# Patient Record
Sex: MALE | Race: WHITE | NOT HISPANIC OR LATINO
[De-identification: names, ages, dates, MRNs, and addresses within clinical notes are randomized per-mention and may not be internally consistent; named-entity substitution may affect disease eponyms.]

---

## 2017-02-01 ENCOUNTER — APPOINTMENT (OUTPATIENT)
Dept: CT IMAGING | Facility: IMAGING CENTER | Age: 77
End: 2017-02-01

## 2017-02-01 ENCOUNTER — OUTPATIENT (OUTPATIENT)
Dept: OUTPATIENT SERVICES | Facility: HOSPITAL | Age: 77
LOS: 1 days | End: 2017-02-01
Payer: COMMERCIAL

## 2017-02-01 DIAGNOSIS — C34.90 MALIGNANT NEOPLASM OF UNSPECIFIED PART OF UNSPECIFIED BRONCHUS OR LUNG: ICD-10-CM

## 2017-02-01 DIAGNOSIS — Z98.89 OTHER SPECIFIED POSTPROCEDURAL STATES: Chronic | ICD-10-CM

## 2017-02-01 DIAGNOSIS — R91.8 OTHER NONSPECIFIC ABNORMAL FINDING OF LUNG FIELD: Chronic | ICD-10-CM

## 2017-02-01 PROCEDURE — 71250 CT THORAX DX C-: CPT

## 2017-02-07 ENCOUNTER — APPOINTMENT (OUTPATIENT)
Dept: THORACIC SURGERY | Facility: CLINIC | Age: 77
End: 2017-02-07

## 2017-02-07 ENCOUNTER — OUTPATIENT (OUTPATIENT)
Dept: OUTPATIENT SERVICES | Facility: HOSPITAL | Age: 77
LOS: 1 days | Discharge: HOME | End: 2017-02-07

## 2017-02-07 VITALS
WEIGHT: 315 LBS | SYSTOLIC BLOOD PRESSURE: 150 MMHG | BODY MASS INDEX: 38.36 KG/M2 | HEIGHT: 76 IN | DIASTOLIC BLOOD PRESSURE: 70 MMHG | OXYGEN SATURATION: 97 % | RESPIRATION RATE: 16 BRPM | HEART RATE: 80 BPM

## 2017-02-07 DIAGNOSIS — C34.90 MALIGNANT NEOPLASM OF UNSPECIFIED PART OF UNSPECIFIED BRONCHUS OR LUNG: ICD-10-CM

## 2017-02-07 DIAGNOSIS — R91.8 OTHER NONSPECIFIC ABNORMAL FINDING OF LUNG FIELD: Chronic | ICD-10-CM

## 2017-02-07 DIAGNOSIS — Z98.89 OTHER SPECIFIED POSTPROCEDURAL STATES: Chronic | ICD-10-CM

## 2017-02-08 ENCOUNTER — APPOINTMENT (OUTPATIENT)
Dept: HEMATOLOGY ONCOLOGY | Facility: CLINIC | Age: 77
End: 2017-02-08

## 2017-05-01 ENCOUNTER — EMERGENCY (EMERGENCY)
Facility: HOSPITAL | Age: 77
LOS: 1 days | Discharge: HOME | End: 2017-05-01
Attending: EMERGENCY MEDICINE | Admitting: EMERGENCY MEDICINE
Payer: COMMERCIAL

## 2017-05-01 VITALS
DIASTOLIC BLOOD PRESSURE: 70 MMHG | OXYGEN SATURATION: 100 % | SYSTOLIC BLOOD PRESSURE: 174 MMHG | TEMPERATURE: 98 F | HEART RATE: 86 BPM | RESPIRATION RATE: 18 BRPM

## 2017-05-01 DIAGNOSIS — Z98.89 OTHER SPECIFIED POSTPROCEDURAL STATES: Chronic | ICD-10-CM

## 2017-05-01 DIAGNOSIS — R91.8 OTHER NONSPECIFIC ABNORMAL FINDING OF LUNG FIELD: Chronic | ICD-10-CM

## 2017-05-01 PROCEDURE — 99283 EMERGENCY DEPT VISIT LOW MDM: CPT

## 2017-05-01 NOTE — ED PROVIDER NOTE - PMH
Anxiety    Atrial fibrillation  s/p ablation 2006  Balanitis    Cellulitis  Both legs 2/2 chronic venous stasis  Diabetes mellitus    HTN (hypertension)    Morbid obesity    Neuropathy    Squamous cell lung cancer    Venous stasis of both lower extremities

## 2017-05-01 NOTE — ED PROVIDER NOTE - NS ED MD SCRIBE ATTENDING SCRIBE SECTIONS
DISPOSITION/HISTORY OF PRESENT ILLNESS/REVIEW OF SYSTEMS/VITAL SIGNS( Pullset)/PAST MEDICAL/SURGICAL/SOCIAL HISTORY

## 2017-05-01 NOTE — ED PROVIDER NOTE - PROGRESS NOTE DETAILS
The scribe's documentation has been prepared under my direction and personally reviewed by me in its entirety. I confirm that the note above accurately reflects all work, treatment, procedures, and medical decision making performed by me. SAI Pearce SAI Pearce - pt seen and evaluted by Dr tirado, wishes to leave before the xrays, will dc with pmd f/u.
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
## 2017-05-01 NOTE — ED PROVIDER NOTE - PSH
H/O prior ablation treatment  cardiac 2006  Lung nodules  Flexible Bronchoscopy, Right VATS, Right Lung Resection - 1/21/15, LLL partial resection 2/2015
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
## 2017-05-01 NOTE — ED PROVIDER NOTE - CARE PLAN
Principal Discharge DX:	Back pain  Instructions for follow-up, activity and diet:	YOU WILL BE FEELING SORE FOR THE NEXT 3-4 DAYS, PLEASE TAKE PERCOCET AS NEEDED FOR THE PAIN EVERY 6-8HRS - BE CAREFUL IT WILL MAKE YOU DROWSY.  SEE YOUR DOCTOR WITHIN 48HRS. RETURN TO ER FOR WORSENING PAIN, SEVERE HEADACHE, DIZZINESS, DIFFICULTY WALKING, PERSISTENT VOMITING.

## 2017-06-28 RX ORDER — RIVAROXABAN 15 MG-20MG
1 KIT ORAL
Qty: 0 | Refills: 0 | COMMUNITY
Start: 2017-06-28

## 2017-06-30 ENCOUNTER — INPATIENT (INPATIENT)
Facility: HOSPITAL | Age: 77
LOS: 4 days | Discharge: HOME | End: 2017-07-05
Attending: INTERNAL MEDICINE | Admitting: INTERNAL MEDICINE
Payer: MEDICARE

## 2017-06-30 VITALS
OXYGEN SATURATION: 100 % | HEART RATE: 143 BPM | TEMPERATURE: 98 F | RESPIRATION RATE: 18 BRPM | SYSTOLIC BLOOD PRESSURE: 159 MMHG | DIASTOLIC BLOOD PRESSURE: 76 MMHG

## 2017-06-30 DIAGNOSIS — Z98.89 OTHER SPECIFIED POSTPROCEDURAL STATES: Chronic | ICD-10-CM

## 2017-06-30 DIAGNOSIS — R91.8 OTHER NONSPECIFIC ABNORMAL FINDING OF LUNG FIELD: Chronic | ICD-10-CM

## 2017-06-30 LAB
ALBUMIN SERPL ELPH-MCNC: 3.6 G/DL — SIGNIFICANT CHANGE UP (ref 3.3–5)
ALP SERPL-CCNC: 60 U/L — SIGNIFICANT CHANGE UP (ref 40–120)
ALT FLD-CCNC: 36 U/L — SIGNIFICANT CHANGE UP (ref 4–41)
APTT BLD: 36.9 SEC — SIGNIFICANT CHANGE UP (ref 27.5–37.4)
AST SERPL-CCNC: 32 U/L — SIGNIFICANT CHANGE UP (ref 4–40)
BASE EXCESS BLDV CALC-SCNC: 1.8 MMOL/L — SIGNIFICANT CHANGE UP
BASOPHILS # BLD AUTO: 0.02 K/UL — SIGNIFICANT CHANGE UP (ref 0–0.2)
BASOPHILS NFR BLD AUTO: 0.4 % — SIGNIFICANT CHANGE UP (ref 0–2)
BILIRUB SERPL-MCNC: 0.5 MG/DL — SIGNIFICANT CHANGE UP (ref 0.2–1.2)
BLOOD GAS VENOUS - CREATININE: 1.04 MG/DL — SIGNIFICANT CHANGE UP (ref 0.5–1.3)
BUN SERPL-MCNC: 18 MG/DL — SIGNIFICANT CHANGE UP (ref 7–23)
CALCIUM SERPL-MCNC: 8.9 MG/DL — SIGNIFICANT CHANGE UP (ref 8.4–10.5)
CHLORIDE BLDV-SCNC: 105 MMOL/L — SIGNIFICANT CHANGE UP (ref 96–108)
CHLORIDE SERPL-SCNC: 101 MMOL/L — SIGNIFICANT CHANGE UP (ref 98–107)
CK MB BLD-MCNC: 5.42 NG/ML — SIGNIFICANT CHANGE UP (ref 1–6.6)
CK SERPL-CCNC: 578 U/L — HIGH (ref 30–200)
CO2 SERPL-SCNC: 22 MMOL/L — SIGNIFICANT CHANGE UP (ref 22–31)
CREAT SERPL-MCNC: 1.03 MG/DL — SIGNIFICANT CHANGE UP (ref 0.5–1.3)
EOSINOPHIL # BLD AUTO: 0.1 K/UL — SIGNIFICANT CHANGE UP (ref 0–0.5)
EOSINOPHIL NFR BLD AUTO: 1.8 % — SIGNIFICANT CHANGE UP (ref 0–6)
GAS PNL BLDV: 137 MMOL/L — SIGNIFICANT CHANGE UP (ref 136–146)
GLUCOSE BLDV-MCNC: 161 — HIGH (ref 70–99)
GLUCOSE SERPL-MCNC: 171 MG/DL — HIGH (ref 70–99)
HCO3 BLDV-SCNC: 24 MMOL/L — SIGNIFICANT CHANGE UP (ref 20–27)
HCT VFR BLD CALC: 39.3 % — SIGNIFICANT CHANGE UP (ref 39–50)
HCT VFR BLDV CALC: 42.1 % — SIGNIFICANT CHANGE UP (ref 39–51)
HGB BLD-MCNC: 13.4 G/DL — SIGNIFICANT CHANGE UP (ref 13–17)
HGB BLDV-MCNC: 13.7 G/DL — SIGNIFICANT CHANGE UP (ref 13–17)
IMM GRANULOCYTES # BLD AUTO: 0.04 # — SIGNIFICANT CHANGE UP
IMM GRANULOCYTES NFR BLD AUTO: 0.7 % — SIGNIFICANT CHANGE UP (ref 0–1.5)
INR BLD: 1.53 — HIGH (ref 0.88–1.17)
LACTATE BLDV-MCNC: 2.6 MMOL/L — HIGH (ref 0.5–2)
LYMPHOCYTES # BLD AUTO: 0.85 K/UL — LOW (ref 1–3.3)
LYMPHOCYTES # BLD AUTO: 14.9 % — SIGNIFICANT CHANGE UP (ref 13–44)
MCHC RBC-ENTMCNC: 30.7 PG — SIGNIFICANT CHANGE UP (ref 27–34)
MCHC RBC-ENTMCNC: 34.1 % — SIGNIFICANT CHANGE UP (ref 32–36)
MCV RBC AUTO: 89.9 FL — SIGNIFICANT CHANGE UP (ref 80–100)
MONOCYTES # BLD AUTO: 0.6 K/UL — SIGNIFICANT CHANGE UP (ref 0–0.9)
MONOCYTES NFR BLD AUTO: 10.5 % — SIGNIFICANT CHANGE UP (ref 2–14)
NEUTROPHILS # BLD AUTO: 4.1 K/UL — SIGNIFICANT CHANGE UP (ref 1.8–7.4)
NEUTROPHILS NFR BLD AUTO: 71.7 % — SIGNIFICANT CHANGE UP (ref 43–77)
NRBC # FLD: 0 — SIGNIFICANT CHANGE UP
NT-PROBNP SERPL-SCNC: 287 PG/ML — SIGNIFICANT CHANGE UP
PCO2 BLDV: 46 MMHG — SIGNIFICANT CHANGE UP (ref 41–51)
PH BLDV: 7.38 PH — SIGNIFICANT CHANGE UP (ref 7.32–7.43)
PLATELET # BLD AUTO: 121 K/UL — LOW (ref 150–400)
PMV BLD: 10 FL — SIGNIFICANT CHANGE UP (ref 7–13)
PO2 BLDV: < 24 MMHG — LOW (ref 35–40)
POTASSIUM BLDV-SCNC: 3.7 MMOL/L — SIGNIFICANT CHANGE UP (ref 3.4–4.5)
POTASSIUM SERPL-MCNC: 4 MMOL/L — SIGNIFICANT CHANGE UP (ref 3.5–5.3)
POTASSIUM SERPL-SCNC: 4 MMOL/L — SIGNIFICANT CHANGE UP (ref 3.5–5.3)
PROT SERPL-MCNC: 6.5 G/DL — SIGNIFICANT CHANGE UP (ref 6–8.3)
PROTHROM AB SERPL-ACNC: 17.3 SEC — HIGH (ref 9.8–13.1)
RBC # BLD: 4.37 M/UL — SIGNIFICANT CHANGE UP (ref 4.2–5.8)
RBC # FLD: 14.6 % — HIGH (ref 10.3–14.5)
SAO2 % BLDV: 36.4 % — LOW (ref 60–85)
SODIUM SERPL-SCNC: 138 MMOL/L — SIGNIFICANT CHANGE UP (ref 135–145)
TROPONIN T SERPL-MCNC: < 0.06 NG/ML — SIGNIFICANT CHANGE UP (ref 0–0.06)
WBC # BLD: 5.71 K/UL — SIGNIFICANT CHANGE UP (ref 3.8–10.5)
WBC # FLD AUTO: 5.71 K/UL — SIGNIFICANT CHANGE UP (ref 3.8–10.5)

## 2017-06-30 PROCEDURE — 71275 CT ANGIOGRAPHY CHEST: CPT | Mod: 26

## 2017-06-30 PROCEDURE — 71010: CPT | Mod: 26

## 2017-06-30 RX ORDER — METOPROLOL TARTRATE 50 MG
25 TABLET ORAL ONCE
Qty: 0 | Refills: 0 | Status: COMPLETED | OUTPATIENT
Start: 2017-06-30 | End: 2017-06-30

## 2017-06-30 RX ORDER — METOPROLOL TARTRATE 50 MG
5 TABLET ORAL ONCE
Qty: 0 | Refills: 0 | Status: COMPLETED | OUTPATIENT
Start: 2017-06-30 | End: 2017-06-30

## 2017-06-30 RX ADMIN — Medication 100 MILLIGRAM(S): at 22:04

## 2017-06-30 RX ADMIN — Medication 25 MILLIGRAM(S): at 23:11

## 2017-06-30 RX ADMIN — Medication 5 MILLIGRAM(S): at 21:50

## 2017-06-30 NOTE — ED PROVIDER NOTE - SKIN, MLM
bilateral lower extremities with skin changes associated with chronic venous stasis, RLE with greater swelling and erythema/warmth

## 2017-06-30 NOTE — ED PROVIDER NOTE - ATTENDING CONTRIBUTION TO CARE
Pt was seen and evaluated by me. Pt states he was seen yesterday at [place] for swelling to LE and found to have a right leg DVT and started on Xarelto. Pt was noted today to have increased right LE swelling. with weakness. Family notes pt has been more tired. Pt denies any fever, chills, SOB, chest pain, palpitations, nausea, vomiting, or abd pain. Lungs CTA b/l. Tachy. Abd soft, non-tender. Noted to have swelling to b/l LE R>L with erythema and warmth. + distal pulses.

## 2017-06-30 NOTE — ED PROVIDER NOTE - CRITICAL CARE PROVIDED
direct patient care (not related to procedure)/interpretation of diagnostic studies/documentation/additional history taking

## 2017-06-30 NOTE — ED PROVIDER NOTE - MEDICAL DECISION MAKING DETAILS
78 y/o M with h/o DM, HTN, DVT on xarelto, afib s/p ablation presents with worsening RLE swelling/erythema/warmth and is in afib w/ RVR. Hypertensive. Concern for PE vs cellultiis as cause of tachycardia. Rate control, IV abx, CTA PA to r/o PE

## 2017-06-30 NOTE — ED PROVIDER NOTE - CHPI ED SYMPTOMS NEG
no chest pain/no chills/no numbness/no fever/no vomiting/no nausea/no pain/no weakness no vomiting/no chest pain/no fever/no chills/no nausea

## 2017-06-30 NOTE — ED ADULT NURSE NOTE - OBJECTIVE STATEMENT
Received pt A&Ox3, respirations even and unlabored b/l. Abdomen soft, nondistended, nontender. HR in 140s, medicated with 5mg metoprolol IV as per MD Romero. Redness, swelling and warmth noted on right LE with b/l redness and swelling. IVL 20g Angiocath placed on right AC. Labs sent. Pt placed on zoll. MD sanchez at bedside. Cardiology NP at bedside. Will continue to monitor. Received pt A&Ox3, respirations even and unlabored b/l. Abdomen soft, nondistended, nontender. HR in 140s, medicated with 5mg metoprolol IV as per MD Romero. Aflutter on cardiac monitor. Redness, swelling and warmth noted on right LE with b/l redness and swelling. IVL 20g Angiocath placed on right AC. Labs sent. Pt placed on zoll. MD sanchez at bedside. Cardiology NP at bedside. Will continue to monitor. Received pt A&Ox3, respirations even and unlabored b/l. Abdomen soft, nondistended, nontender. HR in 140s, medicated with 5mg metoprolol IV as per MD Romero. Wide QRS on cardiac monitor. Redness, swelling and warmth noted on right LE with b/l redness and swelling. IVL 20g Angiocath placed on right AC. Labs sent. Pt placed on zoll. MD sanchez at bedside. Cardiology NP at bedside. Will continue to monitor.

## 2017-06-30 NOTE — ED ADULT NURSE NOTE - CHIEF COMPLAINT QUOTE
Arrives by EMS following dc from [place] yesterday for fall and diagnosed w/ DVT to right leg and found new onset AFlutter which resolved during ER visit. Today RLExt is warm/swollen.  PMHx Afib s/p ablation 2007, HTN, DM on Xarelto, Lung CA last chemo 2yrs ago. HR Noted to be in 140s in triage.  Pt denies any pain except to lower back intermitt and none at present. b/l LExt redness/ruddiness w/ severe swelling to RLExt, ambulating w/own cane. Resps even/unlabored on RA. EKG obtained.  , Charge RN made aware.    MD Collins came to [facility] to eval pt following EKG - wide complex tachy, brought to room 27

## 2017-06-30 NOTE — ED PROVIDER NOTE - OBJECTIVE STATEMENT
78 y/o M with h/o DM, HTN, DVT diagnosed earlier this week on xarelto, a-fib s/p ablation and Lung CA s/p resection of RUL and half of LLL presents with complaint of RLE swelling and redness. Patient found to be in a-fib with RVR with concerning ischemic changes in inferior leads, stemi consult called. Patient was discharged from [place] yesterday where he was found to have RLE DVT and started on xarelto. As per family swelling and redness in RLE worsened today and patient was complaining of shortness of breath.

## 2017-06-30 NOTE — ED ADULT NURSE NOTE - CHIEF COMPLAINT
The patient is a 77y Male complaining of b/l leg swelling, recent discharge from [place] and dx with DVT, aflutter that resolved in [place]. Pt c/o int lower back pain.

## 2017-06-30 NOTE — ED PROVIDER NOTE - PROGRESS NOTE DETAILS
Pt was brought back with abnormal EKG and upfront physician called Cardio and was faxed. Cardio NP contacted. Discussed case with Cardio NP with HR in 140s and SBP 180s and it recommended to give Metoprolol 5mg IV. Pt denies any palpitations, SOB, or chest pain. After Metoprolol pts HR down to 130s and SBP down to 135. Pt still denies any chest pain, SOB, or palpitations.

## 2017-07-01 DIAGNOSIS — L03.90 CELLULITIS, UNSPECIFIED: ICD-10-CM

## 2017-07-01 DIAGNOSIS — I49.9 CARDIAC ARRHYTHMIA, UNSPECIFIED: ICD-10-CM

## 2017-07-01 DIAGNOSIS — E11.9 TYPE 2 DIABETES MELLITUS WITHOUT COMPLICATIONS: ICD-10-CM

## 2017-07-01 DIAGNOSIS — C34.90 MALIGNANT NEOPLASM OF UNSPECIFIED PART OF UNSPECIFIED BRONCHUS OR LUNG: ICD-10-CM

## 2017-07-01 DIAGNOSIS — I82.409 ACUTE EMBOLISM AND THROMBOSIS OF UNSPECIFIED DEEP VEINS OF UNSPECIFIED LOWER EXTREMITY: ICD-10-CM

## 2017-07-01 DIAGNOSIS — I87.8 OTHER SPECIFIED DISORDERS OF VEINS: ICD-10-CM

## 2017-07-01 DIAGNOSIS — I10 ESSENTIAL (PRIMARY) HYPERTENSION: ICD-10-CM

## 2017-07-01 DIAGNOSIS — I48.91 UNSPECIFIED ATRIAL FIBRILLATION: ICD-10-CM

## 2017-07-01 LAB
APTT BLD: 35.3 SEC — SIGNIFICANT CHANGE UP (ref 27.5–37.4)
BASOPHILS # BLD AUTO: 0.03 K/UL — SIGNIFICANT CHANGE UP (ref 0–0.2)
BASOPHILS NFR BLD AUTO: 0.4 % — SIGNIFICANT CHANGE UP (ref 0–2)
BUN SERPL-MCNC: 17 MG/DL — SIGNIFICANT CHANGE UP (ref 7–23)
CALCIUM SERPL-MCNC: 8.6 MG/DL — SIGNIFICANT CHANGE UP (ref 8.4–10.5)
CHLORIDE SERPL-SCNC: 102 MMOL/L — SIGNIFICANT CHANGE UP (ref 98–107)
CHOLEST SERPL-MCNC: 116 MG/DL — LOW (ref 120–199)
CK MB BLD-MCNC: 3.45 NG/ML — SIGNIFICANT CHANGE UP (ref 1–6.6)
CK SERPL-CCNC: 437 U/L — HIGH (ref 30–200)
CO2 SERPL-SCNC: 21 MMOL/L — LOW (ref 22–31)
CREAT SERPL-MCNC: 0.94 MG/DL — SIGNIFICANT CHANGE UP (ref 0.5–1.3)
EOSINOPHIL # BLD AUTO: 0.07 K/UL — SIGNIFICANT CHANGE UP (ref 0–0.5)
EOSINOPHIL NFR BLD AUTO: 0.9 % — SIGNIFICANT CHANGE UP (ref 0–6)
GLUCOSE SERPL-MCNC: 145 MG/DL — HIGH (ref 70–99)
HCT VFR BLD CALC: 41.2 % — SIGNIFICANT CHANGE UP (ref 39–50)
HDLC SERPL-MCNC: 22 MG/DL — LOW (ref 35–55)
HGB BLD-MCNC: 13.7 G/DL — SIGNIFICANT CHANGE UP (ref 13–17)
IMM GRANULOCYTES # BLD AUTO: 0.07 # — SIGNIFICANT CHANGE UP
IMM GRANULOCYTES NFR BLD AUTO: 0.9 % — SIGNIFICANT CHANGE UP (ref 0–1.5)
INR BLD: 1.31 — HIGH (ref 0.88–1.17)
LACTATE SERPL-SCNC: 1.7 MMOL/L — SIGNIFICANT CHANGE UP (ref 0.5–2)
LIPID PNL WITH DIRECT LDL SERPL: 72 MG/DL — SIGNIFICANT CHANGE UP
LYMPHOCYTES # BLD AUTO: 1.09 K/UL — SIGNIFICANT CHANGE UP (ref 1–3.3)
LYMPHOCYTES # BLD AUTO: 14.7 % — SIGNIFICANT CHANGE UP (ref 13–44)
MAGNESIUM SERPL-MCNC: 2 MG/DL — SIGNIFICANT CHANGE UP (ref 1.6–2.6)
MCHC RBC-ENTMCNC: 29.5 PG — SIGNIFICANT CHANGE UP (ref 27–34)
MCHC RBC-ENTMCNC: 33.3 % — SIGNIFICANT CHANGE UP (ref 32–36)
MCV RBC AUTO: 88.8 FL — SIGNIFICANT CHANGE UP (ref 80–100)
MONOCYTES # BLD AUTO: 0.71 K/UL — SIGNIFICANT CHANGE UP (ref 0–0.9)
MONOCYTES NFR BLD AUTO: 9.5 % — SIGNIFICANT CHANGE UP (ref 2–14)
NEUTROPHILS # BLD AUTO: 5.47 K/UL — SIGNIFICANT CHANGE UP (ref 1.8–7.4)
NEUTROPHILS NFR BLD AUTO: 73.6 % — SIGNIFICANT CHANGE UP (ref 43–77)
NRBC # FLD: 0 — SIGNIFICANT CHANGE UP
PHOSPHATE SERPL-MCNC: 2.8 MG/DL — SIGNIFICANT CHANGE UP (ref 2.5–4.5)
PLATELET # BLD AUTO: 128 K/UL — LOW (ref 150–400)
PMV BLD: 9.8 FL — SIGNIFICANT CHANGE UP (ref 7–13)
POTASSIUM SERPL-MCNC: 4.6 MMOL/L — SIGNIFICANT CHANGE UP (ref 3.5–5.3)
POTASSIUM SERPL-SCNC: 4.6 MMOL/L — SIGNIFICANT CHANGE UP (ref 3.5–5.3)
PROTHROM AB SERPL-ACNC: 14.7 SEC — HIGH (ref 9.8–13.1)
RBC # BLD: 4.64 M/UL — SIGNIFICANT CHANGE UP (ref 4.2–5.8)
RBC # FLD: 14.9 % — HIGH (ref 10.3–14.5)
SODIUM SERPL-SCNC: 140 MMOL/L — SIGNIFICANT CHANGE UP (ref 135–145)
TRIGL SERPL-MCNC: 137 MG/DL — SIGNIFICANT CHANGE UP (ref 10–149)
TROPONIN T SERPL-MCNC: < 0.06 NG/ML — SIGNIFICANT CHANGE UP (ref 0–0.06)
WBC # BLD: 7.44 K/UL — SIGNIFICANT CHANGE UP (ref 3.8–10.5)
WBC # FLD AUTO: 7.44 K/UL — SIGNIFICANT CHANGE UP (ref 3.8–10.5)

## 2017-07-01 RX ORDER — INSULIN LISPRO 100/ML
VIAL (ML) SUBCUTANEOUS AT BEDTIME
Qty: 0 | Refills: 0 | Status: DISCONTINUED | OUTPATIENT
Start: 2017-07-01 | End: 2017-07-05

## 2017-07-01 RX ORDER — ACETAMINOPHEN 500 MG
650 TABLET ORAL EVERY 6 HOURS
Qty: 0 | Refills: 0 | Status: DISCONTINUED | OUTPATIENT
Start: 2017-07-01 | End: 2017-07-05

## 2017-07-01 RX ORDER — FERROUS SULFATE 325(65) MG
325 TABLET ORAL DAILY
Qty: 0 | Refills: 0 | Status: DISCONTINUED | OUTPATIENT
Start: 2017-07-01 | End: 2017-07-05

## 2017-07-01 RX ORDER — SODIUM CHLORIDE 9 MG/ML
1000 INJECTION, SOLUTION INTRAVENOUS
Qty: 0 | Refills: 0 | Status: DISCONTINUED | OUTPATIENT
Start: 2017-07-01 | End: 2017-07-05

## 2017-07-01 RX ORDER — ALBUTEROL 90 UG/1
2 AEROSOL, METERED ORAL EVERY 4 HOURS
Qty: 0 | Refills: 0 | Status: DISCONTINUED | OUTPATIENT
Start: 2017-07-01 | End: 2017-07-05

## 2017-07-01 RX ORDER — RIVAROXABAN 15 MG-20MG
15 KIT ORAL
Qty: 0 | Refills: 0 | Status: DISCONTINUED | OUTPATIENT
Start: 2017-07-01 | End: 2017-07-05

## 2017-07-01 RX ORDER — DEXTROSE 50 % IN WATER 50 %
1 SYRINGE (ML) INTRAVENOUS ONCE
Qty: 0 | Refills: 0 | Status: DISCONTINUED | OUTPATIENT
Start: 2017-07-01 | End: 2017-07-05

## 2017-07-01 RX ORDER — DEXTROSE 50 % IN WATER 50 %
12.5 SYRINGE (ML) INTRAVENOUS ONCE
Qty: 0 | Refills: 0 | Status: DISCONTINUED | OUTPATIENT
Start: 2017-07-01 | End: 2017-07-05

## 2017-07-01 RX ORDER — GABAPENTIN 400 MG/1
800 CAPSULE ORAL THREE TIMES A DAY
Qty: 0 | Refills: 0 | Status: DISCONTINUED | OUTPATIENT
Start: 2017-07-01 | End: 2017-07-05

## 2017-07-01 RX ORDER — METOPROLOL TARTRATE 50 MG
50 TABLET ORAL
Qty: 0 | Refills: 0 | Status: DISCONTINUED | OUTPATIENT
Start: 2017-07-01 | End: 2017-07-05

## 2017-07-01 RX ORDER — METOPROLOL TARTRATE 50 MG
12.5 TABLET ORAL
Qty: 0 | Refills: 0 | Status: DISCONTINUED | OUTPATIENT
Start: 2017-07-01 | End: 2017-07-01

## 2017-07-01 RX ORDER — INSULIN LISPRO 100/ML
VIAL (ML) SUBCUTANEOUS
Qty: 0 | Refills: 0 | Status: DISCONTINUED | OUTPATIENT
Start: 2017-07-01 | End: 2017-07-05

## 2017-07-01 RX ORDER — GLUCAGON INJECTION, SOLUTION 0.5 MG/.1ML
1 INJECTION, SOLUTION SUBCUTANEOUS ONCE
Qty: 0 | Refills: 0 | Status: DISCONTINUED | OUTPATIENT
Start: 2017-07-01 | End: 2017-07-05

## 2017-07-01 RX ORDER — DEXTROSE 50 % IN WATER 50 %
25 SYRINGE (ML) INTRAVENOUS ONCE
Qty: 0 | Refills: 0 | Status: DISCONTINUED | OUTPATIENT
Start: 2017-07-01 | End: 2017-07-05

## 2017-07-01 RX ORDER — ACETAMINOPHEN 500 MG
650 TABLET ORAL ONCE
Qty: 0 | Refills: 0 | Status: COMPLETED | OUTPATIENT
Start: 2017-07-01 | End: 2017-07-01

## 2017-07-01 RX ORDER — FUROSEMIDE 40 MG
40 TABLET ORAL
Qty: 0 | Refills: 0 | Status: DISCONTINUED | OUTPATIENT
Start: 2017-07-01 | End: 2017-07-04

## 2017-07-01 RX ORDER — AMLODIPINE BESYLATE 2.5 MG/1
5 TABLET ORAL DAILY
Qty: 0 | Refills: 0 | Status: DISCONTINUED | OUTPATIENT
Start: 2017-07-01 | End: 2017-07-05

## 2017-07-01 RX ORDER — GABAPENTIN 400 MG/1
800 CAPSULE ORAL
Qty: 0 | Refills: 0 | Status: DISCONTINUED | OUTPATIENT
Start: 2017-07-01 | End: 2017-07-01

## 2017-07-01 RX ADMIN — Medication 10 MILLIGRAM(S): at 13:43

## 2017-07-01 RX ADMIN — GABAPENTIN 800 MILLIGRAM(S): 400 CAPSULE ORAL at 18:14

## 2017-07-01 RX ADMIN — Medication 100 MILLIGRAM(S): at 13:42

## 2017-07-01 RX ADMIN — Medication 100 MILLIGRAM(S): at 21:46

## 2017-07-01 RX ADMIN — Medication 325 MILLIGRAM(S): at 12:30

## 2017-07-01 RX ADMIN — GABAPENTIN 800 MILLIGRAM(S): 400 CAPSULE ORAL at 10:02

## 2017-07-01 RX ADMIN — Medication 2: at 09:04

## 2017-07-01 RX ADMIN — Medication 2: at 12:30

## 2017-07-01 RX ADMIN — AMLODIPINE BESYLATE 5 MILLIGRAM(S): 2.5 TABLET ORAL at 13:42

## 2017-07-01 RX ADMIN — Medication 40 MILLIGRAM(S): at 18:14

## 2017-07-01 RX ADMIN — GABAPENTIN 800 MILLIGRAM(S): 400 CAPSULE ORAL at 21:46

## 2017-07-01 RX ADMIN — Medication 100 MILLIGRAM(S): at 07:27

## 2017-07-01 RX ADMIN — Medication 650 MILLIGRAM(S): at 00:35

## 2017-07-01 RX ADMIN — Medication 50 MILLIGRAM(S): at 18:14

## 2017-07-01 RX ADMIN — RIVAROXABAN 15 MILLIGRAM(S): KIT at 18:14

## 2017-07-01 NOTE — H&P ADULT - PMH
Anxiety    Atrial fibrillation  s/p ablation 2006  Balanitis    Cellulitis  Both legs 2/2 chronic venous stasis  Diabetes mellitus    DVT (deep venous thrombosis)  RLE dx 6/28/17  HTN (hypertension)    Morbid obesity    Neuropathy    Squamous cell lung cancer    Venous stasis of both lower extremities

## 2017-07-01 NOTE — CONSULT NOTE ADULT - PROBLEM SELECTOR RECOMMENDATION 9
Admit to medicine  -cardiology consulted  for  possible STEMI ,but EKG more concerning right heart strain   -Given patient chest pain free , incidental  finding of ST changes in EKG   likely 2/2 R Heart strain and on   Xarelto (last dose 6/30) patient  deemed not  a candidate for  urgent C  -Echo in am  -CTPA chest to r/o PE Admit to medicine  -cardiology consulted  for  possible STEMI ,but EKG more concerning right heart strain   -Given patient chest pain free , incidental  finding of ST changes in EKG   likely 2/2 R Heart strain and on   Xarelto (last dose 6/30) patient  deemed not  a candidate for  urgent LHC  Trend CE x3  -Echo in am  -CTPA chest to r/o PE

## 2017-07-01 NOTE — CONSULT NOTE ADULT - SUBJECTIVE AND OBJECTIVE BOX
CHIEF COMPLAINT :   78 y/o male, with a PmHx of DM, HTN, Morbid Obesity, Afib s/p ablation in 2006, Lung ca w/RUL resection 1/21/15 and LLL partial resection 2/2015, was discharged from [facility] on 6/28/17 after being diagnosed with DVT and started on Xarelto, presented to [facility] by EMS with RLE swelling and sob. Pt states while at home yesterday, his wife noticed his right lower leg was red and swollen even more than it normally is so she called 911. Pt denies any fever, chills, chest pain, HA, dizziness, blurred vision, n/v. Pt states he is always sob secondary to the lung resection. In the ED at [facility] today, he was found to be in Afib w/RVR so he was admitted to telemetry for further medical management.    HISTORY OF PRESENT ILLNESS:    PAST MEDICAL & SURGICAL HISTORY:  DVT (deep venous thrombosis): RLE dx 6/28/17  Venous stasis of both lower extremities  Balanitis  Squamous cell lung cancer  Atrial fibrillation: s/p ablation 2006  Cellulitis: Both legs 2/2 chronic venous stasis  Morbid obesity  Anxiety  Neuropathy  Diabetes mellitus  HTN (hypertension)  Lung nodules: Flexible Bronchoscopy, Right VATS, Right Lung Resection - 1/21/15, LLL partial resection 2/2015  H/O prior ablation treatment: cardiac 2006   	    MEDICATIONS:  rivaroxaban 15 milliGRAM(s) Oral two times a day  amLODIPine   Tablet 5 milliGRAM(s) Oral daily  furosemide   Injectable 40 milliGRAM(s) IV Push two times a day  metoprolol 50 milliGRAM(s) Oral two times a day  clindamycin IVPB 600 milliGRAM(s) IV Intermittent every 8 hours  ALBUTerol    90 MICROgram(s) HFA Inhaler 2 Puff(s) Inhalation every 4 hours PRN  acetaminophen   Tablet. 650 milliGRAM(s) Oral every 6 hours PRN  oxyCODONE  5 mG/acetaminophen 325 mG 1 Tablet(s) Oral every 6 hours PRN  busPIRone 10 milliGRAM(s) Oral two times a day  gabapentin 800 milliGRAM(s) Oral three times a day  insulin lispro (HumaLOG) corrective regimen sliding scale   SubCutaneous three times a day before meals  insulin lispro (HumaLOG) corrective regimen sliding scale   SubCutaneous at bedtime  dextrose Gel 1 Dose(s) Oral once PRN  dextrose 50% Injectable 12.5 Gram(s) IV Push once  dextrose 50% Injectable 25 Gram(s) IV Push once  dextrose 50% Injectable 25 Gram(s) IV Push once  glucagon  Injectable 1 milliGRAM(s) IntraMuscular once PRN  dextrose 5%. 1000 milliLiter(s) IV Continuous <Continuous>  ferrous    sulfate 325 milliGRAM(s) Oral daily      FAMILY HISTORY:  Family history of liver cancer (Sibling): sister  Family history of diabetes mellitus (Sibling): Brother  Family history of heart failure (Father): father    Allergies    No Known Allergies    Intolerances      PHYSICAL EXAM:  T(C): 36.4 (07-01-17 @ 05:15), Max: 37.8 (07-01-17 @ 00:23)  HR: 72 (07-01-17 @ 05:15) (72 - 144)  BP: 152/66 (07-01-17 @ 05:15) (111/69 - 171/66)  RR: 18 (07-01-17 @ 05:15) (18 - 22)  SpO2: 98% (07-01-17 @ 05:15) (95% - 100%)  Wt(kg): --  I&O's Summary    30 Jun 2017 07:01  -  01 Jul 2017 07:00  --------------------------------------------------------  IN: 0 mL / OUT: 600 mL / NET: -600 mL    01 Jul 2017 07:01  -  01 Jul 2017 11:46  --------------------------------------------------------  IN: 350 mL / OUT: 0 mL / NET: 350 mL        Appearance: Normal	  HEENT:   no JVD  Cardiovascular: Normal S1 S2, No JVD, No murmurs, No edema  Respiratory: Lungs clear to auscultation	  Gastrointestinal:  Soft, Non-tender, + BS	  Extremities: right lower extremity swelling and tenderness and warmth      CKMB: 3.45 ng/mL (07-01 @ 05:01)  CKMB: 5.42 ng/mL (06-30 @ 21:40)                              13.7   7.44  )-----------( 128      ( 01 Jul 2017 05:01 )             41.2     07-01    140  |  102  |  17  ----------------------------<  145<H>  4.6   |  21<L>  |  0.94    Ca    8.6      01 Jul 2017 05:01  Phos  2.8     07-01  Mg     2.0     07-01    TPro  6.5  /  Alb  3.6  /  TBili  0.5  /  DBili  x   /  AST  32  /  ALT  36  /  AlkPhos  60  06-30    proBNP: Serum Pro-Brain Natriuretic Peptide: 287.0 pg/mL (06-30 @ 21:40)    Lipid Profile:   HgA1c:   TSH:     ASSESSMENT/PLAN:

## 2017-07-01 NOTE — H&P ADULT - NSHPPHYSICALEXAM_GEN_ALL_CORE
Vital Signs Last 24 Hrs  T(C): 36.4 (01 Jul 2017 05:15), Max: 37.8 (01 Jul 2017 00:23)  T(F): 97.5 (01 Jul 2017 05:15), Max: 100 (01 Jul 2017 00:23)  HR: 72 (01 Jul 2017 05:15) (72 - 144)  BP: 152/66 (01 Jul 2017 05:15) (111/69 - 171/66)  BP(mean): --  RR: 18 (01 Jul 2017 05:15) (18 - 22)  SpO2: 98% (01 Jul 2017 05:15) (95% - 100%)    EKG: Afib @ 121, RBBB, T inv III, V2-4, ST dep I, AVL, ST inc III, AVF

## 2017-07-01 NOTE — H&P ADULT - ASSESSMENT
78 y/o male, with a PmHx of DM, HTN, Morbid Obesity, Afib s/p ablation in 2006, Lung ca w/RUL resection 1/21/15 and LLL partial resection 2/2015, was discharged from [facility] on 6/28/17 after being diagnosed with DVT and started on Xarelto, is being admitted to telemetry for RLE cellulitis and afib w/RVR.

## 2017-07-01 NOTE — CONSULT NOTE ADULT - ASSESSMENT
EKG - SVT @ 140 bpm  Tele - several episodes of SVT, no afib     1) SVT - ? aflutter vs atrial tach with 2:1 conduction, increase lopressor to 50mg q12, cont xarelto , get 2d echo , will consider EP consult, has h/o afib ablation    2) Right leg cellulitis - on ciprofloxacin     3) SOB - CT chest negative for PE

## 2017-07-01 NOTE — H&P ADULT - NEGATIVE OPHTHALMOLOGIC SYMPTOMS
no diplopia/no loss of vision R/no blurred vision R/no photophobia/no loss of vision L/no blurred vision L

## 2017-07-01 NOTE — CONSULT NOTE ADULT - SUBJECTIVE AND OBJECTIVE BOX
Patient is a 77y old  Male who presents with a chief complaint of     HPI:·76 y/o M with h/o DM, HTN, DVT diagnosed earlier this week on xarelto, a-fib s/p ablation 2006 ,Small cell  Lung CA s/p resection of RUL and half of LLLin 2015 .who was brought by family concerning worsening shortness of breath , change in mental status and worsening  RLE swelling and redness He was recently admitted to  [facility] s/p fall and discharge on  6/28 on xeralto for+ RLE DVT.  As per family swelling and redness in RLE worsened today and patient was complaining of shortness of breath and weakness .Patient found to be in wide complex tachycardia with RVR with concerning ischemic changes in inferior leads, stemi consult called.In ED EKG reviewed with on call cath interventionalist .EKG showed STD I,aVL,V2-V6,KEILA in III,aVF with RBBB, likely PE . CKMB and trop negative	        PAST MEDICAL & SURGICAL HISTORY:  Venous stasis of both lower extremities  Balanitis  Squamous cell lung cancer  Atrial fibrillation: s/p ablation 2006   Cellulitis: Both legs 2/2 chronic venous stasis  Morbid obesity  Anxiety  Neuropathy  Diabetes mellitus  HTN (hypertension)  Lung nodules: Flexible Bronchoscopy, Right VATS, Right Lung Resection - 1/21/15, LLL partial resection 2/2015  H/O prior ablation treatment: cardiac 2006      FAMILY HISTORY:  Family history of liver cancer (Sibling)  Family history of diabetes mellitus (Sibling)  Family history of heart failure      Social History:  former smoker[x  ]   Non smoker[  ]   Alcohol[ ] amount[   ]       nonalcohol[ ]   ilicit drug use[  ]         Previous Diagnostic Testing:    Echo:    Cath:    Stress Test:    Allergies:No Known Allergies      Medication:  Albuterol 90mcg HF 2 puff Q4 as needed  Norvasc 5mg PO daily  Buspirone 10mg bid  Feso4 325mg daily  gabapentin 800mg TID  metformin 500mg daily  glipizide 1 mg daily  Oxycodone 5/325mg as needed  Xarelto 15mg bid  Lopressor 12.5mg bid          Vital Signs:  Vital Signs Last 24 Hrs  T(C): 37.1 (30 Jun 2017 22:37), Max: 37.1 (30 Jun 2017 22:37)  T(F): 98.8 (30 Jun 2017 22:37), Max: 98.8 (30 Jun 2017 22:37)  HR: 135 (30 Jun 2017 22:37) (134 - 144)  BP: 132/79 (30 Jun 2017 22:37) (111/69 - 171/66)  BP(mean): --  RR: 20 (30 Jun 2017 22:37) (18 - 22)  SpO2: 99% (30 Jun 2017 22:37) (97% - 100%)    EKG: Wide complex tachycardia with RBBB      Labs:                        13.4   5.71  )-----------( 121      ( 30 Jun 2017 21:40 )             39.3     06-30    138  |  101  |  18  ----------------------------<  171<H>  4.0   |  22  |  1.03    Ca    8.9      30 Jun 2017 21:40    TPro  6.5  /  Alb  3.6  /  TBili  0.5  /  DBili  x   /  AST  32  /  ALT  36  /  AlkPhos  60  06-30    PT/INR - ( 30 Jun 2017 21:40 )   PT: 17.3 SEC;   INR: 1.53          PTT - ( 30 Jun 2017 21:40 )  PTT:36.9 SEC  CE Creatine Kinase, Serum: 578 u/L (06-30 @ 21:40)  CKMB: 5.42 ng/mL (06-30 @ 21:40)  Troponin T, Serum: < 0.06 ng/mL (06-30 @ 21:40)    pro BNP Serum Pro-Brain Natriuretic Peptide: 287.0 pg/mL (06-30 @ 21:40)    lipid   LIVER FUNCTIONS - ( 30 Jun 2017 21:40 )  Alb: 3.6 g/dL / Pro: 6.5 g/dL / ALK PHOS: 60 u/L / ALT: 36 u/L / AST: 32 u/L / GGT: x

## 2017-07-01 NOTE — H&P ADULT - HISTORY OF PRESENT ILLNESS
76 y/o male, with a PmHx of DM, HTN, Morbid Obesity, Afib s/p ablation in 2006, Lung ca w/RUL resection 1/21/15 and LLL partial resection 2/2015, was discharged from [facility] on 6/28/17 after being diagnosed with DVT and started on Xarelto, presented to [facility] by EMS with RLE swelling and sob. Pt states while at home yesterday, his wife noticed his right lower leg was red and swollen even more than it normally is so she called 911. Pt denies any fever, chills, chest pain, HA, dizziness, blurred vision, n/v. Pt states he is always sob secondary to the lung resection. In the ED at [facility] today, he was found to be in Afib w/RVR so he was admitted to telemetry for further medical management.

## 2017-07-01 NOTE — H&P ADULT - FAMILY HISTORY
Sibling  Still living? No  Family history of diabetes mellitus, Age at diagnosis: Age Unknown  Family history of liver cancer, Age at diagnosis: Age Unknown     Father  Still living? No  Family history of heart failure, Age at diagnosis: Age Unknown

## 2017-07-01 NOTE — CONSULT NOTE ADULT - ASSESSMENT
78 y/o M with h/o DM, HTN, DVT diagnosed earlier this week on xarelto, a-fib s/p ablation 2006 ,Small cell  Lung CA s/p resection of RUL and half of LLLin 2015 presents with worsening shortness of breath,weakness and RLE swelling and reddness found in wide complex tachycardia with RBBB  concerning PE

## 2017-07-02 LAB
BUN SERPL-MCNC: 15 MG/DL — SIGNIFICANT CHANGE UP (ref 7–23)
CALCIUM SERPL-MCNC: 9 MG/DL — SIGNIFICANT CHANGE UP (ref 8.4–10.5)
CHLORIDE SERPL-SCNC: 99 MMOL/L — SIGNIFICANT CHANGE UP (ref 98–107)
CO2 SERPL-SCNC: 24 MMOL/L — SIGNIFICANT CHANGE UP (ref 22–31)
CREAT SERPL-MCNC: 0.98 MG/DL — SIGNIFICANT CHANGE UP (ref 0.5–1.3)
GLUCOSE SERPL-MCNC: 130 MG/DL — HIGH (ref 70–99)
HBA1C BLD-MCNC: 6.2 % — HIGH (ref 4–5.6)
HCT VFR BLD CALC: 42.5 % — SIGNIFICANT CHANGE UP (ref 39–50)
HGB BLD-MCNC: 14.1 G/DL — SIGNIFICANT CHANGE UP (ref 13–17)
MCHC RBC-ENTMCNC: 29.6 PG — SIGNIFICANT CHANGE UP (ref 27–34)
MCHC RBC-ENTMCNC: 33.2 % — SIGNIFICANT CHANGE UP (ref 32–36)
MCV RBC AUTO: 89.1 FL — SIGNIFICANT CHANGE UP (ref 80–100)
NRBC # FLD: 0 — SIGNIFICANT CHANGE UP
PLATELET # BLD AUTO: 129 K/UL — LOW (ref 150–400)
PMV BLD: 10 FL — SIGNIFICANT CHANGE UP (ref 7–13)
POTASSIUM SERPL-MCNC: 3.9 MMOL/L — SIGNIFICANT CHANGE UP (ref 3.5–5.3)
POTASSIUM SERPL-SCNC: 3.9 MMOL/L — SIGNIFICANT CHANGE UP (ref 3.5–5.3)
RBC # BLD: 4.77 M/UL — SIGNIFICANT CHANGE UP (ref 4.2–5.8)
RBC # FLD: 14.9 % — HIGH (ref 10.3–14.5)
SODIUM SERPL-SCNC: 139 MMOL/L — SIGNIFICANT CHANGE UP (ref 135–145)
WBC # BLD: 8.09 K/UL — SIGNIFICANT CHANGE UP (ref 3.8–10.5)
WBC # FLD AUTO: 8.09 K/UL — SIGNIFICANT CHANGE UP (ref 3.8–10.5)

## 2017-07-02 PROCEDURE — 99222 1ST HOSP IP/OBS MODERATE 55: CPT | Mod: GC

## 2017-07-02 RX ORDER — CEFAZOLIN SODIUM 1 G
1000 VIAL (EA) INJECTION ONCE
Qty: 0 | Refills: 0 | Status: COMPLETED | OUTPATIENT
Start: 2017-07-02 | End: 2017-07-02

## 2017-07-02 RX ORDER — CEFAZOLIN SODIUM 1 G
VIAL (EA) INJECTION
Qty: 0 | Refills: 0 | Status: DISCONTINUED | OUTPATIENT
Start: 2017-07-02 | End: 2017-07-05

## 2017-07-02 RX ORDER — CEFAZOLIN SODIUM 1 G
1000 VIAL (EA) INJECTION EVERY 8 HOURS
Qty: 0 | Refills: 0 | Status: DISCONTINUED | OUTPATIENT
Start: 2017-07-02 | End: 2017-07-05

## 2017-07-02 RX ADMIN — Medication 10 MILLIGRAM(S): at 18:15

## 2017-07-02 RX ADMIN — AMLODIPINE BESYLATE 5 MILLIGRAM(S): 2.5 TABLET ORAL at 05:48

## 2017-07-02 RX ADMIN — Medication 50 MILLIGRAM(S): at 18:15

## 2017-07-02 RX ADMIN — Medication 10 MILLIGRAM(S): at 05:48

## 2017-07-02 RX ADMIN — Medication: at 17:42

## 2017-07-02 RX ADMIN — RIVAROXABAN 15 MILLIGRAM(S): KIT at 05:48

## 2017-07-02 RX ADMIN — GABAPENTIN 800 MILLIGRAM(S): 400 CAPSULE ORAL at 05:48

## 2017-07-02 RX ADMIN — Medication 100 MILLIGRAM(S): at 15:43

## 2017-07-02 RX ADMIN — Medication 40 MILLIGRAM(S): at 05:48

## 2017-07-02 RX ADMIN — Medication 325 MILLIGRAM(S): at 15:43

## 2017-07-02 RX ADMIN — GABAPENTIN 800 MILLIGRAM(S): 400 CAPSULE ORAL at 21:38

## 2017-07-02 RX ADMIN — Medication 100 MILLIGRAM(S): at 13:54

## 2017-07-02 RX ADMIN — RIVAROXABAN 15 MILLIGRAM(S): KIT at 18:15

## 2017-07-02 RX ADMIN — Medication 100 MILLIGRAM(S): at 05:50

## 2017-07-02 RX ADMIN — GABAPENTIN 800 MILLIGRAM(S): 400 CAPSULE ORAL at 15:43

## 2017-07-02 RX ADMIN — Medication 40 MILLIGRAM(S): at 18:15

## 2017-07-02 RX ADMIN — Medication 50 MILLIGRAM(S): at 05:48

## 2017-07-02 RX ADMIN — Medication 100 MILLIGRAM(S): at 21:38

## 2017-07-02 RX ADMIN — Medication 2: at 12:14

## 2017-07-02 RX ADMIN — Medication: at 08:50

## 2017-07-02 NOTE — CONSULT NOTE ADULT - ATTENDING COMMENTS
-suspect Rt LE strep cellulitis  -better  -DC clinda  -ancef 1 gm iv q8  -if stable in AM, change to po mike Rosario  Division of Infectious Disease  Pager [phone]  After 5pm/weekend #[phone]

## 2017-07-02 NOTE — PROGRESS NOTE ADULT - SUBJECTIVE AND OBJECTIVE BOX
INTERVAL HISTORY: Pt is lying in bed comfortable, leg pain improving, no chest pain noSOB  	  MEDICATIONS:  rivaroxaban 15 milliGRAM(s) Oral two times a day  amLODIPine   Tablet 5 milliGRAM(s) Oral daily  furosemide   Injectable 40 milliGRAM(s) IV Push two times a day  metoprolol 50 milliGRAM(s) Oral two times a day  ceFAZolin   IVPB 1000 milliGRAM(s) IV Intermittent every 8 hours  ceFAZolin   IVPB   IV Intermittent   ALBUTerol    90 MICROgram(s) HFA Inhaler 2 Puff(s) Inhalation every 4 hours PRN  acetaminophen   Tablet. 650 milliGRAM(s) Oral every 6 hours PRN  oxyCODONE  5 mG/acetaminophen 325 mG 1 Tablet(s) Oral every 6 hours PRN  busPIRone 10 milliGRAM(s) Oral two times a day  gabapentin 800 milliGRAM(s) Oral three times a day  insulin lispro (HumaLOG) corrective regimen sliding scale   SubCutaneous three times a day before meals  insulin lispro (HumaLOG) corrective regimen sliding scale   SubCutaneous at bedtime  dextrose Gel 1 Dose(s) Oral once PRN  dextrose 50% Injectable 12.5 Gram(s) IV Push once  dextrose 50% Injectable 25 Gram(s) IV Push once  dextrose 50% Injectable 25 Gram(s) IV Push once  glucagon  Injectable 1 milliGRAM(s) IntraMuscular once PRN  dextrose 5%. 1000 milliLiter(s) IV Continuous <Continuous>  ferrous    sulfate 325 milliGRAM(s) Oral daily      PHYSICAL EXAM:  T(C): 36.3 (07-02-17 @ 13:53), Max: 36.5 (07-01-17 @ 20:09)  HR: 78 (07-02-17 @ 13:53) (74 - 82)  BP: 123/97 (07-02-17 @ 13:53) (123/97 - 154/56)  RR: 18 (07-02-17 @ 13:53) (17 - 18)  SpO2: 98% (07-02-17 @ 13:53) (96% - 100%)  Wt(kg): --  I&O's Summary    01 Jul 2017 07:01  -  02 Jul 2017 07:00  --------------------------------------------------------  IN: 850 mL / OUT: 2 mL / NET: 848 mL    02 Jul 2017 07:01  -  02 Jul 2017 15:48  --------------------------------------------------------  IN: 360 mL / OUT: 0 mL / NET: 360 mL          Appearance: Normal	  HEENT:   Normal oral mucosa, PERRL, EOMI	  Cardiovascular: Normal S1 S2, No JVD, No murmurs, No edema  Respiratory: Lungs clear to auscultation	  Gastrointestinal:  Soft, Non-tender, + BS	  Extremities: b/l lower ext swelling right > left, +erythema                                    14.1   8.09  )-----------( 129      ( 02 Jul 2017 05:10 )             42.5     07-02    139  |  99  |  15  ----------------------------<  130<H>  3.9   |  24  |  0.98    Ca    9.0      02 Jul 2017 06:30  Phos  2.8     07-01  Mg     2.0     07-01    TPro  6.5  /  Alb  3.6  /  TBili  0.5  /  DBili  x   /  AST  32  /  ALT  36  /  AlkPhos  60  06-30    proBNP:   Lipid Profile:   HgA1c: Hemoglobin A1C, Whole Blood: 6.2 % (07-02 @ 05:10)    TSH:

## 2017-07-02 NOTE — CONSULT NOTE ADULT - ASSESSMENT
76 y/o male with DM, HTN, Morbid Obesity, Afib s/p ablation, Lung ca w/ RUL resection and LLL partial resection, was discharged from [facility] on 6/28/17 with DVT on Xarelto p/w RLE swelling and sob. CT angio chest showed no central PE. 76 y/o male with DM, HTN, Morbid Obesity, Afib s/p ablation, Lung ca w/ RUL resection and LLL partial resection, was discharged from OSH on 6/28/17 with DVT on Xarelto p/w RLE swelling and sob. CT angio chest showed no central PE.    1- Cellulitis of Right leg  - check blood cultures  - discontinue clindamycin  - start cefazolin  - r/o DVT 78 y/o male with DM, HTN, Morbid Obesity, Afib s/p ablation, Lung ca w/ RUL resection and LLL partial resection, was discharged from OSH on 6/28/17 with DVT on Xarelto p/w RLE swelling and sob. CT angio chest showed no central PE.    1- Cellulitis of Right leg- looks more like chronic venous stasis with the DVT making the swelling worse  - s/p 2 days of clindamycin- patient says its better  - check blood cultures  - discontinue clindamycin  - start cefazolin  - r/o DVT

## 2017-07-02 NOTE — PROGRESS NOTE ADULT - SUBJECTIVE AND OBJECTIVE BOX
chief complaint : lower ext edema, erythema    SUBJECTIVE / OVERNIGHT EVENTS: pt ambulating without difficulty, says swelling in lower ext is much better       MEDICATIONS  (STANDING):  insulin lispro (HumaLOG) corrective regimen sliding scale   SubCutaneous three times a day before meals  insulin lispro (HumaLOG) corrective regimen sliding scale   SubCutaneous at bedtime  dextrose 5%. 1000 milliLiter(s) (50 mL/Hr) IV Continuous <Continuous>  dextrose 50% Injectable 12.5 Gram(s) IV Push once  dextrose 50% Injectable 25 Gram(s) IV Push once  dextrose 50% Injectable 25 Gram(s) IV Push once  rivaroxaban 15 milliGRAM(s) Oral two times a day  busPIRone 10 milliGRAM(s) Oral two times a day  amLODIPine   Tablet 5 milliGRAM(s) Oral daily  furosemide   Injectable 40 milliGRAM(s) IV Push two times a day  ferrous    sulfate 325 milliGRAM(s) Oral daily  gabapentin 800 milliGRAM(s) Oral three times a day  metoprolol 50 milliGRAM(s) Oral two times a day  ceFAZolin   IVPB 1000 milliGRAM(s) IV Intermittent every 8 hours  ceFAZolin   IVPB   IV Intermittent     MEDICATIONS  (PRN):  acetaminophen   Tablet. 650 milliGRAM(s) Oral every 6 hours PRN mild, moderate pain  dextrose Gel 1 Dose(s) Oral once PRN Blood Glucose LESS THAN 70 milliGRAM(s)/deciliter  glucagon  Injectable 1 milliGRAM(s) IntraMuscular once PRN Glucose LESS THAN 70 milligrams/deciliter  oxyCODONE  5 mG/acetaminophen 325 mG 1 Tablet(s) Oral every 6 hours PRN Severe Pain (7 - 10)  ALBUTerol    90 MICROgram(s) HFA Inhaler 2 Puff(s) Inhalation every 4 hours PRN Shortness of Breath      Vital Signs Last 24 Hrs  T(C): 36.3 (07-02-17 @ 13:53), Max: 36.5 (07-01-17 @ 20:09)  HR: 83 (07-02-17 @ 16:52) (74 - 83)  BP: 129/58 (07-02-17 @ 16:52) (123/97 - 154/56)  RR: 18 (07-02-17 @ 13:53) (17 - 18)  SpO2: 98% (07-02-17 @ 13:53) (96% - 100%)  CAPILLARY BLOOD GLUCOSE  169 (02 Jul 2017 17:26)  172 (02 Jul 2017 11:56)  151 (02 Jul 2017 08:43)  122 (01 Jul 2017 23:13)        I&O's Summary    01 Jul 2017 07:01  -  02 Jul 2017 07:00  --------------------------------------------------------  IN: 850 mL / OUT: 2 mL / NET: 848 mL    02 Jul 2017 07:01  -  02 Jul 2017 17:36  --------------------------------------------------------  IN: 360 mL / OUT: 0 mL / NET: 360 mL        Constitutional: No fever, fatigue  Skin: No rash.  Eyes: No recent vision problems or eye pain.  ENT: No congestion, ear pain, or sore throat.  Cardiovascular: No chest pain or palpation.  Respiratory: No cough, shortness of breath, congestion, or wheezing.  Gastrointestinal: No abdominal pain, nausea, vomiting, or diarrhea.  Genitourinary: No dysuria.  Musculoskeletal: No joint swelling.  Neurologic: No headache.    PHYSICAL EXAM:  GENERAL: NAD  EYES: EOMI, PERRLA  NECK: Supple, No JVD  CHEST/LUNG: dec breath sounds at bases  HEART:  S1 , S2 +  ABDOMEN: soft, bs+  EXTREMITIES:  edema erythema, chronic venous statis changes +, rt lower edema >left  NEUROLOGY: alert awake orineted      LABS:                        14.1   8.09  )-----------( 129      ( 02 Jul 2017 05:10 )             42.5     07-02    139  |  99  |  15  ----------------------------<  130<H>  3.9   |  24  |  0.98    Ca    9.0      02 Jul 2017 06:30  Phos  2.8     07-01  Mg     2.0     07-01    TPro  6.5  /  Alb  3.6  /  TBili  0.5  /  DBili  x   /  AST  32  /  ALT  36  /  AlkPhos  60  06-30    PT/INR - ( 01 Jul 2017 05:01 )   PT: 14.7 SEC;   INR: 1.31          PTT - ( 01 Jul 2017 05:01 )  PTT:35.3 SEC  CARDIAC MARKERS ( 01 Jul 2017 05:01 )  x     / < 0.06 ng/mL / 437 u/L / 3.45 ng/mL / x      CARDIAC MARKERS ( 30 Jun 2017 21:40 )  x     / < 0.06 ng/mL / 578 u/L / 5.42 ng/mL / x              RADIOLOGY & ADDITIONAL TESTS:    Imaging Personally Reviewed:    Consultant(s) Notes Reviewed:      Care Discussed with Consultants/Other Providers:

## 2017-07-02 NOTE — CONSULT NOTE ADULT - SUBJECTIVE AND OBJECTIVE BOX
Patient is a 77y old  Male who presents with a chief complaint of RLE Swelling/SOB (2017 08:14)    HPI:  76 y/o male with DM, HTN, Morbid Obesity, Afib s/p ablation in , Lung ca w/RUL resection 1/21/15 and LLL partial resection 2015, was discharged from [facility] on 17 after being diagnosed with DVT and started on Xarelto, presented to [facility] by EMS with RLE swelling and sob. Pt states while at home yesterday, his wife noticed his right lower leg was red and swollen even more than it normally is so she called 911.   Pt denies any fever, chills, chest pain, HA, dizziness, blurred vision, n/v. Pt states he is always sob secondary to the lung resection.   In the ED at [facility] today, he was found to be in Afib w/RVR so he was admitted to telemetry for further medical management.         REVIEW OF SYSTEMS  All as below unless noted otherwise    General:  Denies fever and chills. 	  Ophthalmologic: Denies any visual complaints. 	  ENMT: No throat pain.  Respiratory: No cough, sputum. No shortness of breath.   Cardiovascular: No chest pain.   Gastrointestinal: No nausea or vomiting. No abdominal pain or diarrhea.   Genitourinary: No burning urine, no frequency. No flank pain  Musculoskeletal: No joint swelling or pain.   Neurological: No confusion. 	  Skin: No rash    PAST MEDICAL & SURGICAL HISTORY:  DVT (deep venous thrombosis): RLE dx 17  Venous stasis of both lower extremities  Balanitis  Squamous cell lung cancer  Atrial fibrillation: s/p ablation   Cellulitis: Both legs 2/2 chronic venous stasis  Morbid obesity  Anxiety  Neuropathy  Diabetes mellitus  HTN (hypertension)  Lung nodules: Flexible Bronchoscopy, Right VATS, Right Lung Resection - 1/21/15, LLL partial resection 2015  H/O prior ablation treatment: cardiac 2006    FAMILY HISTORY:  Family history of liver cancer (Sibling): sister  Family history of diabetes mellitus (Sibling): Brother  Family history of heart failure (Father): father    SOCIAL HISTORY:  non smoker      ANTIMICROBIALS:    clindamycin IVPB 600 every 8 hours      OTHER MEDS:    acetaminophen   Tablet. 650 milliGRAM(s) Oral every 6 hours PRN  insulin lispro (HumaLOG) corrective regimen sliding scale   SubCutaneous three times a day before meals  insulin lispro (HumaLOG) corrective regimen sliding scale   SubCutaneous at bedtime  oxyCODONE  5 mG/acetaminophen 325 mG 1 Tablet(s) Oral every 6 hours PRN  rivaroxaban 15 milliGRAM(s) Oral two times a day  busPIRone 10 milliGRAM(s) Oral two times a day  ALBUTerol    90 MICROgram(s) HFA Inhaler 2 Puff(s) Inhalation every 4 hours PRN  amLODIPine   Tablet 5 milliGRAM(s) Oral daily  furosemide   Injectable 40 milliGRAM(s) IV Push two times a day  ferrous    sulfate 325 milliGRAM(s) Oral daily  gabapentin 800 milliGRAM(s) Oral three times a day  metoprolol 50 milliGRAM(s) Oral two times a day      Allergies  No Known Allergies      Vital Signs Last 24 Hrs  T(C): 36.5 (2017 05:40), Max: 36.7 (2017 13:43)  T(F): 97.7 (2017 05:40), Max: 98.1 (2017 13:43)  HR: 78 (2017 05:40) (74 - 82)  BP: 136/62 (2017 05:40) (132/61 - 154/56)  RR: 18 (2017 05:40) (16 - 18)  SpO2: 96% (2017 05:40) (96% - 100%)  CAPILLARY BLOOD GLUCOSE  122 (2017 23:13)     Daily Weight in k.1 (2017 05:40)        PHYSICAL EXAM:  patient in no acute respiratory distress.  Constitutional: Comfortable. Awake and alert  Eyes: PERRL EOMI. No discharge.  ENMT: No sinus tenderness.  No pharyngeal erythema.   Neck: Supple  Respiratory: Good air entry bilaterally, no wheezes, rhonchi, or crackles  Cardiovascular:S1 S2 wnl, No murmurs  Gastrointestinal: Soft, no tenderness , bowel sounds present,   Genitourinary: No suprapubic tenderness. no CVA tenderness   Musculoskeletal: No joint swelling. No edema.  Vascular: peripheral pulses felt  Neurological: No grossly focal deficits  Skin: No rash   Lymph Nodes: No palpable Lymphadenopathy   Psychiatric: Affect normal                            14.1   8.09  )-----------( 129      ( 2017 05:10 )             42.5     WBC Count: 8.09 ( @ 05:10)  WBC Count: 7.44 ( @ 05:01)  WBC Count: 5.71 ( @ 21:40)        139  |  99  |  15  ----------------------------<  130<H>  3.9   |  24  |  0.98    Ca    9.0      2017 06:30  Phos  2.8       Mg     2.0         TPro  6.5  /  Alb  3.6  /  TBili  0.5  /  DBili  x   /  AST  32  /  ALT  36  /  AlkPhos  60      LIVER FUNCTIONS - ( 2017 21:40 )  Alb: 3.6 g/dL / Pro: 6.5 g/dL / ALK PHOS: 60 u/L / ALT: 36 u/L / AST: 32 u/L / GGT: x           PT/INR - ( 2017 05:01 )   PT: 14.7 SEC;   INR: 1.31          PTT - ( 2017 05:01 )  PTT:35.3 SEC    CARDIAC MARKERS ( 2017 05:01 )  x     / < 0.06 ng/mL / 437 u/L / 3.45 ng/mL / x      CARDIAC MARKERS ( 2017 21:40 )  x     / < 0.06 ng/mL / 578 u/L / 5.42 ng/mL / x                  MICROBIOLOGY:  none new        RADIOLOGY:    < from: CT Angio Chest w/ IV Cont (17 @ 22:34) >  LUNGS AND LARGE AIRWAYS: Patent central airways. Status post right upper   lobectomy and partial left lower lobectomy. Subsegmental atelectasis.   Again noted, scarring in the left lower lobe and right apex. Stable   scattered bilateral pulmonary nodules, for example, 0.7 cm in the left   lower lobe (3:129), 1.0 cm in the right lower lobe (3:319), 0.3 cm in the   right lower lobe (3:132), 0.4 cm in the right middle lobe at the apex   (3:340 and 3:194).  PLEURA: No pneumothorax. Interval resolution of left pleural effusion.   Biapical pleural thickening.  VESSELS: Suboptimal contrast opacification of the peripheral pulmonary   arteries. No central pulmonary embolus. Contrast reflux into the   hepatic/azygos veins/IVC without significant cardiac interventricular   septal bowing to suggest right heart strain. Atherosclerotic change of   the thoracic aorta, great vessels and coronary arteries.   HEART: Normal heart size. No pericardial effusion.    MEDIASTINUM AND MU: Subcentimeter mediastinal and hilar lymph nodes   without lymphadenopathy. Calcified right hilar lymph nodes.  CHEST WALL AND LOWER NECK: Within normal limits.  VISUALIZED UPPER ABDOMEN: Grossly unremarkable.  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    IMPRESSION:     Suboptimal evaluation of the peripheral pulmonary arteries. No central   pulmonary embolus.    Post surgical changes. Interval resolution of the left pleural effusion   since 2015. Stable bilateral pulmonary nodules.    < from: Xray Chest 1 View AP-PORTABLE IMMEDIATE (17 @ 21:53) >  IMPRESSION:    No localized pulmonary disease. Patient is a 77y old  Male who presents with a chief complaint of RLE Swelling/SOB (2017 08:14)    HPI:  78 y/o male with DM, HTN, Morbid Obesity, Afib s/p ablation in , Lung ca w/RUL resection 1/21/15 and LLL partial resection 2015, was discharged from [facility] on 17 after being diagnosed with DVT and started on Xarelto, presented to [facility] by EMS with RLE swelling and sob. Pt states while at home yesterday, his wife noticed his right lower leg was red and swollen even more than it normally is so she called 911.   Pt denies any fever, chills, chest pain, HA, dizziness, blurred vision, n/v. Pt states he is always sob secondary to the lung resection.   In the ED at [facility] today, he was found to be in Afib w/RVR so he was admitted to telemetry for further medical management.         REVIEW OF SYSTEMS  All as below unless noted otherwise    General:  Denies fever and chills. 	  Ophthalmologic: Denies any visual complaints. 	  ENMT: No throat pain.  Respiratory: No cough, sputum. No shortness of breath.   Cardiovascular: No chest pain.   Gastrointestinal: No nausea or vomiting. No abdominal pain or diarrhea.   Genitourinary: No burning urine, no frequency. No flank pain  Musculoskeletal: No joint swelling or pain.   Neurological: No confusion. 	  Skin: No rash    PAST MEDICAL & SURGICAL HISTORY:  DVT (deep venous thrombosis): RLE dx 17  Venous stasis of both lower extremities  Balanitis  Squamous cell lung cancer  Atrial fibrillation: s/p ablation   Cellulitis: Both legs 2/2 chronic venous stasis  Morbid obesity  Anxiety  Neuropathy  Diabetes mellitus  HTN (hypertension)  Lung nodules: Flexible Bronchoscopy, Right VATS, Right Lung Resection - 1/21/15, LLL partial resection 2015  H/O prior ablation treatment: cardiac 2006    FAMILY HISTORY:  Family history of liver cancer (Sibling): sister  Family history of diabetes mellitus (Sibling): Brother  Family history of heart failure (Father): father    SOCIAL HISTORY: Retired - quit smoking 13 years ago; used to smoke 2pks/day x 45 years    ANTIMICROBIALS:    clindamycin IVPB 600 every 8 hours      OTHER MEDS:    acetaminophen   Tablet. 650 milliGRAM(s) Oral every 6 hours PRN  insulin lispro (HumaLOG) corrective regimen sliding scale   SubCutaneous three times a day before meals  insulin lispro (HumaLOG) corrective regimen sliding scale   SubCutaneous at bedtime  oxyCODONE  5 mG/acetaminophen 325 mG 1 Tablet(s) Oral every 6 hours PRN  rivaroxaban 15 milliGRAM(s) Oral two times a day  busPIRone 10 milliGRAM(s) Oral two times a day  ALBUTerol    90 MICROgram(s) HFA Inhaler 2 Puff(s) Inhalation every 4 hours PRN  amLODIPine   Tablet 5 milliGRAM(s) Oral daily  furosemide   Injectable 40 milliGRAM(s) IV Push two times a day  ferrous    sulfate 325 milliGRAM(s) Oral daily  gabapentin 800 milliGRAM(s) Oral three times a day  metoprolol 50 milliGRAM(s) Oral two times a day      Allergies  No Known Allergies      Vital Signs Last 24 Hrs  T(C): 36.5 (2017 05:40), Max: 36.7 (2017 13:43)  T(F): 97.7 (2017 05:40), Max: 98.1 (2017 13:43)  HR: 78 (2017 05:40) (74 - 82)  BP: 136/62 (2017 05:40) (132/61 - 154/56)  RR: 18 (2017 05:40) (16 - 18)  SpO2: 96% (2017 05:40) (96% - 100%)  CAPILLARY BLOOD GLUCOSE  122 (2017 23:13)     Daily Weight in k.1 (2017 05:40)        PHYSICAL EXAM:  patient in no acute respiratory distress.  Constitutional: Comfortable. Awake and alert  Eyes: PERRL EOMI. No discharge.  ENMT: No sinus tenderness.  No pharyngeal erythema.   Neck: Supple  Respiratory: Good air entry bilaterally, no wheezes, rhonchi, or crackles  Cardiovascular:S1 S2 wnl, No murmurs  Gastrointestinal: Soft, no tenderness , bowel sounds present,   Genitourinary: No suprapubic tenderness. no CVA tenderness   Musculoskeletal: No joint swelling. No edema.  Vascular: peripheral pulses felt  Neurological: No grossly focal deficits  Skin: No rash   Lymph Nodes: No palpable Lymphadenopathy   Psychiatric: Affect normal                            14.1   8.09  )-----------( 129      ( 2017 05:10 )             42.5     WBC Count: 8.09 ( @ 05:10)  WBC Count: 7.44 ( @ 05:01)  WBC Count: 5.71 ( @ 21:40)        139  |  99  |  15  ----------------------------<  130<H>  3.9   |  24  |  0.98    Ca    9.0      2017 06:30  Phos  2.8       Mg     2.0         TPro  6.5  /  Alb  3.6  /  TBili  0.5  /  DBili  x   /  AST  32  /  ALT  36  /  AlkPhos  60      LIVER FUNCTIONS - ( 2017 21:40 )  Alb: 3.6 g/dL / Pro: 6.5 g/dL / ALK PHOS: 60 u/L / ALT: 36 u/L / AST: 32 u/L / GGT: x           PT/INR - ( 2017 05:01 )   PT: 14.7 SEC;   INR: 1.31          PTT - ( 2017 05:01 )  PTT:35.3 SEC    CARDIAC MARKERS ( 2017 05:01 )  x     / < 0.06 ng/mL / 437 u/L / 3.45 ng/mL / x      CARDIAC MARKERS ( 2017 21:40 )  x     / < 0.06 ng/mL / 578 u/L / 5.42 ng/mL / x                  MICROBIOLOGY:  none new        RADIOLOGY:    < from: CT Angio Chest w/ IV Cont (17 @ 22:34) >  LUNGS AND LARGE AIRWAYS: Patent central airways. Status post right upper   lobectomy and partial left lower lobectomy. Subsegmental atelectasis.   Again noted, scarring in the left lower lobe and right apex. Stable   scattered bilateral pulmonary nodules, for example, 0.7 cm in the left   lower lobe (3:129), 1.0 cm in the right lower lobe (3:319), 0.3 cm in the   right lower lobe (3:132), 0.4 cm in the right middle lobe at the apex   (3:340 and 3:194).  PLEURA: No pneumothorax. Interval resolution of left pleural effusion.   Biapical pleural thickening.  VESSELS: Suboptimal contrast opacification of the peripheral pulmonary   arteries. No central pulmonary embolus. Contrast reflux into the   hepatic/azygos veins/IVC without significant cardiac interventricular   septal bowing to suggest right heart strain. Atherosclerotic change of   the thoracic aorta, great vessels and coronary arteries.   HEART: Normal heart size. No pericardial effusion.    MEDIASTINUM AND MU: Subcentimeter mediastinal and hilar lymph nodes   without lymphadenopathy. Calcified right hilar lymph nodes.  CHEST WALL AND LOWER NECK: Within normal limits.  VISUALIZED UPPER ABDOMEN: Grossly unremarkable.  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    IMPRESSION:     Suboptimal evaluation of the peripheral pulmonary arteries. No central   pulmonary embolus.    Post surgical changes. Interval resolution of the left pleural effusion   since 2015. Stable bilateral pulmonary nodules.    < from: Xray Chest 1 View AP-PORTABLE IMMEDIATE (17 @ 21:53) >  IMPRESSION:    No localized pulmonary disease. Patient is a 77y old  Male who presents with a chief complaint of RLE Swelling/SOB (2017 08:14)    HPI:  76 y/o male with DM, HTN, Morbid Obesity, Afib s/p ablation in , Lung ca w/RUL resection 1/21/15 and LLL partial resection 2015, was discharged from [facility] on 17 after being diagnosed with DVT and started on Xarelto, presented to [facility] by EMS with RLE swelling and sob. Pt states while at home yesterday, his wife noticed his right lower leg was red and swollen even more than it normally is so she called 911.   Pt denies any fever, chills, chest pain, HA, dizziness, blurred vision, n/v. Pt states he is always sob secondary to the lung resection.   In the ED at [facility] today, he was found to be in Afib w/RVR so he was admitted to telemetry for further medical management.   He has right leg DVT. Has a dog. Son at bedside.       REVIEW OF SYSTEMS  All as below unless noted otherwise    General:  Denies fever and chills. 	  Ophthalmologic: Denies any visual complaints. 	  ENMT: No throat pain.  Respiratory: No cough, sputum. No shortness of breath.   Cardiovascular: No chest pain.   Gastrointestinal: No nausea or vomiting. No abdominal pain or diarrhea.   Genitourinary: No burning urine, no frequency. No flank pain  Musculoskeletal: right leg redness and swelling  Neurological: No confusion. 	  Skin: No rash    PAST MEDICAL & SURGICAL HISTORY:  DVT (deep venous thrombosis): RLE dx 17  Venous stasis of both lower extremities  Balanitis  Squamous cell lung cancer  Atrial fibrillation: s/p ablation 2006  Cellulitis: Both legs 2/2 chronic venous stasis  Morbid obesity  Anxiety  Neuropathy  Diabetes mellitus  HTN (hypertension)  Lung nodules: Flexible Bronchoscopy, Right VATS, Right Lung Resection - 1/21/15, LLL partial resection 2015  H/O prior ablation treatment: cardiac 2006    FAMILY HISTORY:  Family history of liver cancer (Sibling): sister  Family history of diabetes mellitus (Sibling): Brother  Family history of heart failure (Father): father    SOCIAL HISTORY: Retired - quit smoking 13 years ago; used to smoke 2pks/day x 45 years    ANTIMICROBIALS:    clindamycin IVPB 600 every 8 hours      OTHER MEDS:    acetaminophen   Tablet. 650 milliGRAM(s) Oral every 6 hours PRN  insulin lispro (HumaLOG) corrective regimen sliding scale   SubCutaneous three times a day before meals  insulin lispro (HumaLOG) corrective regimen sliding scale   SubCutaneous at bedtime  oxyCODONE  5 mG/acetaminophen 325 mG 1 Tablet(s) Oral every 6 hours PRN  rivaroxaban 15 milliGRAM(s) Oral two times a day  busPIRone 10 milliGRAM(s) Oral two times a day  ALBUTerol    90 MICROgram(s) HFA Inhaler 2 Puff(s) Inhalation every 4 hours PRN  amLODIPine   Tablet 5 milliGRAM(s) Oral daily  furosemide   Injectable 40 milliGRAM(s) IV Push two times a day  ferrous    sulfate 325 milliGRAM(s) Oral daily  gabapentin 800 milliGRAM(s) Oral three times a day  metoprolol 50 milliGRAM(s) Oral two times a day      Allergies  No Known Allergies      Vital Signs Last 24 Hrs  T(C): 36.5 (2017 05:40), Max: 36.7 (2017 13:43)  T(F): 97.7 (2017 05:40), Max: 98.1 (2017 13:43)  HR: 78 (2017 05:40) (74 - 82)  BP: 136/62 (2017 05:40) (132/61 - 154/56)  RR: 18 (2017 05:40) (16 - 18)  SpO2: 96% (2017 05:40) (96% - 100%)  CAPILLARY BLOOD GLUCOSE  122 (2017 23:13)     Daily Weight in k.1 (2017 05:40)        PHYSICAL EXAM:  patient in no acute respiratory distress. Obese elderly male  Constitutional: Comfortable. Awake and alert  Eyes: PERRL EOMI. No discharge.  ENMT: No sinus tenderness.  No pharyngeal erythema.   Neck: Supple  Respiratory: air entry bilaterally, no wheezes, rhonchi, or crackles  Cardiovascular:S1 S2 wnl, No murmurs  Gastrointestinal: Soft, no tenderness , bowel sounds present,   Genitourinary: No suprapubic tenderness. no CVA tenderness   Musculoskeletal: no joint swelling  Vascular: peripheral pulses felt  Neurological: No grossly focal deficits  Skin: bilateral chronic venous stasis- right leg looks more swollen not warm, some erythema on the medial leg, not tender  Lymph Nodes: No palpable Lymphadenopathy   Psychiatric: Affect normal                            14.1   8.09  )-----------( 129      ( 2017 05:10 )             42.5     WBC Count: 8.09 ( @ 05:10)  WBC Count: 7.44 ( @ 05:01)  WBC Count: 5.71 ( @ 21:40)        139  |  99  |  15  ----------------------------<  130<H>  3.9   |  24  |  0.98    Ca    9.0      2017 06:30  Phos  2.8       Mg     2.0         TPro  6.5  /  Alb  3.6  /  TBili  0.5  /  DBili  x   /  AST  32  /  ALT  36  /  AlkPhos  60  30    LIVER FUNCTIONS - ( 2017 21:40 )  Alb: 3.6 g/dL / Pro: 6.5 g/dL / ALK PHOS: 60 u/L / ALT: 36 u/L / AST: 32 u/L / GGT: x           PT/INR - ( 2017 05:01 )   PT: 14.7 SEC;   INR: 1.31          PTT - ( 2017 05:01 )  PTT:35.3 SEC    CARDIAC MARKERS ( 2017 05:01 )  x     / < 0.06 ng/mL / 437 u/L / 3.45 ng/mL / x      CARDIAC MARKERS ( 2017 21:40 )  x     / < 0.06 ng/mL / 578 u/L / 5.42 ng/mL / x                  MICROBIOLOGY:  none new        RADIOLOGY:    < from: CT Angio Chest w/ IV Cont (17 @ 22:34) >  LUNGS AND LARGE AIRWAYS: Patent central airways. Status post right upper   lobectomy and partial left lower lobectomy. Subsegmental atelectasis.   Again noted, scarring in the left lower lobe and right apex. Stable   scattered bilateral pulmonary nodules, for example, 0.7 cm in the left   lower lobe (3:129), 1.0 cm in the right lower lobe (3:319), 0.3 cm in the   right lower lobe (3:132), 0.4 cm in the right middle lobe at the apex   (3:340 and 3:194).  PLEURA: No pneumothorax. Interval resolution of left pleural effusion.   Biapical pleural thickening.  VESSELS: Suboptimal contrast opacification of the peripheral pulmonary   arteries. No central pulmonary embolus. Contrast reflux into the   hepatic/azygos veins/IVC without significant cardiac interventricular   septal bowing to suggest right heart strain. Atherosclerotic change of   the thoracic aorta, great vessels and coronary arteries.   HEART: Normal heart size. No pericardial effusion.    MEDIASTINUM AND MU: Subcentimeter mediastinal and hilar lymph nodes   without lymphadenopathy. Calcified right hilar lymph nodes.  CHEST WALL AND LOWER NECK: Within normal limits.  VISUALIZED UPPER ABDOMEN: Grossly unremarkable.  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    IMPRESSION:     Suboptimal evaluation of the peripheral pulmonary arteries. No central   pulmonary embolus.    Post surgical changes. Interval resolution of the left pleural effusion   since 2015. Stable bilateral pulmonary nodules.    < from: Xray Chest 1 View AP-PORTABLE IMMEDIATE (17 @ 21:53) >  IMPRESSION:    No localized pulmonary disease.

## 2017-07-03 DIAGNOSIS — L03.115 CELLULITIS OF RIGHT LOWER LIMB: ICD-10-CM

## 2017-07-03 LAB
BASOPHILS # BLD AUTO: 0.03 K/UL — SIGNIFICANT CHANGE UP (ref 0–0.2)
BASOPHILS NFR BLD AUTO: 0.5 % — SIGNIFICANT CHANGE UP (ref 0–2)
BUN SERPL-MCNC: 20 MG/DL — SIGNIFICANT CHANGE UP (ref 7–23)
CALCIUM SERPL-MCNC: 8.8 MG/DL — SIGNIFICANT CHANGE UP (ref 8.4–10.5)
CHLORIDE SERPL-SCNC: 98 MMOL/L — SIGNIFICANT CHANGE UP (ref 98–107)
CO2 SERPL-SCNC: 20 MMOL/L — LOW (ref 22–31)
CREAT SERPL-MCNC: 1.15 MG/DL — SIGNIFICANT CHANGE UP (ref 0.5–1.3)
EOSINOPHIL # BLD AUTO: 0.15 K/UL — SIGNIFICANT CHANGE UP (ref 0–0.5)
EOSINOPHIL NFR BLD AUTO: 2.4 % — SIGNIFICANT CHANGE UP (ref 0–6)
GLUCOSE SERPL-MCNC: 151 MG/DL — HIGH (ref 70–99)
HCT VFR BLD CALC: 42.3 % — SIGNIFICANT CHANGE UP (ref 39–50)
HGB BLD-MCNC: 14.4 G/DL — SIGNIFICANT CHANGE UP (ref 13–17)
IMM GRANULOCYTES # BLD AUTO: 0.05 # — SIGNIFICANT CHANGE UP
IMM GRANULOCYTES NFR BLD AUTO: 0.8 % — SIGNIFICANT CHANGE UP (ref 0–1.5)
LYMPHOCYTES # BLD AUTO: 1.17 K/UL — SIGNIFICANT CHANGE UP (ref 1–3.3)
LYMPHOCYTES # BLD AUTO: 18.9 % — SIGNIFICANT CHANGE UP (ref 13–44)
MAGNESIUM SERPL-MCNC: 1.9 MG/DL — SIGNIFICANT CHANGE UP (ref 1.6–2.6)
MCHC RBC-ENTMCNC: 30.1 PG — SIGNIFICANT CHANGE UP (ref 27–34)
MCHC RBC-ENTMCNC: 34 % — SIGNIFICANT CHANGE UP (ref 32–36)
MCV RBC AUTO: 88.5 FL — SIGNIFICANT CHANGE UP (ref 80–100)
MONOCYTES # BLD AUTO: 0.6 K/UL — SIGNIFICANT CHANGE UP (ref 0–0.9)
MONOCYTES NFR BLD AUTO: 9.7 % — SIGNIFICANT CHANGE UP (ref 2–14)
NEUTROPHILS # BLD AUTO: 4.2 K/UL — SIGNIFICANT CHANGE UP (ref 1.8–7.4)
NEUTROPHILS NFR BLD AUTO: 67.7 % — SIGNIFICANT CHANGE UP (ref 43–77)
NRBC # FLD: 0 — SIGNIFICANT CHANGE UP
PLATELET # BLD AUTO: 153 K/UL — SIGNIFICANT CHANGE UP (ref 150–400)
PMV BLD: 10.1 FL — SIGNIFICANT CHANGE UP (ref 7–13)
POTASSIUM SERPL-MCNC: 3.7 MMOL/L — SIGNIFICANT CHANGE UP (ref 3.5–5.3)
POTASSIUM SERPL-SCNC: 3.7 MMOL/L — SIGNIFICANT CHANGE UP (ref 3.5–5.3)
RBC # BLD: 4.78 M/UL — SIGNIFICANT CHANGE UP (ref 4.2–5.8)
RBC # FLD: 14.6 % — HIGH (ref 10.3–14.5)
SODIUM SERPL-SCNC: 139 MMOL/L — SIGNIFICANT CHANGE UP (ref 135–145)
WBC # BLD: 6.2 K/UL — SIGNIFICANT CHANGE UP (ref 3.8–10.5)
WBC # FLD AUTO: 6.2 K/UL — SIGNIFICANT CHANGE UP (ref 3.8–10.5)

## 2017-07-03 PROCEDURE — 93306 TTE W/DOPPLER COMPLETE: CPT | Mod: 26

## 2017-07-03 PROCEDURE — 99232 SBSQ HOSP IP/OBS MODERATE 35: CPT

## 2017-07-03 RX ADMIN — GABAPENTIN 800 MILLIGRAM(S): 400 CAPSULE ORAL at 14:02

## 2017-07-03 RX ADMIN — GABAPENTIN 800 MILLIGRAM(S): 400 CAPSULE ORAL at 22:02

## 2017-07-03 RX ADMIN — RIVAROXABAN 15 MILLIGRAM(S): KIT at 05:54

## 2017-07-03 RX ADMIN — Medication 100 MILLIGRAM(S): at 05:55

## 2017-07-03 RX ADMIN — RIVAROXABAN 15 MILLIGRAM(S): KIT at 18:07

## 2017-07-03 RX ADMIN — Medication 4: at 14:02

## 2017-07-03 RX ADMIN — Medication 100 MILLIGRAM(S): at 14:03

## 2017-07-03 RX ADMIN — Medication 10 MILLIGRAM(S): at 18:07

## 2017-07-03 RX ADMIN — Medication 325 MILLIGRAM(S): at 14:02

## 2017-07-03 RX ADMIN — Medication 100 MILLIGRAM(S): at 22:02

## 2017-07-03 RX ADMIN — Medication 2: at 18:06

## 2017-07-03 RX ADMIN — Medication 40 MILLIGRAM(S): at 18:07

## 2017-07-03 RX ADMIN — GABAPENTIN 800 MILLIGRAM(S): 400 CAPSULE ORAL at 05:55

## 2017-07-03 RX ADMIN — AMLODIPINE BESYLATE 5 MILLIGRAM(S): 2.5 TABLET ORAL at 05:55

## 2017-07-03 RX ADMIN — Medication 10 MILLIGRAM(S): at 05:55

## 2017-07-03 RX ADMIN — Medication 50 MILLIGRAM(S): at 18:07

## 2017-07-03 RX ADMIN — Medication 50 MILLIGRAM(S): at 05:55

## 2017-07-03 RX ADMIN — Medication 40 MILLIGRAM(S): at 05:54

## 2017-07-03 NOTE — DIETITIAN INITIAL EVALUATION ADULT. - OTHER INFO
Nutrition Consult X BMI greater than 40. Pt 76 yo male awake, alert, oriented. Per Pt his appetite good; No chew/swallow problem voiced; no nausea/vomiting/diarrhea reported @ present. Per Pt he has been following "Atkins diet" for past 2 months and lost ~24#. Weight management nutrition therapy discussed with Pt including better food choices, foods to avoid, Low Calorie meal plans. Written materials on diet also provided. RDN remains available, Pt made aware.

## 2017-07-03 NOTE — DIETITIAN INITIAL EVALUATION ADULT. - PERTINENT LABORATORY DATA
(7/2) Glu 130 H, HbA1c 6.2% H;             (7/1) Cholesterol 11 L, HDL 22 L;           (6/30) Albumin 3.6

## 2017-07-03 NOTE — PROGRESS NOTE ADULT - SUBJECTIVE AND OBJECTIVE BOX
chief complaint : lower ext edema, erythema    SUBJECTIVE / OVERNIGHT EVENTS: pt ambulating without difficulty, says swelling in lower ext is much better       MEDICATIONS  (STANDING):  insulin lispro (HumaLOG) corrective regimen sliding scale   SubCutaneous three times a day before meals  insulin lispro (HumaLOG) corrective regimen sliding scale   SubCutaneous at bedtime  dextrose 5%. 1000 milliLiter(s) (50 mL/Hr) IV Continuous <Continuous>  dextrose 50% Injectable 12.5 Gram(s) IV Push once  dextrose 50% Injectable 25 Gram(s) IV Push once  dextrose 50% Injectable 25 Gram(s) IV Push once  rivaroxaban 15 milliGRAM(s) Oral two times a day  busPIRone 10 milliGRAM(s) Oral two times a day  amLODIPine   Tablet 5 milliGRAM(s) Oral daily  furosemide   Injectable 40 milliGRAM(s) IV Push two times a day  ferrous    sulfate 325 milliGRAM(s) Oral daily  gabapentin 800 milliGRAM(s) Oral three times a day  metoprolol 50 milliGRAM(s) Oral two times a day  ceFAZolin   IVPB 1000 milliGRAM(s) IV Intermittent every 8 hours  ceFAZolin   IVPB   IV Intermittent     MEDICATIONS  (PRN):  acetaminophen   Tablet. 650 milliGRAM(s) Oral every 6 hours PRN mild, moderate pain  dextrose Gel 1 Dose(s) Oral once PRN Blood Glucose LESS THAN 70 milliGRAM(s)/deciliter  glucagon  Injectable 1 milliGRAM(s) IntraMuscular once PRN Glucose LESS THAN 70 milligrams/deciliter  oxyCODONE  5 mG/acetaminophen 325 mG 1 Tablet(s) Oral every 6 hours PRN Severe Pain (7 - 10)  ALBUTerol    90 MICROgram(s) HFA Inhaler 2 Puff(s) Inhalation every 4 hours PRN Shortness of Breath      Vital Signs Last 24 Hrs  T(C): 36.3 (03 Jul 2017 05:48), Max: 36.4 (02 Jul 2017 21:31)  T(F): 97.3 (03 Jul 2017 05:48), Max: 97.6 (02 Jul 2017 21:31)  HR: 79 (03 Jul 2017 05:48) (71 - 83)  BP: 149/69 (03 Jul 2017 05:48) (101/82 - 149/69)  BP(mean): --  RR: 18 (03 Jul 2017 05:48) (18 - 18)  SpO2: 100% (03 Jul 2017 05:48) (98% - 100%)      Constitutional: No fever, fatigue  Skin: No rash.  Eyes: No recent vision problems or eye pain.  ENT: No congestion, ear pain, or sore throat.  Cardiovascular: No chest pain or palpation.  Respiratory: No cough, shortness of breath, congestion, or wheezing.  Gastrointestinal: No abdominal pain, nausea, vomiting, or diarrhea.  Genitourinary: No dysuria.  Musculoskeletal: No joint swelling.  Neurologic: No headache.    PHYSICAL EXAM:  GENERAL: NAD  EYES: EOMI, PERRLA  NECK: Supple, No JVD  CHEST/LUNG: dec breath sounds at bases  HEART:  S1 , S2 +  ABDOMEN: soft, bs+  EXTREMITIES:  edema erythema, chronic venous statis changes +, rt lower edema >left  NEUROLOGY: alert awake orineted    LABS:  07-03    139  |  98  |  20  ----------------------------<  151<H>  3.7   |  20<L>  |  1.15    Ca    8.8      03 Jul 2017 06:00  Mg     1.9     07-03      Creatinine Trend: 1.15 <--, 0.98 <--, 0.94 <--, 1.03 <--                        14.4   6.20  )-----------( 153      ( 03 Jul 2017 06:00 )             42.3     Urine Studies:

## 2017-07-03 NOTE — PROGRESS NOTE ADULT - SUBJECTIVE AND OBJECTIVE BOX
INTERVAL HISTORY: Pt is lying in bed comfortable, seen in am, not in distress  	  MEDICATIONS:  rivaroxaban 15 milliGRAM(s) Oral two times a day  amLODIPine   Tablet 5 milliGRAM(s) Oral daily  furosemide   Injectable 40 milliGRAM(s) IV Push two times a day  metoprolol 50 milliGRAM(s) Oral two times a day    ceFAZolin   IVPB 1000 milliGRAM(s) IV Intermittent every 8 hours  ceFAZolin   IVPB   IV Intermittent     ALBUTerol    90 MICROgram(s) HFA Inhaler 2 Puff(s) Inhalation every 4 hours PRN    acetaminophen   Tablet. 650 milliGRAM(s) Oral every 6 hours PRN  oxyCODONE  5 mG/acetaminophen 325 mG 1 Tablet(s) Oral every 6 hours PRN  busPIRone 10 milliGRAM(s) Oral two times a day  gabapentin 800 milliGRAM(s) Oral three times a day      insulin lispro (HumaLOG) corrective regimen sliding scale   SubCutaneous three times a day before meals  insulin lispro (HumaLOG) corrective regimen sliding scale   SubCutaneous at bedtime  dextrose Gel 1 Dose(s) Oral once PRN  dextrose 50% Injectable 12.5 Gram(s) IV Push once  dextrose 50% Injectable 25 Gram(s) IV Push once  dextrose 50% Injectable 25 Gram(s) IV Push once  glucagon  Injectable 1 milliGRAM(s) IntraMuscular once PRN    dextrose 5%. 1000 milliLiter(s) IV Continuous <Continuous>  ferrous    sulfate 325 milliGRAM(s) Oral daily      PHYSICAL EXAM:  T(C): 36.3 (07-03-17 @ 05:48), Max: 36.4 (07-02-17 @ 21:31)  HR: 79 (07-03-17 @ 05:48) (71 - 83)  BP: 149/69 (07-03-17 @ 05:48) (101/82 - 149/69)  RR: 18 (07-03-17 @ 05:48) (18 - 18)  SpO2: 100% (07-03-17 @ 05:48) (98% - 100%)  Wt(kg): --  I&O's Summary    02 Jul 2017 07:01  -  03 Jul 2017 07:00  --------------------------------------------------------  IN: 660 mL / OUT: 0 mL / NET: 660 mL    03 Jul 2017 07:01  -  03 Jul 2017 14:01  --------------------------------------------------------  IN: 280 mL / OUT: 0 mL / NET: 280 mL          Appearance: Normal	  HEENT:   Normal oral mucosa, PERRL, EOMI	  Cardiovascular: Normal S1 S2, No JVD, No murmurs, No edema  Respiratory: Lungs clear to auscultation	  Gastrointestinal:  Soft, Non-tender, + BS	  Extremities: b/l lower ext erythema and 2+ edema                                    14.4   6.20  )-----------( 153      ( 03 Jul 2017 06:00 )             42.3     07-03    139  |  98  |  20  ----------------------------<  151<H>  3.7   |  20<L>  |  1.15    Ca    8.8      03 Jul 2017 06:00  Mg     1.9     07-03      proBNP:   Lipid Profile:   HgA1c:   TSH:

## 2017-07-03 NOTE — DIETITIAN INITIAL EVALUATION ADULT. - ENERGY NEEDS
Per Pt his usual body weight: 348#. Pt also stated she lost weight: ~24# since 5/1/17; Pt following Atkins diet (for past 2 months) reported.

## 2017-07-03 NOTE — DIETITIAN INITIAL EVALUATION ADULT. - NS AS NUTRI INTERV MEALS SNACK
Diets modified for specific foods and ingredients/Other (specify)/1. Suggest: PO diet rx: Consistent Carbohydrate (no snacks), DASH/TLC (cholesterol and sodium restricted), Low Fat;             2. Monitor PO diet tolerance;                 3. Monitor labs, weights, hydration status;                4. Suggest outpatient follow up with an endocrinologist to ensure long-term DM diet comprehension and compliance. Suggest outpatient follow up with appropriate RD for the purposes of long-term nutrition evaluation and diet education; 1. Suggest: PO diet rx: Consistent Carbohydrate (no snacks), DASH/TLC (cholesterol and sodium restricted), Low Fat;             2. Monitor PO diet tolerance;                 3. Monitor labs, weights, hydration status;                4. Recommend: to follow up with weight management dietitians;/Diets modified for specific foods and ingredients/Other (specify)

## 2017-07-03 NOTE — PROGRESS NOTE ADULT - SUBJECTIVE AND OBJECTIVE BOX
CALLIE MACIAS 77y [id number]    Patient is a 77y old  Male who presents with a chief complaint of RLE Swelling/SOB (01 Jul 2017 08:14)      Follow Up/CC:  Cellulitis    Interval History/ROS: leg better    Allergies    No Known Allergies    Intolerances        ANTIMICROBIALS:  ceFAZolin   IVPB 1000 every 8 hours  ceFAZolin   IVPB        MEDICATIONS  (STANDING):  insulin lispro (HumaLOG) corrective regimen sliding scale   SubCutaneous three times a day before meals  insulin lispro (HumaLOG) corrective regimen sliding scale   SubCutaneous at bedtime  dextrose 5%. 1000 milliLiter(s) (50 mL/Hr) IV Continuous <Continuous>  dextrose 50% Injectable 12.5 Gram(s) IV Push once  dextrose 50% Injectable 25 Gram(s) IV Push once  dextrose 50% Injectable 25 Gram(s) IV Push once  rivaroxaban 15 milliGRAM(s) Oral two times a day  busPIRone 10 milliGRAM(s) Oral two times a day  amLODIPine   Tablet 5 milliGRAM(s) Oral daily  furosemide   Injectable 40 milliGRAM(s) IV Push two times a day  ferrous    sulfate 325 milliGRAM(s) Oral daily  gabapentin 800 milliGRAM(s) Oral three times a day  metoprolol 50 milliGRAM(s) Oral two times a day  ceFAZolin   IVPB 1000 milliGRAM(s) IV Intermittent every 8 hours  ceFAZolin   IVPB   IV Intermittent     MEDICATIONS  (PRN):  acetaminophen   Tablet. 650 milliGRAM(s) Oral every 6 hours PRN mild, moderate pain  dextrose Gel 1 Dose(s) Oral once PRN Blood Glucose LESS THAN 70 milliGRAM(s)/deciliter  glucagon  Injectable 1 milliGRAM(s) IntraMuscular once PRN Glucose LESS THAN 70 milligrams/deciliter  oxyCODONE  5 mG/acetaminophen 325 mG 1 Tablet(s) Oral every 6 hours PRN Severe Pain (7 - 10)  ALBUTerol    90 MICROgram(s) HFA Inhaler 2 Puff(s) Inhalation every 4 hours PRN Shortness of Breath        Vital Signs Last 24 Hrs  T(C): 36.3 (03 Jul 2017 05:48), Max: 36.4 (02 Jul 2017 21:31)  T(F): 97.3 (03 Jul 2017 05:48), Max: 97.6 (02 Jul 2017 21:31)  HR: 79 (03 Jul 2017 05:48) (71 - 83)  BP: 149/69 (03 Jul 2017 05:48) (101/82 - 149/69)  BP(mean): --  RR: 18 (03 Jul 2017 05:48) (18 - 18)  SpO2: 100% (03 Jul 2017 05:48) (98% - 100%)    CBC Full  -  ( 03 Jul 2017 06:00 )  WBC Count : 6.20 K/uL  Hemoglobin : 14.4 g/dL  Hematocrit : 42.3 %  Platelet Count - Automated : 153 K/uL  Mean Cell Volume : 88.5 fL  Mean Cell Hemoglobin : 30.1 pg  Mean Cell Hemoglobin Concentration : 34.0 %  Auto Neutrophil # : 4.20 K/uL  Auto Lymphocyte # : 1.17 K/uL  Auto Monocyte # : 0.60 K/uL  Auto Eosinophil # : 0.15 K/uL  Auto Basophil # : 0.03 K/uL  Auto Neutrophil % : 67.7 %  Auto Lymphocyte % : 18.9 %  Auto Monocyte % : 9.7 %  Auto Eosinophil % : 2.4 %  Auto Basophil % : 0.5 %    07-03    139  |  98  |  20  ----------------------------<  151<H>  3.7   |  20<L>  |  1.15    Ca    8.8      03 Jul 2017 06:00  Mg     1.9     07-03            MICROBIOLOGY:      CALLIE MACIAS 77y [id number]    Patient is a 77y old  Male who presents with a chief complaint of RLE Swelling/SOB (01 Jul 2017 08:14)      Follow Up/CC:      Interval History/ROS:    Allergies    No Known Allergies    Intolerances        ANTIMICROBIALS:  ceFAZolin   IVPB 1000 every 8 hours  ceFAZolin   IVPB        MEDICATIONS  (STANDING):  insulin lispro (HumaLOG) corrective regimen sliding scale   SubCutaneous three times a day before meals  insulin lispro (HumaLOG) corrective regimen sliding scale   SubCutaneous at bedtime  dextrose 5%. 1000 milliLiter(s) (50 mL/Hr) IV Continuous <Continuous>  dextrose 50% Injectable 12.5 Gram(s) IV Push once  dextrose 50% Injectable 25 Gram(s) IV Push once  dextrose 50% Injectable 25 Gram(s) IV Push once  rivaroxaban 15 milliGRAM(s) Oral two times a day  busPIRone 10 milliGRAM(s) Oral two times a day  amLODIPine   Tablet 5 milliGRAM(s) Oral daily  furosemide   Injectable 40 milliGRAM(s) IV Push two times a day  ferrous    sulfate 325 milliGRAM(s) Oral daily  gabapentin 800 milliGRAM(s) Oral three times a day  metoprolol 50 milliGRAM(s) Oral two times a day  ceFAZolin   IVPB 1000 milliGRAM(s) IV Intermittent every 8 hours  ceFAZolin   IVPB   IV Intermittent     MEDICATIONS  (PRN):  acetaminophen   Tablet. 650 milliGRAM(s) Oral every 6 hours PRN mild, moderate pain  dextrose Gel 1 Dose(s) Oral once PRN Blood Glucose LESS THAN 70 milliGRAM(s)/deciliter  glucagon  Injectable 1 milliGRAM(s) IntraMuscular once PRN Glucose LESS THAN 70 milligrams/deciliter  oxyCODONE  5 mG/acetaminophen 325 mG 1 Tablet(s) Oral every 6 hours PRN Severe Pain (7 - 10)  ALBUTerol    90 MICROgram(s) HFA Inhaler 2 Puff(s) Inhalation every 4 hours PRN Shortness of Breath        Vital Signs Last 24 Hrs  T(C): 36.3 (03 Jul 2017 05:48), Max: 36.4 (02 Jul 2017 21:31)  T(F): 97.3 (03 Jul 2017 05:48), Max: 97.6 (02 Jul 2017 21:31)  HR: 79 (03 Jul 2017 05:48) (71 - 83)  BP: 149/69 (03 Jul 2017 05:48) (101/82 - 149/69)  BP(mean): --  RR: 18 (03 Jul 2017 05:48) (18 - 18)  SpO2: 100% (03 Jul 2017 05:48) (98% - 100%)    CBC Full  -  ( 03 Jul 2017 06:00 )  WBC Count : 6.20 K/uL  Hemoglobin : 14.4 g/dL  Hematocrit : 42.3 %  Platelet Count - Automated : 153 K/uL  Mean Cell Volume : 88.5 fL  Mean Cell Hemoglobin : 30.1 pg  Mean Cell Hemoglobin Concentration : 34.0 %  Auto Neutrophil # : 4.20 K/uL  Auto Lymphocyte # : 1.17 K/uL  Auto Monocyte # : 0.60 K/uL  Auto Eosinophil # : 0.15 K/uL  Auto Basophil # : 0.03 K/uL  Auto Neutrophil % : 67.7 %  Auto Lymphocyte % : 18.9 %  Auto Monocyte % : 9.7 %  Auto Eosinophil % : 2.4 %  Auto Basophil % : 0.5 %    07-03    139  |  98  |  20  ----------------------------<  151<H>  3.7   |  20<L>  |  1.15    Ca    8.8      03 Jul 2017 06:00  Mg     1.9     07-03            MICROBIOLOGY:          v            RADIOLOGY    CXR:    CT HEAD:    CT CHEST:    CT ABDOMEN:    MRI:    OTHER:          RADIOLOGY    CXR:    CT HEAD:    CT CHEST:    CT ABDOMEN:    MRI:    OTHER:0

## 2017-07-04 LAB
BUN SERPL-MCNC: 20 MG/DL — SIGNIFICANT CHANGE UP (ref 7–23)
CALCIUM SERPL-MCNC: 8.9 MG/DL — SIGNIFICANT CHANGE UP (ref 8.4–10.5)
CHLORIDE SERPL-SCNC: 96 MMOL/L — LOW (ref 98–107)
CO2 SERPL-SCNC: 28 MMOL/L — SIGNIFICANT CHANGE UP (ref 22–31)
CREAT SERPL-MCNC: 1.1 MG/DL — SIGNIFICANT CHANGE UP (ref 0.5–1.3)
GLUCOSE SERPL-MCNC: 131 MG/DL — HIGH (ref 70–99)
HCT VFR BLD CALC: 44.7 % — SIGNIFICANT CHANGE UP (ref 39–50)
HGB BLD-MCNC: 15 G/DL — SIGNIFICANT CHANGE UP (ref 13–17)
MAGNESIUM SERPL-MCNC: 2.1 MG/DL — SIGNIFICANT CHANGE UP (ref 1.6–2.6)
MCHC RBC-ENTMCNC: 29.6 PG — SIGNIFICANT CHANGE UP (ref 27–34)
MCHC RBC-ENTMCNC: 33.6 % — SIGNIFICANT CHANGE UP (ref 32–36)
MCV RBC AUTO: 88.3 FL — SIGNIFICANT CHANGE UP (ref 80–100)
NRBC # FLD: 0 — SIGNIFICANT CHANGE UP
PLATELET # BLD AUTO: 159 K/UL — SIGNIFICANT CHANGE UP (ref 150–400)
PMV BLD: 10 FL — SIGNIFICANT CHANGE UP (ref 7–13)
POTASSIUM SERPL-MCNC: 3.5 MMOL/L — SIGNIFICANT CHANGE UP (ref 3.5–5.3)
POTASSIUM SERPL-SCNC: 3.5 MMOL/L — SIGNIFICANT CHANGE UP (ref 3.5–5.3)
RBC # BLD: 5.06 M/UL — SIGNIFICANT CHANGE UP (ref 4.2–5.8)
RBC # FLD: 14.6 % — HIGH (ref 10.3–14.5)
SODIUM SERPL-SCNC: 139 MMOL/L — SIGNIFICANT CHANGE UP (ref 135–145)
WBC # BLD: 5.96 K/UL — SIGNIFICANT CHANGE UP (ref 3.8–10.5)
WBC # FLD AUTO: 5.96 K/UL — SIGNIFICANT CHANGE UP (ref 3.8–10.5)

## 2017-07-04 PROCEDURE — 93970 EXTREMITY STUDY: CPT | Mod: 26

## 2017-07-04 RX ORDER — FUROSEMIDE 40 MG
40 TABLET ORAL DAILY
Qty: 0 | Refills: 0 | Status: DISCONTINUED | OUTPATIENT
Start: 2017-07-04 | End: 2017-07-05

## 2017-07-04 RX ADMIN — Medication 2: at 17:48

## 2017-07-04 RX ADMIN — GABAPENTIN 800 MILLIGRAM(S): 400 CAPSULE ORAL at 21:53

## 2017-07-04 RX ADMIN — Medication 10 MILLIGRAM(S): at 17:48

## 2017-07-04 RX ADMIN — RIVAROXABAN 15 MILLIGRAM(S): KIT at 17:48

## 2017-07-04 RX ADMIN — Medication 2: at 12:48

## 2017-07-04 RX ADMIN — Medication 50 MILLIGRAM(S): at 17:48

## 2017-07-04 RX ADMIN — Medication 100 MILLIGRAM(S): at 21:53

## 2017-07-04 RX ADMIN — GABAPENTIN 800 MILLIGRAM(S): 400 CAPSULE ORAL at 06:24

## 2017-07-04 RX ADMIN — Medication 40 MILLIGRAM(S): at 06:23

## 2017-07-04 RX ADMIN — RIVAROXABAN 15 MILLIGRAM(S): KIT at 06:23

## 2017-07-04 RX ADMIN — GABAPENTIN 800 MILLIGRAM(S): 400 CAPSULE ORAL at 13:44

## 2017-07-04 RX ADMIN — Medication 325 MILLIGRAM(S): at 12:49

## 2017-07-04 RX ADMIN — Medication 100 MILLIGRAM(S): at 06:24

## 2017-07-04 RX ADMIN — Medication 10 MILLIGRAM(S): at 06:23

## 2017-07-04 RX ADMIN — Medication 100 MILLIGRAM(S): at 13:44

## 2017-07-04 RX ADMIN — Medication 50 MILLIGRAM(S): at 06:23

## 2017-07-04 RX ADMIN — AMLODIPINE BESYLATE 5 MILLIGRAM(S): 2.5 TABLET ORAL at 06:23

## 2017-07-04 NOTE — PROGRESS NOTE ADULT - PROBLEM SELECTOR PROBLEM 3
Cardiac arrhythmia, unspecified cardiac arrhythmia type

## 2017-07-04 NOTE — PROGRESS NOTE ADULT - SUBJECTIVE AND OBJECTIVE BOX
Patient is a 77y old  Male who presents with a chief complaint of RLE Swelling/SOB (01 Jul 2017 08:14)      SUBJECTIVE / OVERNIGHT EVENTS:  No new symptoms reported  Review of Systems:   CONSTITUTIONAL: No fever, weight loss, or fatigue  EYES: No eye pain, visual disturbances, or discharge  ENMT:  No difficulty hearing, tinnitus, vertigo; No sinus or throat pain  NECK: No pain or stiffness  BREASTS: No pain, masses, or nipple discharge  RESPIRATORY: No cough, wheezing, chills or hemoptysis; No shortness of breath  CARDIOVASCULAR: No chest pain, palpitations, dizziness, or leg swelling  GASTROINTESTINAL: No abdominal or epigastric pain. No nausea, vomiting, or hematemesis; No diarrhea or constipation. No melena or hematochezia.  GENITOURINARY: No dysuria, frequency, hematuria, or incontinence  NEUROLOGICAL: No headaches, memory loss, loss of strength, numbness, or tremors  SKIN: No itching, burning, rashes, or lesions   LYMPH NODES: No enlarged glands  ENDOCRINE: No heat or cold intolerance; No hair loss  MUSCULOSKELETAL: No joint pain or swelling; No muscle, back, or extremity pain  PSYCHIATRIC: No depression, anxiety, mood swings, or difficulty sleeping  HEME/LYMPH: No easy bruising, or bleeding gums  ALLERY AND IMMUNOLOGIC: No hives or eczema    MEDICATIONS  (STANDING):  insulin lispro (HumaLOG) corrective regimen sliding scale   SubCutaneous three times a day before meals  insulin lispro (HumaLOG) corrective regimen sliding scale   SubCutaneous at bedtime  dextrose 5%. 1000 milliLiter(s) (50 mL/Hr) IV Continuous <Continuous>  dextrose 50% Injectable 12.5 Gram(s) IV Push once  dextrose 50% Injectable 25 Gram(s) IV Push once  dextrose 50% Injectable 25 Gram(s) IV Push once  rivaroxaban 15 milliGRAM(s) Oral two times a day  busPIRone 10 milliGRAM(s) Oral two times a day  amLODIPine   Tablet 5 milliGRAM(s) Oral daily  ferrous    sulfate 325 milliGRAM(s) Oral daily  gabapentin 800 milliGRAM(s) Oral three times a day  metoprolol 50 milliGRAM(s) Oral two times a day  ceFAZolin   IVPB 1000 milliGRAM(s) IV Intermittent every 8 hours  ceFAZolin   IVPB   IV Intermittent   furosemide    Tablet 40 milliGRAM(s) Oral daily    MEDICATIONS  (PRN):  acetaminophen   Tablet. 650 milliGRAM(s) Oral every 6 hours PRN mild, moderate pain  dextrose Gel 1 Dose(s) Oral once PRN Blood Glucose LESS THAN 70 milliGRAM(s)/deciliter  glucagon  Injectable 1 milliGRAM(s) IntraMuscular once PRN Glucose LESS THAN 70 milligrams/deciliter  oxyCODONE  5 mG/acetaminophen 325 mG 1 Tablet(s) Oral every 6 hours PRN Severe Pain (7 - 10)  ALBUTerol    90 MICROgram(s) HFA Inhaler 2 Puff(s) Inhalation every 4 hours PRN Shortness of Breath      PHYSICAL EXAM:  Vital Signs Last 24 Hrs  T(C): 36.2 (07-04-17 @ 13:49), Max: 36.4 (07-04-17 @ 06:18)  HR: 65 (07-04-17 @ 17:47) (65 - 76)  BP: 126/61 (07-04-17 @ 17:47) (124/65 - 148/70)  RR: 18 (07-04-17 @ 13:49) (18 - 18)  SpO2: 97% (07-04-17 @ 13:49) (95% - 100%)  I&O's Summary    03 Jul 2017 07:01  -  04 Jul 2017 07:00  --------------------------------------------------------  IN: 1360 mL / OUT: 0 mL / NET: 1360 mL    04 Jul 2017 07:01  -  04 Jul 2017 19:12  --------------------------------------------------------  IN: 660 mL / OUT: 0 mL / NET: 660 mL      GENERAL: NAD, well-developed  HEAD:  Atraumatic, Normocephalic  EYES: EOMI, PERRLA, conjunctiva and sclera clear  NECK: Supple, No JVD  CHEST/LUNG: Clear to auscultation bilaterally; No wheeze  HEART: Regular rate and rhythm; No murmurs, rubs, or gallops  ABDOMEN: Soft, Nontender, Nondistended; Bowel sounds present  EXTREMITIES:  2+ Peripheral Pulses, No clubbing, cyanosis, or edema  PSYCH: AAOx3  NEUROLOGY: non-focal  SKIN: No rashes or lesions    LABS:  CAPILLARY BLOOD GLUCOSE  157 (04 Jul 2017 17:41)  167 (04 Jul 2017 11:50)  148 (04 Jul 2017 08:17)  170 (03 Jul 2017 21:24)                              15.0   5.96  )-----------( 159      ( 04 Jul 2017 07:20 )             44.7     07-04    139  |  96<L>  |  20  ----------------------------<  131<H>  3.5   |  28  |  1.10    Ca    8.9      04 Jul 2017 07:20  Mg     2.1     07-04                RADIOLOGY & ADDITIONAL TESTS:    Imaging Personally Reviewed:    Consultant(s) Notes Reviewed:      Care Discussed with Consultants/Other Providers:

## 2017-07-04 NOTE — PROGRESS NOTE ADULT - SUBJECTIVE AND OBJECTIVE BOX
INTERVAL HISTORY: Pt is lying in bed comfortable, not in distress, no chest pain noSOB  	  MEDICATIONS:  rivaroxaban 15 milliGRAM(s) Oral two times a day  amLODIPine   Tablet 5 milliGRAM(s) Oral daily  metoprolol 50 milliGRAM(s) Oral two times a day  furosemide    Tablet 40 milliGRAM(s) Oral daily    ceFAZolin   IVPB 1000 milliGRAM(s) IV Intermittent every 8 hours  ceFAZolin   IVPB   IV Intermittent     ALBUTerol    90 MICROgram(s) HFA Inhaler 2 Puff(s) Inhalation every 4 hours PRN    acetaminophen   Tablet. 650 milliGRAM(s) Oral every 6 hours PRN  oxyCODONE  5 mG/acetaminophen 325 mG 1 Tablet(s) Oral every 6 hours PRN  busPIRone 10 milliGRAM(s) Oral two times a day  gabapentin 800 milliGRAM(s) Oral three times a day      insulin lispro (HumaLOG) corrective regimen sliding scale   SubCutaneous three times a day before meals  insulin lispro (HumaLOG) corrective regimen sliding scale   SubCutaneous at bedtime  dextrose Gel 1 Dose(s) Oral once PRN  dextrose 50% Injectable 12.5 Gram(s) IV Push once  dextrose 50% Injectable 25 Gram(s) IV Push once  dextrose 50% Injectable 25 Gram(s) IV Push once  glucagon  Injectable 1 milliGRAM(s) IntraMuscular once PRN    dextrose 5%. 1000 milliLiter(s) IV Continuous <Continuous>  ferrous    sulfate 325 milliGRAM(s) Oral daily      PHYSICAL EXAM:  T(C): 36.4 (07-04-17 @ 06:18), Max: 36.4 (07-04-17 @ 06:18)  HR: 76 (07-04-17 @ 06:18) (68 - 76)  BP: 148/70 (07-04-17 @ 06:18) (124/65 - 148/70)  RR: 18 (07-04-17 @ 06:18) (18 - 18)  SpO2: 100% (07-04-17 @ 06:18) (95% - 100%)  Wt(kg): --  I&O's Summary    03 Jul 2017 07:01  -  04 Jul 2017 07:00  --------------------------------------------------------  IN: 1360 mL / OUT: 0 mL / NET: 1360 mL    04 Jul 2017 07:01  -  04 Jul 2017 11:43  --------------------------------------------------------  IN: 340 mL / OUT: 0 mL / NET: 340 mL          Appearance: Normal	  HEENT:   Normal oral mucosa, PERRL, EOMI	  Cardiovascular: Normal S1 S2, No JVD, No murmurs, No edema  Respiratory: Lungs clear to auscultation	  Gastrointestinal:  Soft, Non-tender, + BS	  Extremities: right leg cellulitis improving, b/l edema improving                                    15.0   5.96  )-----------( 159      ( 04 Jul 2017 07:20 )             44.7     07-04    139  |  96<L>  |  20  ----------------------------<  131<H>  3.5   |  28  |  1.10    Ca    8.9      04 Jul 2017 07:20  Mg     2.1     07-04      proBNP:   Lipid Profile:   HgA1c:   TSH:

## 2017-07-05 ENCOUNTER — TRANSCRIPTION ENCOUNTER (OUTPATIENT)
Age: 77
End: 2017-07-05

## 2017-07-05 LAB
HCT VFR BLD CALC: 45.5 % — SIGNIFICANT CHANGE UP (ref 39–50)
HGB BLD-MCNC: 15.1 G/DL — SIGNIFICANT CHANGE UP (ref 13–17)
MCHC RBC-ENTMCNC: 29.7 PG — SIGNIFICANT CHANGE UP (ref 27–34)
MCHC RBC-ENTMCNC: 33.2 % — SIGNIFICANT CHANGE UP (ref 32–36)
MCV RBC AUTO: 89.4 FL — SIGNIFICANT CHANGE UP (ref 80–100)
NRBC # FLD: 0 — SIGNIFICANT CHANGE UP
PLATELET # BLD AUTO: 159 K/UL — SIGNIFICANT CHANGE UP (ref 150–400)
PMV BLD: 9.8 FL — SIGNIFICANT CHANGE UP (ref 7–13)
RBC # BLD: 5.09 M/UL — SIGNIFICANT CHANGE UP (ref 4.2–5.8)
RBC # FLD: 14.3 % — SIGNIFICANT CHANGE UP (ref 10.3–14.5)
WBC # BLD: 5.71 K/UL — SIGNIFICANT CHANGE UP (ref 3.8–10.5)
WBC # FLD AUTO: 5.71 K/UL — SIGNIFICANT CHANGE UP (ref 3.8–10.5)

## 2017-07-05 PROCEDURE — 99232 SBSQ HOSP IP/OBS MODERATE 35: CPT

## 2017-07-05 RX ORDER — ACETAMINOPHEN 500 MG
2 TABLET ORAL
Qty: 0 | Refills: 0 | COMMUNITY
Start: 2017-07-05

## 2017-07-05 RX ORDER — FONDAPARINUX SODIUM 2.5 MG/.5ML
1 INJECTION, SOLUTION SUBCUTANEOUS
Qty: 0 | Refills: 0 | COMMUNITY

## 2017-07-05 RX ORDER — FUROSEMIDE 40 MG
1 TABLET ORAL
Qty: 0 | Refills: 0 | COMMUNITY
Start: 2017-07-05

## 2017-07-05 RX ORDER — AMLODIPINE BESYLATE 2.5 MG/1
1 TABLET ORAL
Qty: 0 | Refills: 0 | COMMUNITY
Start: 2017-07-05

## 2017-07-05 RX ORDER — GABAPENTIN 400 MG/1
2 CAPSULE ORAL
Qty: 0 | Refills: 0 | COMMUNITY
Start: 2017-07-05

## 2017-07-05 RX ORDER — GABAPENTIN 400 MG/1
1 CAPSULE ORAL
Qty: 0 | Refills: 0 | COMMUNITY
Start: 2017-07-05

## 2017-07-05 RX ORDER — ALBUTEROL 90 UG/1
2 AEROSOL, METERED ORAL
Qty: 0 | Refills: 0 | COMMUNITY

## 2017-07-05 RX ORDER — GABAPENTIN 400 MG/1
1 CAPSULE ORAL
Qty: 0 | Refills: 0 | DISCHARGE
Start: 2017-07-05

## 2017-07-05 RX ORDER — METOPROLOL TARTRATE 50 MG
0.5 TABLET ORAL
Qty: 0 | Refills: 0 | COMMUNITY

## 2017-07-05 RX ORDER — CEPHALEXIN 500 MG
1 CAPSULE ORAL
Qty: 12 | Refills: 0 | OUTPATIENT
Start: 2017-07-05 | End: 2017-07-08

## 2017-07-05 RX ORDER — ALBUTEROL 90 UG/1
2 AEROSOL, METERED ORAL
Qty: 0 | Refills: 0 | COMMUNITY
Start: 2017-07-05

## 2017-07-05 RX ORDER — METOPROLOL TARTRATE 50 MG
1 TABLET ORAL
Qty: 0 | Refills: 0 | COMMUNITY
Start: 2017-07-05

## 2017-07-05 RX ORDER — METOPROLOL TARTRATE 50 MG
1 TABLET ORAL
Qty: 60 | Refills: 0 | OUTPATIENT
Start: 2017-07-05 | End: 2017-08-04

## 2017-07-05 RX ADMIN — Medication 100 MILLIGRAM(S): at 10:47

## 2017-07-05 RX ADMIN — Medication 10 MILLIGRAM(S): at 05:39

## 2017-07-05 RX ADMIN — AMLODIPINE BESYLATE 5 MILLIGRAM(S): 2.5 TABLET ORAL at 05:39

## 2017-07-05 RX ADMIN — Medication 100 MILLIGRAM(S): at 13:02

## 2017-07-05 RX ADMIN — Medication 2: at 13:02

## 2017-07-05 RX ADMIN — RIVAROXABAN 15 MILLIGRAM(S): KIT at 05:40

## 2017-07-05 RX ADMIN — Medication 325 MILLIGRAM(S): at 13:02

## 2017-07-05 RX ADMIN — Medication 50 MILLIGRAM(S): at 05:40

## 2017-07-05 RX ADMIN — Medication 40 MILLIGRAM(S): at 05:40

## 2017-07-05 RX ADMIN — GABAPENTIN 800 MILLIGRAM(S): 400 CAPSULE ORAL at 13:02

## 2017-07-05 RX ADMIN — GABAPENTIN 800 MILLIGRAM(S): 400 CAPSULE ORAL at 05:40

## 2017-07-05 NOTE — PROGRESS NOTE ADULT - PROBLEM SELECTOR PROBLEM 2
DVT (deep venous thrombosis)
Venous stasis of both lower extremities
Venous stasis of both lower extremities

## 2017-07-05 NOTE — PROGRESS NOTE ADULT - SUBJECTIVE AND OBJECTIVE BOX
CALLIE MACIAS 77y [id number]    Patient is a 77y old  Male who presents with a chief complaint of RLE Swelling/SOB (05 Jul 2017 12:22)      Follow Up/CC:  Rt LE cellulitis    Interval History/ROS: feels well    Allergies    No Known Allergies    Intolerances        ANTIMICROBIALS:  ceFAZolin   IVPB 1000 every 8 hours  ceFAZolin   IVPB        MEDICATIONS  (STANDING):  insulin lispro (HumaLOG) corrective regimen sliding scale   SubCutaneous three times a day before meals  insulin lispro (HumaLOG) corrective regimen sliding scale   SubCutaneous at bedtime  dextrose 5%. 1000 milliLiter(s) (50 mL/Hr) IV Continuous <Continuous>  dextrose 50% Injectable 12.5 Gram(s) IV Push once  dextrose 50% Injectable 25 Gram(s) IV Push once  dextrose 50% Injectable 25 Gram(s) IV Push once  rivaroxaban 15 milliGRAM(s) Oral two times a day  busPIRone 10 milliGRAM(s) Oral two times a day  amLODIPine   Tablet 5 milliGRAM(s) Oral daily  ferrous    sulfate 325 milliGRAM(s) Oral daily  gabapentin 800 milliGRAM(s) Oral three times a day  metoprolol 50 milliGRAM(s) Oral two times a day  ceFAZolin   IVPB 1000 milliGRAM(s) IV Intermittent every 8 hours  ceFAZolin   IVPB   IV Intermittent   furosemide    Tablet 40 milliGRAM(s) Oral daily    MEDICATIONS  (PRN):  acetaminophen   Tablet. 650 milliGRAM(s) Oral every 6 hours PRN mild, moderate pain  dextrose Gel 1 Dose(s) Oral once PRN Blood Glucose LESS THAN 70 milliGRAM(s)/deciliter  glucagon  Injectable 1 milliGRAM(s) IntraMuscular once PRN Glucose LESS THAN 70 milligrams/deciliter  oxyCODONE  5 mG/acetaminophen 325 mG 1 Tablet(s) Oral every 6 hours PRN Severe Pain (7 - 10)  ALBUTerol    90 MICROgram(s) HFA Inhaler 2 Puff(s) Inhalation every 4 hours PRN Shortness of Breath        Vital Signs Last 24 Hrs  T(C): 36.8 (04 Jul 2017 21:51), Max: 36.8 (04 Jul 2017 21:51)  T(F): 98.2 (04 Jul 2017 21:51), Max: 98.2 (04 Jul 2017 21:51)  HR: 71 (04 Jul 2017 21:51) (65 - 71)  BP: 144/71 (04 Jul 2017 21:51) (126/61 - 144/71)  BP(mean): --  RR: 18 (04 Jul 2017 21:51) (18 - 18)  SpO2: 97% (04 Jul 2017 21:51) (97% - 97%)    CBC Full  -  ( 05 Jul 2017 07:44 )  WBC Count : 5.71 K/uL  Hemoglobin : 15.1 g/dL  Hematocrit : 45.5 %  Platelet Count - Automated : 159 K/uL  Mean Cell Volume : 89.4 fL  Mean Cell Hemoglobin : 29.7 pg  Mean Cell Hemoglobin Concentration : 33.2 %  Auto Neutrophil # : x  Auto Lymphocyte # : x  Auto Monocyte # : x  Auto Eosinophil # : x  Auto Basophil # : x  Auto Neutrophil % : x  Auto Lymphocyte % : x  Auto Monocyte % : x  Auto Eosinophil % : x  Auto Basophil % : x    07-04    139  |  96<L>  |  20  ----------------------------<  131<H>  3.5   |  28  |  1.10    Ca    8.9      04 Jul 2017 07:20  Mg     2.1     07-04            MICROBIOLOGY:          v            RADIOLOGY    CXR:    CT HEAD:    CT CHEST:    CT ABDOMEN:    MRI:    OTHER:

## 2017-07-05 NOTE — PROGRESS NOTE ADULT - ATTENDING COMMENTS
after echo ./ doppler of lower ext possible dc
Filiberto Rosario  Division of Infectious Disease  Pager [phone]  After 5pm/weekend #[phone]    Will sign off, recall ID if needed #[phone].
Filiberto Rosario  Division of Infectious Disease  Pager [phone]  After 5pm/weekend #[phone]

## 2017-07-05 NOTE — PROGRESS NOTE ADULT - PROBLEM SELECTOR PROBLEM 1
Cellulitis
Cellulitis of right lower extremity
Cellulitis of right lower extremity

## 2017-07-05 NOTE — DISCHARGE NOTE ADULT - PLAN OF CARE
complete antibiotics complete course of antibiotics   follow up with primary medical doctor within 1 week - please call for an appointment continue xarelto -low salt diet  - monitor blood pressure closely- please call primary care doctor for follow up appointment   - continue medications

## 2017-07-05 NOTE — DISCHARGE NOTE ADULT - HOSPITAL COURSE
78 y/o male, with a PmHx of DM, HTN, Afib s/p ablation, Lung ca w/RUL resection 1/21/15 and LLL partial resection 2/2015, was discharged from [facility] on 6/28/17 after being diagnosed with DVT and started on Xarelto, presented with RLE swelling and sob. Pt was found to be in Afib w/RVR and Cellulitis in RLE.    + RLE Cellulitis - on Cefazolin IV - ID following  -suspect Rt LE strep cellulitis-DC clinda  -ancef 1 gm iv q8 change to po keflex    + DVT &  Afib w/RVR - on xarelto- Ct angio of the chest   Suboptimal evaluation of the peripheral pulmonary arteries. No central   pulmonary embolus.  Post surgical changes. Interval resolution of the left pleural effusion   since 5/13/2015. Stable bilateral pulmonary nodules.      cardiology consult obtained and metoprolol increased   + LE Edema - on Lasix 40 iv bid - r/o CHF    Admit to medicine  -cardiology consulted  for  possible STEMI ,but EKG more concerning right heart strain   -Given patient chest pain free , incidental  finding of ST changes in EKG   likely 2/2 R Heart strain and on   Xarelto (last dose 6/30) patient  deemed not  a candidate for  urgent LHC  -Echo in am  -CTPA chest to r/o PE from: TTE with Doppler (w/Cont) (07.03.17 @ 13:05) >  CONCLUSIONS:  Technically very difficult study.  1. Mitral annular calcification, otherwise normal mitral  valve. Minimal mitral regurgitation.  2. Endocardium not well visualized; despite the use of IV  contrast, unable to evaluate left ventricular systolic  function.  3. Unable to accurately evaluate right ventricular size or  systolic function.  4. Estimated right ventricular systolic pressure equals 41  mm Hg, assuming right atrial pressure equals 10 mm Hg,  consistent with mild pulmonary hypertension. 78 y/o male, with a PmHx of DM, HTN, Afib s/p ablation, Lung ca w/RUL resection 1/21/15 and LLL partial resection 2/2015, was discharged from [facility] on 6/28/17 after being diagnosed with DVT and started on Xarelto, presented with RLE swelling and sob. Pt was found to be in Afib w/RVR and Cellulitis in RLE.    + RLE Cellulitis - on Cefazolin IV - ID following  -suspect Rt LE strep cellulitis-DC clinda  -ancef 1 gm iv q8 change to po keflex    + DVT &  Afib w/RVR - on xarelto- Ct angio of the chest   Suboptimal evaluation of the peripheral pulmonary arteries. No central   pulmonary embolus.  Post surgical changes. Interval resolution of the left pleural effusion   since 5/13/2015. Stable bilateral pulmonary nodules.      cardiology consult obtained and metoprolol increased   + LE Edema - on Lasix 40 iv bid - r/o CHF    Admit to medicine  -cardiology consulted  for  possible STEMI ,but EKG more concerning right heart strain   -Given patient chest pain free , incidental  finding of ST changes in EKG   likely 2/2 R Heart strain and on   Xarelto (last dose 6/30) patient  deemed not  a candidate for  urgent LHC  -Echo in am  -CTPA chest to r/o PE from: TTE with Doppler (w/Cont) (07.03.17 @ 13:05) >  CONCLUSIONS:Technically very difficult study. Mitral annular calcification, otherwise normal mitral valve. Minimal mitral regurgitation.  Endocardium not well visualized; despite the use of IV contrast, unable to evaluate left ventricular systolic function. Unable to accurately evaluate right ventricular size or  systolic function. Estimated right ventricular systolic pressure equals 41 mm Hg, assuming right atrial pressure equals 10 mm Hg,consistent with mild pulmonary hypertension.

## 2017-07-05 NOTE — PROGRESS NOTE ADULT - SUBJECTIVE AND OBJECTIVE BOX
Coverage for Dr. Saleem      CC: no CP/SOB    TELEMETRY: no events    PHYSICAL EXAM:    T(C): 36.8 (07-04-17 @ 21:51), Max: 36.8 (07-04-17 @ 21:51)  HR: 71 (07-04-17 @ 21:51) (65 - 71)  BP: 144/71 (07-04-17 @ 21:51) (126/61 - 144/71)  RR: 18 (07-04-17 @ 21:51) (18 - 18)  SpO2: 97% (07-04-17 @ 21:51) (97% - 97%)  Wt(kg): --  I&O's Summary    04 Jul 2017 07:01  -  05 Jul 2017 07:00  --------------------------------------------------------  IN: 910 mL / OUT: 0 mL / NET: 910 mL    05 Jul 2017 07:01  -  05 Jul 2017 11:47  --------------------------------------------------------  IN: 240 mL / OUT: 0 mL / NET: 240 mL        Appearance: Normal	  Cardiovascular: Normal S1 S2,RRR, No JVD, No murmurs  Respiratory: Lungs clear to auscultation	  Gastrointestinal:  Soft, Non-tender, + BS	  Extremities: + LE R>L  Vascular: Peripheral pulses palpable 2+ bilaterally     LABS:	 	                          15.1   5.71  )-----------( 159      ( 05 Jul 2017 07:44 )             45.5     07-04    139  |  96<L>  |  20  ----------------------------<  131<H>  3.5   |  28  |  1.10    Ca    8.9      04 Jul 2017 07:20  Mg     2.1     07-04            CARDIAC MARKERS:

## 2017-07-05 NOTE — DISCHARGE NOTE ADULT - CARE PROVIDER_API CALL
Fozia Ledesma), Internal Medicine  [address] [place] [address]  Phone: [phone]  Fax: [phone]

## 2017-07-05 NOTE — PROGRESS NOTE ADULT - ASSESSMENT
EKG - SVT @ 140 bpm  Tele - several episodes of SVT, no afib     1) SVT - ? aflutter vs atrial tach with 2:1 conduction, increase lopressor to 50mg q12, no episodes overnight, cont xarelto , 2d echo technically difficult cannot access LV function ,  has h/o afib ablation, no afib episodes on this admission    2) Right leg cellulitis - on ancef    3) SOB - CT chest negative for PE, ?diastolic CHF, 2d echo technically difficult cannot access LV function, edema improved change IV lasix to PO 40mg daily
EKG - SVT @ 140 bpm  Tele - several episodes of SVT, no afib     1) SVT - ? aflutter vs atrial tach with 2:1 conduction, increase lopressor to 50mg q12, no episodes overnight, cont xarelto , get 2d echo ,  has h/o afib ablation, no afib episodes on this admission    2) Right leg cellulitis - on ancef    3) SOB - CT chest negative for PE, ?diastolic CHF was started on lasix 40mg IV q12, f/u 2d echo
EKG - SVT @ 140 bpm  Tele - several episodes of SVT, no afib     1) SVT - ? aflutter vs atrial tach with 2:1 conduction, increase lopressor to 50mg q12, no episodes overnight, cont xarelto , get 2d echo , will consider EP consult, has h/o afib ablation    2) Right leg cellulitis - on ancef    3) SOB - CT chest negative for PE, ?diastolic CHF was started on lasix 40mg IV q12, f/u 2d echo
EKG - SVT @ 140 bpm  Tele - several episodes of SVT, no afib     1) SVT - ?no further events on tele, cont BB    2) Right leg cellulitis - on ancef    3) SOB - CT chest negative for PE, ?diastolic CHF, 2d echo technically difficult cannot access LV function, edema improved , cont PO lasix
76 y/o male with DM, HTN, Morbid Obesity, Afib s/p ablation, Lung ca w/ RUL resection and LLL partial resection, was discharged from OSH on 6/28/17 with DVT on Xarelto p/w RLE swelling and sob. CT angio chest showed no central PE.
76 y/o male with DM, HTN, Morbid Obesity, Afib s/p ablation, Lung ca w/ RUL resection and LLL partial resection, was discharged from OSH on 6/28/17 with DVT on Xarelto p/w RLE swelling and sob. CT angio chest showed no central PE.

## 2017-07-05 NOTE — DISCHARGE NOTE ADULT - PATIENT PORTAL LINK FT
“You can access the Follow[facility] Patient Portal, offered by [facility], by registering with the following website: [email or web address]”

## 2017-07-05 NOTE — DISCHARGE NOTE ADULT - CARE PLAN
Principal Discharge DX:	Cellulitis  Goal:	complete antibiotics  Instructions for follow-up, activity and diet:	complete course of antibiotics   follow up with primary medical doctor within 1 week - please call for an appointment  Secondary Diagnosis:	Atrial fibrillation  Instructions for follow-up, activity and diet:	continue xarelto  Secondary Diagnosis:	HTN (hypertension)  Instructions for follow-up, activity and diet:	-low salt diet  - monitor blood pressure closely- please call primary care doctor for follow up appointment   - continue medications  Secondary Diagnosis:	DVT (deep venous thrombosis)  Instructions for follow-up, activity and diet:	continue xarelto

## 2017-07-05 NOTE — PROGRESS NOTE ADULT - PROBLEM SELECTOR PLAN 1
cont present abx  doppler rt lower ext pending
cont present abx  doppler rt lower ext pending
cont present abx  doppler rt lower ext
-better  -limb elevation  -change abx to keflex 500 mg po 4 x daily x 3 days
-better  -limb elevation  -cont ancef  -DC with Keflex 500 mg po 4 x daily x 5 days in AM

## 2017-07-05 NOTE — DISCHARGE NOTE ADULT - MEDICATION SUMMARY - MEDICATIONS TO TAKE
I will START or STAY ON the medications listed below when I get home from the hospital:    Percocet 5/325  -- 1 tab(s) by mouth every 4 hours, As Needed MDD:8 - for severe pain  -- Indication: For pain    rivaroxaban 15 mg oral tablet  -- 1 tab(s) by mouth 2 times a day  -- Indication: For DVT (deep venous thrombosis)    gabapentin 400 mg oral capsule  -- 2 cap(s) by mouth 3 times a day  -- Indication: For Diabetes mellitus    Amaryl 1 mg oral tablet  -- 1 tab(s) by mouth once a day  -- Indication: For Diabetes mellitus    metFORMIN 500 mg oral tablet, extended release  -- 1 tab(s) by mouth once a day  -- Indication: For Diabetes mellitus    busPIRone 10 mg oral tablet  -- 1 tab(s) by mouth 2 times a day  -- Indication: For mood    metoprolol tartrate 50 mg oral tablet  -- 1 tab(s) by mouth 2 times a day  -- Indication: For Atrial fibrillation    albuterol 90 mcg/inh inhalation aerosol  -- 2 puff(s) inhaled every 4 hours, As needed, Shortness of Breath  -- Indication: For Squamous cell lung cancer    amLODIPine 5 mg oral tablet  -- 1 tab(s) by mouth once a day  -- Indication: For HTN (hypertension)    Keflex 500 mg oral capsule  -- 1 cap(s) by mouth 4 times a day  -- Finish all this medication unless otherwise directed by prescriber.    -- Indication: For Cellulitis    furosemide 40 mg oral tablet  -- 1 tab(s) by mouth once a day  -- Indication: For Heart failure    ferrous sulfate  -- 325 milligram(s) by mouth once a day  -- Indication: For Supplement

## 2017-07-06 VITALS
OXYGEN SATURATION: 94 % | SYSTOLIC BLOOD PRESSURE: 136 MMHG | DIASTOLIC BLOOD PRESSURE: 67 MMHG | HEART RATE: 67 BPM | TEMPERATURE: 98 F | RESPIRATION RATE: 18 BRPM

## 2017-07-11 ENCOUNTER — INPATIENT (INPATIENT)
Facility: HOSPITAL | Age: 77
LOS: 3 days | Discharge: HOME HEALTH | End: 2017-07-15
Attending: INTERNAL MEDICINE | Admitting: INTERNAL MEDICINE
Payer: MEDICARE

## 2017-07-11 VITALS
SYSTOLIC BLOOD PRESSURE: 136 MMHG | HEART RATE: 95 BPM | TEMPERATURE: 100 F | DIASTOLIC BLOOD PRESSURE: 82 MMHG | RESPIRATION RATE: 20 BRPM

## 2017-07-11 DIAGNOSIS — Z98.89 OTHER SPECIFIED POSTPROCEDURAL STATES: Chronic | ICD-10-CM

## 2017-07-11 DIAGNOSIS — F41.9 ANXIETY DISORDER, UNSPECIFIED: ICD-10-CM

## 2017-07-11 DIAGNOSIS — Z29.9 ENCOUNTER FOR PROPHYLACTIC MEASURES, UNSPECIFIED: ICD-10-CM

## 2017-07-11 DIAGNOSIS — I10 ESSENTIAL (PRIMARY) HYPERTENSION: ICD-10-CM

## 2017-07-11 DIAGNOSIS — I48.91 UNSPECIFIED ATRIAL FIBRILLATION: ICD-10-CM

## 2017-07-11 DIAGNOSIS — L03.115 CELLULITIS OF RIGHT LOWER LIMB: ICD-10-CM

## 2017-07-11 DIAGNOSIS — I82.411 ACUTE EMBOLISM AND THROMBOSIS OF RIGHT FEMORAL VEIN: ICD-10-CM

## 2017-07-11 DIAGNOSIS — R91.8 OTHER NONSPECIFIC ABNORMAL FINDING OF LUNG FIELD: Chronic | ICD-10-CM

## 2017-07-11 DIAGNOSIS — E11.9 TYPE 2 DIABETES MELLITUS WITHOUT COMPLICATIONS: ICD-10-CM

## 2017-07-11 LAB
ALBUMIN SERPL ELPH-MCNC: 4.2 G/DL — SIGNIFICANT CHANGE UP (ref 3.3–5)
ALP SERPL-CCNC: 56 U/L — SIGNIFICANT CHANGE UP (ref 40–120)
ALT FLD-CCNC: 17 U/L — SIGNIFICANT CHANGE UP (ref 4–41)
APPEARANCE UR: CLEAR — SIGNIFICANT CHANGE UP
APTT BLD: 35.3 SEC — SIGNIFICANT CHANGE UP (ref 27.5–37.4)
AST SERPL-CCNC: 20 U/L — SIGNIFICANT CHANGE UP (ref 4–40)
BASE EXCESS BLDV CALC-SCNC: -0.5 MMOL/L — SIGNIFICANT CHANGE UP
BASOPHILS # BLD AUTO: 0.02 K/UL — SIGNIFICANT CHANGE UP (ref 0–0.2)
BASOPHILS NFR BLD AUTO: 0.2 % — SIGNIFICANT CHANGE UP (ref 0–2)
BILIRUB SERPL-MCNC: 0.7 MG/DL — SIGNIFICANT CHANGE UP (ref 0.2–1.2)
BILIRUB UR-MCNC: NEGATIVE — SIGNIFICANT CHANGE UP
BLOOD GAS VENOUS - CREATININE: 1.09 MG/DL — SIGNIFICANT CHANGE UP (ref 0.5–1.3)
BLOOD UR QL VISUAL: NEGATIVE — SIGNIFICANT CHANGE UP
BUN SERPL-MCNC: 21 MG/DL — SIGNIFICANT CHANGE UP (ref 7–23)
CALCIUM SERPL-MCNC: 9.4 MG/DL — SIGNIFICANT CHANGE UP (ref 8.4–10.5)
CHLORIDE BLDV-SCNC: 104 MMOL/L — SIGNIFICANT CHANGE UP (ref 96–108)
CHLORIDE SERPL-SCNC: 97 MMOL/L — LOW (ref 98–107)
CO2 SERPL-SCNC: 21 MMOL/L — LOW (ref 22–31)
COLOR SPEC: SIGNIFICANT CHANGE UP
CREAT SERPL-MCNC: 1.17 MG/DL — SIGNIFICANT CHANGE UP (ref 0.5–1.3)
EOSINOPHIL # BLD AUTO: 0 K/UL — SIGNIFICANT CHANGE UP (ref 0–0.5)
EOSINOPHIL NFR BLD AUTO: 0 % — SIGNIFICANT CHANGE UP (ref 0–6)
GAS PNL BLDV: 137 MMOL/L — SIGNIFICANT CHANGE UP (ref 136–146)
GLUCOSE BLDV-MCNC: 182 — HIGH (ref 70–99)
GLUCOSE SERPL-MCNC: 184 MG/DL — HIGH (ref 70–99)
GLUCOSE UR-MCNC: NEGATIVE — SIGNIFICANT CHANGE UP
HCO3 BLDV-SCNC: 24 MMOL/L — SIGNIFICANT CHANGE UP (ref 20–27)
HCT VFR BLD CALC: 42 % — SIGNIFICANT CHANGE UP (ref 39–50)
HCT VFR BLDV CALC: 43.9 % — SIGNIFICANT CHANGE UP (ref 39–51)
HGB BLD-MCNC: 14 G/DL — SIGNIFICANT CHANGE UP (ref 13–17)
HGB BLDV-MCNC: 14.3 G/DL — SIGNIFICANT CHANGE UP (ref 13–17)
IMM GRANULOCYTES # BLD AUTO: 0.07 # — SIGNIFICANT CHANGE UP
IMM GRANULOCYTES NFR BLD AUTO: 0.6 % — SIGNIFICANT CHANGE UP (ref 0–1.5)
INR BLD: 1.33 — HIGH (ref 0.88–1.17)
KETONES UR-MCNC: NEGATIVE — SIGNIFICANT CHANGE UP
LACTATE BLDV-MCNC: 3.8 MMOL/L — HIGH (ref 0.5–2)
LEUKOCYTE ESTERASE UR-ACNC: NEGATIVE — SIGNIFICANT CHANGE UP
LYMPHOCYTES # BLD AUTO: 0.51 K/UL — LOW (ref 1–3.3)
LYMPHOCYTES # BLD AUTO: 4.1 % — LOW (ref 13–44)
MCHC RBC-ENTMCNC: 29.2 PG — SIGNIFICANT CHANGE UP (ref 27–34)
MCHC RBC-ENTMCNC: 33.3 % — SIGNIFICANT CHANGE UP (ref 32–36)
MCV RBC AUTO: 87.7 FL — SIGNIFICANT CHANGE UP (ref 80–100)
MONOCYTES # BLD AUTO: 0.54 K/UL — SIGNIFICANT CHANGE UP (ref 0–0.9)
MONOCYTES NFR BLD AUTO: 4.4 % — SIGNIFICANT CHANGE UP (ref 2–14)
NEUTROPHILS # BLD AUTO: 11.17 K/UL — HIGH (ref 1.8–7.4)
NEUTROPHILS NFR BLD AUTO: 90.7 % — HIGH (ref 43–77)
NITRITE UR-MCNC: NEGATIVE — SIGNIFICANT CHANGE UP
NRBC # FLD: 0 — SIGNIFICANT CHANGE UP
PCO2 BLDV: 38 MMHG — LOW (ref 41–51)
PH BLDV: 7.41 PH — SIGNIFICANT CHANGE UP (ref 7.32–7.43)
PH UR: 6 — SIGNIFICANT CHANGE UP (ref 4.6–8)
PLATELET # BLD AUTO: 131 K/UL — LOW (ref 150–400)
PMV BLD: 10.2 FL — SIGNIFICANT CHANGE UP (ref 7–13)
PO2 BLDV: 50 MMHG — HIGH (ref 35–40)
POTASSIUM BLDV-SCNC: 3.8 MMOL/L — SIGNIFICANT CHANGE UP (ref 3.4–4.5)
POTASSIUM SERPL-MCNC: 4 MMOL/L — SIGNIFICANT CHANGE UP (ref 3.5–5.3)
POTASSIUM SERPL-SCNC: 4 MMOL/L — SIGNIFICANT CHANGE UP (ref 3.5–5.3)
PROT SERPL-MCNC: 7.6 G/DL — SIGNIFICANT CHANGE UP (ref 6–8.3)
PROT UR-MCNC: 20 — SIGNIFICANT CHANGE UP
PROTHROM AB SERPL-ACNC: 15 SEC — HIGH (ref 9.8–13.1)
RBC # BLD: 4.79 M/UL — SIGNIFICANT CHANGE UP (ref 4.2–5.8)
RBC # FLD: 14.6 % — HIGH (ref 10.3–14.5)
RBC CASTS # UR COMP ASSIST: SIGNIFICANT CHANGE UP (ref 0–?)
SAO2 % BLDV: 79.2 % — SIGNIFICANT CHANGE UP (ref 60–85)
SODIUM SERPL-SCNC: 138 MMOL/L — SIGNIFICANT CHANGE UP (ref 135–145)
SP GR SPEC: 1.02 — SIGNIFICANT CHANGE UP (ref 1–1.03)
TROPONIN T SERPL-MCNC: < 0.06 NG/ML — SIGNIFICANT CHANGE UP (ref 0–0.06)
UROBILINOGEN FLD QL: NORMAL E.U. — SIGNIFICANT CHANGE UP (ref 0.1–0.2)
WBC # BLD: 12.31 K/UL — HIGH (ref 3.8–10.5)
WBC # FLD AUTO: 12.31 K/UL — HIGH (ref 3.8–10.5)
WBC UR QL: SIGNIFICANT CHANGE UP (ref 0–?)

## 2017-07-11 PROCEDURE — 99223 1ST HOSP IP/OBS HIGH 75: CPT

## 2017-07-11 PROCEDURE — 71020: CPT | Mod: 26

## 2017-07-11 PROCEDURE — 93970 EXTREMITY STUDY: CPT | Mod: 26

## 2017-07-11 RX ORDER — SODIUM CHLORIDE 9 MG/ML
1000 INJECTION INTRAMUSCULAR; INTRAVENOUS; SUBCUTANEOUS ONCE
Qty: 0 | Refills: 0 | Status: COMPLETED | OUTPATIENT
Start: 2017-07-11 | End: 2017-07-11

## 2017-07-11 RX ORDER — VANCOMYCIN HCL 1 G
1000 VIAL (EA) INTRAVENOUS EVERY 12 HOURS
Qty: 0 | Refills: 0 | Status: DISCONTINUED | OUTPATIENT
Start: 2017-07-12 | End: 2017-07-13

## 2017-07-11 RX ORDER — GABAPENTIN 400 MG/1
800 CAPSULE ORAL THREE TIMES A DAY
Qty: 0 | Refills: 0 | Status: DISCONTINUED | OUTPATIENT
Start: 2017-07-11 | End: 2017-07-15

## 2017-07-11 RX ORDER — VANCOMYCIN HCL 1 G
1500 VIAL (EA) INTRAVENOUS ONCE
Qty: 0 | Refills: 0 | Status: COMPLETED | OUTPATIENT
Start: 2017-07-11 | End: 2017-07-11

## 2017-07-11 RX ORDER — GABAPENTIN 400 MG/1
800 CAPSULE ORAL ONCE
Qty: 0 | Refills: 0 | Status: COMPLETED | OUTPATIENT
Start: 2017-07-11 | End: 2017-07-11

## 2017-07-11 RX ORDER — INSULIN LISPRO 100/ML
VIAL (ML) SUBCUTANEOUS
Qty: 0 | Refills: 0 | Status: DISCONTINUED | OUTPATIENT
Start: 2017-07-11 | End: 2017-07-15

## 2017-07-11 RX ORDER — DEXTROSE 50 % IN WATER 50 %
1 SYRINGE (ML) INTRAVENOUS ONCE
Qty: 0 | Refills: 0 | Status: DISCONTINUED | OUTPATIENT
Start: 2017-07-11 | End: 2017-07-15

## 2017-07-11 RX ORDER — ENOXAPARIN SODIUM 100 MG/ML
150 INJECTION SUBCUTANEOUS EVERY 12 HOURS
Qty: 0 | Refills: 0 | Status: DISCONTINUED | OUTPATIENT
Start: 2017-07-11 | End: 2017-07-11

## 2017-07-11 RX ORDER — DEXTROSE 50 % IN WATER 50 %
12.5 SYRINGE (ML) INTRAVENOUS ONCE
Qty: 0 | Refills: 0 | Status: DISCONTINUED | OUTPATIENT
Start: 2017-07-11 | End: 2017-07-15

## 2017-07-11 RX ORDER — OXYCODONE AND ACETAMINOPHEN 5; 325 MG/1; MG/1
1 TABLET ORAL ONCE
Qty: 0 | Refills: 0 | Status: DISCONTINUED | OUTPATIENT
Start: 2017-07-11 | End: 2017-07-11

## 2017-07-11 RX ORDER — DOCUSATE SODIUM 100 MG
100 CAPSULE ORAL
Qty: 0 | Refills: 0 | Status: DISCONTINUED | OUTPATIENT
Start: 2017-07-11 | End: 2017-07-15

## 2017-07-11 RX ORDER — METOPROLOL TARTRATE 50 MG
50 TABLET ORAL
Qty: 0 | Refills: 0 | Status: DISCONTINUED | OUTPATIENT
Start: 2017-07-11 | End: 2017-07-11

## 2017-07-11 RX ORDER — ENOXAPARIN SODIUM 100 MG/ML
150 INJECTION SUBCUTANEOUS EVERY 12 HOURS
Qty: 0 | Refills: 0 | Status: DISCONTINUED | OUTPATIENT
Start: 2017-07-11 | End: 2017-07-13

## 2017-07-11 RX ORDER — ACETAMINOPHEN 500 MG
1000 TABLET ORAL ONCE
Qty: 0 | Refills: 0 | Status: COMPLETED | OUTPATIENT
Start: 2017-07-11 | End: 2017-07-11

## 2017-07-11 RX ORDER — FUROSEMIDE 40 MG
40 TABLET ORAL
Qty: 0 | Refills: 0 | Status: DISCONTINUED | OUTPATIENT
Start: 2017-07-11 | End: 2017-07-11

## 2017-07-11 RX ORDER — AMLODIPINE BESYLATE 2.5 MG/1
5 TABLET ORAL DAILY
Qty: 0 | Refills: 0 | Status: DISCONTINUED | OUTPATIENT
Start: 2017-07-11 | End: 2017-07-11

## 2017-07-11 RX ORDER — GLUCAGON INJECTION, SOLUTION 0.5 MG/.1ML
1 INJECTION, SOLUTION SUBCUTANEOUS ONCE
Qty: 0 | Refills: 0 | Status: DISCONTINUED | OUTPATIENT
Start: 2017-07-11 | End: 2017-07-15

## 2017-07-11 RX ORDER — SODIUM CHLORIDE 9 MG/ML
1000 INJECTION, SOLUTION INTRAVENOUS
Qty: 0 | Refills: 0 | Status: DISCONTINUED | OUTPATIENT
Start: 2017-07-11 | End: 2017-07-15

## 2017-07-11 RX ORDER — FERROUS SULFATE 325(65) MG
325 TABLET ORAL DAILY
Qty: 0 | Refills: 0 | Status: DISCONTINUED | OUTPATIENT
Start: 2017-07-11 | End: 2017-07-15

## 2017-07-11 RX ORDER — OXYCODONE AND ACETAMINOPHEN 5; 325 MG/1; MG/1
1 TABLET ORAL EVERY 6 HOURS
Qty: 0 | Refills: 0 | Status: DISCONTINUED | OUTPATIENT
Start: 2017-07-11 | End: 2017-07-15

## 2017-07-11 RX ORDER — DEXTROSE 50 % IN WATER 50 %
25 SYRINGE (ML) INTRAVENOUS ONCE
Qty: 0 | Refills: 0 | Status: DISCONTINUED | OUTPATIENT
Start: 2017-07-11 | End: 2017-07-15

## 2017-07-11 RX ORDER — TIZANIDINE 4 MG/1
2 TABLET ORAL
Qty: 0 | Refills: 0 | COMMUNITY

## 2017-07-11 RX ORDER — METOPROLOL TARTRATE 50 MG
50 TABLET ORAL
Qty: 0 | Refills: 0 | Status: DISCONTINUED | OUTPATIENT
Start: 2017-07-11 | End: 2017-07-15

## 2017-07-11 RX ORDER — RIVAROXABAN 15 MG-20MG
15 KIT ORAL ONCE
Qty: 0 | Refills: 0 | Status: COMPLETED | OUTPATIENT
Start: 2017-07-11 | End: 2017-07-11

## 2017-07-11 RX ORDER — INSULIN LISPRO 100/ML
VIAL (ML) SUBCUTANEOUS AT BEDTIME
Qty: 0 | Refills: 0 | Status: DISCONTINUED | OUTPATIENT
Start: 2017-07-11 | End: 2017-07-15

## 2017-07-11 RX ORDER — AMLODIPINE BESYLATE 2.5 MG/1
5 TABLET ORAL DAILY
Qty: 0 | Refills: 0 | Status: DISCONTINUED | OUTPATIENT
Start: 2017-07-11 | End: 2017-07-15

## 2017-07-11 RX ORDER — FUROSEMIDE 40 MG
40 TABLET ORAL
Qty: 0 | Refills: 0 | Status: DISCONTINUED | OUTPATIENT
Start: 2017-07-11 | End: 2017-07-15

## 2017-07-11 RX ORDER — POTASSIUM CHLORIDE 20 MEQ
20 PACKET (EA) ORAL DAILY
Qty: 0 | Refills: 0 | Status: DISCONTINUED | OUTPATIENT
Start: 2017-07-11 | End: 2017-07-15

## 2017-07-11 RX ORDER — ALBUTEROL 90 UG/1
2 AEROSOL, METERED ORAL EVERY 6 HOURS
Qty: 0 | Refills: 0 | Status: DISCONTINUED | OUTPATIENT
Start: 2017-07-11 | End: 2017-07-15

## 2017-07-11 RX ADMIN — Medication 250 MILLIGRAM(S): at 12:59

## 2017-07-11 RX ADMIN — Medication 10 MILLIGRAM(S): at 20:15

## 2017-07-11 RX ADMIN — OXYCODONE AND ACETAMINOPHEN 1 TABLET(S): 5; 325 TABLET ORAL at 14:01

## 2017-07-11 RX ADMIN — SODIUM CHLORIDE 3000 MILLILITER(S): 9 INJECTION INTRAMUSCULAR; INTRAVENOUS; SUBCUTANEOUS at 09:47

## 2017-07-11 RX ADMIN — GABAPENTIN 800 MILLIGRAM(S): 400 CAPSULE ORAL at 21:39

## 2017-07-11 RX ADMIN — Medication 40 MILLIGRAM(S): at 18:00

## 2017-07-11 RX ADMIN — Medication 50 MILLIGRAM(S): at 18:04

## 2017-07-11 RX ADMIN — Medication 400 MILLIGRAM(S): at 10:37

## 2017-07-11 RX ADMIN — OXYCODONE AND ACETAMINOPHEN 1 TABLET(S): 5; 325 TABLET ORAL at 13:01

## 2017-07-11 RX ADMIN — Medication 1000 MILLIGRAM(S): at 12:09

## 2017-07-11 RX ADMIN — RIVAROXABAN 15 MILLIGRAM(S): KIT at 09:57

## 2017-07-11 RX ADMIN — GABAPENTIN 800 MILLIGRAM(S): 400 CAPSULE ORAL at 09:57

## 2017-07-11 NOTE — H&P ADULT - EXTREMITIES COMMENTS
significant bilateral LE pitting edema with chronic venous stasis changes, RLE much greater than LLE; RLE with erythema tracking up medial thigh

## 2017-07-11 NOTE — ED ADULT TRIAGE NOTE - CHIEF COMPLAINT QUOTE
Pt. brought to [facility] ED for difficulty breathing. Pt. appears comfortable. NAD noted. O2 2liters via N/C given at triage for comfort.

## 2017-07-11 NOTE — ED PROVIDER NOTE - MEDICAL DECISION MAKING DETAILS
76 y/o male with PMH of DM, HTN, a-flutter, lung cancer s/p lobectomy, presenting after an episode of AMS, SOB, chills, and diaphoresis this morning. Found to have a fever and RLE erythema, edema, and TTP. All in the context of a recent hospitalization for RLE cellulitis. Suspicious for infection (cellulitis recurrence vs. UTI) vs. encephalopathy vs. electrolyte imbalance. Will start patient on vanco for coverage of MRSA cellulitis and give Tylenol and IVF. Ordered CBC, CMP, UA, VBG.    Less suspicion for ACS given unchanged EKG but ordered CE and will trend.

## 2017-07-11 NOTE — ED PROVIDER NOTE - CRITICAL CARE PROVIDED
consult w/ pt's family directly relating to pts condition/additional history taking/direct patient care (not related to procedure)/interpretation of diagnostic studies/consultation with other physicians/documentation

## 2017-07-11 NOTE — H&P ADULT - PROBLEM SELECTOR PLAN 1
Extension of thrombus despite rivaroxaban therapy. Alternative medication could be apixaban (especially given history of cardiovascular disease), enoxaparin (though could be challenging given obesity), or warfarin. Pt expressed concern over coverage of DOACs. Will need to coordinate with Pharmacy. For now, initiate enoxaparin 1mg/kg BID.

## 2017-07-11 NOTE — ED PROVIDER NOTE - PROGRESS NOTE DETAILS
76 y/o male, with a PmHx of DM, HTN, Afib s/p ablation, Lung ca w/RUL resection 1/21/15 and LLL partial resection 2/2015, was discharged from [facility] on 6/28/17 after being diagnosed with DVT and started on Xarelto, p/w SOB.  Pt recently admitted CTA no PE, Echo technically difficult unable to assess systolic Fx, US no DVT. Pt Tx for cellulitis.  Pt discharge don antibiotics. CLYDE As the supervising resident-in-charge (JEN), I have performed a face to face bedside history and physical examination of this patient. I have discussed the history, examination, review of systems, assessment and plan of management with the PA. I have reviewed the electronic medical record and amended it to reflect my history, review of systems, physical exam, assessment and plan. 77 YOM PmHx of lung CA, DM, HTN, Afib s/p ablation, Lung ca w/RUL resection 1/21/15 and LLL partial resection 2/2015, was discharged from [facility] on 6/28/17 after being diagnosed with DVT and started on Xarelto, p/w SOB.  Pt awoke this morning c/o sudden onset diaphoresis SOB and urinary incontinence.  Pt states mild chills last night Pt and son state otherwise normal.  Pt recently admitted CTA no PE, Echo technically difficult unable to assess systolic Fx, US no DVT. Pt Tx for cellulitis.  Pt discharged don antibiotics. 77 YOM PMH DM, HTN, Afib s/p ablation, Lung ca w/RUL resection 1/21/15 and LLL partial resection 2/2015, was discharged from [facility] on 6/28/17 after being diagnosed with DVT and started on Xarelto, p/w SOB.  Pt awoke this morning c/o sudden onset diaphoresis SOB and urinary incontinence.  Pt states mild chills last night Pt and son state otherwise normal.  Pt recently admitted CTA no PE, Echo technically difficult unable to assess systolic Fx, US no DVT. Pt Tx for cellulitis for selling in the right leg.  Swelling in the right leg worse today.  Pt discharged on Keflex finished 2 days ago.  No chest pain, back pain neck pain.  In the ED Tachypnea, mild tachycardia and fever. No hypoxia.  RRR NMRG, LCAB, ABD soft NTD, ND, NBS.  Extremities 4+ pitting edema, chronic venous stasis right worse than left.  A/P: Given Hx R/O DVT however less likely given pt has fever.  EKG no active ischemia.  Likely bacteremia with CHANTEL cellulitis. Evaluate and treat for infection.  Admit.

## 2017-07-11 NOTE — H&P ADULT - HISTORY OF PRESENT ILLNESS
77M DM2, HTN, Afib s/p ablation 2006, prior lung CA s/p lobectomy x 2 who presents with worsening of RLE swelling. The patient has been hospitalized twice recently: first at [facility] where he was diagnosed with RLE below-the-knee DVT and discharged on xarelto; and second at [facility] where presented with afib RVR, was ruled out for PE, and was diagnosed with cellulitis and sent home on keflex (which he took for 3 days total). Since then, RLE swelling has been worsening, and now redness is tracking up the leg to the thigh. He has some discomfort, not really what he would call pain. His wife also reports that the patient has appears short of breath and mildly confused; the patient does not affirm this history.

## 2017-07-11 NOTE — ED ADULT NURSE NOTE - CHIEF COMPLAINT QUOTE
Pt. brought to [facility] ED for difficulty breathing. Pt. appears comfortable. NAD noted. O2 2liters via N/C given at triage for comfort.

## 2017-07-11 NOTE — ED PROVIDER NOTE - OBJECTIVE STATEMENT
Patient is a 78 y/o male complaining of shortness of breath. Per patient's son, when they went to his room this morning (1 hour ago), they found the patient to be disoriented, sweating, short of breath, and unable to stand. The patient urinated himself once. Patient said he had some chills last night and moderate fatigue while walking during the day yesterday, but otherwise felt normal. He feels better now but is not completely back to baseline. Patient denies fevers, chest pain, dyspnea on exertion, orthopnea, cough, sore throat, runny nose, congestion, abdominal pain, N/V/D, dysuria, or weakness. His family denied witnessing any epileptic activity. Of note, patient was hospitalized twice recently. During the first admission, he was found to have a DVT, and during the second admission, he was diagnosed with cellulitis and discharged with Keflex, which he finished 2 days ago. Patient is a 78 y/o male complaining of shortness of breath. Per patient's son, when they went to his room this morning (1 hour ago), they found the patient to be disoriented, sweating, short of breath, and unable to stand. The patient urinated himself once. Patient said he had some chills last night and moderate fatigue while walking during the day yesterday, but otherwise felt normal. He feels better now but is not completely back to baseline. Patient denies fevers, chest pain, dyspnea on exertion, orthopnea, cough, sore throat, runny nose, congestion, abdominal pain, N/V/D, dysuria, or weakness. His family denied witnessing any epileptic activity. Of note, patient was hospitalized twice recently. During the first admission, he was found to have a DVT, and during the second admission, he was diagnosed with cellulitis and discharged with Keflex, which he finished 2 days ago.    Attendinyo male presents with episode of shortness of breath and confusion this morning.  This happened 2x before in the last 2 weeks, both times when he was diagnosed with an infection and found to be febrile.  Pt was on keflex for LE cellulitis which stopped 2 days ago.  No fall/trauma.  No chest pain.

## 2017-07-11 NOTE — ED ADULT NURSE NOTE - OBJECTIVE STATEMENT
Pt. received in stable condition and NAD AOX3. Patient is a 76 y/o male complaining of shortness of breath. Per patient's son, when they went to his room this morning (1 hour ago), they found the patient to be disoriented, sweating, short of breath, and unable to stand. RLE appears more swollen than left. Denies CP, Headache, n/v. 20G IV inserted to right AC. VSS. Labs drawn and sent. MD evaluation in progress. Will cont. to monitor.

## 2017-07-11 NOTE — H&P ADULT - FAMILY HISTORY
<<-----Click on this checkbox to enter Family History Family history of heart failure, father     Sibling  Still living? No  Family history of diabetes mellitus, Age at diagnosis: Age Unknown  Family history of liver cancer, Age at diagnosis: Age Unknown

## 2017-07-11 NOTE — H&P ADULT - ASSESSMENT
77M DM2, HTN, Afib s/p ablation 2006, prior lung CA s/p lobectomy x 2 who presents with worsening of RLE swelling. Confirmed extension of RLE DVT on ultrasound.

## 2017-07-11 NOTE — CONSULT NOTE ADULT - SUBJECTIVE AND OBJECTIVE BOX
CHIEF COMPLAINT: Pt is a 76 yo male h/o Afib s/p abl;ation 2006, DVT on xarelto, cellulitis, obesity, comes in with chief complaint of altered mental status as per patients wife and shortness of breath, pt was recently admitted with right lower ext cellulitis, s/p antibiotics, now complains of right lower ext swelling and warmth and tenderness, no h/o dizziness, chest pain or syncope    HISTORY OF PRESENT ILLNESS:    PAST MEDICAL & SURGICAL HISTORY:  DVT (deep venous thrombosis): RLE dx 6/28/17  Venous stasis of both lower extremities  Balanitis  Squamous cell lung cancer  Atrial fibrillation: s/p ablation 2006  Cellulitis: Both legs 2/2 chronic venous stasis  Morbid obesity  Anxiety  Neuropathy  Diabetes mellitus  HTN (hypertension)  Lung nodules: Flexible Bronchoscopy, Right VATS, Right Lung Resection - 1/21/15, LLL partial resection 2/2015  H/O prior ablation treatment: cardiac 2006      MEDICATIONS:  enoxaparin Injectable 150 milliGRAM(s) SubCutaneous every 12 hours                  FAMILY HISTORY:  Family history of liver cancer (Sibling): sister  Family history of diabetes mellitus (Sibling): Brother  Family history of heart failure: father      SOCIAL HISTORY:    [ ] Non-smoker  [ ] Smoker  [ ] Alcohol    Allergies    No Known Allergies    Intolerances        PHYSICAL EXAM:  T(C): 37.1 (07-11-17 @ 13:36), Max: 38.2 (07-11-17 @ 09:40)  HR: 88 (07-11-17 @ 13:36) (88 - 95)  BP: 140/84 (07-11-17 @ 13:36) (136/82 - 140/84)  RR: 18 (07-11-17 @ 13:36) (18 - 20)  SpO2: 97% (07-11-17 @ 13:36) (97% - 97%)  Wt(kg): --  I&O's Summary      Appearance: Normal	  HEENT:   no JVD  Lymphatic: No lymphadenopathy  Cardiovascular: Normal S1 S2, No JVD, No murmurs, No edema  Respiratory: Lungs clear to auscultation	  Gastrointestinal:  Soft, Non-tender, + BS	  Extremities: right calf edema 2+ erythema, and warmth       TELEMETRY: 	    ECG:  	  RADIOLOGY:  OTHER: 	  	  LABS:	 	    CARDIAC MARKERS:                                  14.0   12.31 )-----------( 131      ( 11 Jul 2017 08:43 )             42.0     07-11    138  |  97<L>  |  21  ----------------------------<  184<H>  4.0   |  21<L>  |  1.17    Ca    9.4      11 Jul 2017 08:45    TPro  7.6  /  Alb  4.2  /  TBili  0.7  /  DBili  x   /  AST  20  /  ALT  17  /  AlkPhos  56  07-11    proBNP:   Lipid Profile:   HgA1c:   TSH:     ASSESSMENT/PLAN:

## 2017-07-11 NOTE — ED PROVIDER NOTE - ST/T WAVE
Diffuse TWI in the precordial leads unchanged.  V4-V6 QRS complex normalized compared to prior.  No signs of active ischemia.

## 2017-07-11 NOTE — H&P ADULT - PROBLEM SELECTOR PLAN 2
Suspect majority of LE symptoms related to extending DVT. Nonetheless, pt remains at risk for infection. Will give IV vancomycin 1g q12. Monitor trough.

## 2017-07-11 NOTE — ED PROVIDER NOTE - CHIEF COMPLAINT
The patient is a 77y Male complaining of The patient is a 77y Male complaining of shortness of breath.

## 2017-07-12 ENCOUNTER — TRANSCRIPTION ENCOUNTER (OUTPATIENT)
Age: 77
End: 2017-07-12

## 2017-07-12 DIAGNOSIS — Z51.81 ENCOUNTER FOR THERAPEUTIC DRUG LEVEL MONITORING: ICD-10-CM

## 2017-07-12 DIAGNOSIS — R50.9 FEVER, UNSPECIFIED: ICD-10-CM

## 2017-07-12 DIAGNOSIS — I82.499 ACUTE EMBOLISM AND THROMBOSIS OF OTHER SPECIFIED DEEP VEIN OF UNSPECIFIED LOWER EXTREMITY: ICD-10-CM

## 2017-07-12 LAB
SPECIMEN SOURCE: SIGNIFICANT CHANGE UP

## 2017-07-12 PROCEDURE — 99232 SBSQ HOSP IP/OBS MODERATE 35: CPT

## 2017-07-12 PROCEDURE — 99223 1ST HOSP IP/OBS HIGH 75: CPT

## 2017-07-12 PROCEDURE — 99222 1ST HOSP IP/OBS MODERATE 55: CPT

## 2017-07-12 RX ORDER — APIXABAN 2.5 MG/1
2 TABLET, FILM COATED ORAL
Qty: 120 | Refills: 0 | OUTPATIENT
Start: 2017-07-12 | End: 2017-08-11

## 2017-07-12 RX ADMIN — OXYCODONE AND ACETAMINOPHEN 1 TABLET(S): 5; 325 TABLET ORAL at 12:22

## 2017-07-12 RX ADMIN — GABAPENTIN 800 MILLIGRAM(S): 400 CAPSULE ORAL at 22:46

## 2017-07-12 RX ADMIN — Medication 250 MILLIGRAM(S): at 00:04

## 2017-07-12 RX ADMIN — Medication 20 MILLIEQUIVALENT(S): at 11:22

## 2017-07-12 RX ADMIN — Medication 2: at 17:38

## 2017-07-12 RX ADMIN — OXYCODONE AND ACETAMINOPHEN 1 TABLET(S): 5; 325 TABLET ORAL at 22:46

## 2017-07-12 RX ADMIN — OXYCODONE AND ACETAMINOPHEN 1 TABLET(S): 5; 325 TABLET ORAL at 11:22

## 2017-07-12 RX ADMIN — ENOXAPARIN SODIUM 150 MILLIGRAM(S): 100 INJECTION SUBCUTANEOUS at 22:46

## 2017-07-12 RX ADMIN — Medication 50 MILLIGRAM(S): at 18:36

## 2017-07-12 RX ADMIN — Medication 40 MILLIGRAM(S): at 18:39

## 2017-07-12 RX ADMIN — ENOXAPARIN SODIUM 150 MILLIGRAM(S): 100 INJECTION SUBCUTANEOUS at 09:43

## 2017-07-12 RX ADMIN — Medication 325 MILLIGRAM(S): at 11:22

## 2017-07-12 RX ADMIN — GABAPENTIN 800 MILLIGRAM(S): 400 CAPSULE ORAL at 06:03

## 2017-07-12 RX ADMIN — ENOXAPARIN SODIUM 150 MILLIGRAM(S): 100 INJECTION SUBCUTANEOUS at 00:03

## 2017-07-12 RX ADMIN — Medication 40 MILLIGRAM(S): at 06:03

## 2017-07-12 RX ADMIN — Medication 4: at 12:34

## 2017-07-12 RX ADMIN — Medication 50 MILLIGRAM(S): at 06:02

## 2017-07-12 RX ADMIN — Medication 10 MILLIGRAM(S): at 06:02

## 2017-07-12 RX ADMIN — OXYCODONE AND ACETAMINOPHEN 1 TABLET(S): 5; 325 TABLET ORAL at 23:30

## 2017-07-12 RX ADMIN — AMLODIPINE BESYLATE 5 MILLIGRAM(S): 2.5 TABLET ORAL at 06:02

## 2017-07-12 RX ADMIN — Medication 10 MILLIGRAM(S): at 18:37

## 2017-07-12 RX ADMIN — Medication 250 MILLIGRAM(S): at 11:22

## 2017-07-12 RX ADMIN — GABAPENTIN 800 MILLIGRAM(S): 400 CAPSULE ORAL at 14:37

## 2017-07-12 NOTE — DISCHARGE NOTE ADULT - CARE PLAN
Principal Discharge DX:	Acute deep vein thrombosis (DVT) of femoral vein of right lower extremity  Goal:	management  Instructions for follow-up, activity and diet:	Please continue with eliquis as directed.  Please take medications daily and do not skip doses.  Follow up with your PCP within 2 weeks of discharge.  Monitor for any further pain or swelling of lower legs  Secondary Diagnosis:	Cellulitis of right lower extremity  Instructions for follow-up, activity and diet:	Your legs are not infected.  Secondary Diagnosis:	Venous stasis of both lower extremities  Instructions for follow-up, activity and diet:	Please continue to elevate legs when sitting or at home.  Compression stockings if tolerated.  Secondary Diagnosis:	Diabetes mellitus  Instructions for follow-up, activity and diet:	Your a1c is 6.2  Continue with oral medications as directed.  Secondary Diagnosis:	Essential hypertension  Instructions for follow-up, activity and diet:	Continue medications as directed. Principal Discharge DX:	Acute deep vein thrombosis (DVT) of femoral vein of right lower extremity  Goal:	management  Instructions for follow-up, activity and diet:	Please continue with eliquis as directed. ($10 copay)  Please take medications daily and do not skip doses.  Follow up with your PCP within 2 weeks of discharge.  Monitor for any further pain or swelling of lower legs  Secondary Diagnosis:	Cellulitis of right lower extremity  Instructions for follow-up, activity and diet:	Your legs are not infected.  Secondary Diagnosis:	Venous stasis of both lower extremities  Instructions for follow-up, activity and diet:	Please continue to elevate legs when sitting or at home.  Compression stockings if tolerated.  Secondary Diagnosis:	Diabetes mellitus  Instructions for follow-up, activity and diet:	Your a1c is 6.2  Continue with oral medications as directed.  Secondary Diagnosis:	Essential hypertension  Instructions for follow-up, activity and diet:	Continue medications as directed. Principal Discharge DX:	Acute deep vein thrombosis (DVT) of femoral vein of right lower extremity  Goal:	management  Instructions for follow-up, activity and diet:	Please continue with eliquis as directed. ($10 copay)  Please take medications daily and do not skip doses.  Follow up with your PCP within 2 weeks of discharge.  Monitor for any further pain or swelling of lower legs  Follow up with Dr Quesada from cardiology within 1 to 2 weeks of discharge. Call to schedule your appointment  Secondary Diagnosis:	Cellulitis of right lower extremity  Instructions for follow-up, activity and diet:	Your legs are not infected.  Secondary Diagnosis:	Venous stasis of both lower extremities  Instructions for follow-up, activity and diet:	Please continue to elevate legs when sitting or at home.  Compression stockings if tolerated.  Secondary Diagnosis:	Diabetes mellitus  Instructions for follow-up, activity and diet:	Your a1c is 6.2  Continue with oral medications as directed.  Secondary Diagnosis:	Essential hypertension  Instructions for follow-up, activity and diet:	Continue medications as directed.

## 2017-07-12 NOTE — DISCHARGE NOTE ADULT - CARE PROVIDER_API CALL
Nik Quesada (MD; PhD), Cardiology; Internal Medicine; Vascular Medicine  [id number] [place] [address]  Phone: [phone]  Fax: [phone]    Fozia Ledesma (MD), Internal Medicine  [address] [place] [address]  Phone: [phone]  Fax: [phone]

## 2017-07-12 NOTE — CONSULT NOTE ADULT - SUBJECTIVE AND OBJECTIVE BOX
CALLIE MACIAS 77y Male  MRN-[id number]    Patient is a 77y old  Male who presents with a chief complaint of right leg swelling and pain (2017 17:14)      HPI:  77M DM2, HTN, Afib s/p ablation 2006, prior lung CA s/p lobectomy x 2 who presents with worsening of RLE swelling. The patient has been hospitalized twice recently: first at [place] where he was diagnosed with RLE below-the-knee DVT and discharged on xarelto; and second at [facility] where presented with afib RVR, was ruled out for PE, and was diagnosed with cellulitis and sent home on keflex (which he took for 3 days total). Since then, RLE swelling has been worsening, and now redness is tracking up the leg to the thigh. He has some discomfort, not really what he would call pain. His wife also reports that the patient has appears short of breath and mildly confused; the patient does not affirm this history. (2017 17:14)      PAST MEDICAL & SURGICAL HISTORY:  DVT (deep venous thrombosis): RLE dx 17  Venous stasis of both lower extremities  Balanitis  Squamous cell lung cancer  Atrial fibrillation: s/p ablation 2006  Cellulitis: Both legs 2/2 chronic venous stasis  Morbid obesity  Anxiety  Neuropathy  Diabetes mellitus  HTN (hypertension)  Lung nodules: Flexible Bronchoscopy, Right VATS, Right Lung Resection - 1/21/15, LLL partial resection 2015  H/O prior ablation treatment: cardiac       Allergies    No Known Allergies    Intolerances        ANTIMICROBIALS:  vancomycin  IVPB 1000 every 12 hours      MEDICATIONS  (STANDING):  enoxaparin Injectable 150 milliGRAM(s) SubCutaneous every 12 hours  gabapentin 800 milliGRAM(s) Oral three times a day  busPIRone 10 milliGRAM(s) Oral two times a day  ferrous    sulfate 325 milliGRAM(s) Oral daily  potassium chloride    Tablet ER 20 milliEquivalent(s) Oral daily  insulin lispro (HumaLOG) corrective regimen sliding scale   SubCutaneous three times a day before meals  insulin lispro (HumaLOG) corrective regimen sliding scale   SubCutaneous at bedtime  dextrose 5%. 1000 milliLiter(s) (50 mL/Hr) IV Continuous <Continuous>  dextrose 50% Injectable 12.5 Gram(s) IV Push once  dextrose 50% Injectable 25 Gram(s) IV Push once  dextrose 50% Injectable 25 Gram(s) IV Push once  vancomycin  IVPB 1000 milliGRAM(s) IV Intermittent every 12 hours  amLODIPine   Tablet 5 milliGRAM(s) Oral daily  furosemide    Tablet 40 milliGRAM(s) Oral two times a day  metoprolol 50 milliGRAM(s) Oral two times a day      Social History  Smoking:  Etoh:  Drug use:      FAMILY HISTORY:  Family history of liver cancer (Sibling): sister  Family history of diabetes mellitus (Sibling): Brother  Family history of heart failure: father      Vital Signs Last 24 Hrs  T(C): 36.9 (2017 06:00), Max: 37.2 (2017 21:29)  T(F): 98.5 (2017 06:00), Max: 99 (2017 21:29)  HR: 69 (2017 06:00) (69 - 88)  BP: 126/55 (2017 06:00) (126/55 - 161/76)  BP(mean): --  RR: 18 (2017 06:00) (17 - 18)  SpO2: 98% (2017 06:00) (96% - 100%)    CBC Full  -  ( 2017 08:43 )  WBC Count : 12.31 K/uL  Hemoglobin : 14.0 g/dL  Hematocrit : 42.0 %  Platelet Count - Automated : 131 K/uL  Mean Cell Volume : 87.7 fL  Mean Cell Hemoglobin : 29.2 pg  Mean Cell Hemoglobin Concentration : 33.3 %  Auto Neutrophil # : 11.17 K/uL  Auto Lymphocyte # : 0.51 K/uL  Auto Monocyte # : 0.54 K/uL  Auto Eosinophil # : 0.00 K/uL  Auto Basophil # : 0.02 K/uL  Auto Neutrophil % : 90.7 %  Auto Lymphocyte % : 4.1 %  Auto Monocyte % : 4.4 %  Auto Eosinophil % : 0.0 %  Auto Basophil % : 0.2 %        138  |  97<L>  |  21  ----------------------------<  184<H>  4.0   |  21<L>  |  1.17    Ca    9.4      2017 08:45    TPro  7.6  /  Alb  4.2  /  TBili  0.7  /  DBili  x   /  AST  20  /  ALT  17  /  AlkPhos  56  07-11    LIVER FUNCTIONS - ( 2017 08:45 )  Alb: 4.2 g/dL / Pro: 7.6 g/dL / ALK PHOS: 56 u/L / ALT: 17 u/L / AST: 20 u/L / GGT: x           Urinalysis Basic - ( 2017 09:00 )    Color: PLYEL / Appearance: CLEAR / S.018 / pH: 6.0  Gluc: NEGATIVE / Ketone: NEGATIVE  / Bili: NEGATIVE / Urobili: NORMAL E.U.   Blood: NEGATIVE / Protein: 20 / Nitrite: NEGATIVE   Leuk Esterase: NEGATIVE / RBC: 0-2 / WBC 0-2   Sq Epi: x / Non Sq Epi: x / Bacteria: x        MICROBIOLOGY:          v            RADIOLOGY    CXR:    CT HEAD:    CT CHEST:    CT ABDOMEN:    MRI:    OTHER: CALLIE MACISA 77y Male  MRN-[id number]    Patient is a 77y old  Male who presents with a chief complaint of right leg swelling and pain (2017 17:14)      HPI:  77M DM2, HTN, Afib s/p ablation 2006, prior lung CA s/p lobectomy x 2 who presents with worsening of RLE swelling. The patient has been hospitalized twice recently: first at [place] where he was diagnosed with RLE below-the-knee DVT and discharged on xarelto; and second at [facility] where presented with afib RVR, was ruled out for PE, and was diagnosed with cellulitis and sent home on keflex (which he took for 3 days total). Since then, RLE swelling has been worsening, and now redness is tracking up the leg to the thigh. He has some discomfort, not really what he would call pain. His wife also reports that the patient has appears short of breath and mildly confused; the patient does not affirm this history. (2017 17:14)    Leg not as red as last admission  worsening swelling       PAST MEDICAL & SURGICAL HISTORY:  DVT (deep venous thrombosis): RLE dx 17  Venous stasis of both lower extremities  Balanitis  Squamous cell lung cancer  Atrial fibrillation: s/p ablation 2006  Cellulitis: Both legs 2/2 chronic venous stasis  Morbid obesity  Anxiety  Neuropathy  Diabetes mellitus  HTN (hypertension)  Lung nodules: Flexible Bronchoscopy, Right VATS, Right Lung Resection - 1/21/15, LLL partial resection 2015  H/O prior ablation treatment: cardiac 2006      Allergies    No Known Allergies    Intolerances        ANTIMICROBIALS:  vancomycin  IVPB 1000 every 12 hours      MEDICATIONS  (STANDING):  enoxaparin Injectable 150 milliGRAM(s) SubCutaneous every 12 hours  gabapentin 800 milliGRAM(s) Oral three times a day  busPIRone 10 milliGRAM(s) Oral two times a day  ferrous    sulfate 325 milliGRAM(s) Oral daily  potassium chloride    Tablet ER 20 milliEquivalent(s) Oral daily  insulin lispro (HumaLOG) corrective regimen sliding scale   SubCutaneous three times a day before meals  insulin lispro (HumaLOG) corrective regimen sliding scale   SubCutaneous at bedtime  dextrose 5%. 1000 milliLiter(s) (50 mL/Hr) IV Continuous <Continuous>  dextrose 50% Injectable 12.5 Gram(s) IV Push once  dextrose 50% Injectable 25 Gram(s) IV Push once  dextrose 50% Injectable 25 Gram(s) IV Push once  vancomycin  IVPB 1000 milliGRAM(s) IV Intermittent every 12 hours  amLODIPine   Tablet 5 milliGRAM(s) Oral daily  furosemide    Tablet 40 milliGRAM(s) Oral two times a day  metoprolol 50 milliGRAM(s) Oral two times a day      Social History  Smoking:  Etoh:  Drug use:      FAMILY HISTORY:  Family history of liver cancer (Sibling): sister  Family history of diabetes mellitus (Sibling): Brother  Family history of heart failure: father      Vital Signs Last 24 Hrs  T(C): 36.9 (2017 06:00), Max: 37.2 (2017 21:29)  T(F): 98.5 (2017 06:00), Max: 99 (2017 21:29)  HR: 69 (2017 06:00) (69 - 88)  BP: 126/55 (2017 06:00) (126/55 - 161/76)  BP(mean): --  RR: 18 (2017 06:00) (17 - 18)  SpO2: 98% (2017 06:00) (96% - 100%)    CBC Full  -  ( 2017 08:43 )  WBC Count : 12.31 K/uL  Hemoglobin : 14.0 g/dL  Hematocrit : 42.0 %  Platelet Count - Automated : 131 K/uL  Mean Cell Volume : 87.7 fL  Mean Cell Hemoglobin : 29.2 pg  Mean Cell Hemoglobin Concentration : 33.3 %  Auto Neutrophil # : 11.17 K/uL  Auto Lymphocyte # : 0.51 K/uL  Auto Monocyte # : 0.54 K/uL  Auto Eosinophil # : 0.00 K/uL  Auto Basophil # : 0.02 K/uL  Auto Neutrophil % : 90.7 %  Auto Lymphocyte % : 4.1 %  Auto Monocyte % : 4.4 %  Auto Eosinophil % : 0.0 %  Auto Basophil % : 0.2 %        138  |  97<L>  |  21  ----------------------------<  184<H>  4.0   |  21<L>  |  1.17    Ca    9.4      2017 08:45    TPro  7.6  /  Alb  4.2  /  TBili  0.7  /  DBili  x   /  AST  20  /  ALT  17  /  AlkPhos  56  07-11    LIVER FUNCTIONS - ( 2017 08:45 )  Alb: 4.2 g/dL / Pro: 7.6 g/dL / ALK PHOS: 56 u/L / ALT: 17 u/L / AST: 20 u/L / GGT: x           Urinalysis Basic - ( 2017 09:00 )    Color: PLYEL / Appearance: CLEAR / S.018 / pH: 6.0  Gluc: NEGATIVE / Ketone: NEGATIVE  / Bili: NEGATIVE / Urobili: NORMAL E.U.   Blood: NEGATIVE / Protein: 20 / Nitrite: NEGATIVE   Leuk Esterase: NEGATIVE / RBC: 0-2 / WBC 0-2   Sq Epi: x / Non Sq Epi: x / Bacteria: x        MICROBIOLOGY:    Culture - Urine (17 @ 13:09)    Culture - Urine:   NO GROWTH TO DATE    Specimen Source: URINE MIDSTREAM    Culture - Blood (17 @ 11:19)    Culture - Blood:   NO ORGANISMS ISOLATED  NO ORGANISMS ISOLATED AT 24 HOURS    Specimen Source: BLOOD VENOUS    Culture - Blood (17 @ 11:19)    Culture - Blood:   NO ORGANISMS ISOLATED  NO ORGANISMS ISOLATED AT 24 HOURS    Specimen Source: BLOOD PERIPHERAL                      RADIOLOGY    CXR:    CT HEAD:    CT CHEST:    CT ABDOMEN:    MRI:    OTHER:

## 2017-07-12 NOTE — PROGRESS NOTE ADULT - SUBJECTIVE AND OBJECTIVE BOX
CC: no CP/SOB    TELEMETRY: no events    PHYSICAL EXAM:    T(C): 36.8 (07-04-17 @ 21:51), Max: 36.8 (07-04-17 @ 21:51)  HR: 71 (07-04-17 @ 21:51) (65 - 71)  BP: 144/71 (07-04-17 @ 21:51) (126/61 - 144/71)  RR: 18 (07-04-17 @ 21:51) (18 - 18)  SpO2: 97% (07-04-17 @ 21:51) (97% - 97%)  Wt(kg): --  I&O's Summary    04 Jul 2017 07:01  -  05 Jul 2017 07:00  --------------------------------------------------------  IN: 910 mL / OUT: 0 mL / NET: 910 mL    05 Jul 2017 07:01  -  05 Jul 2017 11:47  --------------------------------------------------------  IN: 240 mL / OUT: 0 mL / NET: 240 mL        Appearance: Normal	  Cardiovascular: Normal S1 S2,RRR, No JVD, No murmurs  Respiratory: Lungs clear to auscultation	  Gastrointestinal:  Soft, Non-tender, + BS	  Extremities: + LE R>L  Vascular: Peripheral pulses palpable 2+ bilaterally     LABS:	 	                          15.1   5.71  )-----------( 159      ( 05 Jul 2017 07:44 )             45.5     07-04    139  |  96<L>  |  20  ----------------------------<  131<H>  3.5   |  28  |  1.10    Ca    8.9      04 Jul 2017 07:20  Mg     2.1     07-04            CARDIAC MARKERS:

## 2017-07-12 NOTE — DIETITIAN INITIAL EVALUATION ADULT. - OTHER INFO
Nutrition consult ordered for BMI > 40. 76 y/o male admitted with the DX of cellulitis of lower leg, DM and HTN.    Pt with good  po and appetite  with no N/V/D at this time. Pt reports he was following  DR An diet and was able  to loose 22 pounds but stopped doing it  now. Pt reports  that he cannot control his portion size and  loves pasta   and bread. Pt encouraged to  f/u with out pt dietitian for regular  visits. Labs reviewed , Recommend to add DASH/TLC to current diet.  Nutrition education provided  with written material . RD remains available,  pt made aware..

## 2017-07-12 NOTE — PROGRESS NOTE ADULT - ASSESSMENT
EKG - SVT @ 140 bpm  Tele - several episodes of SVT, no afib     1) SVT - ?no further events on tele, cont BB    2) Right leg cellulitis - on ancef    3) SOB - CT chest negative for PE, ?diastolic CHF, 2d echo technically difficult cannot access LV function, edema improved , cont PO lasix

## 2017-07-12 NOTE — DISCHARGE NOTE ADULT - MEDICATION SUMMARY - MEDICATIONS TO TAKE
I will START or STAY ON the medications listed below when I get home from the hospital:    naproxen 500 mg oral tablet  -- 1 tab(s) by mouth 2 times a day  -- Indication: For Pain     Percocet 5/325 oral tablet  -- 1 tab(s) by mouth every 4 hours, As Needed  -- Indication: For Pain     apixaban 5 mg oral tablet  -- 2 tab(s) by mouth every 12 hours x7 days then 1 tab by mouth every 12 hours    **please use coupon**  -- Indication: For Atrial fibrillation    gabapentin 800 mg oral tablet  -- 1 tab(s) by mouth 3 times a day  -- Indication: For pain     Amaryl 1 mg oral tablet  -- 1 tab(s) by mouth once a day before breakfast  -- Indication: For Dm    metFORMIN 500 mg oral tablet, extended release  -- 1 tab(s) by mouth once a day  -- Indication: For Dm    busPIRone 10 mg oral tablet  -- 1 tab(s) by mouth 2 times a day  -- Indication: For Anxiety    metoprolol tartrate 50 mg oral tablet  -- 1 tab(s) by mouth 2 times a day  -- Indication: For AFIB    Ventolin HFA 90 mcg/inh inhalation aerosol  -- 2 puff(s) inhaled 4 times a day, As Needed  -- Indication: For RESP    amLODIPine 5 mg oral tablet  -- 1 tab(s) by mouth once a day  -- Indication: For HTN    furosemide 40 mg oral tablet  -- 1 tab(s) by mouth 2 times a day  -- Indication: For Diuretic    ferrous sulfate 325 mg (65 mg elemental iron) oral tablet  -- 1 tab(s) by mouth once a day  -- Indication: For supplement     Colace 100 mg oral capsule  -- 1 cap(s) by mouth 2 times a day, As Needed  -- Indication: For Constipation     potassium chloride 20 mEq oral tablet, extended release  -- 1 tab(s) by mouth once a day  -- Indication: For supplement     tiZANidine 4 mg oral tablet  -- 2 tab(s) by mouth once a day (at bedtime) for muscle spasm  -- Indication: For pain     Refresh ophthalmic solution  -- 1 drop(s) to each affected eye 2 times a day, As Needed  -- Indication: For Eye drop

## 2017-07-12 NOTE — CONSULT NOTE ADULT - ATTENDING COMMENTS
Filiberto Rosario  Division of Infectious Disease  Pager [phone]  After 5pm/weekend #[phone]    I am away from 7/13-7/16. ID available #[phone]

## 2017-07-12 NOTE — DISCHARGE NOTE ADULT - PATIENT PORTAL LINK FT
“You can access the [employer] Patient Portal, offered by [facility], by registering with the following website: [email or web address]”

## 2017-07-12 NOTE — DISCHARGE NOTE ADULT - PLAN OF CARE
management Please continue with eliquis as directed.  Please take medications daily and do not skip doses.  Follow up with your PCP within 2 weeks of discharge.  Monitor for any further pain or swelling of lower legs Your legs are not infected. Please continue to elevate legs when sitting or at home.  Compression stockings if tolerated. Your a1c is 6.2  Continue with oral medications as directed. Continue medications as directed. Please continue with eliquis as directed. ($10 copay)  Please take medications daily and do not skip doses.  Follow up with your PCP within 2 weeks of discharge.  Monitor for any further pain or swelling of lower legs Please continue with eliquis as directed. ($10 copay)  Please take medications daily and do not skip doses.  Follow up with your PCP within 2 weeks of discharge.  Monitor for any further pain or swelling of lower legs  Follow up with Dr Quesada from cardiology within 1 to 2 weeks of discharge. Call to schedule your appointment

## 2017-07-12 NOTE — DISCHARGE NOTE ADULT - HOME CARE AGENCY
[facility]  [phone] .   Nurse to call and visit day after discharge  and Physical Therapy to Follow

## 2017-07-12 NOTE — DISCHARGE NOTE ADULT - HOSPITAL COURSE
77y Male complaining of shortness of breath. RLE swelling.  Doppler with acute DVT above and below knee.  Cards consulted, Vascular cards consulted.  Pt will be d/c on another NOAC besides xarelto.  ID consulted for poss cellulitis  - bcx negative, abx d/c'd, monitored off abx.  PT evaluated, rec home PT.  Medically optimized for discharge    dispo: home with home PT

## 2017-07-12 NOTE — CONSULT NOTE ADULT - ASSESSMENT
? Recurrent DVT/failure on Xarelto vs subacute DVT that was not noted on previous [facility] ultrasound.  Will review previous venous duplex  Despite whether this is indeed old or new DVT, recommend switching to another agent (another DOAC is fine).  On abx for cellulitis
EKG - NSR RBBB    1) Cellulitis - recurrent cellulitis of right lower ext, recommend ID consult and IV vancomycin     2) Acute right calf above and below knee DVT - pt developed clot ob xarelto, switch to lovenox for now, consider switching to eliquis or coumadin, recommend vascular consult     3) Shortness of breath - consider CT chest to r/o PE
76 y/o male with h/o DVT with Rt LE new DVT with swelling and fever

## 2017-07-12 NOTE — CONSULT NOTE ADULT - PROBLEM SELECTOR RECOMMENDATION 9
-leg doesn't seem as red as last time  -suspect fever likely due to DVT more than infection  -if bcx remain -ve, DC abx and observe  -?Heme eval given DVT occurred while on AC

## 2017-07-12 NOTE — DISCHARGE NOTE ADULT - MEDICATION SUMMARY - MEDICATIONS TO STOP TAKING
I will STOP taking the medications listed below when I get home from the hospital:    rivaroxaban 15 mg oral tablet  -- 1 tab(s) by mouth 2 times a day

## 2017-07-12 NOTE — DISCHARGE NOTE ADULT - SECONDARY DIAGNOSIS.
Cellulitis of right lower extremity Venous stasis of both lower extremities Diabetes mellitus Essential hypertension

## 2017-07-12 NOTE — CONSULT NOTE ADULT - SUBJECTIVE AND OBJECTIVE BOX
Vascular Medicine Inpatient Consultation Note      Asked by Dr. Wood to evaluate patient for DVT      HISTORY OF PRESENT ILLNESS:  Patient is a 76 yo man with DM2, HTN, Afib s/p ablation 2006, prior lung CA s/p lobectomy x 2 who presents with worsening of RLE swelling. The patient has been hospitalized twice recently at [facility] where he was diagnosed with RLE below-the-knee DVT and discharged on Xarelto; and then more recently at [facility] where presented with afib RVR, was ruled out for PE, and was diagnosed with cellulitis and sent home on Keflex (which he took for 3 days total). At that time, LE venous duplex did not identify DVT in RLE (reviewed study myself--technically difficulty study).    Since then, RLE swelling has been worsening, and now redness is tracking up the leg to the thigh. He has some discomfort, not really what he would call pain. His wife also reports that the patient has appears short of breath and mildly confused; the patient does not affirm this history. (11 Jul 2017 17:14)    < from: US Duplex Venous Lower Ext Complete, Bilateral (07.11.17 @ 10:56) >  Above and below knee acute DVT in the right lower extremity as described.    < end of copied text >      Allergies  No Known Allergies      MEDICATIONS:  enoxaparin Injectable 150 milliGRAM(s) SubCutaneous every 12 hours  amLODIPine   Tablet 5 milliGRAM(s) Oral daily  furosemide    Tablet 40 milliGRAM(s) Oral two times a day  metoprolol 50 milliGRAM(s) Oral two times a day    vancomycin  IVPB 1000 milliGRAM(s) IV Intermittent every 12 hours    ALBUTerol    90 MICROgram(s) HFA Inhaler 2 Puff(s) Inhalation every 6 hours PRN    gabapentin 800 milliGRAM(s) Oral three times a day  busPIRone 10 milliGRAM(s) Oral two times a day  oxyCODONE    5 mG/acetaminophen 325 mG 1 Tablet(s) Oral every 6 hours PRN    docusate sodium 100 milliGRAM(s) Oral two times a day PRN    insulin lispro (HumaLOG) corrective regimen sliding scale   SubCutaneous three times a day before meals  insulin lispro (HumaLOG) corrective regimen sliding scale   SubCutaneous at bedtime  dextrose Gel 1 Dose(s) Oral once PRN  dextrose 50% Injectable 12.5 Gram(s) IV Push once  dextrose 50% Injectable 25 Gram(s) IV Push once  dextrose 50% Injectable 25 Gram(s) IV Push once  glucagon  Injectable 1 milliGRAM(s) IntraMuscular once PRN    ferrous    sulfate 325 milliGRAM(s) Oral daily  potassium chloride    Tablet ER 20 milliEquivalent(s) Oral daily  dextrose 5%. 1000 milliLiter(s) IV Continuous <Continuous>      PAST MEDICAL & SURGICAL HISTORY:  DVT (deep venous thrombosis): RLE dx 6/28/17  Venous stasis of both lower extremities  Balanitis  Squamous cell lung cancer  Atrial fibrillation: s/p ablation 2006  Cellulitis: Both legs 2/2 chronic venous stasis  Morbid obesity  Anxiety  Neuropathy  Diabetes mellitus  HTN (hypertension)  Lung nodules: Flexible Bronchoscopy, Right VATS, Right Lung Resection - 1/21/15, LLL partial resection 2/2015  H/O prior ablation treatment: cardiac 2006      FAMILY HISTORY:  Family history of liver cancer (Sibling): sister  Family history of diabetes mellitus (Sibling): Brother  Family history of heart failure: father    SOCIAL HISTORY:    [ ] Non-smoker      REVIEW OF SYSTEMS:  CONSTITUTIONAL: No fever, weight loss, or fatigue  EYES: No eye pain, visual disturbances, or discharge  ENMT:  No difficulty hearing, tinnitus, vertigo; No sinus or throat pain  NECK: No pain or stiffness  RESPIRATORY: No cough, wheezing, chills or hemoptysis; No Shortness of Breath  CARDIOVASCULAR: No chest pain, palpitations, passing out, dizziness, or leg swelling  GASTROINTESTINAL: No abdominal or epigastric pain. No nausea, vomiting, or hematemesis; No diarrhea or constipation. No melena or hematochezia.  GENITOURINARY: No dysuria, frequency, hematuria, or incontinence  NEUROLOGICAL: No headaches, memory loss, loss of strength, numbness, or tremors  SKIN:As above  LYMPH Nodes: No enlarged glands  ENDOCRINE: No heat or cold intolerance; No hair loss  MUSCULOSKELETAL: No joint pain or swelling; No muscle, back, or extremity pain  PSYCHIATRIC: No depression, anxiety, mood swings, or difficulty sleeping  HEME/LYMPH: No easy bruising, or bleeding gums  ALLERGY AND IMMUNOLOGIC: No hives or eczema	        PHYSICAL EXAM:  T(C): 36.9 (07-12-17 @ 06:00), Max: 38.2 (07-11-17 @ 09:40)  HR: 69 (07-12-17 @ 06:00) (69 - 88)  BP: 126/55 (07-12-17 @ 06:00) (126/55 - 161/76)  RR: 18 (07-12-17 @ 06:00) (17 - 18)  SpO2: 98% (07-12-17 @ 06:00) (96% - 100%)  Wt(kg): --  I&O's Summary      Appearance: Normal	  HEENT:   Normal oral mucosa, PERRL, EOMI	  Lymphatic: No lymphadenopathy  Cardiovascular: Normal S1 S2, No JVD, No murmurs, No edema  Respiratory: Lungs clear to auscultation	  Psychiatry: A & O x 3, Mood & affect appropriate  Gastrointestinal:  Soft, Non-tender, + BS	  Skin: No rashes, No ecchymoses, No cyanosis	  Neurologic: Non-focal  Extremities: As above  Vascular: Peripheral pulses palpable 2+ bilaterally      LABS:	 	                          14.0   12.31 )-----------( 131      ( 11 Jul 2017 08:43 )             42.0     07-11    138  |  97<L>  |  21  ----------------------------<  184<H>  4.0   |  21<L>  |  1.17    Ca    9.4      11 Jul 2017 08:45    TPro  7.6  /  Alb  4.2  /  TBili  0.7  /  DBili  x   /  AST  20  /  ALT  17  /  AlkPhos  56  07-11      < from: US Duplex Venous Lower Ext Complete, Bilateral (07.11.17 @ 10:56) >  EXAM:  US DPLX LWR EXT VEINS COMPL BI        PROCEDURE DATE:  Jul 11 2017         INTERPRETATION:  CLINICAL INFORMATION: Lower extremity swelling. History   of DVT and cellulitis.    COMPARISON: Duplex sonography of the bilateral lower extremities dated   5/13/2015    TECHNIQUE: Duplex sonography of the BILATERAL LOWER extremities with   color and spectral Doppler, with and without compression.      FINDINGS:    There is normal compressibility of the left common femoral, femoral and   poplitealveins.     Normal compressibility of the right common and femoral veins are   demonstrated. There is noncompressibility with filling defect in the   right popliteal and gastrocnemius veins.     Calf veins are not visualized bilaterally.      IMPRESSION:     Above and below knee acute DVT in the right lower extremity as described.    Dr. Silver discussed these findings with SAI Richard on 7/11/2017 11:15   AM with read back.        REBEKAH SILVER M.D., RADIOLOGY RESIDENT  This document has been electronically signed.  JESUS MANUEL ALEXANDER M.D., ATTENDING RADIOLOGIST  This document has been electronically signed. Jul 11 2017 12:40PM        < end of copied text >  	  	    < from: US Duplex Venous Upper Ext Complete, Bilateral (07.04.17 @ 12:29) >    EXAM:  US DPLX UPR EXT VEINS COMPL BI        PROCEDURE DATE:  Jul 4 2017         INTERPRETATION:  CLINICAL INFORMATION: Bilateral lower external cellulitis    TECHNIQUE: Duplex sonography of the bilateral lower extremities with   color and spectral Doppler, with and without compression.      COMPARISON: Bilateral lower external venous duplex 5/13/2015    FINDINGS:    There is normal compressibility of the bilateral common femoral, femoral   and popliteal veins. The calf veins could not be seenbilaterally.    Doppler examination shows normal spontaneous and phasic flow.    IMPRESSION:     No evidence of bilateral lower extremity deep venous thrombosis.      TRISTA VILLARREAL M.D., RADIOLOGY RESIDENT  This document has been electronically signed.  ANNA MARIE MEADE M.D., ATTENDING RADIOLOGIST  This document has been electronically signed. Jul 4 2017  1:58PM              < end of copied text >    < from: CT Angio Chest w/ IV Cont (06.30.17 @ 22:34) >    EXAM:  CT ANGIO CHEST (W)AW IC        PROCEDURE DATE:  Jun 30 2017         INTERPRETATION:  CLINICAL INFORMATION: Chest pain, evaluate for pulmonary   embolism    COMPARISON: CT chest 5/13/2015.    PROCEDURE:   CT Angiography of the Chest.  90 ml of Omnipaque 350 was injected intravenously. 10 ml were discarded.  Sagittal and coronal reformats were performed as well as MIPS.    FINDINGS: Artifact from the patient's arms and this patient motion   degrades images.    LUNGS AND LARGE AIRWAYS: Patent central airways. Status post right upper   lobectomy and partial left lower lobectomy. Subsegmental atelectasis.   Again noted, scarring in the left lower lobe and right apex. Stable   scattered bilateral pulmonary nodules, for example, 0.7 cm in the left   lower lobe (3:129), 1.0 cm in the right lower lobe (3:319), 0.3 cm in the   right lower lobe (3:132), 0.4 cm in the right middle lobe at the apex   (3:340 and 3:194).  PLEURA: No pneumothorax. Interval resolution of left pleural effusion.   Biapical pleural thickening.  VESSELS: Suboptimal contrast opacification of the peripheral pulmonary   arteries. No central pulmonary embolus. Contrast reflux into the   hepatic/azygos veins/IVC without significant cardiac interventricular   septal bowing to suggest right heart strain. Atherosclerotic change of   the thoracic aorta, great vessels and coronary arteries.   HEART: Normal heart size. No pericardial effusion.    MEDIASTINUM AND MU: Subcentimeter mediastinal and hilar lymph nodes   without lymphadenopathy. Calcified right hilar lymph nodes.  CHEST WALL AND LOWER NECK: Within normal limits.  VISUALIZED UPPER ABDOMEN: Grossly unremarkable.  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    IMPRESSION:     Suboptimal evaluation of the peripheral pulmonary arteries. No central   pulmonary embolus.    Post surgical changes. Interval resolution of the left pleural effusion   since 5/13/2015. Stable bilateral pulmonary nodules.    Additional findings as described.      TRISTA VILLARREAL M.D., RADIOLOGY RESIDENT  This document has been electronically signed.  NESTOR ALEXANDER M.D., ATTENDING RADIOLOGIST  This document has been electronically signed. Jun 30 2017 11:58PM            < end of copied text >

## 2017-07-13 DIAGNOSIS — I48.91 UNSPECIFIED ATRIAL FIBRILLATION: ICD-10-CM

## 2017-07-13 LAB
BACTERIA UR CULT: SIGNIFICANT CHANGE UP
BUN SERPL-MCNC: 17 MG/DL — SIGNIFICANT CHANGE UP (ref 7–23)
CALCIUM SERPL-MCNC: 8.6 MG/DL — SIGNIFICANT CHANGE UP (ref 8.4–10.5)
CHLORIDE SERPL-SCNC: 101 MMOL/L — SIGNIFICANT CHANGE UP (ref 98–107)
CO2 SERPL-SCNC: 26 MMOL/L — SIGNIFICANT CHANGE UP (ref 22–31)
CREAT SERPL-MCNC: 1.07 MG/DL — SIGNIFICANT CHANGE UP (ref 0.5–1.3)
GLUCOSE SERPL-MCNC: 116 MG/DL — HIGH (ref 70–99)
HCT VFR BLD CALC: 37.9 % — LOW (ref 39–50)
HGB BLD-MCNC: 12.6 G/DL — LOW (ref 13–17)
MCHC RBC-ENTMCNC: 30.1 PG — SIGNIFICANT CHANGE UP (ref 27–34)
MCHC RBC-ENTMCNC: 33.2 % — SIGNIFICANT CHANGE UP (ref 32–36)
MCV RBC AUTO: 90.7 FL — SIGNIFICANT CHANGE UP (ref 80–100)
NRBC # FLD: 0 — SIGNIFICANT CHANGE UP
PLATELET # BLD AUTO: 100 K/UL — LOW (ref 150–400)
PMV BLD: 10.1 FL — SIGNIFICANT CHANGE UP (ref 7–13)
POTASSIUM SERPL-MCNC: 3.6 MMOL/L — SIGNIFICANT CHANGE UP (ref 3.5–5.3)
POTASSIUM SERPL-SCNC: 3.6 MMOL/L — SIGNIFICANT CHANGE UP (ref 3.5–5.3)
RBC # BLD: 4.18 M/UL — LOW (ref 4.2–5.8)
RBC # FLD: 14.6 % — HIGH (ref 10.3–14.5)
SODIUM SERPL-SCNC: 139 MMOL/L — SIGNIFICANT CHANGE UP (ref 135–145)
VANCOMYCIN TROUGH SERPL-MCNC: 13.3 UG/ML — SIGNIFICANT CHANGE UP (ref 10–20)
WBC # BLD: 4.11 K/UL — SIGNIFICANT CHANGE UP (ref 3.8–10.5)
WBC # FLD AUTO: 4.11 K/UL — SIGNIFICANT CHANGE UP (ref 3.8–10.5)

## 2017-07-13 PROCEDURE — 99232 SBSQ HOSP IP/OBS MODERATE 35: CPT

## 2017-07-13 RX ORDER — APIXABAN 2.5 MG/1
10 TABLET, FILM COATED ORAL EVERY 12 HOURS
Qty: 0 | Refills: 0 | Status: DISCONTINUED | OUTPATIENT
Start: 2017-07-13 | End: 2017-07-15

## 2017-07-13 RX ORDER — APIXABAN 2.5 MG/1
10 TABLET, FILM COATED ORAL EVERY 12 HOURS
Qty: 0 | Refills: 0 | Status: DISCONTINUED | OUTPATIENT
Start: 2017-07-13 | End: 2017-07-13

## 2017-07-13 RX ORDER — APIXABAN 2.5 MG/1
2 TABLET, FILM COATED ORAL
Qty: 120 | Refills: 0 | OUTPATIENT
Start: 2017-07-13 | End: 2017-08-12

## 2017-07-13 RX ADMIN — APIXABAN 10 MILLIGRAM(S): 2.5 TABLET, FILM COATED ORAL at 18:43

## 2017-07-13 RX ADMIN — OXYCODONE AND ACETAMINOPHEN 1 TABLET(S): 5; 325 TABLET ORAL at 17:12

## 2017-07-13 RX ADMIN — Medication 10 MILLIGRAM(S): at 18:43

## 2017-07-13 RX ADMIN — Medication 40 MILLIGRAM(S): at 06:01

## 2017-07-13 RX ADMIN — Medication 50 MILLIGRAM(S): at 18:43

## 2017-07-13 RX ADMIN — Medication 325 MILLIGRAM(S): at 12:16

## 2017-07-13 RX ADMIN — Medication 40 MILLIGRAM(S): at 18:48

## 2017-07-13 RX ADMIN — AMLODIPINE BESYLATE 5 MILLIGRAM(S): 2.5 TABLET ORAL at 05:54

## 2017-07-13 RX ADMIN — Medication 50 MILLIGRAM(S): at 05:54

## 2017-07-13 RX ADMIN — GABAPENTIN 800 MILLIGRAM(S): 400 CAPSULE ORAL at 22:22

## 2017-07-13 RX ADMIN — ENOXAPARIN SODIUM 150 MILLIGRAM(S): 100 INJECTION SUBCUTANEOUS at 10:12

## 2017-07-13 RX ADMIN — Medication 2: at 08:45

## 2017-07-13 RX ADMIN — Medication 20 MILLIEQUIVALENT(S): at 12:16

## 2017-07-13 RX ADMIN — GABAPENTIN 800 MILLIGRAM(S): 400 CAPSULE ORAL at 14:18

## 2017-07-13 RX ADMIN — GABAPENTIN 800 MILLIGRAM(S): 400 CAPSULE ORAL at 06:01

## 2017-07-13 RX ADMIN — Medication 250 MILLIGRAM(S): at 00:15

## 2017-07-13 RX ADMIN — OXYCODONE AND ACETAMINOPHEN 1 TABLET(S): 5; 325 TABLET ORAL at 16:12

## 2017-07-13 RX ADMIN — Medication 10 MILLIGRAM(S): at 05:54

## 2017-07-13 NOTE — PROGRESS NOTE ADULT - SUBJECTIVE AND OBJECTIVE BOX
CC: F/U for ? cellulitis    Saw/spoke to patient. No further fevers, no chills. No other complaints. Improved RLE swelling and redness. No new complaints.    Allergies  No Known Allergies    ANTIMICROBIALS:  Off    PE:    Vital Signs Last 24 Hrs  T(C): 36.2 (13 Jul 2017 05:52), Max: 36.6 (12 Jul 2017 13:37)  T(F): 97.2 (13 Jul 2017 05:52), Max: 97.8 (12 Jul 2017 13:37)  HR: 62 (13 Jul 2017 05:52) (62 - 73)  BP: 148/76 (13 Jul 2017 05:52) (114/49 - 157/63)  BP(mean): --  RR: 18 (13 Jul 2017 05:52) (18 - 18)  SpO2: 99% (13 Jul 2017 05:52) (95% - 100%)    Gen: AOx3, NAD, non-toxic, pleasant  CV: S1+S2 normal, no murmurs, nontachycardic  Resp: Clear bilat, no resp distress, no crackles/wheezes  Abd: Soft, nontender, +BS, obese  Ext: Bilateral lower extremity lower ext chronic venous stasis; RLE swelling, and mild warmth    LABS:                        12.6   4.11  )-----------( 100      ( 13 Jul 2017 05:30 )             37.9     07-13    139  |  101  |  17  ----------------------------<  116<H>  3.6   |  26  |  1.07    Ca    8.6      13 Jul 2017 05:30    MICROBIOLOGY:  Vancomycin Level, Trough: 13.3 ug/mL (07-13-17 @ 05:30)    7/11 BCX x2 NGTD  7/11 UCX NGTD    RADIOLOGY:    LE USG:    IMPRESSION:     Above and below knee acute DVT in the right lower extremity as described.

## 2017-07-13 NOTE — PROGRESS NOTE ADULT - SUBJECTIVE AND OBJECTIVE BOX
chief complaint : worsening lower ext erythema    subjective/ overnight events : pt says he feels better than before    MEDICATIONS  (STANDING):  gabapentin 800 milliGRAM(s) Oral three times a day  busPIRone 10 milliGRAM(s) Oral two times a day  ferrous    sulfate 325 milliGRAM(s) Oral daily  potassium chloride    Tablet ER 20 milliEquivalent(s) Oral daily  insulin lispro (HumaLOG) corrective regimen sliding scale   SubCutaneous three times a day before meals  insulin lispro (HumaLOG) corrective regimen sliding scale   SubCutaneous at bedtime  dextrose 5%. 1000 milliLiter(s) (50 mL/Hr) IV Continuous <Continuous>  dextrose 50% Injectable 12.5 Gram(s) IV Push once  dextrose 50% Injectable 25 Gram(s) IV Push once  dextrose 50% Injectable 25 Gram(s) IV Push once  amLODIPine   Tablet 5 milliGRAM(s) Oral daily  furosemide    Tablet 40 milliGRAM(s) Oral two times a day  metoprolol 50 milliGRAM(s) Oral two times a day  apixaban 10 milliGRAM(s) Oral every 12 hours    MEDICATIONS  (PRN):  ALBUTerol    90 MICROgram(s) HFA Inhaler 2 Puff(s) Inhalation every 6 hours PRN Shortness of Breath and/or Wheezing  docusate sodium 100 milliGRAM(s) Oral two times a day PRN Constipation  dextrose Gel 1 Dose(s) Oral once PRN Blood Glucose LESS THAN 70 milliGRAM(s)/deciliter  glucagon  Injectable 1 milliGRAM(s) IntraMuscular once PRN Glucose LESS THAN 70 milligrams/deciliter  oxyCODONE    5 mG/acetaminophen 325 mG 1 Tablet(s) Oral every 6 hours PRN moderate and severe pain    Vital Signs Last 24 Hrs  T(C): 36.4 (13 Jul 2017 20:42), Max: 36.4 (13 Jul 2017 12:55)  T(F): 97.6 (13 Jul 2017 20:42), Max: 97.6 (13 Jul 2017 12:55)  HR: 59 (13 Jul 2017 20:42) (59 - 71)  BP: 124/67 (13 Jul 2017 20:42) (122/65 - 151/71)  BP(mean): --  RR: 18 (13 Jul 2017 20:42) (18 - 18)  SpO2: 100% (13 Jul 2017 20:42) (96% - 100%)    Constitutional: No fever, fatigue  Skin: No rash.  Eyes: No recent vision problems or eye pain.  ENT: No congestion, ear pain, or sore throat.  Cardiovascular: No chest pain or palpation.  Respiratory: No cough, shortness of breath, congestion, or wheezing.  Gastrointestinal: No abdominal pain, nausea, vomiting, or diarrhea.  Genitourinary: No dysuria.  Musculoskeletal: No joint swelling.  Neurologic: No headache.    Physical Exam :    HEENT ; peerla, eomi  CVS : S1, S2  RS - dec breath sounds at bases  Abd : soft, bs+  Ext : rt lower ext edema/ erythema > left lower ext   Neuro : alert awake calm cooperative

## 2017-07-13 NOTE — PROGRESS NOTE ADULT - ASSESSMENT
? Recurrent DVT/failure on Xarelto vs subacute DVT that was not noted on previous [facility] ultrasound.  Will review previous venous duplex  Despite whether this is indeed old or new DVT, recommend switching to another agent (another DOAC is fine).  On abx for cellulitis ? Recurrent DVT/failure on Xarelto vs subacute DVT that was not noted on previous [facility] ultrasound.  Will review previous venous duplex  Despite whether this is indeed old or new DVT, recommend switching to another agent (another DOAC is fine such as apixaban).  On abx for cellulitis

## 2017-07-13 NOTE — PROGRESS NOTE ADULT - SUBJECTIVE AND OBJECTIVE BOX
INTERVAL HISTORY: Pt is lying in bed comfortable, not in distress, feels better no SOB  	  MEDICATIONS:  amLODIPine   Tablet 5 milliGRAM(s) Oral daily  furosemide    Tablet 40 milliGRAM(s) Oral two times a day  metoprolol 50 milliGRAM(s) Oral two times a day  apixaban 10 milliGRAM(s) Oral every 12 hours      ALBUTerol    90 MICROgram(s) HFA Inhaler 2 Puff(s) Inhalation every 6 hours PRN    gabapentin 800 milliGRAM(s) Oral three times a day  busPIRone 10 milliGRAM(s) Oral two times a day  oxyCODONE    5 mG/acetaminophen 325 mG 1 Tablet(s) Oral every 6 hours PRN    docusate sodium 100 milliGRAM(s) Oral two times a day PRN    insulin lispro (HumaLOG) corrective regimen sliding scale   SubCutaneous three times a day before meals  insulin lispro (HumaLOG) corrective regimen sliding scale   SubCutaneous at bedtime  dextrose Gel 1 Dose(s) Oral once PRN  dextrose 50% Injectable 12.5 Gram(s) IV Push once  dextrose 50% Injectable 25 Gram(s) IV Push once  dextrose 50% Injectable 25 Gram(s) IV Push once  glucagon  Injectable 1 milliGRAM(s) IntraMuscular once PRN    ferrous    sulfate 325 milliGRAM(s) Oral daily  potassium chloride    Tablet ER 20 milliEquivalent(s) Oral daily  dextrose 5%. 1000 milliLiter(s) IV Continuous <Continuous>      PHYSICAL EXAM:  T(C): 36.4 (07-13-17 @ 12:55), Max: 36.4 (07-13-17 @ 12:55)  HR: 62 (07-13-17 @ 12:55) (62 - 73)  BP: 122/65 (07-13-17 @ 12:55) (122/65 - 157/63)  RR: 18 (07-13-17 @ 12:55) (18 - 18)  SpO2: 96% (07-13-17 @ 12:55) (96% - 100%)  Wt(kg): --  I&O's Summary        Appearance: Normal	  HEENT:   Normal oral mucosa, PERRL, EOMI	  Cardiovascular: Normal S1 S2, No JVD, No murmurs, No edema  Respiratory: Lungs clear to auscultation	  Gastrointestinal:  Soft, Non-tender, + BS	  Extremities: b/' lower ext swelling and erythema right > left                                    12.6   4.11  )-----------( 100      ( 13 Jul 2017 05:30 )             37.9     07-13    139  |  101  |  17  ----------------------------<  116<H>  3.6   |  26  |  1.07    Ca    8.6      13 Jul 2017 05:30      proBNP:   Lipid Profile:   HgA1c:   TSH:

## 2017-07-13 NOTE — PROGRESS NOTE ADULT - SUBJECTIVE AND OBJECTIVE BOX
Vascular Medicine Inpatient Consultation Note      Asked by Dr. Wood to evaluate patient for DVT      HISTORY OF PRESENT ILLNESS:  Patient is a 78 yo man with DM2, HTN, Afib s/p ablation 2006, prior lung CA s/p lobectomy x 2 who presents with worsening of RLE swelling. The patient has been hospitalized twice recently at [place] where he was diagnosed with RLE below-the-knee DVT and discharged on Xarelto; and then more recently at [facility] where presented with afib RVR, was ruled out for PE, and was diagnosed with cellulitis and sent home on Keflex (which he took for 3 days total). At that time, LE venous duplex did not identify DVT in RLE (reviewed study myself--technically difficulty study).    Since then, RLE swelling has been worsening, and now redness is tracking up the leg to the thigh. He has some discomfort, not really what he would call pain. His wife also reports that the patient has appears short of breath and mildly confused; the patient does not affirm this history. (11 Jul 2017 17:14)    < from: US Duplex Venous Lower Ext Complete, Bilateral (07.11.17 @ 10:56) >  Above and below knee acute DVT in the right lower extremity as described.    < end of copied text >      Allergies  No Known Allergies      MEDICATIONS:  enoxaparin Injectable 150 milliGRAM(s) SubCutaneous every 12 hours  amLODIPine   Tablet 5 milliGRAM(s) Oral daily  furosemide    Tablet 40 milliGRAM(s) Oral two times a day  metoprolol 50 milliGRAM(s) Oral two times a day    vancomycin  IVPB 1000 milliGRAM(s) IV Intermittent every 12 hours    ALBUTerol    90 MICROgram(s) HFA Inhaler 2 Puff(s) Inhalation every 6 hours PRN    gabapentin 800 milliGRAM(s) Oral three times a day  busPIRone 10 milliGRAM(s) Oral two times a day  oxyCODONE    5 mG/acetaminophen 325 mG 1 Tablet(s) Oral every 6 hours PRN    docusate sodium 100 milliGRAM(s) Oral two times a day PRN    insulin lispro (HumaLOG) corrective regimen sliding scale   SubCutaneous three times a day before meals  insulin lispro (HumaLOG) corrective regimen sliding scale   SubCutaneous at bedtime  dextrose Gel 1 Dose(s) Oral once PRN  dextrose 50% Injectable 12.5 Gram(s) IV Push once  dextrose 50% Injectable 25 Gram(s) IV Push once  dextrose 50% Injectable 25 Gram(s) IV Push once  glucagon  Injectable 1 milliGRAM(s) IntraMuscular once PRN    ferrous    sulfate 325 milliGRAM(s) Oral daily  potassium chloride    Tablet ER 20 milliEquivalent(s) Oral daily  dextrose 5%. 1000 milliLiter(s) IV Continuous <Continuous>      PAST MEDICAL & SURGICAL HISTORY:  DVT (deep venous thrombosis): RLE dx 6/28/17  Venous stasis of both lower extremities  Balanitis  Squamous cell lung cancer  Atrial fibrillation: s/p ablation 2006  Cellulitis: Both legs 2/2 chronic venous stasis  Morbid obesity  Anxiety  Neuropathy  Diabetes mellitus  HTN (hypertension)  Lung nodules: Flexible Bronchoscopy, Right VATS, Right Lung Resection - 1/21/15, LLL partial resection 2/2015  H/O prior ablation treatment: cardiac 2006      FAMILY HISTORY:  Family history of liver cancer (Sibling): sister  Family history of diabetes mellitus (Sibling): Brother  Family history of heart failure: father    SOCIAL HISTORY:    [ ] Non-smoker      REVIEW OF SYSTEMS:  CONSTITUTIONAL: No fever, weight loss, or fatigue  EYES: No eye pain, visual disturbances, or discharge  ENMT:  No difficulty hearing, tinnitus, vertigo; No sinus or throat pain  NECK: No pain or stiffness  RESPIRATORY: No cough, wheezing, chills or hemoptysis; No Shortness of Breath  CARDIOVASCULAR: No chest pain, palpitations, passing out, dizziness, or leg swelling  GASTROINTESTINAL: No abdominal or epigastric pain. No nausea, vomiting, or hematemesis; No diarrhea or constipation. No melena or hematochezia.  GENITOURINARY: No dysuria, frequency, hematuria, or incontinence  NEUROLOGICAL: No headaches, memory loss, loss of strength, numbness, or tremors  SKIN:As above  LYMPH Nodes: No enlarged glands  ENDOCRINE: No heat or cold intolerance; No hair loss  MUSCULOSKELETAL: No joint pain or swelling; No muscle, back, or extremity pain  PSYCHIATRIC: No depression, anxiety, mood swings, or difficulty sleeping  HEME/LYMPH: No easy bruising, or bleeding gums  ALLERGY AND IMMUNOLOGIC: No hives or eczema	        PHYSICAL EXAM:  T(C): 36.9 (07-12-17 @ 06:00), Max: 38.2 (07-11-17 @ 09:40)  HR: 69 (07-12-17 @ 06:00) (69 - 88)  BP: 126/55 (07-12-17 @ 06:00) (126/55 - 161/76)  RR: 18 (07-12-17 @ 06:00) (17 - 18)  SpO2: 98% (07-12-17 @ 06:00) (96% - 100%)  Wt(kg): --  I&O's Summary      Appearance: Normal	  HEENT:   Normal oral mucosa, PERRL, EOMI	  Lymphatic: No lymphadenopathy  Cardiovascular: Normal S1 S2, No JVD, No murmurs, No edema  Respiratory: Lungs clear to auscultation	  Psychiatry: A & O x 3, Mood & affect appropriate  Gastrointestinal:  Soft, Non-tender, + BS	  Skin: No rashes, No ecchymoses, No cyanosis	  Neurologic: Non-focal  Extremities: As above  Vascular: Peripheral pulses palpable 2+ bilaterally      LABS:	 	                          14.0   12.31 )-----------( 131      ( 11 Jul 2017 08:43 )             42.0     07-11    138  |  97<L>  |  21  ----------------------------<  184<H>  4.0   |  21<L>  |  1.17    Ca    9.4      11 Jul 2017 08:45    TPro  7.6  /  Alb  4.2  /  TBili  0.7  /  DBili  x   /  AST  20  /  ALT  17  /  AlkPhos  56  07-11      < from: US Duplex Venous Lower Ext Complete, Bilateral (07.11.17 @ 10:56) >  EXAM:  US DPLX LWR EXT VEINS COMPL BI        PROCEDURE DATE:  Jul 11 2017         INTERPRETATION:  CLINICAL INFORMATION: Lower extremity swelling. History   of DVT and cellulitis.    COMPARISON: Duplex sonography of the bilateral lower extremities dated   5/13/2015    TECHNIQUE: Duplex sonography of the BILATERAL LOWER extremities with   color and spectral Doppler, with and without compression.      FINDINGS:    There is normal compressibility of the left common femoral, femoral and   poplitealveins.     Normal compressibility of the right common and femoral veins are   demonstrated. There is noncompressibility with filling defect in the   right popliteal and gastrocnemius veins.     Calf veins are not visualized bilaterally.      IMPRESSION:     Above and below knee acute DVT in the right lower extremity as described.    Dr. Silver discussed these findings with SAI Richard on 7/11/2017 11:15   AM with read back.        REBEKAH SILVER M.D., RADIOLOGY RESIDENT  This document has been electronically signed.  JESUS MANUEL ALEXANDER M.D., ATTENDING RADIOLOGIST  This document has been electronically signed. Jul 11 2017 12:40PM        < end of copied text >  	  	    < from: US Duplex Venous Upper Ext Complete, Bilateral (07.04.17 @ 12:29) >    EXAM:  US DPLX UPR EXT VEINS COMPL BI        PROCEDURE DATE:  Jul 4 2017         INTERPRETATION:  CLINICAL INFORMATION: Bilateral lower external cellulitis    TECHNIQUE: Duplex sonography of the bilateral lower extremities with   color and spectral Doppler, with and without compression.      COMPARISON: Bilateral lower external venous duplex 5/13/2015    FINDINGS:    There is normal compressibility of the bilateral common femoral, femoral   and popliteal veins. The calf veins could not be seenbilaterally.    Doppler examination shows normal spontaneous and phasic flow.    IMPRESSION:     No evidence of bilateral lower extremity deep venous thrombosis.      TRISTA VILLARREAL M.D., RADIOLOGY RESIDENT  This document has been electronically signed.  ANNA MARIE MEADE M.D., ATTENDING RADIOLOGIST  This document has been electronically signed. Jul 4 2017  1:58PM              < end of copied text >    < from: CT Angio Chest w/ IV Cont (06.30.17 @ 22:34) >    EXAM:  CT ANGIO CHEST (W)AW IC        PROCEDURE DATE:  Jun 30 2017         INTERPRETATION:  CLINICAL INFORMATION: Chest pain, evaluate for pulmonary   embolism    COMPARISON: CT chest 5/13/2015.    PROCEDURE:   CT Angiography of the Chest.  90 ml of Omnipaque 350 was injected intravenously. 10 ml were discarded.  Sagittal and coronal reformats were performed as well as MIPS.    FINDINGS: Artifact from the patient's arms and this patient motion   degrades images.    LUNGS AND LARGE AIRWAYS: Patent central airways. Status post right upper   lobectomy and partial left lower lobectomy. Subsegmental atelectasis.   Again noted, scarring in the left lower lobe and right apex. Stable   scattered bilateral pulmonary nodules, for example, 0.7 cm in the left   lower lobe (3:129), 1.0 cm in the right lower lobe (3:319), 0.3 cm in the   right lower lobe (3:132), 0.4 cm in the right middle lobe at the apex   (3:340 and 3:194).  PLEURA: No pneumothorax. Interval resolution of left pleural effusion.   Biapical pleural thickening.  VESSELS: Suboptimal contrast opacification of the peripheral pulmonary   arteries. No central pulmonary embolus. Contrast reflux into the   hepatic/azygos veins/IVC without significant cardiac interventricular   septal bowing to suggest right heart strain. Atherosclerotic change of   the thoracic aorta, great vessels and coronary arteries.   HEART: Normal heart size. No pericardial effusion.    MEDIASTINUM AND MU: Subcentimeter mediastinal and hilar lymph nodes   without lymphadenopathy. Calcified right hilar lymph nodes.  CHEST WALL AND LOWER NECK: Within normal limits.  VISUALIZED UPPER ABDOMEN: Grossly unremarkable.  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    IMPRESSION:     Suboptimal evaluation of the peripheral pulmonary arteries. No central   pulmonary embolus.    Post surgical changes. Interval resolution of the left pleural effusion   since 5/13/2015. Stable bilateral pulmonary nodules.    Additional findings as described.      TRISTA VILLARREAL M.D., RADIOLOGY RESIDENT  This document has been electronically signed.  NESTOR ALEXANDER M.D., ATTENDING RADIOLOGIST  This document has been electronically signed. Jun 30 2017 11:58PM            < end of copied text > Vascular Medicine Inpatient Consultation Note      Asked by Dr. Wood to evaluate patient for DVT  No overnight complaints/events.  No CP, SOB.  Stable RLE swelling/discomfort.  Placed on apixaban.      HISTORY OF PRESENT ILLNESS:  Patient is a 78 yo man with DM2, HTN, Afib s/p ablation 2006, prior lung CA s/p lobectomy x 2 who presents with worsening of RLE swelling. The patient has been hospitalized twice recently at [place] where he was diagnosed with RLE below-the-knee DVT and discharged on Xarelto; and then more recently at [facility] where presented with afib RVR, was ruled out for PE, and was diagnosed with cellulitis and sent home on Keflex (which he took for 3 days total). At that time, LE venous duplex did not identify DVT in RLE (reviewed study myself--technically difficulty study).    Since then, RLE swelling has been worsening, and now redness is tracking up the leg to the thigh. He has some discomfort, not really what he would call pain. His wife also reports that the patient has appears short of breath and mildly confused; the patient does not affirm this history. (11 Jul 2017 17:14)    < from: US Duplex Venous Lower Ext Complete, Bilateral (07.11.17 @ 10:56) >  Above and below knee acute DVT in the right lower extremity as described.    < end of copied text >      Allergies  No Known Allergies      MEDICATIONS  (STANDING):  gabapentin 800 milliGRAM(s) Oral three times a day  busPIRone 10 milliGRAM(s) Oral two times a day  ferrous    sulfate 325 milliGRAM(s) Oral daily  potassium chloride    Tablet ER 20 milliEquivalent(s) Oral daily  insulin lispro (HumaLOG) corrective regimen sliding scale   SubCutaneous three times a day before meals  insulin lispro (HumaLOG) corrective regimen sliding scale   SubCutaneous at bedtime  dextrose 5%. 1000 milliLiter(s) (50 mL/Hr) IV Continuous <Continuous>  dextrose 50% Injectable 12.5 Gram(s) IV Push once  dextrose 50% Injectable 25 Gram(s) IV Push once  dextrose 50% Injectable 25 Gram(s) IV Push once  amLODIPine   Tablet 5 milliGRAM(s) Oral daily  furosemide    Tablet 40 milliGRAM(s) Oral two times a day  metoprolol 50 milliGRAM(s) Oral two times a day  apixaban 10 milliGRAM(s) Oral every 12 hours    MEDICATIONS  (PRN):  ALBUTerol    90 MICROgram(s) HFA Inhaler 2 Puff(s) Inhalation every 6 hours PRN Shortness of Breath and/or Wheezing  docusate sodium 100 milliGRAM(s) Oral two times a day PRN Constipation  dextrose Gel 1 Dose(s) Oral once PRN Blood Glucose LESS THAN 70 milliGRAM(s)/deciliter  glucagon  Injectable 1 milliGRAM(s) IntraMuscular once PRN Glucose LESS THAN 70 milligrams/deciliter  oxyCODONE    5 mG/acetaminophen 325 mG 1 Tablet(s) Oral every 6 hours PRN moderate and severe pain      PAST MEDICAL & SURGICAL HISTORY:  DVT (deep venous thrombosis): RLE dx 6/28/17  Venous stasis of both lower extremities  Balanitis  Squamous cell lung cancer  Atrial fibrillation: s/p ablation 2006  Cellulitis: Both legs 2/2 chronic venous stasis  Morbid obesity  Anxiety  Neuropathy  Diabetes mellitus  HTN (hypertension)  Lung nodules: Flexible Bronchoscopy, Right VATS, Right Lung Resection - 1/21/15, LLL partial resection 2/2015  H/O prior ablation treatment: cardiac 2006      FAMILY HISTORY:  Family history of liver cancer (Sibling): sister  Family history of diabetes mellitus (Sibling): Brother  Family history of heart failure: father    SOCIAL HISTORY:    [ ] Non-smoker      REVIEW OF SYSTEMS:  CONSTITUTIONAL: No fever, weight loss, or fatigue  EYES: No eye pain, visual disturbances, or discharge  ENMT:  No difficulty hearing, tinnitus, vertigo; No sinus or throat pain  NECK: No pain or stiffness  RESPIRATORY: No cough, wheezing, chills or hemoptysis; No Shortness of Breath  CARDIOVASCULAR: No chest pain, palpitations, passing out, dizziness, or leg swelling  GASTROINTESTINAL: No abdominal or epigastric pain. No nausea, vomiting, or hematemesis; No diarrhea or constipation. No melena or hematochezia.  GENITOURINARY: No dysuria, frequency, hematuria, or incontinence  NEUROLOGICAL: No headaches, memory loss, loss of strength, numbness, or tremors  SKIN:As above  LYMPH Nodes: No enlarged glands  ENDOCRINE: No heat or cold intolerance; No hair loss  MUSCULOSKELETAL: No joint pain or swelling; No muscle, back, or extremity pain  PSYCHIATRIC: No depression, anxiety, mood swings, or difficulty sleeping  HEME/LYMPH: No easy bruising, or bleeding gums  ALLERGY AND IMMUNOLOGIC: No hives or eczema	      Vital Signs Last 24 Hrs  T(C): 36.4 (13 Jul 2017 20:42), Max: 36.4 (13 Jul 2017 12:55)  T(F): 97.6 (13 Jul 2017 20:42), Max: 97.6 (13 Jul 2017 12:55)  HR: 59 (13 Jul 2017 20:42) (59 - 71)  BP: 124/67 (13 Jul 2017 20:42) (122/65 - 151/71)  BP(mean): --  RR: 18 (13 Jul 2017 20:42) (18 - 18)  SpO2: 100% (13 Jul 2017 20:42) (96% - 100%)    Appearance: Normal	  HEENT:   Normal oral mucosa, PERRL, EOMI	  Lymphatic: No lymphadenopathy  Cardiovascular: Normal S1 S2, No JVD, No murmurs, No edema  Respiratory: Lungs clear to auscultation	  Psychiatry: A & O x 3, Mood & affect appropriate  Gastrointestinal:  Soft, Non-tender, + BS	  Skin: No rashes, No ecchymoses, No cyanosis	  Neurologic: Non-focal  Extremities: As above  Vascular: Peripheral pulses palpable 2+ bilaterally      LABS:	 	                          14.0   12.31 )-----------( 131      ( 11 Jul 2017 08:43 )             42.0     07-11    138  |  97<L>  |  21  ----------------------------<  184<H>  4.0   |  21<L>  |  1.17    Ca    9.4      11 Jul 2017 08:45    TPro  7.6  /  Alb  4.2  /  TBili  0.7  /  DBili  x   /  AST  20  /  ALT  17  /  AlkPhos  56  07-11      < from: US Duplex Venous Lower Ext Complete, Bilateral (07.11.17 @ 10:56) >  EXAM:  US DPLX LWR EXT VEINS COMPL BI        PROCEDURE DATE:  Jul 11 2017         INTERPRETATION:  CLINICAL INFORMATION: Lower extremity swelling. History   of DVT and cellulitis.    COMPARISON: Duplex sonography of the bilateral lower extremities dated   5/13/2015    TECHNIQUE: Duplex sonography of the BILATERAL LOWER extremities with   color and spectral Doppler, with and without compression.      FINDINGS:    There is normal compressibility of the left common femoral, femoral and   poplitealveins.     Normal compressibility of the right common and femoral veins are   demonstrated. There is noncompressibility with filling defect in the   right popliteal and gastrocnemius veins.     Calf veins are not visualized bilaterally.      IMPRESSION:     Above and below knee acute DVT in the right lower extremity as described.    Dr. Silver discussed these findings with SAI Richard on 7/11/2017 11:15   AM with read back.        REBEKAH SILVER M.D., RADIOLOGY RESIDENT  This document has been electronically signed.  JESUS MANUEL ALEXANDER M.D., ATTENDING RADIOLOGIST  This document has been electronically signed. Jul 11 2017 12:40PM        < end of copied text >  	  	    < from: US Duplex Venous Upper Ext Complete, Bilateral (07.04.17 @ 12:29) >    EXAM:  US DPLX UPR EXT VEINS COMPL BI        PROCEDURE DATE:  Jul 4 2017         INTERPRETATION:  CLINICAL INFORMATION: Bilateral lower external cellulitis    TECHNIQUE: Duplex sonography of the bilateral lower extremities with   color and spectral Doppler, with and without compression.      COMPARISON: Bilateral lower external venous duplex 5/13/2015    FINDINGS:    There is normal compressibility of the bilateral common femoral, femoral   and popliteal veins. The calf veins could not be seenbilaterally.    Doppler examination shows normal spontaneous and phasic flow.    IMPRESSION:     No evidence of bilateral lower extremity deep venous thrombosis.      TRISTA VILLARREAL M.D., RADIOLOGY RESIDENT  This document has been electronically signed.  ANNA MARIE MEADE M.D., ATTENDING RADIOLOGIST  This document has been electronically signed. Jul 4 2017  1:58PM              < end of copied text >    < from: CT Angio Chest w/ IV Cont (06.30.17 @ 22:34) >    EXAM:  CT ANGIO CHEST (W)AW IC        PROCEDURE DATE:  Jun 30 2017         INTERPRETATION:  CLINICAL INFORMATION: Chest pain, evaluate for pulmonary   embolism    COMPARISON: CT chest 5/13/2015.    PROCEDURE:   CT Angiography of the Chest.  90 ml of Omnipaque 350 was injected intravenously. 10 ml were discarded.  Sagittal and coronal reformats were performed as well as MIPS.    FINDINGS: Artifact from the patient's arms and this patient motion   degrades images.    LUNGS AND LARGE AIRWAYS: Patent central airways. Status post right upper   lobectomy and partial left lower lobectomy. Subsegmental atelectasis.   Again noted, scarring in the left lower lobe and right apex. Stable   scattered bilateral pulmonary nodules, for example, 0.7 cm in the left   lower lobe (3:129), 1.0 cm in the right lower lobe (3:319), 0.3 cm in the   right lower lobe (3:132), 0.4 cm in the right middle lobe at the apex   (3:340 and 3:194).  PLEURA: No pneumothorax. Interval resolution of left pleural effusion.   Biapical pleural thickening.  VESSELS: Suboptimal contrast opacification of the peripheral pulmonary   arteries. No central pulmonary embolus. Contrast reflux into the   hepatic/azygos veins/IVC without significant cardiac interventricular   septal bowing to suggest right heart strain. Atherosclerotic change of   the thoracic aorta, great vessels and coronary arteries.   HEART: Normal heart size. No pericardial effusion.    MEDIASTINUM AND MU: Subcentimeter mediastinal and hilar lymph nodes   without lymphadenopathy. Calcified right hilar lymph nodes.  CHEST WALL AND LOWER NECK: Within normal limits.  VISUALIZED UPPER ABDOMEN: Grossly unremarkable.  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    IMPRESSION:     Suboptimal evaluation of the peripheral pulmonary arteries. No central   pulmonary embolus.    Post surgical changes. Interval resolution of the left pleural effusion   since 5/13/2015. Stable bilateral pulmonary nodules.    Additional findings as described.      TRISTA VILLARREAL M.D., RADIOLOGY RESIDENT  This document has been electronically signed.  NESTOR ALEXANDER M.D., ATTENDING RADIOLOGIST  This document has been electronically signed. Jun 30 2017 11:58PM            < end of copied text >

## 2017-07-13 NOTE — PROGRESS NOTE ADULT - ASSESSMENT
EKG - NSR RBBB    1) Cellulitis - as per ID no cellulitis off abx     2) Acute right calf above and below knee DVT - pt developed clot on xarelto, now switched to eliquis    3) Shortness of breath - improved

## 2017-07-13 NOTE — PROGRESS NOTE ADULT - ASSESSMENT
76 y/o male with h/o DVT with Rt LE new DVT with swelling and fever. Appears to have baseline LE redness and swelling due to chronic venous stasis, and has increasing swelling in RLE likely 2/2 to DVT. Team stopped antibiotic today. Can observe for signs of breakthrough cellulitis.  - Continue off antibiotics  - Monitor RLE for worsening redness, erythema to indicate cellulitis; monitor fever curve; trend WBCs  - Follow up blood cultures      David Hogue MD  Pager [phone]  After 5pm and on weekends call [phone]

## 2017-07-13 NOTE — PHYSICAL THERAPY INITIAL EVALUATION ADULT - PERTINENT HX OF CURRENT PROBLEM, REHAB EVAL
Pt with history of DM2, HTN, Afib s/p ablation 2006, prior lung CA s/p lobectomy x 2 who presents with worsening of RLE swelling.

## 2017-07-14 LAB
HCT VFR BLD CALC: 42.6 % — SIGNIFICANT CHANGE UP (ref 39–50)
HGB BLD-MCNC: 14.2 G/DL — SIGNIFICANT CHANGE UP (ref 13–17)
MCHC RBC-ENTMCNC: 30.1 PG — SIGNIFICANT CHANGE UP (ref 27–34)
MCHC RBC-ENTMCNC: 33.3 % — SIGNIFICANT CHANGE UP (ref 32–36)
MCV RBC AUTO: 90.3 FL — SIGNIFICANT CHANGE UP (ref 80–100)
NRBC # FLD: 0 — SIGNIFICANT CHANGE UP
PLATELET # BLD AUTO: 126 K/UL — LOW (ref 150–400)
PMV BLD: 10.2 FL — SIGNIFICANT CHANGE UP (ref 7–13)
RBC # BLD: 4.72 M/UL — SIGNIFICANT CHANGE UP (ref 4.2–5.8)
RBC # FLD: 14.4 % — SIGNIFICANT CHANGE UP (ref 10.3–14.5)
WBC # BLD: 4.39 K/UL — SIGNIFICANT CHANGE UP (ref 3.8–10.5)
WBC # FLD AUTO: 4.39 K/UL — SIGNIFICANT CHANGE UP (ref 3.8–10.5)

## 2017-07-14 PROCEDURE — 99232 SBSQ HOSP IP/OBS MODERATE 35: CPT

## 2017-07-14 RX ADMIN — AMLODIPINE BESYLATE 5 MILLIGRAM(S): 2.5 TABLET ORAL at 05:24

## 2017-07-14 RX ADMIN — GABAPENTIN 800 MILLIGRAM(S): 400 CAPSULE ORAL at 21:54

## 2017-07-14 RX ADMIN — Medication 40 MILLIGRAM(S): at 17:30

## 2017-07-14 RX ADMIN — GABAPENTIN 800 MILLIGRAM(S): 400 CAPSULE ORAL at 05:24

## 2017-07-14 RX ADMIN — Medication 40 MILLIGRAM(S): at 05:24

## 2017-07-14 RX ADMIN — Medication 10 MILLIGRAM(S): at 17:30

## 2017-07-14 RX ADMIN — GABAPENTIN 800 MILLIGRAM(S): 400 CAPSULE ORAL at 12:28

## 2017-07-14 RX ADMIN — APIXABAN 10 MILLIGRAM(S): 2.5 TABLET, FILM COATED ORAL at 05:24

## 2017-07-14 RX ADMIN — OXYCODONE AND ACETAMINOPHEN 1 TABLET(S): 5; 325 TABLET ORAL at 16:05

## 2017-07-14 RX ADMIN — Medication 50 MILLIGRAM(S): at 05:24

## 2017-07-14 RX ADMIN — Medication 325 MILLIGRAM(S): at 12:25

## 2017-07-14 RX ADMIN — Medication 20 MILLIEQUIVALENT(S): at 12:24

## 2017-07-14 RX ADMIN — Medication 50 MILLIGRAM(S): at 17:31

## 2017-07-14 RX ADMIN — Medication 2: at 12:23

## 2017-07-14 RX ADMIN — OXYCODONE AND ACETAMINOPHEN 1 TABLET(S): 5; 325 TABLET ORAL at 15:05

## 2017-07-14 RX ADMIN — Medication 10 MILLIGRAM(S): at 05:24

## 2017-07-14 RX ADMIN — APIXABAN 10 MILLIGRAM(S): 2.5 TABLET, FILM COATED ORAL at 17:30

## 2017-07-14 NOTE — PROGRESS NOTE ADULT - SUBJECTIVE AND OBJECTIVE BOX
chief complaint : worsening lower ext erythema    subjective/ overnight events : pt says he feels better than before    MEDICATIONS  (STANDING):  gabapentin 800 milliGRAM(s) Oral three times a day  busPIRone 10 milliGRAM(s) Oral two times a day  ferrous    sulfate 325 milliGRAM(s) Oral daily  potassium chloride    Tablet ER 20 milliEquivalent(s) Oral daily  insulin lispro (HumaLOG) corrective regimen sliding scale   SubCutaneous three times a day before meals  insulin lispro (HumaLOG) corrective regimen sliding scale   SubCutaneous at bedtime  dextrose 5%. 1000 milliLiter(s) (50 mL/Hr) IV Continuous <Continuous>  dextrose 50% Injectable 12.5 Gram(s) IV Push once  dextrose 50% Injectable 25 Gram(s) IV Push once  dextrose 50% Injectable 25 Gram(s) IV Push once  amLODIPine   Tablet 5 milliGRAM(s) Oral daily  furosemide    Tablet 40 milliGRAM(s) Oral two times a day  metoprolol 50 milliGRAM(s) Oral two times a day  apixaban 10 milliGRAM(s) Oral every 12 hours    MEDICATIONS  (PRN):  ALBUTerol    90 MICROgram(s) HFA Inhaler 2 Puff(s) Inhalation every 6 hours PRN Shortness of Breath and/or Wheezing  docusate sodium 100 milliGRAM(s) Oral two times a day PRN Constipation  dextrose Gel 1 Dose(s) Oral once PRN Blood Glucose LESS THAN 70 milliGRAM(s)/deciliter  glucagon  Injectable 1 milliGRAM(s) IntraMuscular once PRN Glucose LESS THAN 70 milligrams/deciliter  oxyCODONE    5 mG/acetaminophen 325 mG 1 Tablet(s) Oral every 6 hours PRN moderate and severe pain    Vital Signs Last 24 Hrs  T(C): 36.9 (14 Jul 2017 13:47), Max: 36.9 (14 Jul 2017 13:47)  T(F): 98.4 (14 Jul 2017 13:47), Max: 98.4 (14 Jul 2017 13:47)  HR: 66 (14 Jul 2017 13:47) (59 - 71)  BP: 137/73 (14 Jul 2017 13:47) (124/67 - 155/73)  BP(mean): --  RR: 18 (14 Jul 2017 13:47) (18 - 18)  SpO2: 96% (14 Jul 2017 13:47) (96% - 100%)    Constitutional: No fever, fatigue  Skin: No rash.  Eyes: No recent vision problems or eye pain.  ENT: No congestion, ear pain, or sore throat.  Cardiovascular: No chest pain or palpation.  Respiratory: No cough, shortness of breath, congestion, or wheezing.  Gastrointestinal: No abdominal pain, nausea, vomiting, or diarrhea.  Genitourinary: No dysuria.  Musculoskeletal: No joint swelling.  Neurologic: No headache.    Physical Exam :    HEENT ; peerla, eomi  CVS : S1, S2  RS - dec breath sounds at bases  Abd : soft, bowel sounds+  Ext : rt lower ext edema/ erythema > left lower ext   Neuro : alert awake calm cooperative

## 2017-07-14 NOTE — PROGRESS NOTE ADULT - SUBJECTIVE AND OBJECTIVE BOX
INTERVAL HISTORY: Pt is lying in bed comfortable, not in distress, lower ext swelling improved, no SOB  	  MEDICATIONS:  amLODIPine   Tablet 5 milliGRAM(s) Oral daily  furosemide    Tablet 40 milliGRAM(s) Oral two times a day  metoprolol 50 milliGRAM(s) Oral two times a day  apixaban 10 milliGRAM(s) Oral every 12 hours      ALBUTerol    90 MICROgram(s) HFA Inhaler 2 Puff(s) Inhalation every 6 hours PRN    gabapentin 800 milliGRAM(s) Oral three times a day  busPIRone 10 milliGRAM(s) Oral two times a day  oxyCODONE    5 mG/acetaminophen 325 mG 1 Tablet(s) Oral every 6 hours PRN    docusate sodium 100 milliGRAM(s) Oral two times a day PRN    insulin lispro (HumaLOG) corrective regimen sliding scale   SubCutaneous three times a day before meals  insulin lispro (HumaLOG) corrective regimen sliding scale   SubCutaneous at bedtime  dextrose Gel 1 Dose(s) Oral once PRN  dextrose 50% Injectable 12.5 Gram(s) IV Push once  dextrose 50% Injectable 25 Gram(s) IV Push once  dextrose 50% Injectable 25 Gram(s) IV Push once  glucagon  Injectable 1 milliGRAM(s) IntraMuscular once PRN    ferrous    sulfate 325 milliGRAM(s) Oral daily  potassium chloride    Tablet ER 20 milliEquivalent(s) Oral daily  dextrose 5%. 1000 milliLiter(s) IV Continuous <Continuous>      PHYSICAL EXAM:  T(C): 36.9 (07-14-17 @ 13:47), Max: 36.9 (07-14-17 @ 13:47)  HR: 66 (07-14-17 @ 13:47) (59 - 71)  BP: 137/73 (07-14-17 @ 13:47) (124/67 - 155/73)  RR: 18 (07-14-17 @ 13:47) (18 - 18)  SpO2: 96% (07-14-17 @ 13:47) (96% - 100%)  Wt(kg): --  I&O's Summary        Appearance: Normal	  HEENT:   Normal oral mucosa, PERRL, EOMI	  Cardiovascular: Normal S1 S2, No JVD, No murmurs, No edema  Respiratory: Lungs clear to auscultation	  Gastrointestinal:  Soft, Non-tender, + BS	  Extremities: RIGHT leg swelling improving                                    14.2   4.39  )-----------( 126      ( 14 Jul 2017 05:45 )             42.6     07-13    139  |  101  |  17  ----------------------------<  116<H>  3.6   |  26  |  1.07    Ca    8.6      13 Jul 2017 05:30      proBNP:   Lipid Profile:   HgA1c:   TSH:

## 2017-07-14 NOTE — PROGRESS NOTE ADULT - PROBLEM SELECTOR PLAN 1
Continue off antibiotics  - Monitor RLE for worsening redness, erythema to indicate cellulitis; monitor fever curve; trend WBCs  - Follow up blood cultures
Continue off antibiotics  - Monitor RLE for worsening redness, erythema to indicate cellulitis; monitor fever curve; trend WBCs  - Follow up blood cultures

## 2017-07-14 NOTE — PROGRESS NOTE ADULT - ASSESSMENT
76 y/o male with h/o DVT with Rt LE new DVT with swelling and fever. Appears to have baseline LE redness and swelling due to chronic venous stasis, and has increasing swelling in RLE likely 2/2 to DVT. Team stopped antibiotic 7/13, patient doing well off abx.  - Continue off antibiotics  - Monitor RLE for worsening redness, erythema to indicate cellulitis; monitor fever curve; trend WBCs  - Follow up blood cultures (presently NGTD)      David Hogue MD  Pager [phone]  After 5pm and on weekends call [phone]

## 2017-07-14 NOTE — PROGRESS NOTE ADULT - SUBJECTIVE AND OBJECTIVE BOX
CC: F/U for LE swelling    Saw/spoke to patient. No fevers, no chills, no new events. Overall well off antibiotics.    Allergies  No Known Allergies    ANTIMICROBIALS:  Off    PE:    Vital Signs Last 24 Hrs  T(C): 36.9 (14 Jul 2017 13:47), Max: 36.9 (14 Jul 2017 13:47)  T(F): 98.4 (14 Jul 2017 13:47), Max: 98.4 (14 Jul 2017 13:47)  HR: 66 (14 Jul 2017 13:47) (59 - 71)  BP: 137/73 (14 Jul 2017 13:47) (124/67 - 155/73)  BP(mean): --  RR: 18 (14 Jul 2017 13:47) (18 - 18)  SpO2: 96% (14 Jul 2017 13:47) (96% - 100%)    Gen: AOx3, NAD, non-toxic, pleasant  CV: S1+S2 normal, no murmurs, nontachycardic  Resp: Clear bilat, no resp distress, no crackles/wheezes  Abd: Soft, nontender, +BS  Ext: R> L LE swelling; mild warmth at RLE; not worsening    LABS:                        14.2   4.39  )-----------( 126      ( 14 Jul 2017 05:45 )             42.6     07-13    139  |  101  |  17  ----------------------------<  116<H>  3.6   |  26  |  1.07    Ca    8.6      13 Jul 2017 05:30    MICROBIOLOGY:    7/11 BCX NGTD  7/11 UCX NGTD    RADIOLOGY:    LE USG:    IMPRESSION:     Above and below knee acute DVT in the right lower extremity as described.

## 2017-07-15 VITALS
SYSTOLIC BLOOD PRESSURE: 108 MMHG | DIASTOLIC BLOOD PRESSURE: 59 MMHG | OXYGEN SATURATION: 98 % | TEMPERATURE: 98 F | RESPIRATION RATE: 18 BRPM | HEART RATE: 72 BPM

## 2017-07-15 PROCEDURE — 99232 SBSQ HOSP IP/OBS MODERATE 35: CPT

## 2017-07-15 RX ADMIN — Medication 2: at 12:04

## 2017-07-15 RX ADMIN — Medication 40 MILLIGRAM(S): at 05:28

## 2017-07-15 RX ADMIN — GABAPENTIN 800 MILLIGRAM(S): 400 CAPSULE ORAL at 13:34

## 2017-07-15 RX ADMIN — GABAPENTIN 800 MILLIGRAM(S): 400 CAPSULE ORAL at 05:28

## 2017-07-15 RX ADMIN — AMLODIPINE BESYLATE 5 MILLIGRAM(S): 2.5 TABLET ORAL at 05:28

## 2017-07-15 RX ADMIN — APIXABAN 10 MILLIGRAM(S): 2.5 TABLET, FILM COATED ORAL at 05:28

## 2017-07-15 RX ADMIN — Medication 325 MILLIGRAM(S): at 12:04

## 2017-07-15 RX ADMIN — Medication 50 MILLIGRAM(S): at 05:28

## 2017-07-15 RX ADMIN — Medication 20 MILLIEQUIVALENT(S): at 12:04

## 2017-07-15 RX ADMIN — Medication 10 MILLIGRAM(S): at 05:28

## 2017-07-15 NOTE — PROGRESS NOTE ADULT - PROBLEM SELECTOR PROBLEM 5
Need for prophylactic measure
Atrial fibrillation
Atrial fibrillation

## 2017-07-15 NOTE — PROGRESS NOTE ADULT - PROBLEM SELECTOR PROBLEM 4
Anticoagulation management encounter
Essential hypertension
Essential hypertension

## 2017-07-15 NOTE — PROGRESS NOTE ADULT - PROBLEM SELECTOR PROBLEM 1
Cellulitis of right lower extremity
Acute deep vein thrombosis (DVT) of femoral vein of right lower extremity
Cellulitis of right lower extremity

## 2017-07-15 NOTE — PROGRESS NOTE ADULT - ASSESSMENT
? Recurrent DVT/failure on Xarelto vs subacute DVT that was not noted on previous [facility] ultrasound.  Will review previous venous duplex  Despite whether this is indeed old or new DVT, recommended switching to another agent (now on apixaban).  s/p abx for cellulitis

## 2017-07-15 NOTE — PROGRESS NOTE ADULT - PROBLEM SELECTOR PROBLEM 2
Deep vein thrombosis (DVT) of other vein of lower extremity
Acute deep vein thrombosis (DVT) of femoral vein of right lower extremity
Acute deep vein thrombosis (DVT) of femoral vein of right lower extremity

## 2017-07-15 NOTE — PROGRESS NOTE ADULT - PROBLEM SELECTOR PROBLEM 3
Cellulitis of right lower extremity
Diabetes mellitus
Diabetes mellitus

## 2017-07-15 NOTE — PROGRESS NOTE ADULT - SUBJECTIVE AND OBJECTIVE BOX
Vascular Medicine Inpatient Consultation Note      Asked by Dr. Wood to evaluate patient for DVT  No overnight complaints/events.  No CP, SOB.  Stable RLE swelling/discomfort.  Placed on apixaban.    Vital Signs Last 24 Hrs  T(C): 36.6 (15 Jul 2017 05:25), Max: 36.9 (14 Jul 2017 13:47)  T(F): 97.8 (15 Jul 2017 05:25), Max: 98.4 (14 Jul 2017 13:47)  HR: 62 (15 Jul 2017 05:25) (60 - 66)  BP: 148/78 (15 Jul 2017 05:25) (124/64 - 148/78)  BP(mean): --  RR: 18 (15 Jul 2017 05:25) (18 - 18)  SpO2: 97% (15 Jul 2017 05:25) (96% - 97%)    Appearance: Normal	  HEENT:   Normal oral mucosa, PERRL, EOMI	  Lymphatic: No lymphadenopathy  Cardiovascular: Normal S1 S2, No JVD, No murmurs, No edema  Respiratory: Lungs clear to auscultation	  Psychiatry: A & O x 3, Mood & affect appropriate  Gastrointestinal:  Soft, Non-tender, + BS	  Skin: No rashes, No ecchymoses, No cyanosis	  Neurologic: Non-focal  Extremities: As above  Vascular: Peripheral pulses palpable 2+ bilaterally      MEDICATIONS  (STANDING):  gabapentin 800 milliGRAM(s) Oral three times a day  busPIRone 10 milliGRAM(s) Oral two times a day  ferrous    sulfate 325 milliGRAM(s) Oral daily  potassium chloride    Tablet ER 20 milliEquivalent(s) Oral daily  insulin lispro (HumaLOG) corrective regimen sliding scale   SubCutaneous three times a day before meals  insulin lispro (HumaLOG) corrective regimen sliding scale   SubCutaneous at bedtime  dextrose 5%. 1000 milliLiter(s) (50 mL/Hr) IV Continuous <Continuous>  dextrose 50% Injectable 12.5 Gram(s) IV Push once  dextrose 50% Injectable 25 Gram(s) IV Push once  dextrose 50% Injectable 25 Gram(s) IV Push once  amLODIPine   Tablet 5 milliGRAM(s) Oral daily  furosemide    Tablet 40 milliGRAM(s) Oral two times a day  metoprolol 50 milliGRAM(s) Oral two times a day  apixaban 10 milliGRAM(s) Oral every 12 hours    MEDICATIONS  (PRN):  ALBUTerol    90 MICROgram(s) HFA Inhaler 2 Puff(s) Inhalation every 6 hours PRN Shortness of Breath and/or Wheezing  docusate sodium 100 milliGRAM(s) Oral two times a day PRN Constipation  dextrose Gel 1 Dose(s) Oral once PRN Blood Glucose LESS THAN 70 milliGRAM(s)/deciliter  glucagon  Injectable 1 milliGRAM(s) IntraMuscular once PRN Glucose LESS THAN 70 milligrams/deciliter  oxyCODONE    5 mG/acetaminophen 325 mG 1 Tablet(s) Oral every 6 hours PRN moderate and severe pain      LABS:	 	                          14.2   4.39  )-----------( 126      ( 14 Jul 2017 05:45 )             42.6           < from: US Duplex Venous Lower Ext Complete, Bilateral (07.11.17 @ 10:56) >  EXAM:  US DPLX LWR EXT VEINS COMPL BI        PROCEDURE DATE:  Jul 11 2017         INTERPRETATION:  CLINICAL INFORMATION: Lower extremity swelling. History   of DVT and cellulitis.    COMPARISON: Duplex sonography of the bilateral lower extremities dated   5/13/2015    TECHNIQUE: Duplex sonography of the BILATERAL LOWER extremities with   color and spectral Doppler, with and without compression.      FINDINGS:    There is normal compressibility of the left common femoral, femoral and   poplitealveins.     Normal compressibility of the right common and femoral veins are   demonstrated. There is noncompressibility with filling defect in the   right popliteal and gastrocnemius veins.     Calf veins are not visualized bilaterally.      IMPRESSION:     Above and below knee acute DVT in the right lower extremity as described.    Dr. Silver discussed these findings with SAI Richard on 7/11/2017 11:15   AM with read back.        REBEKAH SILVER M.D., RADIOLOGY RESIDENT  This document has been electronically signed.  JESUS MANUEL ALEXANDER M.D., ATTENDING RADIOLOGIST  This document has been electronically signed. Jul 11 2017 12:40PM        < end of copied text >  	  	    < from: US Duplex Venous Upper Ext Complete, Bilateral (07.04.17 @ 12:29) >    EXAM:  US DPLX UPR EXT VEINS COMPL BI        PROCEDURE DATE:  Jul 4 2017         INTERPRETATION:  CLINICAL INFORMATION: Bilateral lower external cellulitis    TECHNIQUE: Duplex sonography of the bilateral lower extremities with   color and spectral Doppler, with and without compression.      COMPARISON: Bilateral lower external venous duplex 5/13/2015    FINDINGS:    There is normal compressibility of the bilateral common femoral, femoral   and popliteal veins. The calf veins could not be seenbilaterally.    Doppler examination shows normal spontaneous and phasic flow.    IMPRESSION:     No evidence of bilateral lower extremity deep venous thrombosis.      TRISTA VILLARREAL M.D., RADIOLOGY RESIDENT  This document has been electronically signed.  ANNA MARIE MEADE M.D., ATTENDING RADIOLOGIST  This document has been electronically signed. Jul 4 2017  1:58PM              < end of copied text >    < from: CT Angio Chest w/ IV Cont (06.30.17 @ 22:34) >    EXAM:  CT ANGIO CHEST (W)AW IC        PROCEDURE DATE:  Jun 30 2017         INTERPRETATION:  CLINICAL INFORMATION: Chest pain, evaluate for pulmonary   embolism    COMPARISON: CT chest 5/13/2015.    PROCEDURE:   CT Angiography of the Chest.  90 ml of Omnipaque 350 was injected intravenously. 10 ml were discarded.  Sagittal and coronal reformats were performed as well as MIPS.    FINDINGS: Artifact from the patient's arms and this patient motion   degrades images.    LUNGS AND LARGE AIRWAYS: Patent central airways. Status post right upper   lobectomy and partial left lower lobectomy. Subsegmental atelectasis.   Again noted, scarring in the left lower lobe and right apex. Stable   scattered bilateral pulmonary nodules, for example, 0.7 cm in the left   lower lobe (3:129), 1.0 cm in the right lower lobe (3:319), 0.3 cm in the   right lower lobe (3:132), 0.4 cm in the right middle lobe at the apex   (3:340 and 3:194).  PLEURA: No pneumothorax. Interval resolution of left pleural effusion.   Biapical pleural thickening.  VESSELS: Suboptimal contrast opacification of the peripheral pulmonary   arteries. No central pulmonary embolus. Contrast reflux into the   hepatic/azygos veins/IVC without significant cardiac interventricular   septal bowing to suggest right heart strain. Atherosclerotic change of   the thoracic aorta, great vessels and coronary arteries.   HEART: Normal heart size. No pericardial effusion.    MEDIASTINUM AND MU: Subcentimeter mediastinal and hilar lymph nodes   without lymphadenopathy. Calcified right hilar lymph nodes.  CHEST WALL AND LOWER NECK: Within normal limits.  VISUALIZED UPPER ABDOMEN: Grossly unremarkable.  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    IMPRESSION:     Suboptimal evaluation of the peripheral pulmonary arteries. No central   pulmonary embolus.    Post surgical changes. Interval resolution of the left pleural effusion   since 5/13/2015. Stable bilateral pulmonary nodules.    Additional findings as described.      TRISTA VILLARREAL M.D., RADIOLOGY RESIDENT  This document has been electronically signed.  NESTOR ALEXANDER M.D., ATTENDING RADIOLOGIST  This document has been electronically signed. Jun 30 2017 11:58PM            < end of copied text >

## 2017-07-15 NOTE — PROGRESS NOTE ADULT - SUBJECTIVE AND OBJECTIVE BOX
INTERVAL HISTORY:  	  MEDICATIONS:  amLODIPine   Tablet 5 milliGRAM(s) Oral daily  furosemide    Tablet 40 milliGRAM(s) Oral two times a day  metoprolol 50 milliGRAM(s) Oral two times a day  apixaban 10 milliGRAM(s) Oral every 12 hours      ALBUTerol    90 MICROgram(s) HFA Inhaler 2 Puff(s) Inhalation every 6 hours PRN    gabapentin 800 milliGRAM(s) Oral three times a day  busPIRone 10 milliGRAM(s) Oral two times a day  oxyCODONE    5 mG/acetaminophen 325 mG 1 Tablet(s) Oral every 6 hours PRN    docusate sodium 100 milliGRAM(s) Oral two times a day PRN    insulin lispro (HumaLOG) corrective regimen sliding scale   SubCutaneous three times a day before meals  insulin lispro (HumaLOG) corrective regimen sliding scale   SubCutaneous at bedtime  dextrose Gel 1 Dose(s) Oral once PRN  dextrose 50% Injectable 12.5 Gram(s) IV Push once  dextrose 50% Injectable 25 Gram(s) IV Push once  dextrose 50% Injectable 25 Gram(s) IV Push once  glucagon  Injectable 1 milliGRAM(s) IntraMuscular once PRN    ferrous    sulfate 325 milliGRAM(s) Oral daily  potassium chloride    Tablet ER 20 milliEquivalent(s) Oral daily  dextrose 5%. 1000 milliLiter(s) IV Continuous <Continuous>      PHYSICAL EXAM:  T(C): 36.6 (07-15-17 @ 05:25), Max: 36.9 (07-14-17 @ 13:47)  HR: 62 (07-15-17 @ 05:25) (60 - 66)  BP: 148/78 (07-15-17 @ 05:25) (124/64 - 148/78)  RR: 18 (07-15-17 @ 05:25) (18 - 18)  SpO2: 97% (07-15-17 @ 05:25) (96% - 97%)  Wt(kg): --  I&O's Summary        Appearance: Normal	  HEENT:   Normal oral mucosa, PERRL, EOMI	  Cardiovascular: Normal S1 S2, No JVD, No murmurs, No edema  Respiratory: Lungs clear to auscultation	  Gastrointestinal:  Soft, Non-tender, + BS	  Extremities: Normal range of motion, No clubbing, cyanosis or edema                                    14.2   4.39  )-----------( 126      ( 14 Jul 2017 05:45 )             42.6           proBNP:   Lipid Profile:   HgA1c:   TSH: INTERVAL HISTORY: pt is lying in bed comfortable, not in distress  	  MEDICATIONS:  amLODIPine   Tablet 5 milliGRAM(s) Oral daily  furosemide    Tablet 40 milliGRAM(s) Oral two times a day  metoprolol 50 milliGRAM(s) Oral two times a day  apixaban 10 milliGRAM(s) Oral every 12 hours      ALBUTerol    90 MICROgram(s) HFA Inhaler 2 Puff(s) Inhalation every 6 hours PRN    gabapentin 800 milliGRAM(s) Oral three times a day  busPIRone 10 milliGRAM(s) Oral two times a day  oxyCODONE    5 mG/acetaminophen 325 mG 1 Tablet(s) Oral every 6 hours PRN    docusate sodium 100 milliGRAM(s) Oral two times a day PRN    insulin lispro (HumaLOG) corrective regimen sliding scale   SubCutaneous three times a day before meals  insulin lispro (HumaLOG) corrective regimen sliding scale   SubCutaneous at bedtime  dextrose Gel 1 Dose(s) Oral once PRN  dextrose 50% Injectable 12.5 Gram(s) IV Push once  dextrose 50% Injectable 25 Gram(s) IV Push once  dextrose 50% Injectable 25 Gram(s) IV Push once  glucagon  Injectable 1 milliGRAM(s) IntraMuscular once PRN    ferrous    sulfate 325 milliGRAM(s) Oral daily  potassium chloride    Tablet ER 20 milliEquivalent(s) Oral daily  dextrose 5%. 1000 milliLiter(s) IV Continuous <Continuous>      PHYSICAL EXAM:  T(C): 36.6 (07-15-17 @ 05:25), Max: 36.9 (07-14-17 @ 13:47)  HR: 62 (07-15-17 @ 05:25) (60 - 66)  BP: 148/78 (07-15-17 @ 05:25) (124/64 - 148/78)  RR: 18 (07-15-17 @ 05:25) (18 - 18)  SpO2: 97% (07-15-17 @ 05:25) (96% - 97%)  Wt(kg): --  I&O's Summary        Appearance: Normal	  HEENT:   Normal oral mucosa, PERRL, EOMI	  Cardiovascular: Normal S1 S2, No JVD, No murmurs, No edema  Respiratory: Lungs clear to auscultation	  Gastrointestinal:  Soft, Non-tender, + BS	  Extremities: right calf swelling improving                                    14.2   4.39  )-----------( 126      ( 14 Jul 2017 05:45 )             42.6           proBNP:   Lipid Profile:   HgA1c:   TSH:

## 2017-07-16 LAB
BACTERIA BLD CULT: SIGNIFICANT CHANGE UP
BACTERIA BLD CULT: SIGNIFICANT CHANGE UP

## 2017-07-19 ENCOUNTER — APPOINTMENT (OUTPATIENT)
Dept: CT IMAGING | Facility: IMAGING CENTER | Age: 77
End: 2017-07-19

## 2017-07-19 ENCOUNTER — OUTPATIENT (OUTPATIENT)
Dept: OUTPATIENT SERVICES | Facility: HOSPITAL | Age: 77
LOS: 1 days | End: 2017-07-19
Payer: COMMERCIAL

## 2017-07-19 DIAGNOSIS — R91.8 OTHER NONSPECIFIC ABNORMAL FINDING OF LUNG FIELD: Chronic | ICD-10-CM

## 2017-07-19 DIAGNOSIS — Z00.8 ENCOUNTER FOR OTHER GENERAL EXAMINATION: ICD-10-CM

## 2017-07-19 DIAGNOSIS — Z98.89 OTHER SPECIFIED POSTPROCEDURAL STATES: Chronic | ICD-10-CM

## 2017-07-19 PROCEDURE — 71250 CT THORAX DX C-: CPT

## 2017-08-07 ENCOUNTER — RX RENEWAL (OUTPATIENT)
Age: 77
End: 2017-08-07

## 2017-08-08 ENCOUNTER — INPATIENT (INPATIENT)
Facility: HOSPITAL | Age: 77
LOS: 4 days | Discharge: HOME | End: 2017-08-13
Attending: INTERNAL MEDICINE | Admitting: INTERNAL MEDICINE
Payer: MEDICARE

## 2017-08-08 ENCOUNTER — APPOINTMENT (OUTPATIENT)
Dept: THORACIC SURGERY | Facility: CLINIC | Age: 77
End: 2017-08-08
Payer: MEDICARE

## 2017-08-08 VITALS
SYSTOLIC BLOOD PRESSURE: 134 MMHG | HEIGHT: 76 IN | HEART RATE: 82 BPM | OXYGEN SATURATION: 94 % | DIASTOLIC BLOOD PRESSURE: 70 MMHG

## 2017-08-08 VITALS
TEMPERATURE: 99 F | SYSTOLIC BLOOD PRESSURE: 125 MMHG | RESPIRATION RATE: 18 BRPM | HEART RATE: 81 BPM | DIASTOLIC BLOOD PRESSURE: 52 MMHG | OXYGEN SATURATION: 97 %

## 2017-08-08 DIAGNOSIS — Z98.89 OTHER SPECIFIED POSTPROCEDURAL STATES: Chronic | ICD-10-CM

## 2017-08-08 DIAGNOSIS — G62.9 POLYNEUROPATHY, UNSPECIFIED: ICD-10-CM

## 2017-08-08 DIAGNOSIS — I10 ESSENTIAL (PRIMARY) HYPERTENSION: ICD-10-CM

## 2017-08-08 DIAGNOSIS — R63.8 OTHER SYMPTOMS AND SIGNS CONCERNING FOOD AND FLUID INTAKE: ICD-10-CM

## 2017-08-08 DIAGNOSIS — Z29.9 ENCOUNTER FOR PROPHYLACTIC MEASURES, UNSPECIFIED: ICD-10-CM

## 2017-08-08 DIAGNOSIS — L03.119 CELLULITIS OF UNSPECIFIED PART OF LIMB: ICD-10-CM

## 2017-08-08 DIAGNOSIS — I82.409 ACUTE EMBOLISM AND THROMBOSIS OF UNSPECIFIED DEEP VEINS OF UNSPECIFIED LOWER EXTREMITY: ICD-10-CM

## 2017-08-08 DIAGNOSIS — L03.90 CELLULITIS, UNSPECIFIED: ICD-10-CM

## 2017-08-08 DIAGNOSIS — R91.8 OTHER NONSPECIFIC ABNORMAL FINDING OF LUNG FIELD: Chronic | ICD-10-CM

## 2017-08-08 DIAGNOSIS — I48.91 UNSPECIFIED ATRIAL FIBRILLATION: ICD-10-CM

## 2017-08-08 LAB
ALBUMIN SERPL ELPH-MCNC: 4 G/DL — SIGNIFICANT CHANGE UP (ref 3.3–5)
ALP SERPL-CCNC: 49 U/L — SIGNIFICANT CHANGE UP (ref 40–120)
ALT FLD-CCNC: 12 U/L — SIGNIFICANT CHANGE UP (ref 4–41)
ANISOCYTOSIS BLD QL: SLIGHT — SIGNIFICANT CHANGE UP
AST SERPL-CCNC: 15 U/L — SIGNIFICANT CHANGE UP (ref 4–40)
BASE EXCESS BLDV CALC-SCNC: 1.9 MMOL/L — SIGNIFICANT CHANGE UP
BASE EXCESS BLDV CALC-SCNC: 2.6 MMOL/L — SIGNIFICANT CHANGE UP
BASOPHILS # BLD AUTO: 0.03 K/UL — SIGNIFICANT CHANGE UP (ref 0–0.2)
BASOPHILS NFR BLD AUTO: 0.3 % — SIGNIFICANT CHANGE UP (ref 0–2)
BASOPHILS NFR SPEC: 0 % — SIGNIFICANT CHANGE UP (ref 0–2)
BILIRUB SERPL-MCNC: 1 MG/DL — SIGNIFICANT CHANGE UP (ref 0.2–1.2)
BLOOD GAS VENOUS - CREATININE: 1.02 MG/DL — SIGNIFICANT CHANGE UP (ref 0.5–1.3)
BLOOD GAS VENOUS - CREATININE: 1.03 MG/DL — SIGNIFICANT CHANGE UP (ref 0.5–1.3)
BUN SERPL-MCNC: 17 MG/DL — SIGNIFICANT CHANGE UP (ref 7–23)
CALCIUM SERPL-MCNC: 9 MG/DL — SIGNIFICANT CHANGE UP (ref 8.4–10.5)
CHLORIDE BLDV-SCNC: 101 MMOL/L — SIGNIFICANT CHANGE UP (ref 96–108)
CHLORIDE BLDV-SCNC: 104 MMOL/L — SIGNIFICANT CHANGE UP (ref 96–108)
CHLORIDE SERPL-SCNC: 97 MMOL/L — LOW (ref 98–107)
CK MB BLD-MCNC: 1.58 NG/ML — SIGNIFICANT CHANGE UP (ref 1–6.6)
CK MB BLD-MCNC: SIGNIFICANT CHANGE UP (ref 0–2.5)
CK SERPL-CCNC: 126 U/L — SIGNIFICANT CHANGE UP (ref 30–200)
CO2 SERPL-SCNC: 26 MMOL/L — SIGNIFICANT CHANGE UP (ref 22–31)
CREAT SERPL-MCNC: 1.06 MG/DL — SIGNIFICANT CHANGE UP (ref 0.5–1.3)
EOSINOPHIL # BLD AUTO: 0.04 K/UL — SIGNIFICANT CHANGE UP (ref 0–0.5)
EOSINOPHIL NFR BLD AUTO: 0.3 % — SIGNIFICANT CHANGE UP (ref 0–6)
EOSINOPHIL NFR FLD: 0.9 % — SIGNIFICANT CHANGE UP (ref 0–6)
GAS PNL BLDV: 133 MMOL/L — LOW (ref 136–146)
GAS PNL BLDV: 136 MMOL/L — SIGNIFICANT CHANGE UP (ref 136–146)
GIANT PLATELETS BLD QL SMEAR: PRESENT — SIGNIFICANT CHANGE UP
GLUCOSE BLDV-MCNC: 146 — HIGH (ref 70–99)
GLUCOSE BLDV-MCNC: 243 — HIGH (ref 70–99)
GLUCOSE SERPL-MCNC: 139 MG/DL — HIGH (ref 70–99)
HCO3 BLDV-SCNC: 25 MMOL/L — SIGNIFICANT CHANGE UP (ref 20–27)
HCO3 BLDV-SCNC: 25 MMOL/L — SIGNIFICANT CHANGE UP (ref 20–27)
HCT VFR BLD CALC: 41.9 % — SIGNIFICANT CHANGE UP (ref 39–50)
HCT VFR BLDV CALC: 39.7 % — SIGNIFICANT CHANGE UP (ref 39–51)
HCT VFR BLDV CALC: 43.3 % — SIGNIFICANT CHANGE UP (ref 39–51)
HGB BLD-MCNC: 14 G/DL — SIGNIFICANT CHANGE UP (ref 13–17)
HGB BLDV-MCNC: 12.9 G/DL — LOW (ref 13–17)
HGB BLDV-MCNC: 14.1 G/DL — SIGNIFICANT CHANGE UP (ref 13–17)
IMM GRANULOCYTES # BLD AUTO: 0.09 # — SIGNIFICANT CHANGE UP
IMM GRANULOCYTES NFR BLD AUTO: 0.8 % — SIGNIFICANT CHANGE UP (ref 0–1.5)
LACTATE BLDV-MCNC: 2.6 MMOL/L — HIGH (ref 0.5–2)
LACTATE BLDV-MCNC: 3.3 MMOL/L — HIGH (ref 0.5–2)
LYMPHOCYTES # BLD AUTO: 0.84 K/UL — LOW (ref 1–3.3)
LYMPHOCYTES # BLD AUTO: 7 % — LOW (ref 13–44)
LYMPHOCYTES NFR SPEC AUTO: 4.4 % — LOW (ref 13–44)
MACROCYTES BLD QL: SLIGHT — SIGNIFICANT CHANGE UP
MCHC RBC-ENTMCNC: 29.8 PG — SIGNIFICANT CHANGE UP (ref 27–34)
MCHC RBC-ENTMCNC: 33.4 % — SIGNIFICANT CHANGE UP (ref 32–36)
MCV RBC AUTO: 89.1 FL — SIGNIFICANT CHANGE UP (ref 80–100)
MONOCYTES # BLD AUTO: 0.92 K/UL — HIGH (ref 0–0.9)
MONOCYTES NFR BLD AUTO: 7.7 % — SIGNIFICANT CHANGE UP (ref 2–14)
MONOCYTES NFR BLD: 2.6 % — SIGNIFICANT CHANGE UP (ref 2–9)
NEUTROPHIL AB SER-ACNC: 86 % — HIGH (ref 43–77)
NEUTROPHILS # BLD AUTO: 10.05 K/UL — HIGH (ref 1.8–7.4)
NEUTROPHILS NFR BLD AUTO: 83.9 % — HIGH (ref 43–77)
NEUTS BAND # BLD: 6.1 % — HIGH (ref 0–6)
NRBC # FLD: 0 — SIGNIFICANT CHANGE UP
NT-PROBNP SERPL-SCNC: 406.7 PG/ML — SIGNIFICANT CHANGE UP
PCO2 BLDV: 47 MMHG — SIGNIFICANT CHANGE UP (ref 41–51)
PCO2 BLDV: 50 MMHG — SIGNIFICANT CHANGE UP (ref 41–51)
PH BLDV: 7.36 PH — SIGNIFICANT CHANGE UP (ref 7.32–7.43)
PH BLDV: 7.37 PH — SIGNIFICANT CHANGE UP (ref 7.32–7.43)
PLATELET # BLD AUTO: 93 K/UL — LOW (ref 150–400)
PLATELET COUNT - ESTIMATE: SIGNIFICANT CHANGE UP
PMV BLD: 10.9 FL — SIGNIFICANT CHANGE UP (ref 7–13)
PO2 BLDV: 28 MMHG — LOW (ref 35–40)
PO2 BLDV: 36 MMHG — SIGNIFICANT CHANGE UP (ref 35–40)
POTASSIUM BLDV-SCNC: 3.2 MMOL/L — LOW (ref 3.4–4.5)
POTASSIUM BLDV-SCNC: 3.4 MMOL/L — SIGNIFICANT CHANGE UP (ref 3.4–4.5)
POTASSIUM SERPL-MCNC: 3.6 MMOL/L — SIGNIFICANT CHANGE UP (ref 3.5–5.3)
POTASSIUM SERPL-SCNC: 3.6 MMOL/L — SIGNIFICANT CHANGE UP (ref 3.5–5.3)
PROT SERPL-MCNC: 8 G/DL — SIGNIFICANT CHANGE UP (ref 6–8.3)
RBC # BLD: 4.7 M/UL — SIGNIFICANT CHANGE UP (ref 4.2–5.8)
RBC # FLD: 15.3 % — HIGH (ref 10.3–14.5)
REVIEW TO FOLLOW: YES — SIGNIFICANT CHANGE UP
SAO2 % BLDV: 49.3 % — LOW (ref 60–85)
SAO2 % BLDV: 65.3 % — SIGNIFICANT CHANGE UP (ref 60–85)
SODIUM SERPL-SCNC: 138 MMOL/L — SIGNIFICANT CHANGE UP (ref 135–145)
TROPONIN T SERPL-MCNC: < 0.06 NG/ML — SIGNIFICANT CHANGE UP (ref 0–0.06)
TSH SERPL-MCNC: 0.8 UIU/ML — SIGNIFICANT CHANGE UP (ref 0.27–4.2)
WBC # BLD: 11.97 K/UL — HIGH (ref 3.8–10.5)
WBC # FLD AUTO: 11.97 K/UL — HIGH (ref 3.8–10.5)

## 2017-08-08 PROCEDURE — 99214 OFFICE O/P EST MOD 30 MIN: CPT

## 2017-08-08 PROCEDURE — 99223 1ST HOSP IP/OBS HIGH 75: CPT

## 2017-08-08 PROCEDURE — 70450 CT HEAD/BRAIN W/O DYE: CPT | Mod: 26

## 2017-08-08 PROCEDURE — 71020: CPT | Mod: 26

## 2017-08-08 RX ORDER — OXYCODONE AND ACETAMINOPHEN 5; 325 MG/1; MG/1
1 TABLET ORAL ONCE
Qty: 0 | Refills: 0 | Status: DISCONTINUED | OUTPATIENT
Start: 2017-08-08 | End: 2017-08-08

## 2017-08-08 RX ORDER — DEXTROSE 50 % IN WATER 50 %
25 SYRINGE (ML) INTRAVENOUS ONCE
Qty: 0 | Refills: 0 | Status: DISCONTINUED | OUTPATIENT
Start: 2017-08-08 | End: 2017-08-13

## 2017-08-08 RX ORDER — ACETAMINOPHEN 500 MG
650 TABLET ORAL ONCE
Qty: 0 | Refills: 0 | Status: COMPLETED | OUTPATIENT
Start: 2017-08-08 | End: 2017-08-08

## 2017-08-08 RX ORDER — INSULIN LISPRO 100/ML
VIAL (ML) SUBCUTANEOUS
Qty: 0 | Refills: 0 | Status: DISCONTINUED | OUTPATIENT
Start: 2017-08-08 | End: 2017-08-13

## 2017-08-08 RX ORDER — CLOBETASOL PROPIONATE 0.5 MG/G
0.05 CREAM TOPICAL
Qty: 30 | Refills: 0 | Status: COMPLETED | COMMUNITY
Start: 2017-01-10

## 2017-08-08 RX ORDER — AMLODIPINE BESYLATE 2.5 MG/1
5 TABLET ORAL DAILY
Qty: 0 | Refills: 0 | Status: DISCONTINUED | OUTPATIENT
Start: 2017-08-08 | End: 2017-08-13

## 2017-08-08 RX ORDER — CHLORHEXIDINE GLUCONATE 4 %
325 (65 FE) LIQUID (ML) TOPICAL
Qty: 90 | Refills: 0 | Status: COMPLETED | COMMUNITY
Start: 2016-06-20

## 2017-08-08 RX ORDER — INSULIN LISPRO 100/ML
VIAL (ML) SUBCUTANEOUS AT BEDTIME
Qty: 0 | Refills: 0 | Status: DISCONTINUED | OUTPATIENT
Start: 2017-08-08 | End: 2017-08-13

## 2017-08-08 RX ORDER — DEXTROSE 50 % IN WATER 50 %
12.5 SYRINGE (ML) INTRAVENOUS ONCE
Qty: 0 | Refills: 0 | Status: DISCONTINUED | OUTPATIENT
Start: 2017-08-08 | End: 2017-08-13

## 2017-08-08 RX ORDER — ALBUTEROL 90 UG/1
2 AEROSOL, METERED ORAL
Qty: 0 | Refills: 0 | COMMUNITY

## 2017-08-08 RX ORDER — FUROSEMIDE 40 MG
40 TABLET ORAL
Qty: 0 | Refills: 0 | Status: DISCONTINUED | OUTPATIENT
Start: 2017-08-08 | End: 2017-08-13

## 2017-08-08 RX ORDER — METOPROLOL TARTRATE 50 MG
50 TABLET ORAL
Qty: 0 | Refills: 0 | Status: DISCONTINUED | OUTPATIENT
Start: 2017-08-08 | End: 2017-08-13

## 2017-08-08 RX ORDER — SODIUM CHLORIDE 9 MG/ML
1000 INJECTION, SOLUTION INTRAVENOUS
Qty: 0 | Refills: 0 | Status: DISCONTINUED | OUTPATIENT
Start: 2017-08-08 | End: 2017-08-13

## 2017-08-08 RX ORDER — GLUCAGON INJECTION, SOLUTION 0.5 MG/.1ML
1 INJECTION, SOLUTION SUBCUTANEOUS ONCE
Qty: 0 | Refills: 0 | Status: DISCONTINUED | OUTPATIENT
Start: 2017-08-08 | End: 2017-08-13

## 2017-08-08 RX ORDER — GABAPENTIN 400 MG/1
800 CAPSULE ORAL THREE TIMES A DAY
Qty: 0 | Refills: 0 | Status: DISCONTINUED | OUTPATIENT
Start: 2017-08-08 | End: 2017-08-13

## 2017-08-08 RX ORDER — DOCUSATE SODIUM 100 MG
100 CAPSULE ORAL
Qty: 0 | Refills: 0 | Status: DISCONTINUED | OUTPATIENT
Start: 2017-08-08 | End: 2017-08-13

## 2017-08-08 RX ORDER — ALBUTEROL 90 UG/1
2 AEROSOL, METERED ORAL EVERY 6 HOURS
Qty: 0 | Refills: 0 | Status: DISCONTINUED | OUTPATIENT
Start: 2017-08-08 | End: 2017-08-13

## 2017-08-08 RX ORDER — APIXABAN 2.5 MG/1
5 TABLET, FILM COATED ORAL EVERY 12 HOURS
Qty: 0 | Refills: 0 | Status: DISCONTINUED | OUTPATIENT
Start: 2017-08-08 | End: 2017-08-13

## 2017-08-08 RX ORDER — SODIUM CHLORIDE 9 MG/ML
1000 INJECTION INTRAMUSCULAR; INTRAVENOUS; SUBCUTANEOUS ONCE
Qty: 0 | Refills: 0 | Status: COMPLETED | OUTPATIENT
Start: 2017-08-08 | End: 2017-08-08

## 2017-08-08 RX ORDER — DEXTROSE 50 % IN WATER 50 %
1 SYRINGE (ML) INTRAVENOUS ONCE
Qty: 0 | Refills: 0 | Status: DISCONTINUED | OUTPATIENT
Start: 2017-08-08 | End: 2017-08-13

## 2017-08-08 RX ADMIN — SODIUM CHLORIDE 1000 MILLILITER(S): 9 INJECTION INTRAMUSCULAR; INTRAVENOUS; SUBCUTANEOUS at 12:00

## 2017-08-08 RX ADMIN — GABAPENTIN 800 MILLIGRAM(S): 400 CAPSULE ORAL at 21:08

## 2017-08-08 RX ADMIN — Medication 650 MILLIGRAM(S): at 23:15

## 2017-08-08 RX ADMIN — Medication 10 MILLIGRAM(S): at 18:48

## 2017-08-08 RX ADMIN — AMLODIPINE BESYLATE 5 MILLIGRAM(S): 2.5 TABLET ORAL at 18:47

## 2017-08-08 RX ADMIN — Medication 650 MILLIGRAM(S): at 12:10

## 2017-08-08 RX ADMIN — Medication 100 MILLIGRAM(S): at 21:06

## 2017-08-08 RX ADMIN — Medication 40 MILLIGRAM(S): at 18:47

## 2017-08-08 RX ADMIN — Medication 100 MILLIGRAM(S): at 12:51

## 2017-08-08 RX ADMIN — Medication 50 MILLIGRAM(S): at 18:47

## 2017-08-08 RX ADMIN — APIXABAN 5 MILLIGRAM(S): 2.5 TABLET, FILM COATED ORAL at 18:48

## 2017-08-08 NOTE — ED ADULT NURSE NOTE - OBJECTIVE STATEMENT
Pt received to main ED room 17 with reports of generalized weakness x1 day and fever 2 days ago of 100.5. Pt states 'I had a hard time getting up this morning'. Pt reporting h/o lung CA in 2015, reporting h/o removal of RUL of lung and LLL of lung and chemotherapy (2015). Pt reporting he was sent in from Dr. Gee's office (Cardiothoracic surgeon, follow up of CT result) for reported weakness. Pt received awake, ambulatory with cane, alert, oriented x4, pt stating 'im just tired'. Respirations appear even, unlabored, pt denies SOB or chest pain. Abdomen rounded, pt does not endorse N/V/D or dysuria. Pt with BLLE swollen, appear christopher, RLE more reddened than L, warm to touch. Pt reporting choric cellulitis in R leg. Pt also reporting h/o blood clots in R leg, currently on eliquis. Skin on buttocks reddened, blanchable. Pt ambulatory with cane/walker at baseline, denies recent falls. VS documented per flow. 20g PIV placed in R AC, labs drawn and sent per orders, pt medicated per orders. Visitor at bedside identified as wife. pt with no apparent acute distress observed at this time. Safety maintained, will continue to monitor.

## 2017-08-08 NOTE — H&P ADULT - NSHPPHYSICALEXAM_GEN_ALL_CORE
Vital Signs Last 24 Hrs  T(C): 36.7 (08 Aug 2017 14:01), Max: 38.6 (08 Aug 2017 12:12)  T(F): 98.1 (08 Aug 2017 14:01), Max: 101.4 (08 Aug 2017 12:12)  HR: 75 (08 Aug 2017 12:52) (75 - 85)  BP: 133/62 (08 Aug 2017 12:52) (125/52 - 147/65)  BP(mean): --  RR: 18 (08 Aug 2017 12:52) (18 - 18)  SpO2: 99% (08 Aug 2017 12:52) (97% - 100%)    General: NAD, obese, non-toxic appearing, speaking in full sentences   HEENT: EOMI, PERRLA, no conjunctival pallor, MMM, no JVD, no thyromegaly, neck supple, trachea midline  CV: S1S2 RRR no MRG  Lungs: decreased breath sounds RUL compared to Left, decreased breath sounds on Left base compared to right base   Abdomen: soft obese NTND +BS   Extremities: No clubbing or cyanosis, +2 pitting edema, circumferential hyperpigmented, erythematous skin changes of b/l legs extending from feet to below knees bilaterally. No weeping lesions, only +TTP to deep palpation  MSK: preserved ROM on active & passive movement  Neuro: AAOx3, no focal deficits (5/5 strength all extremities), no sensory deficits

## 2017-08-08 NOTE — ED PROVIDER NOTE - ATTENDING CONTRIBUTION TO CARE
ED Attending Dr. Amaral: 76 yo male with PMH lung cancer s/p resection, RLE cellulitis and DVT (on Eliquis), afib, in ED with generalized weakness noted this morning.  Pt notes temp 100.5 at  home 2 days ago but no weakness at that time.  Found to also be febrile in ED today.  Pt denies CP/SOB, N/V/D or abdominal pain.  On exam pt chronically-ill appearing but in NAD, heart irregular, lungs CTAB, abd NTND, extremities bilateral LE equally edematous and with chronic stasis changes, strength 5/5 in all extremities and skin without rash. ED Attending Dr. Amaral: 78 yo male with PMH lung cancer s/p resection, RLE cellulitis and DVT (on Eliquis), afib, in ED with generalized weakness noted this morning.  Pt notes temp 100.5 at  home 2 days ago but no weakness at that time.  Found to also be febrile in ED today.  Pt denies CP/SOB, N/V/D or abdominal pain.  On exam pt chronically-ill appearing but in NAD, heart irregular, lungs CTAB, abd NTND, extremities bilateral LE equally edematous and with chronic stasis changes, right LE with erythema (?early cellulitis), strength 5/5 in all extremities and skin without rash.

## 2017-08-08 NOTE — H&P ADULT - NSHPREVIEWOFSYSTEMS_GEN_ALL_CORE
REVIEW OF SYSTEMS:    CONSTITUTIONAL: (+) weakness, (+) remove fever, no chills, (+) diffuse myalgias and malaise   EYES/ENT: No visual changes;  No vertigo or throat pain   NECK: No pain or stiffness  RESPIRATORY: No cough, wheezing, hemoptysis; No shortness of breath  CARDIOVASCULAR: No chest pain or palpitations  GASTROINTESTINAL: No abdominal or epigastric pain. No nausea, vomiting, or hematemesis; No diarrhea or constipation. No melena or hematochezia.  GENITOURINARY: No dysuria, frequency or hematuria  NEUROLOGICAL: No numbness or weakness  SKIN: b/l LE swelling and circumferential skin rashes - chronic  All other review of systems is negative unless indicated above. REVIEW OF SYSTEMS:    CONSTITUTIONAL: (+) weakness, (+) remote fever, no chills, (+) diffuse myalgias and malaise   EYES/ENT: No visual changes;  No vertigo or throat pain   NECK: No pain or stiffness  RESPIRATORY: No cough, wheezing, hemoptysis; No shortness of breath  CARDIOVASCULAR: No chest pain or palpitations  GASTROINTESTINAL: No abdominal or epigastric pain. No nausea, vomiting, or hematemesis; No diarrhea or constipation. No melena or hematochezia.  GENITOURINARY: No dysuria, frequency or hematuria  NEUROLOGICAL: No numbness or weakness  SKIN: (+) b/l LE swelling and circumferential skin rashes - chronic  All other review of systems is negative unless indicated above.

## 2017-08-08 NOTE — H&P ADULT - ASSESSMENT
7M DM2, HTN, Afib s/p ablation 2006, prior lung CA s/p lobectomy (RUL and part of LLL), B/L DVT on Eliquis, presents to [facility] ED for 1 day of diffuse weakness.

## 2017-08-08 NOTE — ED PROVIDER NOTE - CARE PLAN
Principal Discharge DX:	Cellulitis  Secondary Diagnosis:	Diabetes mellitus  Secondary Diagnosis:	Atrial fibrillation

## 2017-08-08 NOTE — H&P ADULT - PROBLEM SELECTOR PLAN 1
Patient with stasis dermatitis rash bilaterally that has been infected multiple times. Currently he presents with weakness, leukocytosis, and neutrophilic predominance. Given no coughing, dysuria, altered mental status, diarrhea most likely explanation of symptoms at this time is cellulitis. Will empirically treat rash with Clindamycin 600 mg IV q8h.   - Monitor CBC daily, Vitals q4h, Blood and Urine Cultures

## 2017-08-08 NOTE — ED ADULT TRIAGE NOTE - CHIEF COMPLAINT QUOTE
p/t with hx lung ca with bilateral lung sx, sent from cardiothoracic clinic for eval generalized malaise and fever p/t denies any chest pain denies sob @ present

## 2017-08-08 NOTE — ED PROVIDER NOTE - OBJECTIVE STATEMENT
78 yo male with PMH of lung cancer s/p surgery, RLE cellulitis, RLE DVT x 2 recently diagnosed now on AC, afib, obesity, neuropathy, presents to the ED with weakness, generalized extremity pain, and "slowness" that began this morning. Patient states he felt fine yesterday and was having difficulty ambulating today secondary to pain and weakness. Feels weakness is improving. Denies chest pain, dizziness, lightheadedness, LOC, palpitations, chest tightness, back or abdominal pain, n/v, diarrhea, constipation, blood per mouth or rectum 76 yo male with PMH of lung cancer s/p surgery, RLE cellulitis, RLE DVT x 2 recently diagnosed now on AC, afib, obesity, neuropathy, presents to the ED with weakness, generalized extremity pain, and "slowness" that began this morning. Patient states he felt fine yesterday and was having difficulty ambulating today secondary to pain and weakness. Feels weakness is improving. Denies chest pain, dizziness, lightheadedness, LOC, palpitations, chest tightness, back or abdominal pain, n/v, diarrhea, constipation, blood per mouth or rectum.

## 2017-08-08 NOTE — H&P ADULT - NSHPLABSRESULTS_GEN_ALL_CORE
Complete Blood Count + Automated Diff (08.08.17 @ 11:32)    Review to Follow: YES    Nucleated RBC #: 0    WBC Count: 11.97 K/uL    RBC Count: 4.70 M/uL    Hemoglobin: 14.0 g/dL    Hematocrit: 41.9 %    Mean Cell Volume: 89.1 fL    Mean Cell Hemoglobin: 29.8 pg    Mean Cell Hemoglobin Conc: 33.4 %    Red Cell Distrib Width: 15.3 %    Platelet Count - Automated: 93: Smear Reviewed, Result Confirmed. K/uL    MPV: 10.9 fl    Auto Neutrophil #: 10.05 K/uL    Auto Lymphocyte #: 0.84 K/uL    Auto Monocyte #: 0.92 K/uL    Auto Eosinophil #: 0.04 K/uL    Auto Basophil #: 0.03 K/uL    Auto Immature Granulocyte #: 0.09: (Includes meta, myelo and promyelocytes) #    Auto Neutrophil %: 83.9 %    Auto Lymphocyte %: 7.0 %    Auto Monocyte %: 7.7 %    Auto Eosinophil %: 0.3 %    Auto Basophil %: 0.3 %    Auto Immature Granulocyte %: 0.8: (Includes meta, myelo and promyelocytes) %    Neutrophils %: 86.0 %    Band Neutrophils %: 6.1 %    Lymphocytes %: 4.4 %    Monocytes %: 2.6 %    Eosinophils %: 0.9 %    Basophils %: 0 %    Platelet Count - Estimate: DECREASED PLT: Slight Large Platelets Present    Anisocytosis: SLIGHT    Macrocytosis: SLIGHT    Giant Platelets: PRESENT    08-08    138  |  97<L>  |  17  ----------------------------<  139<H>  3.6   |  26  |  1.06    Ca    9.0      08 Aug 2017 11:32    TPro  8.0  /  Alb  4.0  /  TBili  1.0  /  DBili  x   /  AST  15  /  ALT  12  /  AlkPhos  49  08-08

## 2017-08-08 NOTE — H&P ADULT - HISTORY OF PRESENT ILLNESS
77M DM2, HTN, Afib s/p ablation 2006, prior lung CA s/p lobectomy (RUL and part of LLL), B/L DVT on Eliquis, presents to [facility] ED for 1 day of diffuse weakness. The patient states he was in his usual state of health until this AM when he woke up and found it difficult to get up from bed this morning. There's diffuse body aches, weakness/malaise. He had a fever of 100.5 the other day. While his lower extremities are not the worse that they have been (has been in and out of the hospital 4x in the last several months) they do in fact hurt with pressure or walking on them. No chills, vision changes, dyspnea, cough, NVD dysuria constipation or stool color changes    In the ED patient was given Tylenol 650 mg PO x 1, Clindamycin 600 mg IV x 1 (12:00), and NS Bolus 1 liter x 1.

## 2017-08-08 NOTE — ED PROVIDER NOTE - PHYSICAL EXAMINATION
LLE 1+ pitting edema, mildly tender to palpitations   RLE: erythematous, 2+ pitting edema, tender to palpation

## 2017-08-09 DIAGNOSIS — C34.90 MALIGNANT NEOPLASM OF UNSPECIFIED PART OF UNSPECIFIED BRONCHUS OR LUNG: ICD-10-CM

## 2017-08-09 DIAGNOSIS — I82.409 ACUTE EMBOLISM AND THROMBOSIS OF UNSPECIFIED DEEP VEINS OF UNSPECIFIED LOWER EXTREMITY: ICD-10-CM

## 2017-08-09 DIAGNOSIS — R50.9 FEVER, UNSPECIFIED: ICD-10-CM

## 2017-08-09 DIAGNOSIS — L03.90 CELLULITIS, UNSPECIFIED: ICD-10-CM

## 2017-08-09 LAB
BUN SERPL-MCNC: 14 MG/DL — SIGNIFICANT CHANGE UP (ref 7–23)
CALCIUM SERPL-MCNC: 8.8 MG/DL — SIGNIFICANT CHANGE UP (ref 8.4–10.5)
CHLORIDE SERPL-SCNC: 99 MMOL/L — SIGNIFICANT CHANGE UP (ref 98–107)
CO2 SERPL-SCNC: 25 MMOL/L — SIGNIFICANT CHANGE UP (ref 22–31)
CREAT SERPL-MCNC: 1.02 MG/DL — SIGNIFICANT CHANGE UP (ref 0.5–1.3)
GLUCOSE SERPL-MCNC: 131 MG/DL — HIGH (ref 70–99)
HCT VFR BLD CALC: 41 % — SIGNIFICANT CHANGE UP (ref 39–50)
HGB BLD-MCNC: 13.5 G/DL — SIGNIFICANT CHANGE UP (ref 13–17)
MCHC RBC-ENTMCNC: 29.6 PG — SIGNIFICANT CHANGE UP (ref 27–34)
MCHC RBC-ENTMCNC: 32.9 % — SIGNIFICANT CHANGE UP (ref 32–36)
MCV RBC AUTO: 89.9 FL — SIGNIFICANT CHANGE UP (ref 80–100)
NRBC # FLD: 0 — SIGNIFICANT CHANGE UP
PLATELET # BLD AUTO: 88 K/UL — LOW (ref 150–400)
PMV BLD: 10 FL — SIGNIFICANT CHANGE UP (ref 7–13)
POTASSIUM SERPL-MCNC: 3.6 MMOL/L — SIGNIFICANT CHANGE UP (ref 3.5–5.3)
POTASSIUM SERPL-SCNC: 3.6 MMOL/L — SIGNIFICANT CHANGE UP (ref 3.5–5.3)
RBC # BLD: 4.56 M/UL — SIGNIFICANT CHANGE UP (ref 4.2–5.8)
RBC # FLD: 15.4 % — HIGH (ref 10.3–14.5)
SODIUM SERPL-SCNC: 141 MMOL/L — SIGNIFICANT CHANGE UP (ref 135–145)
SPECIMEN SOURCE: SIGNIFICANT CHANGE UP
SPECIMEN SOURCE: SIGNIFICANT CHANGE UP
WBC # BLD: 7.08 K/UL — SIGNIFICANT CHANGE UP (ref 3.8–10.5)
WBC # FLD AUTO: 7.08 K/UL — SIGNIFICANT CHANGE UP (ref 3.8–10.5)

## 2017-08-09 PROCEDURE — 99222 1ST HOSP IP/OBS MODERATE 55: CPT

## 2017-08-09 PROCEDURE — 99223 1ST HOSP IP/OBS HIGH 75: CPT

## 2017-08-09 RX ORDER — NYSTATIN CREAM 100000 [USP'U]/G
1 CREAM TOPICAL DAILY
Qty: 0 | Refills: 0 | Status: DISCONTINUED | OUTPATIENT
Start: 2017-08-09 | End: 2017-08-13

## 2017-08-09 RX ORDER — CEFAZOLIN SODIUM 1 G
1000 VIAL (EA) INJECTION EVERY 8 HOURS
Qty: 0 | Refills: 0 | Status: DISCONTINUED | OUTPATIENT
Start: 2017-08-09 | End: 2017-08-13

## 2017-08-09 RX ORDER — OXYCODONE AND ACETAMINOPHEN 5; 325 MG/1; MG/1
1 TABLET ORAL ONCE
Qty: 0 | Refills: 0 | Status: DISCONTINUED | OUTPATIENT
Start: 2017-08-09 | End: 2017-08-09

## 2017-08-09 RX ADMIN — Medication 100 MILLIGRAM(S): at 12:33

## 2017-08-09 RX ADMIN — GABAPENTIN 800 MILLIGRAM(S): 400 CAPSULE ORAL at 05:06

## 2017-08-09 RX ADMIN — GABAPENTIN 800 MILLIGRAM(S): 400 CAPSULE ORAL at 21:23

## 2017-08-09 RX ADMIN — NYSTATIN CREAM 1 APPLICATION(S): 100000 CREAM TOPICAL at 12:33

## 2017-08-09 RX ADMIN — GABAPENTIN 800 MILLIGRAM(S): 400 CAPSULE ORAL at 14:50

## 2017-08-09 RX ADMIN — Medication 50 MILLIGRAM(S): at 05:05

## 2017-08-09 RX ADMIN — Medication 40 MILLIGRAM(S): at 05:05

## 2017-08-09 RX ADMIN — Medication 650 MILLIGRAM(S): at 00:00

## 2017-08-09 RX ADMIN — AMLODIPINE BESYLATE 5 MILLIGRAM(S): 2.5 TABLET ORAL at 05:05

## 2017-08-09 RX ADMIN — OXYCODONE AND ACETAMINOPHEN 1 TABLET(S): 5; 325 TABLET ORAL at 14:53

## 2017-08-09 RX ADMIN — APIXABAN 5 MILLIGRAM(S): 2.5 TABLET, FILM COATED ORAL at 17:08

## 2017-08-09 RX ADMIN — Medication 1: at 12:24

## 2017-08-09 RX ADMIN — Medication 100 MILLIGRAM(S): at 04:52

## 2017-08-09 RX ADMIN — Medication 10 MILLIGRAM(S): at 17:08

## 2017-08-09 RX ADMIN — Medication 40 MILLIGRAM(S): at 17:08

## 2017-08-09 RX ADMIN — OXYCODONE AND ACETAMINOPHEN 1 TABLET(S): 5; 325 TABLET ORAL at 15:23

## 2017-08-09 RX ADMIN — Medication 100 MILLIGRAM(S): at 22:22

## 2017-08-09 RX ADMIN — APIXABAN 5 MILLIGRAM(S): 2.5 TABLET, FILM COATED ORAL at 05:06

## 2017-08-09 RX ADMIN — Medication 50 MILLIGRAM(S): at 17:08

## 2017-08-09 RX ADMIN — Medication 1: at 08:25

## 2017-08-09 RX ADMIN — Medication 10 MILLIGRAM(S): at 05:06

## 2017-08-09 NOTE — CONSULT NOTE ADULT - ASSESSMENT
77 male with cellulitis, weakness, COPD, prior lung surgery, prior lung cancer, prior DVT
Chronic LE swelling.  History of RLE DVT, but unlikely that this would explain the degree of edema and cellulitis (ie this is probably not completely attributed to post-thrombotic syndrome).  Recommend venous insufficiency evaluation with duplex evaluating for reflux.  Compression therapy should help improve swelling, erythema/skin breakdown, and cellulitis.  Continue antibiotics per ID.
77M DM2, HTN, Afib s/p ablation 2006, prior lung CA s/p lobectomy (RUL and part of LLL), B/L DVT on Eliquis, presents to [facility] ED for 1 day of diffuse weakness.

## 2017-08-09 NOTE — CONSULT NOTE ADULT - PROBLEM SELECTOR RECOMMENDATION 9
abx per primary team
-suspect rt foot cellulitis  -f/u bcx  -ancef 1 gm iv q8  -DC clindamycin  -has had recurrent bouts of cellulitis, may benefit from prolong abx prophlyaxis with daily oral antibiotic  -at risk given LE lymphedema

## 2017-08-09 NOTE — CONSULT NOTE ADULT - SUBJECTIVE AND OBJECTIVE BOX
Cardiology/Vascular Medicine Inpatient Consultation Note    :        HISTORY OF PRESENT ILLNESS:  Patient is a 76 yo man DM2, HTN, Afib s/p ablation 2006, prior lung CA s/p lobectomy (RUL and part of LLL), B/L DVT on Eliquis, presents to [facility] ED for 1 day of diffuse weakness. The patient states he was in his usual state of health until this AM when he woke up and found it difficult to get up from bed this morning. There's diffuse body aches, weakness/malaise. He had a fever of 100.5 the other day. While his lower extremities are not the worse that they have been (has been in and out of the hospital 4x in the last several months) they do in fact hurt with pressure or walking on them. No chills, vision changes, dyspnea, cough, NVD dysuria constipation or stool color changes    In the ED patient was given Tylenol 650 mg PO x 1, Clindamycin 600 mg IV x 1 (12:00), and NS Bolus 1 liter x 1. (08 Aug 2017 14:33)          Allergies    No Known Allergies    Intolerances    	    MEDICATIONS:  apixaban 5 milliGRAM(s) Oral every 12 hours  metoprolol 50 milliGRAM(s) Oral two times a day  amLODIPine   Tablet 5 milliGRAM(s) Oral daily  furosemide    Tablet 40 milliGRAM(s) Oral two times a day      ALBUTerol    90 MICROgram(s) HFA Inhaler 2 Puff(s) Inhalation every 6 hours PRN    gabapentin 800 milliGRAM(s) Oral three times a day  busPIRone 10 milliGRAM(s) Oral two times a day    docusate sodium 100 milliGRAM(s) Oral two times a day PRN    insulin lispro (HumaLOG) corrective regimen sliding scale   SubCutaneous three times a day before meals  insulin lispro (HumaLOG) corrective regimen sliding scale   SubCutaneous at bedtime  dextrose Gel 1 Dose(s) Oral once PRN  dextrose 50% Injectable 12.5 Gram(s) IV Push once  dextrose 50% Injectable 25 Gram(s) IV Push once  dextrose 50% Injectable 25 Gram(s) IV Push once  glucagon  Injectable 1 milliGRAM(s) IntraMuscular once PRN    dextrose 5%. 1000 milliLiter(s) IV Continuous <Continuous>  nystatin Powder 1 Application(s) Topical daily      PAST MEDICAL & SURGICAL HISTORY:  DVT (deep venous thrombosis): RLE dx 6/28/17  Venous stasis of both lower extremities  Balanitis  Squamous cell lung cancer  Atrial fibrillation: s/p ablation 2006  Cellulitis: Both legs 2/2 chronic venous stasis  Morbid obesity  Anxiety  Neuropathy  Diabetes mellitus  HTN (hypertension)  Lung nodules: Flexible Bronchoscopy, Right VATS, Right Lung Resection - 1/21/15, LLL partial resection 2/2015  H/O prior ablation treatment: cardiac 2006      FAMILY HISTORY:  Family history of liver cancer (Sibling): sister  Family history of diabetes mellitus (Sibling): Brother  Family history of heart failure: father      SOCIAL HISTORY:    [ ] Non-smoker  [ ] Smoker  [ ] Alcohol    REVIEW OF SYSTEMS:  CONSTITUTIONAL: No fever, weight loss, or fatigue  EYES: No eye pain, visual disturbances, or discharge  ENMT:  No difficulty hearing, tinnitus, vertigo; No sinus or throat pain  NECK: No pain or stiffness  RESPIRATORY: No cough, wheezing, chills or hemoptysis; No Shortness of Breath  CARDIOVASCULAR: No chest pain, palpitations, passing out, dizziness, or leg swelling  GASTROINTESTINAL: No abdominal or epigastric pain. No nausea, vomiting, or hematemesis; No diarrhea or constipation. No melena or hematochezia.  GENITOURINARY: No dysuria, frequency, hematuria, or incontinence  NEUROLOGICAL: No headaches, memory loss, loss of strength, numbness, or tremors  SKIN: No itching, burning, rashes, or lesions   LYMPH Nodes: No enlarged glands  ENDOCRINE: No heat or cold intolerance; No hair loss  MUSCULOSKELETAL: No joint pain or swelling; No muscle, back, or extremity pain  PSYCHIATRIC: No depression, anxiety, mood swings, or difficulty sleeping  HEME/LYMPH: No easy bruising, or bleeding gums  ALLERY AND IMMUNOLOGIC: No hives or eczema	    [ ] All others negative	  [ ] Unable to obtain    PHYSICAL EXAM:  T(C): 36.6 (08-09-17 @ 13:48), Max: 36.9 (08-08-17 @ 21:30)  HR: 73 (08-09-17 @ 17:07) (73 - 86)  BP: 154/75 (08-09-17 @ 17:07) (150/78 - 154/75)  RR: 19 (08-09-17 @ 13:48) (18 - 19)  SpO2: 99% (08-09-17 @ 13:48) (98% - 99%)  Wt(kg): --  I&O's Summary      Appearance: Normal	  HEENT:   Normal oral mucosa, PERRL, EOMI	  Lymphatic: No lymphadenopathy  Cardiovascular: Normal S1 S2, No JVD, No murmurs, No edema  Respiratory: Lungs clear to auscultation	  Psychiatry: A & O x 3, Mood & affect appropriate  Gastrointestinal:  Soft, Non-tender, + BS	  Skin: No rashes, No ecchymoses, No cyanosis	  Neurologic: Non-focal  Extremities: Normal range of motion, No clubbing, cyanosis or edema  Vascular: Peripheral pulses palpable 2+ bilaterally      LABS:	 	    CBC Full  -  ( 09 Aug 2017 06:00 )  WBC Count : 7.08 K/uL  Hemoglobin : 13.5 g/dL  Hematocrit : 41.0 %  Platelet Count - Automated : 88 K/uL  Mean Cell Volume : 89.9 fL  Mean Cell Hemoglobin : 29.6 pg  Mean Cell Hemoglobin Concentration : 32.9 %  Auto Neutrophil # : x  Auto Lymphocyte # : x  Auto Monocyte # : x  Auto Eosinophil # : x  Auto Basophil # : x  Auto Neutrophil % : x  Auto Lymphocyte % : x  Auto Monocyte % : x  Auto Eosinophil % : x  Auto Basophil % : x    08-09    141  |  99  |  14  ----------------------------<  131<H>  3.6   |  25  |  1.02  08-08    138  |  97<L>  |  17  ----------------------------<  139<H>  3.6   |  26  |  1.06    Ca    8.8      09 Aug 2017 06:00  Ca    9.0      08 Aug 2017 11:32    TPro  8.0  /  Alb  4.0  /  TBili  1.0  /  DBili  x   /  AST  15  /  ALT  12  /  AlkPhos  49  08-08      proBNP:   Lipid Profile:   HgA1c:   TSH:       CARDIAC MARKERS:      CKMB: 1.58 ng/mL (08-08 @ 11:32)        TELEMETRY: 	    ECG:   	  RADIOLOGY:  OTHER: 	      PREVIOUS DIAGNOSTIC CARDIOVASCULAR TESTING:      [ ]  Echocardiogram:  [ ]  Catheterization:  [ ]  Stress test:    [ ]  Vascular studies: Cardiology/Vascular Medicine Inpatient Consultation Note      Asked by Dr. Wood to evaluate patient.    HISTORY OF PRESENT ILLNESS:  Patient is a 78 yo man DM2, HTN, Afib s/p ablation 2006, prior lung CA s/p lobectomy (RUL and part of LLL), right lower extremity DVT (above and below knee popliteal artery) on Eliquis, presents to [facility] ED for 1 day of diffuse weakness. The patient states he was in his usual state of health until this AM when he woke up and found it difficult to get up from bed this morning. There's diffuse body aches, weakness/malaise. He had a fever of 100.5 the other day. While his lower extremities are not the worse that they have been (has been in and out of the hospital 4x in the last several months) they do in fact hurt with pressure or walking on them. No chills, vision changes, dyspnea, cough, NVD dysuria constipation or stool color changes.    +obesity  +prior right LE DVT diagnosed 7/17 on anticoagulation  +acute on chronic LE swelling and discomfort  +chronic cellulitis  +Lymphedema      Allergies    No Known Allergies    Intolerances    	    MEDICATIONS:  apixaban 5 milliGRAM(s) Oral every 12 hours  metoprolol 50 milliGRAM(s) Oral two times a day  amLODIPine   Tablet 5 milliGRAM(s) Oral daily  furosemide    Tablet 40 milliGRAM(s) Oral two times a day      ALBUTerol    90 MICROgram(s) HFA Inhaler 2 Puff(s) Inhalation every 6 hours PRN    gabapentin 800 milliGRAM(s) Oral three times a day  busPIRone 10 milliGRAM(s) Oral two times a day    docusate sodium 100 milliGRAM(s) Oral two times a day PRN    insulin lispro (HumaLOG) corrective regimen sliding scale   SubCutaneous three times a day before meals  insulin lispro (HumaLOG) corrective regimen sliding scale   SubCutaneous at bedtime  dextrose Gel 1 Dose(s) Oral once PRN  dextrose 50% Injectable 12.5 Gram(s) IV Push once  dextrose 50% Injectable 25 Gram(s) IV Push once  dextrose 50% Injectable 25 Gram(s) IV Push once  glucagon  Injectable 1 milliGRAM(s) IntraMuscular once PRN    dextrose 5%. 1000 milliLiter(s) IV Continuous <Continuous>  nystatin Powder 1 Application(s) Topical daily      PAST MEDICAL & SURGICAL HISTORY:  DVT (deep venous thrombosis): RLE dx 6/28/17  Venous stasis of both lower extremities  Balanitis  Squamous cell lung cancer  Atrial fibrillation: s/p ablation 2006  Cellulitis: Both legs 2/2 chronic venous stasis  Morbid obesity  Anxiety  Neuropathy  Diabetes mellitus  HTN (hypertension)  Lung nodules: Flexible Bronchoscopy, Right VATS, Right Lung Resection - 1/21/15, LLL partial resection 2/2015  H/O prior ablation treatment: cardiac 2006      FAMILY HISTORY:  Family history of liver cancer (Sibling): sister  Family history of diabetes mellitus (Sibling): Brother  Family history of heart failure: father      SOCIAL HISTORY:    [ ] Non-smoker    REVIEW OF SYSTEMS:  CONSTITUTIONAL: No fever, weight loss, or fatigue  EYES: No eye pain, visual disturbances, or discharge  ENMT:  No difficulty hearing, tinnitus, vertigo; No sinus or throat pain  NECK: No pain or stiffness  RESPIRATORY: No cough, wheezing, chills or hemoptysis; No Shortness of Breath  CARDIOVASCULAR: As above  GASTROINTESTINAL: No abdominal or epigastric pain. No nausea, vomiting, or hematemesis; No diarrhea or constipation. No melena or hematochezia.  GENITOURINARY: No dysuria, frequency, hematuria, or incontinence  NEUROLOGICAL: As above  SKIN: As above  LYMPH Nodes: No enlarged glands  ENDOCRINE: No heat or cold intolerance; No hair loss  MUSCULOSKELETAL: As above  PSYCHIATRIC: No depression, anxiety, mood swings, or difficulty sleeping  HEME/LYMPH: No easy bruising, or bleeding gums  ALLERGY AND IMMUNOLOGIC: No hives or eczema	      PHYSICAL EXAM:  T(C): 36.6 (08-09-17 @ 13:48), Max: 36.9 (08-08-17 @ 21:30)  HR: 73 (08-09-17 @ 17:07) (73 - 86)  BP: 154/75 (08-09-17 @ 17:07) (150/78 - 154/75)  RR: 19 (08-09-17 @ 13:48) (18 - 19)  SpO2: 99% (08-09-17 @ 13:48) (98% - 99%)  Wt(kg): --  I&O's Summary      Appearance: NAD  HEENT:   Normal oral mucosa, PERRL, EOMI	  Lymphatic: No lymphadenopathy  Cardiovascular: Normal S1 S2, No JVD, No murmurs, No edema  Respiratory: Lungs clear to auscultation	  Psychiatry: Awake, alert, conversant  Gastrointestinal:  Soft, obese, Non-tender, + BS	  Skin: No rashes, No ecchymoses, No cyanosis	  Neurologic: Non-focal  Extremities: + extensive edema, skin lichenification, shallow ulcers, erythema.  Vascular: Peripheral pulses palpable 2+ bilaterally      LABS:	 	    CBC Full  -  ( 09 Aug 2017 06:00 )  WBC Count : 7.08 K/uL  Hemoglobin : 13.5 g/dL  Hematocrit : 41.0 %  Platelet Count - Automated : 88 K/uL  Mean Cell Volume : 89.9 fL  Mean Cell Hemoglobin : 29.6 pg  Mean Cell Hemoglobin Concentration : 32.9 %  Auto Neutrophil # : x  Auto Lymphocyte # : x  Auto Monocyte # : x  Auto Eosinophil # : x  Auto Basophil # : x  Auto Neutrophil % : x  Auto Lymphocyte % : x  Auto Monocyte % : x  Auto Eosinophil % : x  Auto Basophil % : x    08-09    141  |  99  |  14  ----------------------------<  131<H>  3.6   |  25  |  1.02  08-08    138  |  97<L>  |  17  ----------------------------<  139<H>  3.6   |  26  |  1.06    Ca    8.8      09 Aug 2017 06:00  Ca    9.0      08 Aug 2017 11:32    TPro  8.0  /  Alb  4.0  /  TBili  1.0  /  DBili  x   /  AST  15  /  ALT  12  /  AlkPhos  49  08-08      < from: CT Head No Cont (08.08.17 @ 13:48) >  EXAM:  CT BRAIN        PROCEDURE DATE:  Aug  8 2017         INTERPRETATION:  CLINICAL INFORMATION: History of lung cancer; weakness,   lethargic, altered mental status    TECHNIQUE: Noncontrast axial CT images were acquired through the head.   Two-dimensional sagittal and coronal reformats were generated.    COMPARISON STUDY: CT head dated 1/20/2015    FINDINGS:   There is no CT evidence of acute intracranial hemorrhage, extra-axial   collection, vasogenic edema, mass effect, midline shift, central   herniation, or hydrocephalus.     There is age-appropriate cerebral and cerebellar volume loss. 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     The visualized paranasal sinuses are clear. The mastoid air cells and   middle ear cavities are clear.    The soft tissues of the scalp are unremarkable. The calvarium is intact.      IMPRESSION:   No specific changes to suggest brain metastasis or hemorrhagic/ischemic   stroke.  If patient's symptoms persist, recommend MRI for further evaluation.      ELIAS ALEXANDER M.D., RADIOLOGY RESIDENT  This document has been electronically signed.  MARILEE FLORES M.D., ATTENDING RADIOLOGIST  This document has been electronically signed. Aug  8 2017  3:37PM        < end of copied text >      < from: US Duplex Venous Lower Ext Complete, Bilateral (07.11.17 @ 10:56) >  EXAM:  US DPLX LWR EXT VEINS COMPL BI        PROCEDURE DATE:  Jul 11 2017         INTERPRETATION:  CLINICAL INFORMATION: Lower extremity swelling. History   of DVT and cellulitis.    COMPARISON: Duplex sonography of the bilateral lower extremities dated   5/13/2015    TECHNIQUE: Duplex sonography of the BILATERAL LOWER extremities with   color and spectral Doppler, with and without compression.      FINDINGS:    There is normal compressibility of the left common femoral, femoral and   poplitealveins.     Normal compressibility of the right common and femoral veins are   demonstrated. There is noncompressibility with filling defect in the   right popliteal and gastrocnemius veins.     Calf veins are not visualized bilaterally.      IMPRESSION:     Above and below knee acute DVT in the right lower extremity as described.    Dr. Silver discussed these findings with SAI Richard on 7/11/2017 11:15   AM with read back.       REBEKAH SILVER M.D., RADIOLOGY RESIDENT  This document has been electronically signed.  JESUS MANUEL ALEXANDER M.D., ATTENDING RADIOLOGIST  This document has been electronically signed. Jul 11 2017 12:40PM         < end of copied text >

## 2017-08-09 NOTE — DIETITIAN INITIAL EVALUATION ADULT. - OTHER INFO
Nutrition consult ordered for > BMI. 76 Y/O admitted with the dx of DM2, HTN. lung CA ,  s/p lobectomy. Pt reports weight loss of 40 # in two months related with Atkins diet. gained all the weight back as diet was difficult to follow. Pt report he is aware of carb counting and doesn't need written information or  diet plans. No n/v/d reported at this time. LABS reviewed , Recommend to add DASH/TLC  to current diet. Diet compliance encouraged diet.

## 2017-08-09 NOTE — CONSULT NOTE ADULT - PROBLEM SELECTOR PROBLEM 2
Deep vein thrombosis (DVT) of lower extremity, unspecified chronicity, unspecified laterality, unspecified vein
Fever

## 2017-08-09 NOTE — CONSULT NOTE ADULT - SUBJECTIVE AND OBJECTIVE BOX
PULMONARY CONSULT    Initial HPI on admission:  HPI:  77M DM2, HTN, Afib s/p ablation 2006, prior lung CA s/p lobectomy (RUL and part of LLL), B/L DVT on Eliquis, presents to [facility] ED for 1 day of diffuse weakness. The patient states he was in his usual state of health until this AM when he woke up and found it difficult to get up from bed this morning. There's diffuse body aches, weakness/malaise. He had a fever of 100.5 the other day. While his lower extremities are not the worse that they have been (has been in and out of the hospital 4x in the last several months) they do in fact hurt with pressure or walking on them. No chills, vision changes, dyspnea, cough, NVD dysuria constipation or stool color changes    In the ED patient was given Tylenol 650 mg PO x 1, Clindamycin 600 mg IV x 1 (12:00), and NS Bolus 1 liter x 1. (08 Aug 2017 14:33)      BRIEF HOSPITAL COURSE: Denies SOB or cough.     PAST MEDICAL & SURGICAL HISTORY:  DVT (deep venous thrombosis): RLE dx 6/28/17  Venous stasis of both lower extremities  Balanitis  Squamous cell lung cancer  Atrial fibrillation: s/p ablation 2006  Cellulitis: Both legs 2/2 chronic venous stasis  Morbid obesity  Anxiety  Neuropathy  Diabetes mellitus  HTN (hypertension)  Lung nodules: Flexible Bronchoscopy, Right VATS, Right Lung Resection - 1/21/15, LLL partial resection 2/2015  H/O prior ablation treatment: cardiac 2006    Allergies    No Known Allergies    Intolerances      FAMILY HISTORY:  Family history of liver cancer (Sibling): sister  Family history of diabetes mellitus (Sibling): Brother  Family history of heart failure: father    Social history: former 20 pk-yr smoker, quit 13 yrs ago    Review of Systems:  CONSTITUTIONAL: No fever, chills. Diffuse weakness.   EYES: No eye pain, visual disturbances, or discharge  ENMT:  No difficulty hearing, tinnitus, vertigo; No sinus or throat pain  NECK: No pain or stiffness  RESPIRATORY: Per above  CARDIOVASCULAR: No chest pain, palpitations, dizziness, or leg swelling  GASTROINTESTINAL: No abdominal or epigastric pain. No nausea, vomiting, or hematemesis; No diarrhea or constipation. No melena or hematochezia.  GENITOURINARY: No dysuria, frequency, hematuria, or incontinence  NEUROLOGICAL: No headaches, memory loss, loss of strength, numbness, or tremors  SKIN: No itching, burning, rashes, or lesions   MUSCULOSKELETAL: No muscle, back, or extremity pain. Joint pain.   PSYCHIATRIC: No depression, anxiety, mood swings, or difficulty sleeping      Medications:  MEDICATIONS  (STANDING):  apixaban 5 milliGRAM(s) Oral every 12 hours  gabapentin 800 milliGRAM(s) Oral three times a day  busPIRone 10 milliGRAM(s) Oral two times a day  metoprolol 50 milliGRAM(s) Oral two times a day  amLODIPine   Tablet 5 milliGRAM(s) Oral daily  furosemide    Tablet 40 milliGRAM(s) Oral two times a day  insulin lispro (HumaLOG) corrective regimen sliding scale   SubCutaneous three times a day before meals  insulin lispro (HumaLOG) corrective regimen sliding scale   SubCutaneous at bedtime  dextrose 5%. 1000 milliLiter(s) (50 mL/Hr) IV Continuous <Continuous>  dextrose 50% Injectable 12.5 Gram(s) IV Push once  dextrose 50% Injectable 25 Gram(s) IV Push once  dextrose 50% Injectable 25 Gram(s) IV Push once  nystatin Powder 1 Application(s) Topical daily    MEDICATIONS  (PRN):  ALBUTerol    90 MICROgram(s) HFA Inhaler 2 Puff(s) Inhalation every 6 hours PRN Shortness of Breath and/or Wheezing  docusate sodium 100 milliGRAM(s) Oral two times a day PRN Constipation  dextrose Gel 1 Dose(s) Oral once PRN Blood Glucose LESS THAN 70 milliGRAM(s)/deciliter  glucagon  Injectable 1 milliGRAM(s) IntraMuscular once PRN Glucose LESS THAN 70 milligrams/deciliter        Vital Signs Last 24 Hrs  T(C): 36.6 (09 Aug 2017 13:48), Max: 36.9 (08 Aug 2017 21:30)  T(F): 97.8 (09 Aug 2017 13:48), Max: 98.4 (08 Aug 2017 21:30)  HR: 84 (09 Aug 2017 13:48) (80 - 86)  BP: 152/72 (09 Aug 2017 13:48) (150/78 - 153/71)  BP(mean): --  RR: 19 (09 Aug 2017 13:48) (18 - 19)  SpO2: 99% (09 Aug 2017 13:48) (98% - 99%)      VBG pH 7.37 08-08 @ 15:05  VBG pCO2 47 08-08 @ 15:05  VBG O2 sat 49.3 08-08 @ 15:05  VBG lactate 3.3 08-08 @ 15:05  VBG pH 7.36 08-08 @ 11:32  VBG pCO2 50 08-08 @ 11:32  VBG O2 sat 65.3 08-08 @ 11:32  VBG lactate 2.6 08-08 @ 11:32        LABS:                        13.5   7.08  )-----------( 88       ( 09 Aug 2017 06:00 )             41.0     08-09    141  |  99  |  14  ----------------------------<  131<H>  3.6   |  25  |  1.02    Ca    8.8      09 Aug 2017 06:00    TPro  8.0  /  Alb  4.0  /  TBili  1.0  /  DBili  x   /  AST  15  /  ALT  12  /  AlkPhos  49  08-08      CARDIAC MARKERS ( 08 Aug 2017 11:32 )  x     / < 0.06 ng/mL / 126 u/L / 1.58 ng/mL / x          CAPILLARY BLOOD GLUCOSE  164 (09 Aug 2017 12:05)      Serum Pro-Brain Natriuretic Peptide: 406.7 pg/mL (08 Aug 2017 11:32)    CULTURES: (if applicable)      Physical Examination:    General: No acute distress.      HEENT: Pupils equal, reactive to light.  Symmetric.    PULM: Clear to auscultation bilaterally, no significant sputum production    CVS: S1, S2    ABD: Soft, nondistended, nontender, normoactive bowel sounds, no masses    EXT: chronic venous changes    SKIN: Warm and well perfused, no rashes noted.    NEURO: Alert, oriented, interactive, nonfocal    RADIOLOGY REVIEWED  CXR: clear    CT chest:    TTE:

## 2017-08-09 NOTE — CONSULT NOTE ADULT - ATTENDING COMMENTS
Filiberto Rosario  Attending Physician   Division of Infectious Disease  Pager #[phone]  After 5pm/weekend #[phone]
Denies SOB or cough  Needs f/u in future for SCC of lung given history

## 2017-08-09 NOTE — CONSULT NOTE ADULT - SUBJECTIVE AND OBJECTIVE BOX
CALLIE MACIAS 77y Male  MRN-[id number]    Patient is a 77y old  Male who presents with a chief complaint of Weakness (08 Aug 2017 14:33)      HPI:  77M DM2, HTN, Afib s/p ablation 2006, prior lung CA s/p lobectomy (RUL and part of LLL), B/L DVT on Eliquis, presents to [facility] ED for 1 day of diffuse weakness. The patient states he was in his usual state of health until this AM when he woke up and found it difficult to get up from bed this morning. There's diffuse body aches, weakness/malaise. He had a fever of 100.5 the other day. While his lower extremities are not the worse that they have been (has been in and out of the hospital 4x in the last several months) they do in fact hurt with pressure or walking on them. No chills, vision changes, dyspnea, cough, NVD dysuria constipation or stool color changes    In the ED patient was given Tylenol 650 mg PO x 1, Clindamycin 600 mg IV x 1 (12:00), and NS Bolus 1 liter x 1. (08 Aug 2017 14:33)    c/o rt foot swelling/redness      PAST MEDICAL & SURGICAL HISTORY:  DVT (deep venous thrombosis): RLE dx 6/28/17  Venous stasis of both lower extremities  Balanitis  Squamous cell lung cancer  Atrial fibrillation: s/p ablation 2006  Cellulitis: Both legs 2/2 chronic venous stasis  Morbid obesity  Anxiety  Neuropathy  Diabetes mellitus  HTN (hypertension)  Lung nodules: Flexible Bronchoscopy, Right VATS, Right Lung Resection - 1/21/15, LLL partial resection 2/2015  H/O prior ablation treatment: cardiac 2006      Allergies    No Known Allergies    Intolerances        ANTIMICROBIALS:  clindamycin IVPB 600 every 8 hours      MEDICATIONS  (STANDING):  apixaban 5 milliGRAM(s) Oral every 12 hours  gabapentin 800 milliGRAM(s) Oral three times a day  busPIRone 10 milliGRAM(s) Oral two times a day  metoprolol 50 milliGRAM(s) Oral two times a day  amLODIPine   Tablet 5 milliGRAM(s) Oral daily  furosemide    Tablet 40 milliGRAM(s) Oral two times a day  clindamycin IVPB 600 milliGRAM(s) IV Intermittent every 8 hours  insulin lispro (HumaLOG) corrective regimen sliding scale   SubCutaneous three times a day before meals  insulin lispro (HumaLOG) corrective regimen sliding scale   SubCutaneous at bedtime  dextrose 5%. 1000 milliLiter(s) (50 mL/Hr) IV Continuous <Continuous>  dextrose 50% Injectable 12.5 Gram(s) IV Push once  dextrose 50% Injectable 25 Gram(s) IV Push once  dextrose 50% Injectable 25 Gram(s) IV Push once  oxyCODONE    5 mG/acetaminophen 325 mG 1 Tablet(s) Oral once  nystatin Powder 1 Application(s) Topical daily      Social History  Smoking: no  Etoh: no  Drug use: no      FAMILY HISTORY:  Family history of liver cancer (Sibling): sister  Family history of diabetes mellitus (Sibling): Brother  Family history of heart failure: father      Vital Signs Last 24 Hrs  T(C): 36.7 (09 Aug 2017 05:01), Max: 36.9 (08 Aug 2017 21:30)  T(F): 98.1 (09 Aug 2017 05:01), Max: 98.4 (08 Aug 2017 21:30)  HR: 86 (09 Aug 2017 05:01) (80 - 86)  BP: 152/88 (09 Aug 2017 05:01) (150/78 - 153/71)  BP(mean): --  RR: 18 (09 Aug 2017 05:01) (18 - 18)  SpO2: 98% (09 Aug 2017 05:01) (98% - 99%)    CBC Full  -  ( 09 Aug 2017 06:00 )  WBC Count : 7.08 K/uL  Hemoglobin : 13.5 g/dL  Hematocrit : 41.0 %  Platelet Count - Automated : 88 K/uL  Mean Cell Volume : 89.9 fL  Mean Cell Hemoglobin : 29.6 pg  Mean Cell Hemoglobin Concentration : 32.9 %  Auto Neutrophil # : x  Auto Lymphocyte # : x  Auto Monocyte # : x  Auto Eosinophil # : x  Auto Basophil # : x  Auto Neutrophil % : x  Auto Lymphocyte % : x  Auto Monocyte % : x  Auto Eosinophil % : x  Auto Basophil % : x    08-09    141  |  99  |  14  ----------------------------<  131<H>  3.6   |  25  |  1.02    Ca    8.8      09 Aug 2017 06:00    TPro  8.0  /  Alb  4.0  /  TBili  1.0  /  DBili  x   /  AST  15  /  ALT  12  /  AlkPhos  49  08-08    LIVER FUNCTIONS - ( 08 Aug 2017 11:32 )  Alb: 4.0 g/dL / Pro: 8.0 g/dL / ALK PHOS: 49 u/L / ALT: 12 u/L / AST: 15 u/L / GGT: x               MICROBIOLOGY:    Culture - Urine (07.11.17 @ 13:09)    Culture - Urine:   NO GROWTH AT 24 HOURS    Specimen Source: URINE MIDSTREAM    Culture - Blood (07.11.17 @ 11:19)    Culture - Blood:   NO ORGANISMS ISOLATED    Specimen Source: BLOOD VENOUS    RADIOLOGY    Xray Chest 2 Views PA/Lat (08.08.17 @ 15:27) >  Clear lungs.    CT Head No Cont (08.08.17 @ 13:48) >  No specific changes to suggest brain metastasis or hemorrhagic/ischemic   stroke.  If patient's symptoms persist, recommend MRI for further evaluation

## 2017-08-10 PROCEDURE — 99232 SBSQ HOSP IP/OBS MODERATE 35: CPT

## 2017-08-10 RX ADMIN — GABAPENTIN 800 MILLIGRAM(S): 400 CAPSULE ORAL at 14:19

## 2017-08-10 RX ADMIN — Medication 1: at 12:01

## 2017-08-10 RX ADMIN — GABAPENTIN 800 MILLIGRAM(S): 400 CAPSULE ORAL at 05:26

## 2017-08-10 RX ADMIN — Medication 50 MILLIGRAM(S): at 18:05

## 2017-08-10 RX ADMIN — Medication 50 MILLIGRAM(S): at 05:25

## 2017-08-10 RX ADMIN — AMLODIPINE BESYLATE 5 MILLIGRAM(S): 2.5 TABLET ORAL at 05:25

## 2017-08-10 RX ADMIN — Medication 10 MILLIGRAM(S): at 05:25

## 2017-08-10 RX ADMIN — APIXABAN 5 MILLIGRAM(S): 2.5 TABLET, FILM COATED ORAL at 18:05

## 2017-08-10 RX ADMIN — APIXABAN 5 MILLIGRAM(S): 2.5 TABLET, FILM COATED ORAL at 05:26

## 2017-08-10 RX ADMIN — Medication 100 MILLIGRAM(S): at 05:29

## 2017-08-10 RX ADMIN — Medication 10 MILLIGRAM(S): at 18:04

## 2017-08-10 RX ADMIN — GABAPENTIN 800 MILLIGRAM(S): 400 CAPSULE ORAL at 21:48

## 2017-08-10 RX ADMIN — Medication 100 MILLIGRAM(S): at 14:19

## 2017-08-10 RX ADMIN — Medication 100 MILLIGRAM(S): at 21:48

## 2017-08-10 RX ADMIN — Medication 40 MILLIGRAM(S): at 18:04

## 2017-08-10 RX ADMIN — NYSTATIN CREAM 1 APPLICATION(S): 100000 CREAM TOPICAL at 12:00

## 2017-08-10 RX ADMIN — Medication 1: at 08:33

## 2017-08-10 RX ADMIN — Medication 40 MILLIGRAM(S): at 05:26

## 2017-08-11 ENCOUNTER — TRANSCRIPTION ENCOUNTER (OUTPATIENT)
Age: 77
End: 2017-08-11

## 2017-08-11 LAB
BUN SERPL-MCNC: 15 MG/DL — SIGNIFICANT CHANGE UP (ref 7–23)
CALCIUM SERPL-MCNC: 8.9 MG/DL — SIGNIFICANT CHANGE UP (ref 8.4–10.5)
CHLORIDE SERPL-SCNC: 99 MMOL/L — SIGNIFICANT CHANGE UP (ref 98–107)
CO2 SERPL-SCNC: 25 MMOL/L — SIGNIFICANT CHANGE UP (ref 22–31)
CREAT SERPL-MCNC: 0.99 MG/DL — SIGNIFICANT CHANGE UP (ref 0.5–1.3)
GLUCOSE SERPL-MCNC: 198 MG/DL — HIGH (ref 70–99)
HCT VFR BLD CALC: 39.8 % — SIGNIFICANT CHANGE UP (ref 39–50)
HGB BLD-MCNC: 13.2 G/DL — SIGNIFICANT CHANGE UP (ref 13–17)
MCHC RBC-ENTMCNC: 29.1 PG — SIGNIFICANT CHANGE UP (ref 27–34)
MCHC RBC-ENTMCNC: 33.2 % — SIGNIFICANT CHANGE UP (ref 32–36)
MCV RBC AUTO: 87.7 FL — SIGNIFICANT CHANGE UP (ref 80–100)
NRBC # FLD: 0 — SIGNIFICANT CHANGE UP
PLATELET # BLD AUTO: 126 K/UL — LOW (ref 150–400)
PMV BLD: 9.6 FL — SIGNIFICANT CHANGE UP (ref 7–13)
POTASSIUM SERPL-MCNC: 3.9 MMOL/L — SIGNIFICANT CHANGE UP (ref 3.5–5.3)
POTASSIUM SERPL-SCNC: 3.9 MMOL/L — SIGNIFICANT CHANGE UP (ref 3.5–5.3)
RBC # BLD: 4.54 M/UL — SIGNIFICANT CHANGE UP (ref 4.2–5.8)
RBC # FLD: 14.8 % — HIGH (ref 10.3–14.5)
SODIUM SERPL-SCNC: 141 MMOL/L — SIGNIFICANT CHANGE UP (ref 135–145)
WBC # BLD: 4.72 K/UL — SIGNIFICANT CHANGE UP (ref 3.8–10.5)
WBC # FLD AUTO: 4.72 K/UL — SIGNIFICANT CHANGE UP (ref 3.8–10.5)

## 2017-08-11 PROCEDURE — 99232 SBSQ HOSP IP/OBS MODERATE 35: CPT

## 2017-08-11 RX ORDER — OXYCODONE AND ACETAMINOPHEN 5; 325 MG/1; MG/1
1 TABLET ORAL EVERY 6 HOURS
Qty: 0 | Refills: 0 | Status: DISCONTINUED | OUTPATIENT
Start: 2017-08-11 | End: 2017-08-13

## 2017-08-11 RX ADMIN — OXYCODONE AND ACETAMINOPHEN 1 TABLET(S): 5; 325 TABLET ORAL at 18:01

## 2017-08-11 RX ADMIN — Medication 40 MILLIGRAM(S): at 17:32

## 2017-08-11 RX ADMIN — Medication 50 MILLIGRAM(S): at 17:32

## 2017-08-11 RX ADMIN — GABAPENTIN 800 MILLIGRAM(S): 400 CAPSULE ORAL at 05:10

## 2017-08-11 RX ADMIN — Medication 50 MILLIGRAM(S): at 05:11

## 2017-08-11 RX ADMIN — GABAPENTIN 800 MILLIGRAM(S): 400 CAPSULE ORAL at 21:29

## 2017-08-11 RX ADMIN — Medication 10 MILLIGRAM(S): at 05:11

## 2017-08-11 RX ADMIN — AMLODIPINE BESYLATE 5 MILLIGRAM(S): 2.5 TABLET ORAL at 05:10

## 2017-08-11 RX ADMIN — Medication 100 MILLIGRAM(S): at 05:14

## 2017-08-11 RX ADMIN — Medication 40 MILLIGRAM(S): at 05:10

## 2017-08-11 RX ADMIN — OXYCODONE AND ACETAMINOPHEN 1 TABLET(S): 5; 325 TABLET ORAL at 17:31

## 2017-08-11 RX ADMIN — APIXABAN 5 MILLIGRAM(S): 2.5 TABLET, FILM COATED ORAL at 05:11

## 2017-08-11 RX ADMIN — Medication 100 MILLIGRAM(S): at 15:43

## 2017-08-11 RX ADMIN — NYSTATIN CREAM 1 APPLICATION(S): 100000 CREAM TOPICAL at 11:47

## 2017-08-11 RX ADMIN — APIXABAN 5 MILLIGRAM(S): 2.5 TABLET, FILM COATED ORAL at 17:32

## 2017-08-11 RX ADMIN — Medication 10 MILLIGRAM(S): at 17:32

## 2017-08-11 RX ADMIN — Medication 100 MILLIGRAM(S): at 21:29

## 2017-08-11 RX ADMIN — GABAPENTIN 800 MILLIGRAM(S): 400 CAPSULE ORAL at 15:43

## 2017-08-11 NOTE — DISCHARGE NOTE ADULT - MEDICATION SUMMARY - MEDICATIONS TO TAKE
I will START or STAY ON the medications listed below when I get home from the hospital:    Percocet 5/325 oral tablet  -- 1 tab(s) by mouth every 4 hours, As Needed  -- Indication: For Pain    naproxen 500 mg oral tablet  -- 1 tab(s) by mouth 2 times a day, As Needed  -- Indication: For Pain    apixaban 5 mg oral tablet  -- 1 tab(s) by mouth every 12 hours  -- Indication: For Deep vein thrombosis (DVT) of lower extremity, unspecified chronicity, unspecified laterality, unspecified vein    gabapentin 800 mg oral tablet  -- 1 tab(s) by mouth 3 times a day  -- Indication: For Neuropathy    Amaryl 1 mg oral tablet  -- 1 tab(s) by mouth once a day before breakfast  -- Indication: For Diabetes    metFORMIN 500 mg oral tablet, extended release  -- 1 tab(s) by mouth once a day  -- Indication: For Diabetes    busPIRone 10 mg oral tablet  -- 1 tab(s) by mouth 2 times a day  -- Indication: For Anxiety    metoprolol tartrate 50 mg oral tablet  -- 1 tab(s) by mouth 2 times a day  -- Indication: For HTN (hypertension)    amLODIPine 5 mg oral tablet  -- 1 tab(s) by mouth once a day  -- Indication: For HTN (hypertension)    Keflex 500 mg oral capsule  -- 1 cap(s) by mouth 4 times a day  -- Finish all this medication unless otherwise directed by prescriber.    -- Indication: For Antibiotic    furosemide 40 mg oral tablet  -- 1 tab(s) by mouth 2 times a day  -- Indication: For Diuretic     ferrous sulfate 325 mg (65 mg elemental iron) oral tablet  -- 1 tab(s) by mouth once a day  -- Indication: For Anemia    Colace 100 mg oral capsule  -- 1 cap(s) by mouth 2 times a day, As Needed  -- Indication: For bowel regimen     tiZANidine 4 mg oral tablet  -- 2 tab(s) by mouth once a day (at bedtime) for muscle spasm  -- Indication: For muscle relaxant     amoxicillin 250 mg oral capsule  -- 1 cap(s) by mouth 2 times a day MDD:for suppression therapy starting 08/17  -- Finish all this medication unless otherwise directed by prescriber.    -- Indication: For Antibiotic

## 2017-08-11 NOTE — CHART NOTE - NSCHARTNOTEFT_GEN_A_CORE
wound care consult ordered to eval for LE treatments to help with venous return /cellulitis.  Deepthi from wound care called and they do not do any treatments of that type inpatient .  Pt can f.u with OP wound care and for appt after discharge at [phone].  Pt advised to elevate LE during day and prn above heart level either in bed or recliner.   Refused gustavo stockings in past

## 2017-08-11 NOTE — DISCHARGE NOTE ADULT - CARE PROVIDERS DIRECT ADDRESSES
[email or web address]

## 2017-08-11 NOTE — DISCHARGE NOTE ADULT - CARE PLAN
Principal Discharge DX:	Cellulitis  Goal:	Resolution of infection  Secondary Diagnosis:	DVT (deep venous thrombosis)  Instructions for follow-up, activity and diet:	You presented with recent history of DVT in 7/17. Please continue with prescribed medications. Please follow up with your PCP in 1-2 weeks regarding management.  Secondary Diagnosis:	Squamous cell carcinoma of lung, unspecified laterality  Instructions for follow-up, activity and diet:	You had prior lung surgery for lung cancer. Please follow up with your pulmonologist regarding management. Monitor for cough, shortness of breath.  Secondary Diagnosis:	HTN (hypertension)  Secondary Diagnosis:	Atrial fibrillation  Secondary Diagnosis:	Anxiety  Instructions for follow-up, activity and diet:	Please continue with home medications. Principal Discharge DX:	Cellulitis  Goal:	Resolution of infection  Instructions for follow-up, activity and diet:	Complete the course of ABX with Keflex until 08/17 and then switch to Amoxicillin for suppression likley 3 months- You will need to follow up with ID as out pt- DR Rosario.  Secondary Diagnosis:	DVT (deep venous thrombosis)  Instructions for follow-up, activity and diet:	You presented with recent history of DVT in 7/17. Please continue with prescribed medications. Please follow up with your PCP in 1-2 weeks regarding management.  Secondary Diagnosis:	Squamous cell carcinoma of lung, unspecified laterality  Instructions for follow-up, activity and diet:	You had prior lung surgery for lung cancer. Please follow up with your pulmonologist regarding management. Monitor for cough, shortness of breath.  Secondary Diagnosis:	HTN (hypertension)  Secondary Diagnosis:	Atrial fibrillation  Secondary Diagnosis:	Anxiety  Instructions for follow-up, activity and diet:	Please continue with home medications.

## 2017-08-11 NOTE — DISCHARGE NOTE ADULT - PATIENT PORTAL LINK FT
“You can access the Follow[facility] Patient Portal, offered by [facility], by registering with the following website: [email or web address]”

## 2017-08-11 NOTE — DISCHARGE NOTE ADULT - PLAN OF CARE
Please continue with home medications. You had prior lung surgery for lung cancer. Please follow up with your pulmonologist regarding management. Monitor for cough, shortness of breath. You presented with recent history of DVT in 7/17. Please continue with prescribed medications. Please follow up with your PCP in 1-2 weeks regarding management. Resolution of infection Complete the course of ABX with Keflex until 08/17 and then switch to Amoxicillin for suppression likley 3 months- You will need to follow up with ID as out pt- DR Rosario.

## 2017-08-11 NOTE — DISCHARGE NOTE ADULT - CARE PROVIDER_API CALL
Filiberto Rosario; MBBS), Infectious Disease; Internal Medicine  [address] [place] [address]  Phone: [phone]  Fax: [phone]    Nik Quesada (MD; PhD), Cardiology; Internal Medicine; Vascular Medicine  [id number] [place] [address] [address] [place] [id number]  Phone: [phone]  Fax: [phone]    Dakotah Blanco (DO), Critical Care Medicine; Internal Medicine; Pulmonary Disease  [address] [facility] [address]  [place] [address]  Phone: [phone]  Fax: [phone]

## 2017-08-11 NOTE — DISCHARGE NOTE ADULT - SECONDARY DIAGNOSIS.
DVT (deep venous thrombosis) Squamous cell carcinoma of lung, unspecified laterality HTN (hypertension) Atrial fibrillation Anxiety

## 2017-08-11 NOTE — DISCHARGE NOTE ADULT - HOSPITAL COURSE
77 male with cellulitis, weakness, COPD, prior lung surgery, prior lung cancer, prior DVT    Cellulitis of lower extremity, unspecified laterality   cont present abx as per ID - Keflex till 08/17 and then Amoxicilin 250mg BID x 3 months   WOUND CARE --------------------------    Deep vein thrombosis (DVT) of lower extremity   Cont ac - eliquis   Follow up with Cardiology as out pt      Squamous cell carcinoma of lung   MRI brain to r/o mets - which pt refused , requesting out pt OPEN MRI     D/c home with PO antibiotics and follow up out pt with ID DR Rosario

## 2017-08-12 LAB
BASOPHILS # BLD AUTO: 0.03 K/UL — SIGNIFICANT CHANGE UP (ref 0–0.2)
BASOPHILS NFR BLD AUTO: 0.6 % — SIGNIFICANT CHANGE UP (ref 0–2)
BUN SERPL-MCNC: 18 MG/DL — SIGNIFICANT CHANGE UP (ref 7–23)
CALCIUM SERPL-MCNC: 9.1 MG/DL — SIGNIFICANT CHANGE UP (ref 8.4–10.5)
CHLORIDE SERPL-SCNC: 98 MMOL/L — SIGNIFICANT CHANGE UP (ref 98–107)
CO2 SERPL-SCNC: 27 MMOL/L — SIGNIFICANT CHANGE UP (ref 22–31)
CREAT SERPL-MCNC: 0.97 MG/DL — SIGNIFICANT CHANGE UP (ref 0.5–1.3)
EOSINOPHIL # BLD AUTO: 0.13 K/UL — SIGNIFICANT CHANGE UP (ref 0–0.5)
EOSINOPHIL NFR BLD AUTO: 2.5 % — SIGNIFICANT CHANGE UP (ref 0–6)
GLUCOSE SERPL-MCNC: 145 MG/DL — HIGH (ref 70–99)
HCT VFR BLD CALC: 42.6 % — SIGNIFICANT CHANGE UP (ref 39–50)
HGB BLD-MCNC: 14 G/DL — SIGNIFICANT CHANGE UP (ref 13–17)
IMM GRANULOCYTES # BLD AUTO: 0.02 # — SIGNIFICANT CHANGE UP
IMM GRANULOCYTES NFR BLD AUTO: 0.4 % — SIGNIFICANT CHANGE UP (ref 0–1.5)
LYMPHOCYTES # BLD AUTO: 1.19 K/UL — SIGNIFICANT CHANGE UP (ref 1–3.3)
LYMPHOCYTES # BLD AUTO: 23 % — SIGNIFICANT CHANGE UP (ref 13–44)
MCHC RBC-ENTMCNC: 28.6 PG — SIGNIFICANT CHANGE UP (ref 27–34)
MCHC RBC-ENTMCNC: 32.9 % — SIGNIFICANT CHANGE UP (ref 32–36)
MCV RBC AUTO: 86.9 FL — SIGNIFICANT CHANGE UP (ref 80–100)
MONOCYTES # BLD AUTO: 0.47 K/UL — SIGNIFICANT CHANGE UP (ref 0–0.9)
MONOCYTES NFR BLD AUTO: 9.1 % — SIGNIFICANT CHANGE UP (ref 2–14)
NEUTROPHILS # BLD AUTO: 3.33 K/UL — SIGNIFICANT CHANGE UP (ref 1.8–7.4)
NEUTROPHILS NFR BLD AUTO: 64.4 % — SIGNIFICANT CHANGE UP (ref 43–77)
NRBC # FLD: 0 — SIGNIFICANT CHANGE UP
PLATELET # BLD AUTO: 134 K/UL — LOW (ref 150–400)
PMV BLD: 9.5 FL — SIGNIFICANT CHANGE UP (ref 7–13)
POTASSIUM SERPL-MCNC: 3.7 MMOL/L — SIGNIFICANT CHANGE UP (ref 3.5–5.3)
POTASSIUM SERPL-SCNC: 3.7 MMOL/L — SIGNIFICANT CHANGE UP (ref 3.5–5.3)
RBC # BLD: 4.9 M/UL — SIGNIFICANT CHANGE UP (ref 4.2–5.8)
RBC # FLD: 14.7 % — HIGH (ref 10.3–14.5)
SODIUM SERPL-SCNC: 138 MMOL/L — SIGNIFICANT CHANGE UP (ref 135–145)
WBC # BLD: 5.17 K/UL — SIGNIFICANT CHANGE UP (ref 3.8–10.5)
WBC # FLD AUTO: 5.17 K/UL — SIGNIFICANT CHANGE UP (ref 3.8–10.5)

## 2017-08-12 PROCEDURE — 99232 SBSQ HOSP IP/OBS MODERATE 35: CPT

## 2017-08-12 RX ADMIN — Medication 100 MILLIGRAM(S): at 21:29

## 2017-08-12 RX ADMIN — OXYCODONE AND ACETAMINOPHEN 1 TABLET(S): 5; 325 TABLET ORAL at 16:26

## 2017-08-12 RX ADMIN — GABAPENTIN 800 MILLIGRAM(S): 400 CAPSULE ORAL at 21:28

## 2017-08-12 RX ADMIN — GABAPENTIN 800 MILLIGRAM(S): 400 CAPSULE ORAL at 13:39

## 2017-08-12 RX ADMIN — Medication 1: at 12:51

## 2017-08-12 RX ADMIN — Medication 40 MILLIGRAM(S): at 05:18

## 2017-08-12 RX ADMIN — Medication 50 MILLIGRAM(S): at 05:23

## 2017-08-12 RX ADMIN — GABAPENTIN 800 MILLIGRAM(S): 400 CAPSULE ORAL at 05:18

## 2017-08-12 RX ADMIN — Medication 10 MILLIGRAM(S): at 17:50

## 2017-08-12 RX ADMIN — Medication 100 MILLIGRAM(S): at 05:19

## 2017-08-12 RX ADMIN — APIXABAN 5 MILLIGRAM(S): 2.5 TABLET, FILM COATED ORAL at 05:18

## 2017-08-12 RX ADMIN — Medication 10 MILLIGRAM(S): at 05:18

## 2017-08-12 RX ADMIN — APIXABAN 5 MILLIGRAM(S): 2.5 TABLET, FILM COATED ORAL at 17:50

## 2017-08-12 RX ADMIN — Medication 50 MILLIGRAM(S): at 17:50

## 2017-08-12 RX ADMIN — OXYCODONE AND ACETAMINOPHEN 1 TABLET(S): 5; 325 TABLET ORAL at 16:56

## 2017-08-12 RX ADMIN — Medication 100 MILLIGRAM(S): at 13:39

## 2017-08-12 RX ADMIN — AMLODIPINE BESYLATE 5 MILLIGRAM(S): 2.5 TABLET ORAL at 05:18

## 2017-08-12 RX ADMIN — NYSTATIN CREAM 1 APPLICATION(S): 100000 CREAM TOPICAL at 12:52

## 2017-08-12 RX ADMIN — Medication 40 MILLIGRAM(S): at 17:50

## 2017-08-12 NOTE — PROGRESS NOTE ADULT - PROBLEM SELECTOR PROBLEM 3
Squamous cell carcinoma of lung, unspecified laterality

## 2017-08-12 NOTE — PROGRESS NOTE ADULT - PROBLEM SELECTOR PROBLEM 2
Deep vein thrombosis (DVT) of lower extremity, unspecified chronicity, unspecified laterality, unspecified vein
Fever
Deep vein thrombosis (DVT) of lower extremity, unspecified chronicity, unspecified laterality, unspecified vein
Deep vein thrombosis (DVT) of lower extremity, unspecified chronicity, unspecified laterality, unspecified vein

## 2017-08-12 NOTE — PROGRESS NOTE ADULT - PROBLEM SELECTOR PROBLEM 1
Cellulitis of lower extremity, unspecified laterality
Cellulitis

## 2017-08-12 NOTE — PROGRESS NOTE ADULT - PROBLEM SELECTOR PLAN 3
MRI brain to r/o mets - which pt refused , requesting out pt OPEN MRI   poss dc after ID clearance
MRI brain to r/o mets - which pt refused , requesting out pt OPEN MRI   poss dc after ID clearance
MRI brain to r/o mets

## 2017-08-12 NOTE — PROGRESS NOTE ADULT - PROBLEM SELECTOR PLAN 1
cont present abx as per ID   WOUND CARE
cont present abx as per ID   WOUND CARE evaluated pt
cont present abx as per ID   WOUND CARE
Day 4 of antibiotics.   When stable for discharge- change to keflex 500 mg po q 6 through 8/17  After completing treatment course, it is reasonable to try suppression with amoxicillin 250 mg po q 12 (three month trial)- start amoxicillin on 8/17.     Follow up with Dr. Rosario as an outpatient.

## 2017-08-13 VITALS
DIASTOLIC BLOOD PRESSURE: 63 MMHG | RESPIRATION RATE: 20 BRPM | HEART RATE: 74 BPM | TEMPERATURE: 98 F | SYSTOLIC BLOOD PRESSURE: 129 MMHG | OXYGEN SATURATION: 96 %

## 2017-08-13 LAB
BACTERIA BLD CULT: SIGNIFICANT CHANGE UP
BACTERIA BLD CULT: SIGNIFICANT CHANGE UP
BUN SERPL-MCNC: 20 MG/DL — SIGNIFICANT CHANGE UP (ref 7–23)
CALCIUM SERPL-MCNC: 9.3 MG/DL — SIGNIFICANT CHANGE UP (ref 8.4–10.5)
CHLORIDE SERPL-SCNC: 99 MMOL/L — SIGNIFICANT CHANGE UP (ref 98–107)
CO2 SERPL-SCNC: 23 MMOL/L — SIGNIFICANT CHANGE UP (ref 22–31)
CREAT SERPL-MCNC: 1.12 MG/DL — SIGNIFICANT CHANGE UP (ref 0.5–1.3)
GLUCOSE SERPL-MCNC: 128 MG/DL — HIGH (ref 70–99)
POTASSIUM SERPL-MCNC: 4.1 MMOL/L — SIGNIFICANT CHANGE UP (ref 3.5–5.3)
POTASSIUM SERPL-SCNC: 4.1 MMOL/L — SIGNIFICANT CHANGE UP (ref 3.5–5.3)
SODIUM SERPL-SCNC: 139 MMOL/L — SIGNIFICANT CHANGE UP (ref 135–145)

## 2017-08-13 PROCEDURE — 99232 SBSQ HOSP IP/OBS MODERATE 35: CPT

## 2017-08-13 RX ORDER — APIXABAN 2.5 MG/1
1 TABLET, FILM COATED ORAL
Qty: 0 | Refills: 0 | COMMUNITY
Start: 2017-08-13

## 2017-08-13 RX ORDER — CEPHALEXIN 500 MG
1 CAPSULE ORAL
Qty: 16 | Refills: 0 | OUTPATIENT
Start: 2017-08-13 | End: 2017-08-17

## 2017-08-13 RX ORDER — AMOXICILLIN 250 MG/5ML
1 SUSPENSION, RECONSTITUTED, ORAL (ML) ORAL
Qty: 180 | Refills: 0 | OUTPATIENT
Start: 2017-08-13 | End: 2017-11-11

## 2017-08-13 RX ORDER — POTASSIUM CHLORIDE 20 MEQ
1 PACKET (EA) ORAL
Qty: 0 | Refills: 0 | COMMUNITY

## 2017-08-13 RX ADMIN — APIXABAN 5 MILLIGRAM(S): 2.5 TABLET, FILM COATED ORAL at 05:12

## 2017-08-13 RX ADMIN — Medication 50 MILLIGRAM(S): at 05:12

## 2017-08-13 RX ADMIN — AMLODIPINE BESYLATE 5 MILLIGRAM(S): 2.5 TABLET ORAL at 05:12

## 2017-08-13 RX ADMIN — Medication 40 MILLIGRAM(S): at 05:12

## 2017-08-13 RX ADMIN — GABAPENTIN 800 MILLIGRAM(S): 400 CAPSULE ORAL at 05:12

## 2017-08-13 RX ADMIN — NYSTATIN CREAM 1 APPLICATION(S): 100000 CREAM TOPICAL at 12:15

## 2017-08-13 RX ADMIN — Medication 10 MILLIGRAM(S): at 05:12

## 2017-08-13 RX ADMIN — GABAPENTIN 800 MILLIGRAM(S): 400 CAPSULE ORAL at 13:12

## 2017-08-13 RX ADMIN — Medication 1: at 08:29

## 2017-08-13 RX ADMIN — Medication 1: at 12:14

## 2017-08-13 RX ADMIN — Medication 100 MILLIGRAM(S): at 13:12

## 2017-08-13 RX ADMIN — Medication 100 MILLIGRAM(S): at 05:12

## 2017-08-13 NOTE — PHYSICAL THERAPY INITIAL EVALUATION ADULT - PERTINENT HX OF CURRENT PROBLEM, REHAB EVAL
77M DM2, HTN, Afib s/p ablation 2006, prior lung CA s/p lobectomy (RUL and part of LLL), B/L DVT on Eliquis, presents to [facility] ED for 1 day of diffuse weakness. The patient states he was in his usual state of health until this AM when he woke up and found it difficult to get up from bed this morning. There's diffuse body aches, weakness/malaise.

## 2017-08-13 NOTE — PROGRESS NOTE ADULT - ASSESSMENT
77 male with cellulitis, weakness, COPD, prior lung surgery, prior lung cancer, prior DVT
77M DM2, HTN, Afib s/p ablation 2006, prior lung CA s/p lobectomy (RUL and part of LLL), B/L DVT on Eliquis, presents to [facility] ED for 1 day of diffuse weakness.
Chronic LE swelling.  History of RLE DVT, but unlikely that this would explain the degree of edema and cellulitis (ie this is probably not completely attributed to post-thrombotic syndrome).  OK to continue with diuretics.  Recommend venous insufficiency evaluation with duplex evaluating for reflux.  Compression therapy should help improve swelling, erythema/skin breakdown, and cellulitis.  Continue antibiotics per ID (cefazolin).
Chronic LE swelling.  History of RLE DVT, but unlikely that this would explain the degree of edema and cellulitis (ie this is probably not completely attributed to post-thrombotic syndrome).  OK to continue with diuretics.  Recommend venous insufficiency evaluation with duplex evaluating for reflux.  Compression therapy should help improve swelling, erythema/skin breakdown, and cellulitis.  Wound care team to assist.  Continue antibiotics per ID (cefazolin).

## 2017-08-13 NOTE — PROGRESS NOTE ADULT - SUBJECTIVE AND OBJECTIVE BOX
Cardiology/Vascular Medicine Inpatient Progress Note      Asked by Dr. Wood to evaluate patient.  Feels better this AM.  Less fatigued.  Swelling in both legs improved.    +obesity  +prior right LE DVT diagnosed 7/17 on anticoagulation  +acute on chronic LE swelling and discomfort  +chronic cellulitis  +Lymphedema (?)    Vital Signs Last 24 Hrs  T(C): 36.4 (10 Aug 2017 13:59), Max: 36.9 (09 Aug 2017 21:32)  T(F): 97.5 (10 Aug 2017 13:59), Max: 98.4 (09 Aug 2017 21:32)  HR: 79 (10 Aug 2017 18:03) (73 - 86)  BP: 170/75 (10 Aug 2017 18:03) (147/82 - 170/75)  BP(mean): --  RR: 17 (10 Aug 2017 13:59) (17 - 18)  SpO2: 96% (10 Aug 2017 13:59) (96% - 99%)    Appearance: NAD  HEENT:   Normal oral mucosa, PERRL, EOMI	  Lymphatic: No lymphadenopathy  Cardiovascular: Normal S1 S2, No JVD, No murmurs, No edema  Respiratory: Lungs clear to auscultation	  Psychiatry: Awake, alert, conversant  Gastrointestinal:  Soft, obese, Non-tender, + BS	  Skin: No rashes, No ecchymoses, No cyanosis	  Neurologic: Non-focal  Extremities: + extensive edema, skin lichenification, shallow ulcers, erythema.  Vascular: Peripheral pulses palpable 2+ bilaterally    MEDICATIONS  (STANDING):  apixaban 5 milliGRAM(s) Oral every 12 hours  gabapentin 800 milliGRAM(s) Oral three times a day  busPIRone 10 milliGRAM(s) Oral two times a day  metoprolol 50 milliGRAM(s) Oral two times a day  amLODIPine   Tablet 5 milliGRAM(s) Oral daily  furosemide    Tablet 40 milliGRAM(s) Oral two times a day  insulin lispro (HumaLOG) corrective regimen sliding scale   SubCutaneous three times a day before meals  insulin lispro (HumaLOG) corrective regimen sliding scale   SubCutaneous at bedtime  dextrose 5%. 1000 milliLiter(s) (50 mL/Hr) IV Continuous <Continuous>  dextrose 50% Injectable 12.5 Gram(s) IV Push once  dextrose 50% Injectable 25 Gram(s) IV Push once  dextrose 50% Injectable 25 Gram(s) IV Push once  nystatin Powder 1 Application(s) Topical daily  ceFAZolin   IVPB 1000 milliGRAM(s) IV Intermittent every 8 hours    MEDICATIONS  (PRN):  ALBUTerol    90 MICROgram(s) HFA Inhaler 2 Puff(s) Inhalation every 6 hours PRN Shortness of Breath and/or Wheezing  docusate sodium 100 milliGRAM(s) Oral two times a day PRN Constipation  dextrose Gel 1 Dose(s) Oral once PRN Blood Glucose LESS THAN 70 milliGRAM(s)/deciliter  glucagon  Injectable 1 milliGRAM(s) IntraMuscular once PRN Glucose LESS THAN 70 milligrams/deciliter        LABS:	 	                          13.5   7.08  )-----------( 88       ( 09 Aug 2017 06:00 )             41.0     08-09    141  |  99  |  14  ----------------------------<  131<H>  3.6   |  25  |  1.02    Ca    8.8      09 Aug 2017 06:00            < from: CT Head No Cont (08.08.17 @ 13:48) >  EXAM:  CT BRAIN        PROCEDURE DATE:  Aug  8 2017         INTERPRETATION:  CLINICAL INFORMATION: History of lung cancer; weakness,   lethargic, altered mental status    TECHNIQUE: Noncontrast axial CT images were acquired through the head.   Two-dimensional sagittal and coronal reformats were generated.    COMPARISON STUDY: CT head dated 1/20/2015    FINDINGS:   There is no CT evidence of acute intracranial hemorrhage, extra-axial   collection, vasogenic edema, mass effect, midline shift, central   herniation, or hydrocephalus.     There is age-appropriate cerebral and cerebellar volume loss. 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     The visualized paranasal sinuses are clear. The mastoid air cells and   middle ear cavities are clear.    The soft tissues of the scalp are unremarkable. The calvarium is intact.      IMPRESSION:   No specific changes to suggest brain metastasis or hemorrhagic/ischemic   stroke.  If patient's symptoms persist, recommend MRI for further evaluation.      ELIAS ALEXANDER M.D., RADIOLOGY RESIDENT  This document has been electronically signed.  MARILEE FLORES M.D., ATTENDING RADIOLOGIST  This document has been electronically signed. Aug  8 2017  3:37PM        < end of copied text >      < from: US Duplex Venous Lower Ext Complete, Bilateral (07.11.17 @ 10:56) >  EXAM:  US DPLX LWR EXT VEINS COMPL BI        PROCEDURE DATE:  Jul 11 2017         INTERPRETATION:  CLINICAL INFORMATION: Lower extremity swelling. History   of DVT and cellulitis.    COMPARISON: Duplex sonography of the bilateral lower extremities dated   5/13/2015    TECHNIQUE: Duplex sonography of the BILATERAL LOWER extremities with   color and spectral Doppler, with and without compression.      FINDINGS:    There is normal compressibility of the left common femoral, femoral and   poplitealveins.     Normal compressibility of the right common and femoral veins are   demonstrated. There is noncompressibility with filling defect in the   right popliteal and gastrocnemius veins.     Calf veins are not visualized bilaterally.      IMPRESSION:     Above and below knee acute DVT in the right lower extremity as described.    Dr. Silver discussed these findings with SAI Richard on 7/11/2017 11:15   AM with read back.       REBEKAH SILVER M.D., RADIOLOGY RESIDENT  This document has been electronically signed.  JESUS MANUEL ALEXANDER M.D., ATTENDING RADIOLOGIST  This document has been electronically signed. Jul 11 2017 12:40PM         < end of copied text >      < from: TTE with Doppler (w/Cont) (07.03.17 @ 13:05) >  Patient name: CALLIE MACIAS  YOB: 1940   Age: 77 (M)   MR#: [id number]  Study Date: 7/3/2017  Location: [address] kSonographer: Jl Long RDCS  Study quality: Technically Difficult  Referring Physician: Darren Wood MD  Blood Pressure: 160/88 mmHg  Height: 187 cm  Weight: 151 kg  BSA: 2.7 m2  ------------------------------------------------------------------------  PROCEDURE: Transthoracic echocardiogram with 2-D, M-Mode  and complete spectral and color flow Doppler.  Verbal consent was obtained for injection of echo contrast.  Following  intravenous injection of contrast, harmonic  imaging was performed.  INDICATION: Unspecified atrial fibrillation (I48.91),  Shortness of breath (R06.02)  ------------------------------------------------------------------------  OBSERVATIONS:  Mitral Valve: Mitral annular calcification, otherwise  normal mitral valve. Minimal mitral regurgitation.  Aortic Root: Normal aortic root.  Aortic Valve: Aortic valve leaflet morphology not well  visualized.  Left Atrium: Normal left atrium.  Left Ventricle: Endocardium not well visualized; despite  the use of IV contrast, unable to evaluate left ventricular  systolic function.  Right Heart: Normal right atrium. Unable to accurately  evaluate right ventricular size or systolic function.  Tricuspid valve not well visualized. Pulmonic valve not  well visualized.  Pericardium/PleuraNormal pericardium with no pericardial  effusion.  Hemodynamic: Estimated right ventricular systolicpressure  equals 41 mm Hg, assuming right atrial pressure equals 10  mm Hg, consistent with mild pulmonary hypertension.  ------------------------------------------------------------------------  CONCLUSIONS:  Technically very difficult study.  1. Mitral annular calcification, otherwise normal mitral  valve. Minimal mitral regurgitation.  2. Endocardium not well visualized; despite the use of IV  contrast, unable to evaluate left ventricular systolic  function.  3. Unable to accurately evaluate right ventricular size or  systolic function.  4. Estimated right ventricular systolic pressure equals 41  mm Hg, assuming right atrial pressure equals 10 mm Hg,  consistent with mild pulmonary hypertension.  ------------------------------------------------------------------------  Confirmed on  7/3/2017 - 14:34:08 by Thierry Cottrell M.D.  ------------------------------------------------------------------------    < end of copied text >
Cardiology/Vascular Medicine Inpatient Progress Note      Asked by Dr. Wood to evaluate patient.  Feels better this AM.  Less fatigued.  Swelling in both legs improved.  On IV cefazolin    +obesity  +prior right LE DVT diagnosed 7/17 on anticoagulation  +acute on chronic LE swelling and discomfort  +chronic cellulitis  +Lymphedema (?)    Vital Signs Last 24 Hrs  T(C): 36.6 (11 Aug 2017 05:02), Max: 36.6 (11 Aug 2017 05:02)  T(F): 97.8 (11 Aug 2017 05:02), Max: 97.8 (11 Aug 2017 05:02)  HR: 64 (11 Aug 2017 05:02) (60 - 79)  BP: 149/74 (11 Aug 2017 05:02) (136/66 - 170/75)  BP(mean): --  RR: 18 (11 Aug 2017 05:02) (17 - 18)  SpO2: 97% (11 Aug 2017 05:02) (96% - 97%)    Appearance: NAD  HEENT:   Normal oral mucosa, PERRL, EOMI	  Lymphatic: No lymphadenopathy  Cardiovascular: Normal S1 S2, No JVD, No murmurs, No edema  Respiratory: Lungs clear to auscultation	  Psychiatry: Awake, alert, conversant  Gastrointestinal:  Soft, obese, Non-tender, + BS	  Skin: No rashes, No ecchymoses, No cyanosis	  Neurologic: Non-focal  Extremities: + extensive edema, skin lichenification, shallow ulcers, erythema.  Vascular: Peripheral pulses palpable 2+ bilaterally    MEDICATIONS  (STANDING):  apixaban 5 milliGRAM(s) Oral every 12 hours  gabapentin 800 milliGRAM(s) Oral three times a day  busPIRone 10 milliGRAM(s) Oral two times a day  metoprolol 50 milliGRAM(s) Oral two times a day  amLODIPine   Tablet 5 milliGRAM(s) Oral daily  furosemide    Tablet 40 milliGRAM(s) Oral two times a day  insulin lispro (HumaLOG) corrective regimen sliding scale   SubCutaneous three times a day before meals  insulin lispro (HumaLOG) corrective regimen sliding scale   SubCutaneous at bedtime  dextrose 5%. 1000 milliLiter(s) (50 mL/Hr) IV Continuous <Continuous>  dextrose 50% Injectable 12.5 Gram(s) IV Push once  dextrose 50% Injectable 25 Gram(s) IV Push once  dextrose 50% Injectable 25 Gram(s) IV Push once  nystatin Powder 1 Application(s) Topical daily  ceFAZolin   IVPB 1000 milliGRAM(s) IV Intermittent every 8 hours    MEDICATIONS  (PRN):  ALBUTerol    90 MICROgram(s) HFA Inhaler 2 Puff(s) Inhalation every 6 hours PRN Shortness of Breath and/or Wheezing  docusate sodium 100 milliGRAM(s) Oral two times a day PRN Constipation  dextrose Gel 1 Dose(s) Oral once PRN Blood Glucose LESS THAN 70 milliGRAM(s)/deciliter  glucagon  Injectable 1 milliGRAM(s) IntraMuscular once PRN Glucose LESS THAN 70 milligrams/deciliter        LABS:	 	           pending        < from: CT Head No Cont (08.08.17 @ 13:48) >  EXAM:  CT BRAIN        PROCEDURE DATE:  Aug  8 2017         INTERPRETATION:  CLINICAL INFORMATION: History of lung cancer; weakness,   lethargic, altered mental status    TECHNIQUE: Noncontrast axial CT images were acquired through the head.   Two-dimensional sagittal and coronal reformats were generated.    COMPARISON STUDY: CT head dated 1/20/2015    FINDINGS:   There is no CT evidence of acute intracranial hemorrhage, extra-axial   collection, vasogenic edema, mass effect, midline shift, central   herniation, or hydrocephalus.     There is age-appropriate cerebral and cerebellar volume loss. 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     The visualized paranasal sinuses are clear. The mastoid air cells and   middle ear cavities are clear.    The soft tissues of the scalp are unremarkable. The calvarium is intact.      IMPRESSION:   No specific changes to suggest brain metastasis or hemorrhagic/ischemic   stroke.  If patient's symptoms persist, recommend MRI for further evaluation.      ELIAS ALEXANDER M.D., RADIOLOGY RESIDENT  This document has been electronically signed.  MARILEE FLORES M.D., ATTENDING RADIOLOGIST  This document has been electronically signed. Aug  8 2017  3:37PM        < end of copied text >      < from: US Duplex Venous Lower Ext Complete, Bilateral (07.11.17 @ 10:56) >  EXAM:  US DPLX LWR EXT VEINS COMPL BI        PROCEDURE DATE:  Jul 11 2017         INTERPRETATION:  CLINICAL INFORMATION: Lower extremity swelling. History   of DVT and cellulitis.    COMPARISON: Duplex sonography of the bilateral lower extremities dated   5/13/2015    TECHNIQUE: Duplex sonography of the BILATERAL LOWER extremities with   color and spectral Doppler, with and without compression.      FINDINGS:    There is normal compressibility of the left common femoral, femoral and   poplitealveins.     Normal compressibility of the right common and femoral veins are   demonstrated. There is noncompressibility with filling defect in the   right popliteal and gastrocnemius veins.     Calf veins are not visualized bilaterally.      IMPRESSION:     Above and below knee acute DVT in the right lower extremity as described.    Dr. Silver discussed these findings with SAI Richard on 7/11/2017 11:15   AM with read back.       REBEKAH SILVER M.D., RADIOLOGY RESIDENT  This document has been electronically signed.  JESUS MANUEL ALEXANDER M.D., ATTENDING RADIOLOGIST  This document has been electronically signed. Jul 11 2017 12:40PM         < end of copied text >      < from: TTE with Doppler (w/Cont) (07.03.17 @ 13:05) >  Patient name: CALLIE MACIAS  YOB: 1940   Age: 77 (M)   MR#: [id number]  Study Date: 7/3/2017  Location: [address] kSonographer: Jl Long  HOMAR  Study quality: Technically Difficult  Referring Physician: Darren Wood MD  Blood Pressure: 160/88 mmHg  Height: 187 cm  Weight: 151 kg  BSA: 2.7 m2  ------------------------------------------------------------------------  PROCEDURE: Transthoracic echocardiogram with 2-D, M-Mode  and complete spectral and color flow Doppler.  Verbal consent was obtained for injection of echo contrast.  Following  intravenous injection of contrast, harmonic  imaging was performed.  INDICATION: Unspecified atrial fibrillation (I48.91),  Shortness of breath (R06.02)  ------------------------------------------------------------------------  OBSERVATIONS:  Mitral Valve: Mitral annular calcification, otherwise  normal mitral valve. Minimal mitral regurgitation.  Aortic Root: Normal aortic root.  Aortic Valve: Aortic valve leaflet morphology not well  visualized.  Left Atrium: Normal left atrium.  Left Ventricle: Endocardium not well visualized; despite  the use of IV contrast, unable to evaluate left ventricular  systolic function.  Right Heart: Normal right atrium. Unable to accurately  evaluate right ventricular size or systolic function.  Tricuspid valve not well visualized. Pulmonic valve not  well visualized.  Pericardium/PleuraNormal pericardium with no pericardial  effusion.  Hemodynamic: Estimated right ventricular systolicpressure  equals 41 mm Hg, assuming right atrial pressure equals 10  mm Hg, consistent with mild pulmonary hypertension.  ------------------------------------------------------------------------  CONCLUSIONS:  Technically very difficult study.  1. Mitral annular calcification, otherwise normal mitral  valve. Minimal mitral regurgitation.  2. Endocardium not well visualized; despite the use of IV  contrast, unable to evaluate left ventricular systolic  function.  3. Unable to accurately evaluate right ventricular size or  systolic function.  4. Estimated right ventricular systolic pressure equals 41  mm Hg, assuming right atrial pressure equals 10 mm Hg,  consistent with mild pulmonary hypertension.  ------------------------------------------------------------------------  Confirmed on  7/3/2017 - 14:34:08 by Thierry Cottrell M.D.  ------------------------------------------------------------------------    < end of copied text >
Cardiology/Vascular Medicine Inpatient Progress Note      Asked by Dr. Wood to evaluate patient.  Feels better this AM.  Less fatigued.  Swelling in both legs improved.  On IV cefazolin, plan to D/C on Keflex.    +obesity  +prior right LE DVT diagnosed 7/17 on anticoagulation  +acute on chronic LE swelling and discomfort  +chronic cellulitis    Vital Signs Last 24 Hrs  T(C): 36.9 (12 Aug 2017 12:22), Max: 36.9 (12 Aug 2017 12:22)  T(F): 98.5 (12 Aug 2017 12:22), Max: 98.5 (12 Aug 2017 12:22)  HR: 65 (12 Aug 2017 12:22) (61 - 77)  BP: 154/79 (12 Aug 2017 12:22) (130/65 - 162/82)  BP(mean): --  RR: 20 (12 Aug 2017 12:22) (18 - 20)  SpO2: 95% (12 Aug 2017 12:22) (95% - 100%)    Appearance: NAD  HEENT:   Normal oral mucosa, PERRL, EOMI	  Lymphatic: No lymphadenopathy  Cardiovascular: Normal S1 S2, No JVD, No murmurs, No edema  Respiratory: Lungs clear to auscultation	  Psychiatry: Awake, alert, conversant  Gastrointestinal:  Soft, obese, Non-tender, + BS	  Skin: No rashes, No ecchymoses, No cyanosis	  Neurologic: Non-focal  Extremities: + extensive edema, skin lichenification, shallow ulcers, erythema.  Vascular: Peripheral pulses palpable 2+ bilaterally    MEDICATIONS  (STANDING):  apixaban 5 milliGRAM(s) Oral every 12 hours  gabapentin 800 milliGRAM(s) Oral three times a day  busPIRone 10 milliGRAM(s) Oral two times a day  metoprolol 50 milliGRAM(s) Oral two times a day  amLODIPine   Tablet 5 milliGRAM(s) Oral daily  furosemide    Tablet 40 milliGRAM(s) Oral two times a day  insulin lispro (HumaLOG) corrective regimen sliding scale   SubCutaneous three times a day before meals  insulin lispro (HumaLOG) corrective regimen sliding scale   SubCutaneous at bedtime  dextrose 5%. 1000 milliLiter(s) (50 mL/Hr) IV Continuous <Continuous>  dextrose 50% Injectable 12.5 Gram(s) IV Push once  dextrose 50% Injectable 25 Gram(s) IV Push once  dextrose 50% Injectable 25 Gram(s) IV Push once  nystatin Powder 1 Application(s) Topical daily  ceFAZolin   IVPB 1000 milliGRAM(s) IV Intermittent every 8 hours    MEDICATIONS  (PRN):  ALBUTerol    90 MICROgram(s) HFA Inhaler 2 Puff(s) Inhalation every 6 hours PRN Shortness of Breath and/or Wheezing  docusate sodium 100 milliGRAM(s) Oral two times a day PRN Constipation  dextrose Gel 1 Dose(s) Oral once PRN Blood Glucose LESS THAN 70 milliGRAM(s)/deciliter  glucagon  Injectable 1 milliGRAM(s) IntraMuscular once PRN Glucose LESS THAN 70 milligrams/deciliter  oxyCODONE    5 mG/acetaminophen 325 mG 1 Tablet(s) Oral every 6 hours PRN back pain 4-10        LABS:	 	                           14.0   5.17  )-----------( 134      ( 12 Aug 2017 10:02 )             42.6     08-12    138  |  98  |  18  ----------------------------<  145<H>  3.7   |  27  |  0.97    Ca    9.1      12 Aug 2017 06:20            < from: CT Head No Cont (08.08.17 @ 13:48) >  EXAM:  CT BRAIN        PROCEDURE DATE:  Aug  8 2017         INTERPRETATION:  CLINICAL INFORMATION: History of lung cancer; weakness,   lethargic, altered mental status    TECHNIQUE: Noncontrast axial CT images were acquired through the head.   Two-dimensional sagittal and coronal reformats were generated.    COMPARISON STUDY: CT head dated 1/20/2015    FINDINGS:   There is no CT evidence of acute intracranial hemorrhage, extra-axial   collection, vasogenic edema, mass effect, midline shift, central   herniation, or hydrocephalus.     There is age-appropriate cerebral and cerebellar volume loss. 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     The visualized paranasal sinuses are clear. The mastoid air cells and   middle ear cavities are clear.    The soft tissues of the scalp are unremarkable. The calvarium is intact.      IMPRESSION:   No specific changes to suggest brain metastasis or hemorrhagic/ischemic   stroke.  If patient's symptoms persist, recommend MRI for further evaluation.      ELIAS ALEXANDER M.D., RADIOLOGY RESIDENT  This document has been electronically signed.  MARILEE FLORES M.D., ATTENDING RADIOLOGIST  This document has been electronically signed. Aug  8 2017  3:37PM        < end of copied text >      < from: US Duplex Venous Lower Ext Complete, Bilateral (07.11.17 @ 10:56) >  EXAM:  US DPLX LWR EXT VEINS COMPL BI        PROCEDURE DATE:  Jul 11 2017         INTERPRETATION:  CLINICAL INFORMATION: Lower extremity swelling. History   of DVT and cellulitis.    COMPARISON: Duplex sonography of the bilateral lower extremities dated   5/13/2015    TECHNIQUE: Duplex sonography of the BILATERAL LOWER extremities with   color and spectral Doppler, with and without compression.      FINDINGS:    There is normal compressibility of the left common femoral, femoral and   poplitealveins.     Normal compressibility of the right common and femoral veins are   demonstrated. There is noncompressibility with filling defect in the   right popliteal and gastrocnemius veins.     Calf veins are not visualized bilaterally.      IMPRESSION:     Above and below knee acute DVT in the right lower extremity as described.    Dr. Silver discussed these findings with SAI Richard on 7/11/2017 11:15   AM with read back.       REBEKAH SILVER M.D., RADIOLOGY RESIDENT  This document has been electronically signed.  JESUS MANUEL ALEXANDER M.D., ATTENDING RADIOLOGIST  This document has been electronically signed. Jul 11 2017 12:40PM         < end of copied text >      < from: TTE with Doppler (w/Cont) (07.03.17 @ 13:05) >  Patient name: CALLIE MACIAS  YOB: 1940   Age: 77 (M)   MR#: [id number]  Study Date: 7/3/2017  Location: [address] kSonographer: Jl Long RDCS  Study quality: Technically Difficult  Referring Physician: Darren Wood MD  Blood Pressure: 160/88 mmHg  Height: 187 cm  Weight: 151 kg  BSA: 2.7 m2  ------------------------------------------------------------------------  PROCEDURE: Transthoracic echocardiogram with 2-D, M-Mode  and complete spectral and color flow Doppler.  Verbal consent was obtained for injection of echo contrast.  Following  intravenous injection of contrast, harmonic  imaging was performed.  INDICATION: Unspecified atrial fibrillation (I48.91),  Shortness of breath (R06.02)  ------------------------------------------------------------------------  OBSERVATIONS:  Mitral Valve: Mitral annular calcification, otherwise  normal mitral valve. Minimal mitral regurgitation.  Aortic Root: Normal aortic root.  Aortic Valve: Aortic valve leaflet morphology not well  visualized.  Left Atrium: Normal left atrium.  Left Ventricle: Endocardium not well visualized; despite  the use of IV contrast, unable to evaluate left ventricular  systolic function.  Right Heart: Normal right atrium. Unable to accurately  evaluate right ventricular size or systolic function.  Tricuspid valve not well visualized. Pulmonic valve not  well visualized.  Pericardium/PleuraNormal pericardium with no pericardial  effusion.  Hemodynamic: Estimated right ventricular systolicpressure  equals 41 mm Hg, assuming right atrial pressure equals 10  mm Hg, consistent with mild pulmonary hypertension.  ------------------------------------------------------------------------  CONCLUSIONS:  Technically very difficult study.  1. Mitral annular calcification, otherwise normal mitral  valve. Minimal mitral regurgitation.  2. Endocardium not well visualized; despite the use of IV  contrast, unable to evaluate left ventricular systolic  function.  3. Unable to accurately evaluate right ventricular size or  systolic function.  4. Estimated right ventricular systolic pressure equals 41  mm Hg, assuming right atrial pressure equals 10 mm Hg,  consistent with mild pulmonary hypertension.  ------------------------------------------------------------------------  Confirmed on  7/3/2017 - 14:34:08 by Thierry Cottrell M.D.  ------------------------------------------------------------------------    < end of copied text >
Cardiology/Vascular Medicine Inpatient Progress Note      Asked by Dr. Wood to evaluate patient.  Feels better this AM.  Less fatigued.  Swelling in both legs improved.  On IV cefazolin, plan to D/C on Keflex.    +obesity  +prior right LE DVT diagnosed 7/17 on anticoagulation  +acute on chronic LE swelling and discomfort  +chronic cellulitis    Vital Signs Last 24 Hrs  T(C): 37.6 (13 Aug 2017 05:00), Max: 37.6 (13 Aug 2017 05:00)  T(F): 99.6 (13 Aug 2017 05:00), Max: 99.6 (13 Aug 2017 05:00)  HR: 63 (13 Aug 2017 05:00) (63 - 68)  BP: 160/82 (13 Aug 2017 05:00) (130/64 - 160/82)  BP(mean): --  RR: 19 (13 Aug 2017 05:00) (19 - 20)  SpO2: 97% (13 Aug 2017 05:00) (95% - 97%)    Appearance: NAD  HEENT:   Normal oral mucosa, PERRL, EOMI	  Lymphatic: No lymphadenopathy  Cardiovascular: Normal S1 S2, No JVD, No murmurs, No edema  Respiratory: Lungs clear to auscultation	  Psychiatry: Awake, alert, conversant  Gastrointestinal:  Soft, obese, Non-tender, + BS	  Skin: No rashes, No ecchymoses, No cyanosis	  Neurologic: Non-focal  Extremities: + extensive edema, skin lichenification, shallow ulcers, erythema.  Vascular: Peripheral pulses palpable 2+ bilaterally    MEDICATIONS  (STANDING):  apixaban 5 milliGRAM(s) Oral every 12 hours  gabapentin 800 milliGRAM(s) Oral three times a day  busPIRone 10 milliGRAM(s) Oral two times a day  metoprolol 50 milliGRAM(s) Oral two times a day  amLODIPine   Tablet 5 milliGRAM(s) Oral daily  furosemide    Tablet 40 milliGRAM(s) Oral two times a day  insulin lispro (HumaLOG) corrective regimen sliding scale   SubCutaneous three times a day before meals  insulin lispro (HumaLOG) corrective regimen sliding scale   SubCutaneous at bedtime  dextrose 5%. 1000 milliLiter(s) (50 mL/Hr) IV Continuous <Continuous>  dextrose 50% Injectable 12.5 Gram(s) IV Push once  dextrose 50% Injectable 25 Gram(s) IV Push once  dextrose 50% Injectable 25 Gram(s) IV Push once  nystatin Powder 1 Application(s) Topical daily  ceFAZolin   IVPB 1000 milliGRAM(s) IV Intermittent every 8 hours    MEDICATIONS  (PRN):  ALBUTerol    90 MICROgram(s) HFA Inhaler 2 Puff(s) Inhalation every 6 hours PRN Shortness of Breath and/or Wheezing  docusate sodium 100 milliGRAM(s) Oral two times a day PRN Constipation  dextrose Gel 1 Dose(s) Oral once PRN Blood Glucose LESS THAN 70 milliGRAM(s)/deciliter  glucagon  Injectable 1 milliGRAM(s) IntraMuscular once PRN Glucose LESS THAN 70 milligrams/deciliter  oxyCODONE    5 mG/acetaminophen 325 mG 1 Tablet(s) Oral every 6 hours PRN back pain 4-10        LABS:	 	                          14.0   5.17  )-----------( 134      ( 12 Aug 2017 10:02 )             42.6     08-13    139  |  99  |  20  ----------------------------<  128<H>  4.1   |  23  |  1.12    Ca    9.3      13 Aug 2017 05:40            < from: CT Head No Cont (08.08.17 @ 13:48) >  EXAM:  CT BRAIN        PROCEDURE DATE:  Aug  8 2017         INTERPRETATION:  CLINICAL INFORMATION: History of lung cancer; weakness,   lethargic, altered mental status    TECHNIQUE: Noncontrast axial CT images were acquired through the head.   Two-dimensional sagittal and coronal reformats were generated.    COMPARISON STUDY: CT head dated 1/20/2015    FINDINGS:   There is no CT evidence of acute intracranial hemorrhage, extra-axial   collection, vasogenic edema, mass effect, midline shift, central   herniation, or hydrocephalus.     There is age-appropriate cerebral and cerebellar volume loss. 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     The visualized paranasal sinuses are clear. The mastoid air cells and   middle ear cavities are clear.    The soft tissues of the scalp are unremarkable. The calvarium is intact.      IMPRESSION:   No specific changes to suggest brain metastasis or hemorrhagic/ischemic   stroke.  If patient's symptoms persist, recommend MRI for further evaluation.      ELIAS ALEXANDER M.D., RADIOLOGY RESIDENT  This document has been electronically signed.  MARILEE FLORES M.D., ATTENDING RADIOLOGIST  This document has been electronically signed. Aug  8 2017  3:37PM        < end of copied text >      < from: US Duplex Venous Lower Ext Complete, Bilateral (07.11.17 @ 10:56) >  EXAM:  US DPLX LWR EXT VEINS COMPL BI        PROCEDURE DATE:  Jul 11 2017         INTERPRETATION:  CLINICAL INFORMATION: Lower extremity swelling. History   of DVT and cellulitis.    COMPARISON: Duplex sonography of the bilateral lower extremities dated   5/13/2015    TECHNIQUE: Duplex sonography of the BILATERAL LOWER extremities with   color and spectral Doppler, with and without compression.      FINDINGS:    There is normal compressibility of the left common femoral, femoral and   poplitealveins.     Normal compressibility of the right common and femoral veins are   demonstrated. There is noncompressibility with filling defect in the   right popliteal and gastrocnemius veins.     Calf veins are not visualized bilaterally.      IMPRESSION:     Above and below knee acute DVT in the right lower extremity as described.    Dr. Silver discussed these findings with SAI Richard on 7/11/2017 11:15   AM with read back.       REBEKAH SILVER M.D., RADIOLOGY RESIDENT  This document has been electronically signed.  JESUS MANUEL ALEXANDER M.D., ATTENDING RADIOLOGIST  This document has been electronically signed. Jul 11 2017 12:40PM         < end of copied text >      < from: TTE with Doppler (w/Cont) (07.03.17 @ 13:05) >  Patient name: CALLIE MACIAS  YOB: 1940   Age: 77 (M)   MR#: [id number]  Study Date: 7/3/2017  Location: [address] kSonographer: Jl Long RDCS  Study quality: Technically Difficult  Referring Physician: Darren Wood MD  Blood Pressure: 160/88 mmHg  Height: 187 cm  Weight: 151 kg  BSA: 2.7 m2  ------------------------------------------------------------------------  PROCEDURE: Transthoracic echocardiogram with 2-D, M-Mode  and complete spectral and color flow Doppler.  Verbal consent was obtained for injection of echo contrast.  Following  intravenous injection of contrast, harmonic  imaging was performed.  INDICATION: Unspecified atrial fibrillation (I48.91),  Shortness of breath (R06.02)  ------------------------------------------------------------------------  OBSERVATIONS:  Mitral Valve: Mitral annular calcification, otherwise  normal mitral valve. Minimal mitral regurgitation.  Aortic Root: Normal aortic root.  Aortic Valve: Aortic valve leaflet morphology not well  visualized.  Left Atrium: Normal left atrium.  Left Ventricle: Endocardium not well visualized; despite  the use of IV contrast, unable to evaluate left ventricular  systolic function.  Right Heart: Normal right atrium. Unable to accurately  evaluate right ventricular size or systolic function.  Tricuspid valve not well visualized. Pulmonic valve not  well visualized.  Pericardium/PleuraNormal pericardium with no pericardial  effusion.  Hemodynamic: Estimated right ventricular systolicpressure  equals 41 mm Hg, assuming right atrial pressure equals 10  mm Hg, consistent with mild pulmonary hypertension.  ------------------------------------------------------------------------  CONCLUSIONS:  Technically very difficult study.  1. Mitral annular calcification, otherwise normal mitral  valve. Minimal mitral regurgitation.  2. Endocardium not well visualized; despite the use of IV  contrast, unable to evaluate left ventricular systolic  function.  3. Unable to accurately evaluate right ventricular size or  systolic function.  4. Estimated right ventricular systolic pressure equals 41  mm Hg, assuming right atrial pressure equals 10 mm Hg,  consistent with mild pulmonary hypertension.  ------------------------------------------------------------------------  Confirmed on  7/3/2017 - 14:34:08 by Thierry Cottrell M.D.  ------------------------------------------------------------------------    < end of copied text >
Follow Up:      Inverval History/ROS:Patient is a 77y old  Male who presents with leg cellulitis.    No fever. Feels better.         Allergies    No Known Allergies    Intolerances        ANTIMICROBIALS:  ceFAZolin   IVPB 1000 every 8 hours      OTHER MEDS:  apixaban 5 milliGRAM(s) Oral every 12 hours  gabapentin 800 milliGRAM(s) Oral three times a day  busPIRone 10 milliGRAM(s) Oral two times a day  metoprolol 50 milliGRAM(s) Oral two times a day  ALBUTerol    90 MICROgram(s) HFA Inhaler 2 Puff(s) Inhalation every 6 hours PRN  amLODIPine   Tablet 5 milliGRAM(s) Oral daily  furosemide    Tablet 40 milliGRAM(s) Oral two times a day  docusate sodium 100 milliGRAM(s) Oral two times a day PRN  insulin lispro (HumaLOG) corrective regimen sliding scale   SubCutaneous three times a day before meals  insulin lispro (HumaLOG) corrective regimen sliding scale   SubCutaneous at bedtime  dextrose 5%. 1000 milliLiter(s) IV Continuous <Continuous>  dextrose Gel 1 Dose(s) Oral once PRN  dextrose 50% Injectable 12.5 Gram(s) IV Push once  dextrose 50% Injectable 25 Gram(s) IV Push once  dextrose 50% Injectable 25 Gram(s) IV Push once  glucagon  Injectable 1 milliGRAM(s) IntraMuscular once PRN  nystatin Powder 1 Application(s) Topical daily      Vital Signs Last 24 Hrs  T(C): 36.7 (11 Aug 2017 13:16), Max: 36.7 (11 Aug 2017 13:16)  T(F): 98 (11 Aug 2017 13:16), Max: 98 (11 Aug 2017 13:16)  HR: 71 (11 Aug 2017 13:16) (60 - 79)  BP: 130/65 (11 Aug 2017 13:16) (130/65 - 170/75)  BP(mean): --  RR: 18 (11 Aug 2017 13:16) (18 - 18)  SpO2: 98% (11 Aug 2017 13:16) (96% - 98%)    PHYSICAL EXAM:  General: [x ] non-toxic  HEAD/EYES: [ ] PERRL [x ] white sclera [ ] icterus  ENT:  [ ] normal [ x] supple [ ] thrush [ ] pharyngeal exudate  Cardiovascular:   [ ] murmur [x ] normal [ ] PPM/AICD  Respiratory:  [ x] clear to ausculation bilaterally  GI:  [ ] soft, non-tender, normal bowel sounds  :  [ ] kennedy [ ] no CVA tenderness   Musculoskeletal:  [x ] no synovitis  Neurologic:  x[ ] bilateral stasis change, right leg/foot red  Skin:  [ ] no rash  Lymph: [ ] no lymphadenopathy  Psychiatric:  [x ] appropriate affect [ ] alert & oriented  Lines:  [x ] no phlebitis [ ] central line                                13.2   4.72  )-----------( 126      ( 11 Aug 2017 09:24 )             39.8       08-11    141  |  99  |  15  ----------------------------<  198<H>  3.9   |  25  |  0.99    Ca    8.9      11 Aug 2017 09:24            MICROBIOLOGY:    RADIOLOGY:
Follow-up Pulm Progress Note    No new respiratory events overnight.  Denies SOB/CP.     Medications:  MEDICATIONS  (STANDING):  apixaban 5 milliGRAM(s) Oral every 12 hours  gabapentin 800 milliGRAM(s) Oral three times a day  busPIRone 10 milliGRAM(s) Oral two times a day  metoprolol 50 milliGRAM(s) Oral two times a day  amLODIPine   Tablet 5 milliGRAM(s) Oral daily  furosemide    Tablet 40 milliGRAM(s) Oral two times a day  insulin lispro (HumaLOG) corrective regimen sliding scale   SubCutaneous three times a day before meals  insulin lispro (HumaLOG) corrective regimen sliding scale   SubCutaneous at bedtime  dextrose 5%. 1000 milliLiter(s) (50 mL/Hr) IV Continuous <Continuous>  dextrose 50% Injectable 12.5 Gram(s) IV Push once  dextrose 50% Injectable 25 Gram(s) IV Push once  dextrose 50% Injectable 25 Gram(s) IV Push once  nystatin Powder 1 Application(s) Topical daily  ceFAZolin   IVPB 1000 milliGRAM(s) IV Intermittent every 8 hours    MEDICATIONS  (PRN):  ALBUTerol    90 MICROgram(s) HFA Inhaler 2 Puff(s) Inhalation every 6 hours PRN Shortness of Breath and/or Wheezing  docusate sodium 100 milliGRAM(s) Oral two times a day PRN Constipation  dextrose Gel 1 Dose(s) Oral once PRN Blood Glucose LESS THAN 70 milliGRAM(s)/deciliter  glucagon  Injectable 1 milliGRAM(s) IntraMuscular once PRN Glucose LESS THAN 70 milligrams/deciliter    Vital Signs Last 24 Hrs  T(C): 36.4 (10 Aug 2017 13:59), Max: 36.9 (09 Aug 2017 21:32)  T(F): 97.5 (10 Aug 2017 13:59), Max: 98.4 (09 Aug 2017 21:32)  HR: 73 (10 Aug 2017 13:59) (73 - 86)  BP: 159/79 (10 Aug 2017 13:59) (147/82 - 159/79)  BP(mean): --  RR: 17 (10 Aug 2017 13:59) (17 - 18)  SpO2: 96% (10 Aug 2017 13:59) (96% - 99%)      LABS:                        13.5   7.08  )-----------( 88       ( 09 Aug 2017 06:00 )             41.0     08-09    141  |  99  |  14  ----------------------------<  131<H>  3.6   |  25  |  1.02    Ca    8.8      09 Aug 2017 06:00      CAPILLARY BLOOD GLUCOSE  170 (10 Aug 2017 11:58)    Serum Pro-Brain Natriuretic Peptide: 406.7 pg/mL (08 Aug 2017 11:32)    CULTURES: (if applicable)          Physical Examination:  PULM: Clear to auscultation bilaterally, no significant sputum production  CVS: S1, S2 heard    RADIOLOGY REVIEWED  CXR:     CT chest:    TTE:
Follow-up Pulm Progress Note    No new respiratory events overnight.  Denies SOB/CP.     Medications:  MEDICATIONS  (STANDING):  apixaban 5 milliGRAM(s) Oral every 12 hours  gabapentin 800 milliGRAM(s) Oral three times a day  busPIRone 10 milliGRAM(s) Oral two times a day  metoprolol 50 milliGRAM(s) Oral two times a day  amLODIPine   Tablet 5 milliGRAM(s) Oral daily  furosemide    Tablet 40 milliGRAM(s) Oral two times a day  insulin lispro (HumaLOG) corrective regimen sliding scale   SubCutaneous three times a day before meals  insulin lispro (HumaLOG) corrective regimen sliding scale   SubCutaneous at bedtime  dextrose 5%. 1000 milliLiter(s) (50 mL/Hr) IV Continuous <Continuous>  dextrose 50% Injectable 12.5 Gram(s) IV Push once  dextrose 50% Injectable 25 Gram(s) IV Push once  dextrose 50% Injectable 25 Gram(s) IV Push once  nystatin Powder 1 Application(s) Topical daily  ceFAZolin   IVPB 1000 milliGRAM(s) IV Intermittent every 8 hours    MEDICATIONS  (PRN):  ALBUTerol    90 MICROgram(s) HFA Inhaler 2 Puff(s) Inhalation every 6 hours PRN Shortness of Breath and/or Wheezing  docusate sodium 100 milliGRAM(s) Oral two times a day PRN Constipation  dextrose Gel 1 Dose(s) Oral once PRN Blood Glucose LESS THAN 70 milliGRAM(s)/deciliter  glucagon  Injectable 1 milliGRAM(s) IntraMuscular once PRN Glucose LESS THAN 70 milligrams/deciliter  oxyCODONE    5 mG/acetaminophen 325 mG 1 Tablet(s) Oral every 6 hours PRN back pain 4-10          Vital Signs Last 24 Hrs  T(C): 36.7 (11 Aug 2017 13:16), Max: 36.7 (11 Aug 2017 13:16)  T(F): 98 (11 Aug 2017 13:16), Max: 98 (11 Aug 2017 13:16)  HR: 71 (11 Aug 2017 13:16) (60 - 79)  BP: 130/65 (11 Aug 2017 13:16) (130/65 - 170/75)  BP(mean): --  RR: 18 (11 Aug 2017 13:16) (18 - 18)  SpO2: 98% (11 Aug 2017 13:16) (96% - 98%)      LABS:                        13.2   4.72  )-----------( 126      ( 11 Aug 2017 09:24 )             39.8     08-11    141  |  99  |  15  ----------------------------<  198<H>  3.9   |  25  |  0.99    Ca    8.9      11 Aug 2017 09:24      CAPILLARY BLOOD GLUCOSE  140 (11 Aug 2017 11:45)      CULTURES: (if applicable)      Physical Examination:  PULM: Clear to auscultation bilaterally, no significant sputum production  CVS: S1, S2 heard    RADIOLOGY REVIEWED  CXR:     CT chest:    TTE:
chief complaint : shortness of breath     SUBJECTIVE / OVERNIGHT EVENTS: pt says he feels better than yesterday , pt ambulating with out difficulty , refused MRI brain         MEDICATIONS  (STANDING):  apixaban 5 milliGRAM(s) Oral every 12 hours  gabapentin 800 milliGRAM(s) Oral three times a day  busPIRone 10 milliGRAM(s) Oral two times a day  metoprolol 50 milliGRAM(s) Oral two times a day  amLODIPine   Tablet 5 milliGRAM(s) Oral daily  furosemide    Tablet 40 milliGRAM(s) Oral two times a day  insulin lispro (HumaLOG) corrective regimen sliding scale   SubCutaneous three times a day before meals  insulin lispro (HumaLOG) corrective regimen sliding scale   SubCutaneous at bedtime  dextrose 5%. 1000 milliLiter(s) (50 mL/Hr) IV Continuous <Continuous>  dextrose 50% Injectable 12.5 Gram(s) IV Push once  dextrose 50% Injectable 25 Gram(s) IV Push once  dextrose 50% Injectable 25 Gram(s) IV Push once  nystatin Powder 1 Application(s) Topical daily  ceFAZolin   IVPB 1000 milliGRAM(s) IV Intermittent every 8 hours    MEDICATIONS  (PRN):  ALBUTerol    90 MICROgram(s) HFA Inhaler 2 Puff(s) Inhalation every 6 hours PRN Shortness of Breath and/or Wheezing  docusate sodium 100 milliGRAM(s) Oral two times a day PRN Constipation  dextrose Gel 1 Dose(s) Oral once PRN Blood Glucose LESS THAN 70 milliGRAM(s)/deciliter  glucagon  Injectable 1 milliGRAM(s) IntraMuscular once PRN Glucose LESS THAN 70 milligrams/deciliter  oxyCODONE    5 mG/acetaminophen 325 mG 1 Tablet(s) Oral every 6 hours PRN back pain 4-10      Vital Signs Last 24 Hrs  T(C): 36.7 (11 Aug 2017 13:16), Max: 36.7 (11 Aug 2017 13:16)  T(F): 98 (11 Aug 2017 13:16), Max: 98 (11 Aug 2017 13:16)  HR: 77 (11 Aug 2017 17:30) (60 - 77)  BP: 162/82 (11 Aug 2017 17:30) (130/65 - 162/82)  BP(mean): --  RR: 18 (11 Aug 2017 13:16) (18 - 18)  SpO2: 98% (11 Aug 2017 13:16) (96% - 98%)    Constitutional: No fever, fatigue  Skin: No rash.  Eyes: No recent vision problems or eye pain.  ENT: No congestion, ear pain, or sore throat.  Cardiovascular: No chest pain or palpation.  Respiratory: No cough, shortness of breath, congestion, or wheezing.  Gastrointestinal: No abdominal pain, nausea, vomiting, or diarrhea.  Genitourinary: No dysuria.  Musculoskeletal: No joint swelling.  Neurologic: No headache.    PHYSICAL EXAM:  GENERAL: NAD  EYES: EOMI, PERRLA  NECK: Supple, No JVD  CHEST/LUNG: dec  breath sounds at bases  HEART:  S1 , S2 +  ABDOMEN: soft, bs+  EXTREMITIES:  donta art > left   NEUROLOGY: alert awake oriented      LABS:  08-11    141  |  99  |  15  ----------------------------<  198<H>  3.9   |  25  |  0.99    Ca    8.9      11 Aug 2017 09:24      Creatinine Trend: 0.99 <--, 1.02 <--, 1.06 <--                        13.2   4.72  )-----------( 126      ( 11 Aug 2017 09:24 )             39.8     Urine Studies:
chief complaint : shortness of breath     SUBJECTIVE / OVERNIGHT EVENTS: pt says he feels better than yesterday , pt ambulating with out difficulty , refused MRI brain       MEDICATIONS  (STANDING):  apixaban 5 milliGRAM(s) Oral every 12 hours  gabapentin 800 milliGRAM(s) Oral three times a day  busPIRone 10 milliGRAM(s) Oral two times a day  metoprolol 50 milliGRAM(s) Oral two times a day  amLODIPine   Tablet 5 milliGRAM(s) Oral daily  furosemide    Tablet 40 milliGRAM(s) Oral two times a day  insulin lispro (HumaLOG) corrective regimen sliding scale   SubCutaneous three times a day before meals  insulin lispro (HumaLOG) corrective regimen sliding scale   SubCutaneous at bedtime  dextrose 5%. 1000 milliLiter(s) (50 mL/Hr) IV Continuous <Continuous>  dextrose 50% Injectable 12.5 Gram(s) IV Push once  dextrose 50% Injectable 25 Gram(s) IV Push once  dextrose 50% Injectable 25 Gram(s) IV Push once  nystatin Powder 1 Application(s) Topical daily  ceFAZolin   IVPB 1000 milliGRAM(s) IV Intermittent every 8 hours    MEDICATIONS  (PRN):  ALBUTerol    90 MICROgram(s) HFA Inhaler 2 Puff(s) Inhalation every 6 hours PRN Shortness of Breath and/or Wheezing  docusate sodium 100 milliGRAM(s) Oral two times a day PRN Constipation  dextrose Gel 1 Dose(s) Oral once PRN Blood Glucose LESS THAN 70 milliGRAM(s)/deciliter  glucagon  Injectable 1 milliGRAM(s) IntraMuscular once PRN Glucose LESS THAN 70 milligrams/deciliter  oxyCODONE    5 mG/acetaminophen 325 mG 1 Tablet(s) Oral every 6 hours PRN back pain 4-10    Vital Signs Last 24 Hrs  T(C): 36.9 (12 Aug 2017 12:22), Max: 36.9 (12 Aug 2017 12:22)  T(F): 98.5 (12 Aug 2017 12:22), Max: 98.5 (12 Aug 2017 12:22)  HR: 68 (12 Aug 2017 17:50) (61 - 68)  BP: 147/70 (12 Aug 2017 17:50) (143/68 - 159/78)  BP(mean): --  RR: 20 (12 Aug 2017 17:50) (18 - 20)  SpO2: 95% (12 Aug 2017 12:22) (95% - 100%)    Constitutional: No fever, fatigue  Skin: No rash.  Eyes: No recent vision problems or eye pain.  ENT: No congestion, ear pain, or sore throat.  Cardiovascular: No chest pain or palpation.  Respiratory: No cough, shortness of breath, congestion, or wheezing.  Gastrointestinal: No abdominal pain, nausea, vomiting, or diarrhea.  Genitourinary: No dysuria.  Musculoskeletal: No joint swelling.  Neurologic: No headache.    PHYSICAL EXAM:  GENERAL: NAD  EYES: EOMI, PERRLA  NECK: Supple, No JVD  CHEST/LUNG: dec  breath sounds at bases  HEART:  S1 , S2 +  ABDOMEN: soft, bs+  EXTREMITIES:  donta art > left   NEUROLOGY: alert awake oriented      LABS:  08-12    138  |  98  |  18  ----------------------------<  145<H>  3.7   |  27  |  0.97    Ca    9.1      12 Aug 2017 06:20      Creatinine Trend: 0.97 <--, 0.99 <--, 1.02 <--, 1.06 <--                        14.0   5.17  )-----------( 134      ( 12 Aug 2017 10:02 )             42.6     Urine Studies:
chief complaint : shortness of breath     SUBJECTIVE / OVERNIGHT EVENTS: pt says he feels better than yesterday       MEDICATIONS  (STANDING):  apixaban 5 milliGRAM(s) Oral every 12 hours  gabapentin 800 milliGRAM(s) Oral three times a day  busPIRone 10 milliGRAM(s) Oral two times a day  metoprolol 50 milliGRAM(s) Oral two times a day  amLODIPine   Tablet 5 milliGRAM(s) Oral daily  furosemide    Tablet 40 milliGRAM(s) Oral two times a day  insulin lispro (HumaLOG) corrective regimen sliding scale   SubCutaneous three times a day before meals  insulin lispro (HumaLOG) corrective regimen sliding scale   SubCutaneous at bedtime  dextrose 5%. 1000 milliLiter(s) (50 mL/Hr) IV Continuous <Continuous>  dextrose 50% Injectable 12.5 Gram(s) IV Push once  dextrose 50% Injectable 25 Gram(s) IV Push once  dextrose 50% Injectable 25 Gram(s) IV Push once  nystatin Powder 1 Application(s) Topical daily  ceFAZolin   IVPB 1000 milliGRAM(s) IV Intermittent every 8 hours    MEDICATIONS  (PRN):  ALBUTerol    90 MICROgram(s) HFA Inhaler 2 Puff(s) Inhalation every 6 hours PRN Shortness of Breath and/or Wheezing  docusate sodium 100 milliGRAM(s) Oral two times a day PRN Constipation  dextrose Gel 1 Dose(s) Oral once PRN Blood Glucose LESS THAN 70 milliGRAM(s)/deciliter  glucagon  Injectable 1 milliGRAM(s) IntraMuscular once PRN Glucose LESS THAN 70 milligrams/deciliter        CAPILLARY BLOOD GLUCOSE  120 (10 Aug 2017 16:42)  170 (10 Aug 2017 11:58)  151 (10 Aug 2017 08:33)  142 (09 Aug 2017 22:26)        I&O's Summary      Constitutional: No fever, fatigue  Skin: No rash.  Eyes: No recent vision problems or eye pain.  ENT: No congestion, ear pain, or sore throat.  Cardiovascular: No chest pain or palpation.  Respiratory: No cough, shortness of breath, congestion, or wheezing.  Gastrointestinal: No abdominal pain, nausea, vomiting, or diarrhea.  Genitourinary: No dysuria.  Musculoskeletal: No joint swelling.  Neurologic: No headache.    PHYSICAL EXAM:  GENERAL: NAD  EYES: EOMI, PERRLA  NECK: Supple, No JVD  CHEST/LUNG: dec  breath sounds at bases  HEART:  S1 , S2 +  ABDOMEN: soft, bs+  EXTREMITIES:  donta art > left   NEUROLOGY: alert awake oriented      LABS:                        13.5   7.08  )-----------( 88       ( 09 Aug 2017 06:00 )             41.0     08-09    141  |  99  |  14  ----------------------------<  131<H>  3.6   |  25  |  1.02    Ca    8.8      09 Aug 2017 06:00                RADIOLOGY & ADDITIONAL TESTS:    Imaging Personally Reviewed:    Consultant(s) Notes Reviewed:      Care Discussed with Consultants/Other Providers:

## 2017-08-14 ENCOUNTER — RX RENEWAL (OUTPATIENT)
Age: 77
End: 2017-08-14

## 2017-09-20 ENCOUNTER — INPATIENT (INPATIENT)
Facility: HOSPITAL | Age: 77
LOS: 6 days | Discharge: HOME | End: 2017-09-27
Attending: INTERNAL MEDICINE | Admitting: INTERNAL MEDICINE
Payer: MEDICARE

## 2017-09-20 VITALS
OXYGEN SATURATION: 100 % | TEMPERATURE: 99 F | HEART RATE: 83 BPM | DIASTOLIC BLOOD PRESSURE: 88 MMHG | RESPIRATION RATE: 16 BRPM | SYSTOLIC BLOOD PRESSURE: 157 MMHG

## 2017-09-20 DIAGNOSIS — R91.8 OTHER NONSPECIFIC ABNORMAL FINDING OF LUNG FIELD: Chronic | ICD-10-CM

## 2017-09-20 DIAGNOSIS — L03.90 CELLULITIS, UNSPECIFIED: ICD-10-CM

## 2017-09-20 DIAGNOSIS — E87.2 ACIDOSIS: ICD-10-CM

## 2017-09-20 DIAGNOSIS — I10 ESSENTIAL (PRIMARY) HYPERTENSION: ICD-10-CM

## 2017-09-20 DIAGNOSIS — I87.8 OTHER SPECIFIED DISORDERS OF VEINS: ICD-10-CM

## 2017-09-20 DIAGNOSIS — R50.9 FEVER, UNSPECIFIED: ICD-10-CM

## 2017-09-20 DIAGNOSIS — C34.90 MALIGNANT NEOPLASM OF UNSPECIFIED PART OF UNSPECIFIED BRONCHUS OR LUNG: ICD-10-CM

## 2017-09-20 DIAGNOSIS — I82.409 ACUTE EMBOLISM AND THROMBOSIS OF UNSPECIFIED DEEP VEINS OF UNSPECIFIED LOWER EXTREMITY: ICD-10-CM

## 2017-09-20 DIAGNOSIS — Z29.9 ENCOUNTER FOR PROPHYLACTIC MEASURES, UNSPECIFIED: ICD-10-CM

## 2017-09-20 DIAGNOSIS — F41.9 ANXIETY DISORDER, UNSPECIFIED: ICD-10-CM

## 2017-09-20 DIAGNOSIS — A41.9 SEPSIS, UNSPECIFIED ORGANISM: ICD-10-CM

## 2017-09-20 DIAGNOSIS — I48.91 UNSPECIFIED ATRIAL FIBRILLATION: ICD-10-CM

## 2017-09-20 DIAGNOSIS — Z98.89 OTHER SPECIFIED POSTPROCEDURAL STATES: Chronic | ICD-10-CM

## 2017-09-20 LAB
ALBUMIN SERPL ELPH-MCNC: 4 G/DL — SIGNIFICANT CHANGE UP (ref 3.3–5)
ALP SERPL-CCNC: 50 U/L — SIGNIFICANT CHANGE UP (ref 40–120)
ALT FLD-CCNC: 16 U/L — SIGNIFICANT CHANGE UP (ref 4–41)
ANISOCYTOSIS BLD QL: SLIGHT — SIGNIFICANT CHANGE UP
APPEARANCE UR: CLEAR — SIGNIFICANT CHANGE UP
AST SERPL-CCNC: 20 U/L — SIGNIFICANT CHANGE UP (ref 4–40)
BASE EXCESS BLDV CALC-SCNC: -0.4 MMOL/L — SIGNIFICANT CHANGE UP
BASE EXCESS BLDV CALC-SCNC: -1.4 MMOL/L — SIGNIFICANT CHANGE UP
BASOPHILS # BLD AUTO: 0.02 K/UL — SIGNIFICANT CHANGE UP (ref 0–0.2)
BASOPHILS NFR BLD AUTO: 0.2 % — SIGNIFICANT CHANGE UP (ref 0–2)
BASOPHILS NFR SPEC: 0 % — SIGNIFICANT CHANGE UP (ref 0–2)
BILIRUB SERPL-MCNC: 0.8 MG/DL — SIGNIFICANT CHANGE UP (ref 0.2–1.2)
BILIRUB UR-MCNC: NEGATIVE — SIGNIFICANT CHANGE UP
BLASTS # FLD: 0 % — SIGNIFICANT CHANGE UP (ref 0–0)
BLOOD GAS VENOUS - CREATININE: 0.87 MG/DL — SIGNIFICANT CHANGE UP (ref 0.5–1.3)
BLOOD GAS VENOUS - CREATININE: 0.94 MG/DL — SIGNIFICANT CHANGE UP (ref 0.5–1.3)
BLOOD UR QL VISUAL: NEGATIVE — SIGNIFICANT CHANGE UP
BUN SERPL-MCNC: 16 MG/DL — SIGNIFICANT CHANGE UP (ref 7–23)
CALCIUM SERPL-MCNC: 9.2 MG/DL — SIGNIFICANT CHANGE UP (ref 8.4–10.5)
CHLORIDE BLDV-SCNC: 106 MMOL/L — SIGNIFICANT CHANGE UP (ref 96–108)
CHLORIDE BLDV-SCNC: 106 MMOL/L — SIGNIFICANT CHANGE UP (ref 96–108)
CHLORIDE SERPL-SCNC: 100 MMOL/L — SIGNIFICANT CHANGE UP (ref 98–107)
CO2 SERPL-SCNC: 21 MMOL/L — LOW (ref 22–31)
COLOR SPEC: YELLOW — SIGNIFICANT CHANGE UP
CREAT SERPL-MCNC: 1.03 MG/DL — SIGNIFICANT CHANGE UP (ref 0.5–1.3)
EOSINOPHIL # BLD AUTO: 0.01 K/UL — SIGNIFICANT CHANGE UP (ref 0–0.5)
EOSINOPHIL NFR BLD AUTO: 0.1 % — SIGNIFICANT CHANGE UP (ref 0–6)
EOSINOPHIL NFR FLD: 0 % — SIGNIFICANT CHANGE UP (ref 0–6)
GAS PNL BLDV: 136 MMOL/L — SIGNIFICANT CHANGE UP (ref 136–146)
GAS PNL BLDV: 136 MMOL/L — SIGNIFICANT CHANGE UP (ref 136–146)
GIANT PLATELETS BLD QL SMEAR: PRESENT — SIGNIFICANT CHANGE UP
GLUCOSE BLDV-MCNC: 164 — HIGH (ref 70–99)
GLUCOSE BLDV-MCNC: 173 — HIGH (ref 70–99)
GLUCOSE SERPL-MCNC: 167 MG/DL — HIGH (ref 70–99)
GLUCOSE UR-MCNC: SIGNIFICANT CHANGE UP
HCO3 BLDV-SCNC: 22 MMOL/L — SIGNIFICANT CHANGE UP (ref 20–27)
HCO3 BLDV-SCNC: 24 MMOL/L — SIGNIFICANT CHANGE UP (ref 20–27)
HCT VFR BLD CALC: 44.6 % — SIGNIFICANT CHANGE UP (ref 39–50)
HCT VFR BLDV CALC: 43.2 % — SIGNIFICANT CHANGE UP (ref 39–51)
HCT VFR BLDV CALC: 48.2 % — SIGNIFICANT CHANGE UP (ref 39–51)
HGB BLD-MCNC: 14.9 G/DL — SIGNIFICANT CHANGE UP (ref 13–17)
HGB BLDV-MCNC: 14.1 G/DL — SIGNIFICANT CHANGE UP (ref 13–17)
HGB BLDV-MCNC: 15.7 G/DL — SIGNIFICANT CHANGE UP (ref 13–17)
IMM GRANULOCYTES # BLD AUTO: 0.06 # — SIGNIFICANT CHANGE UP
IMM GRANULOCYTES NFR BLD AUTO: 0.6 % — SIGNIFICANT CHANGE UP (ref 0–1.5)
KETONES UR-MCNC: NEGATIVE — SIGNIFICANT CHANGE UP
LACTATE BLDV-MCNC: 3.7 MMOL/L — HIGH (ref 0.5–2)
LACTATE BLDV-MCNC: 4.6 MMOL/L — CRITICAL HIGH (ref 0.5–2)
LEUKOCYTE ESTERASE UR-ACNC: NEGATIVE — SIGNIFICANT CHANGE UP
LYMPHOCYTES # BLD AUTO: 0.44 K/UL — LOW (ref 1–3.3)
LYMPHOCYTES # BLD AUTO: 4.5 % — LOW (ref 13–44)
LYMPHOCYTES NFR SPEC AUTO: 3.5 % — LOW (ref 13–44)
MACROCYTES BLD QL: SLIGHT — SIGNIFICANT CHANGE UP
MCHC RBC-ENTMCNC: 29.2 PG — SIGNIFICANT CHANGE UP (ref 27–34)
MCHC RBC-ENTMCNC: 33.4 % — SIGNIFICANT CHANGE UP (ref 32–36)
MCV RBC AUTO: 87.5 FL — SIGNIFICANT CHANGE UP (ref 80–100)
METAMYELOCYTES # FLD: 0 % — SIGNIFICANT CHANGE UP (ref 0–1)
MONOCYTES # BLD AUTO: 0.39 K/UL — SIGNIFICANT CHANGE UP (ref 0–0.9)
MONOCYTES NFR BLD AUTO: 4 % — SIGNIFICANT CHANGE UP (ref 2–14)
MONOCYTES NFR BLD: 2.6 % — SIGNIFICANT CHANGE UP (ref 2–9)
MUCOUS THREADS # UR AUTO: SIGNIFICANT CHANGE UP
MYELOCYTES NFR BLD: 0 % — SIGNIFICANT CHANGE UP (ref 0–0)
NEUTROPHIL AB SER-ACNC: 86.9 % — HIGH (ref 43–77)
NEUTROPHILS # BLD AUTO: 8.93 K/UL — HIGH (ref 1.8–7.4)
NEUTROPHILS NFR BLD AUTO: 90.6 % — HIGH (ref 43–77)
NEUTS BAND # BLD: 6.1 % — HIGH (ref 0–6)
NITRITE UR-MCNC: NEGATIVE — SIGNIFICANT CHANGE UP
NRBC # FLD: 0 — SIGNIFICANT CHANGE UP
OTHER - HEMATOLOGY %: 0 — SIGNIFICANT CHANGE UP
PCO2 BLDV: 39 MMHG — LOW (ref 41–51)
PCO2 BLDV: 47 MMHG — SIGNIFICANT CHANGE UP (ref 41–51)
PH BLDV: 7.32 PH — SIGNIFICANT CHANGE UP (ref 7.32–7.43)
PH BLDV: 7.4 PH — SIGNIFICANT CHANGE UP (ref 7.32–7.43)
PH UR: 6 — SIGNIFICANT CHANGE UP (ref 4.6–8)
PLATELET # BLD AUTO: 96 K/UL — LOW (ref 150–400)
PLATELET COUNT - ESTIMATE: SIGNIFICANT CHANGE UP
PMV BLD: 10.2 FL — SIGNIFICANT CHANGE UP (ref 7–13)
PO2 BLDV: 37 MMHG — SIGNIFICANT CHANGE UP (ref 35–40)
PO2 BLDV: 37 MMHG — SIGNIFICANT CHANGE UP (ref 35–40)
POTASSIUM BLDV-SCNC: 3.4 MMOL/L — SIGNIFICANT CHANGE UP (ref 3.4–4.5)
POTASSIUM BLDV-SCNC: 4.4 MMOL/L — SIGNIFICANT CHANGE UP (ref 3.4–4.5)
POTASSIUM SERPL-MCNC: 3.9 MMOL/L — SIGNIFICANT CHANGE UP (ref 3.5–5.3)
POTASSIUM SERPL-SCNC: 3.9 MMOL/L — SIGNIFICANT CHANGE UP (ref 3.5–5.3)
PROMYELOCYTES # FLD: 0 % — SIGNIFICANT CHANGE UP (ref 0–0)
PROT SERPL-MCNC: 7.3 G/DL — SIGNIFICANT CHANGE UP (ref 6–8.3)
PROT UR-MCNC: 30 — SIGNIFICANT CHANGE UP
RBC # BLD: 5.1 M/UL — SIGNIFICANT CHANGE UP (ref 4.2–5.8)
RBC # FLD: 15.2 % — HIGH (ref 10.3–14.5)
RBC CASTS # UR COMP ASSIST: SIGNIFICANT CHANGE UP (ref 0–?)
REVIEW TO FOLLOW: YES — SIGNIFICANT CHANGE UP
SAO2 % BLDV: 62.5 % — SIGNIFICANT CHANGE UP (ref 60–85)
SAO2 % BLDV: 70.8 % — SIGNIFICANT CHANGE UP (ref 60–85)
SODIUM SERPL-SCNC: 138 MMOL/L — SIGNIFICANT CHANGE UP (ref 135–145)
SP GR SPEC: 1.03 — SIGNIFICANT CHANGE UP (ref 1–1.03)
UROBILINOGEN FLD QL: NORMAL E.U. — SIGNIFICANT CHANGE UP (ref 0.1–0.2)
VARIANT LYMPHS # BLD: 0.9 % — SIGNIFICANT CHANGE UP
WBC # BLD: 9.85 K/UL — SIGNIFICANT CHANGE UP (ref 3.8–10.5)
WBC # FLD AUTO: 9.85 K/UL — SIGNIFICANT CHANGE UP (ref 3.8–10.5)
WBC UR QL: SIGNIFICANT CHANGE UP (ref 0–?)

## 2017-09-20 PROCEDURE — 99222 1ST HOSP IP/OBS MODERATE 55: CPT

## 2017-09-20 PROCEDURE — 99223 1ST HOSP IP/OBS HIGH 75: CPT | Mod: AI

## 2017-09-20 PROCEDURE — 71020: CPT | Mod: 26

## 2017-09-20 RX ORDER — DOCUSATE SODIUM 100 MG
100 CAPSULE ORAL
Qty: 0 | Refills: 0 | Status: DISCONTINUED | OUTPATIENT
Start: 2017-09-20 | End: 2017-09-27

## 2017-09-20 RX ORDER — SODIUM CHLORIDE 9 MG/ML
1000 INJECTION, SOLUTION INTRAVENOUS
Qty: 0 | Refills: 0 | Status: DISCONTINUED | OUTPATIENT
Start: 2017-09-20 | End: 2017-09-27

## 2017-09-20 RX ORDER — DEXTROSE 50 % IN WATER 50 %
25 SYRINGE (ML) INTRAVENOUS ONCE
Qty: 0 | Refills: 0 | Status: DISCONTINUED | OUTPATIENT
Start: 2017-09-20 | End: 2017-09-27

## 2017-09-20 RX ORDER — FUROSEMIDE 40 MG
40 TABLET ORAL
Qty: 0 | Refills: 0 | Status: DISCONTINUED | OUTPATIENT
Start: 2017-09-20 | End: 2017-09-27

## 2017-09-20 RX ORDER — VANCOMYCIN HCL 1 G
1000 VIAL (EA) INTRAVENOUS ONCE
Qty: 0 | Refills: 0 | Status: COMPLETED | OUTPATIENT
Start: 2017-09-20 | End: 2017-09-20

## 2017-09-20 RX ORDER — APIXABAN 2.5 MG/1
5 TABLET, FILM COATED ORAL ONCE
Qty: 0 | Refills: 0 | Status: COMPLETED | OUTPATIENT
Start: 2017-09-20 | End: 2017-09-20

## 2017-09-20 RX ORDER — DEXTROSE 50 % IN WATER 50 %
12.5 SYRINGE (ML) INTRAVENOUS ONCE
Qty: 0 | Refills: 0 | Status: DISCONTINUED | OUTPATIENT
Start: 2017-09-20 | End: 2017-09-27

## 2017-09-20 RX ORDER — ACETAMINOPHEN 500 MG
650 TABLET ORAL ONCE
Qty: 0 | Refills: 0 | Status: COMPLETED | OUTPATIENT
Start: 2017-09-20 | End: 2017-09-20

## 2017-09-20 RX ORDER — VANCOMYCIN HCL 1 G
1000 VIAL (EA) INTRAVENOUS EVERY 12 HOURS
Qty: 0 | Refills: 0 | Status: DISCONTINUED | OUTPATIENT
Start: 2017-09-20 | End: 2017-09-20

## 2017-09-20 RX ORDER — AMLODIPINE BESYLATE 2.5 MG/1
5 TABLET ORAL DAILY
Qty: 0 | Refills: 0 | Status: DISCONTINUED | OUTPATIENT
Start: 2017-09-20 | End: 2017-09-27

## 2017-09-20 RX ORDER — SODIUM CHLORIDE 9 MG/ML
1000 INJECTION INTRAMUSCULAR; INTRAVENOUS; SUBCUTANEOUS
Qty: 0 | Refills: 0 | Status: DISCONTINUED | OUTPATIENT
Start: 2017-09-20 | End: 2017-09-24

## 2017-09-20 RX ORDER — GLUCAGON INJECTION, SOLUTION 0.5 MG/.1ML
1 INJECTION, SOLUTION SUBCUTANEOUS ONCE
Qty: 0 | Refills: 0 | Status: DISCONTINUED | OUTPATIENT
Start: 2017-09-20 | End: 2017-09-27

## 2017-09-20 RX ORDER — SODIUM CHLORIDE 9 MG/ML
2000 INJECTION INTRAMUSCULAR; INTRAVENOUS; SUBCUTANEOUS ONCE
Qty: 0 | Refills: 0 | Status: COMPLETED | OUTPATIENT
Start: 2017-09-20 | End: 2017-09-20

## 2017-09-20 RX ORDER — GABAPENTIN 400 MG/1
800 CAPSULE ORAL THREE TIMES A DAY
Qty: 0 | Refills: 0 | Status: DISCONTINUED | OUTPATIENT
Start: 2017-09-20 | End: 2017-09-27

## 2017-09-20 RX ORDER — FERROUS SULFATE 325(65) MG
325 TABLET ORAL DAILY
Qty: 0 | Refills: 0 | Status: DISCONTINUED | OUTPATIENT
Start: 2017-09-20 | End: 2017-09-27

## 2017-09-20 RX ORDER — METOPROLOL TARTRATE 50 MG
50 TABLET ORAL
Qty: 0 | Refills: 0 | Status: DISCONTINUED | OUTPATIENT
Start: 2017-09-20 | End: 2017-09-27

## 2017-09-20 RX ORDER — METOPROLOL TARTRATE 50 MG
50 TABLET ORAL ONCE
Qty: 0 | Refills: 0 | Status: COMPLETED | OUTPATIENT
Start: 2017-09-20 | End: 2017-09-20

## 2017-09-20 RX ORDER — OXYCODONE AND ACETAMINOPHEN 5; 325 MG/1; MG/1
1 TABLET ORAL EVERY 4 HOURS
Qty: 0 | Refills: 0 | Status: DISCONTINUED | OUTPATIENT
Start: 2017-09-20 | End: 2017-09-21

## 2017-09-20 RX ORDER — INSULIN LISPRO 100/ML
VIAL (ML) SUBCUTANEOUS
Qty: 0 | Refills: 0 | Status: DISCONTINUED | OUTPATIENT
Start: 2017-09-20 | End: 2017-09-27

## 2017-09-20 RX ORDER — APIXABAN 2.5 MG/1
5 TABLET, FILM COATED ORAL EVERY 12 HOURS
Qty: 0 | Refills: 0 | Status: DISCONTINUED | OUTPATIENT
Start: 2017-09-20 | End: 2017-09-27

## 2017-09-20 RX ORDER — PIPERACILLIN AND TAZOBACTAM 4; .5 G/20ML; G/20ML
3.38 INJECTION, POWDER, LYOPHILIZED, FOR SOLUTION INTRAVENOUS ONCE
Qty: 0 | Refills: 0 | Status: COMPLETED | OUTPATIENT
Start: 2017-09-20 | End: 2017-09-20

## 2017-09-20 RX ORDER — DEXTROSE 50 % IN WATER 50 %
1 SYRINGE (ML) INTRAVENOUS ONCE
Qty: 0 | Refills: 0 | Status: DISCONTINUED | OUTPATIENT
Start: 2017-09-20 | End: 2017-09-27

## 2017-09-20 RX ORDER — CEFAZOLIN SODIUM 1 G
2000 VIAL (EA) INJECTION EVERY 8 HOURS
Qty: 0 | Refills: 0 | Status: DISCONTINUED | OUTPATIENT
Start: 2017-09-20 | End: 2017-09-27

## 2017-09-20 RX ADMIN — Medication 50 MILLIGRAM(S): at 09:17

## 2017-09-20 RX ADMIN — OXYCODONE AND ACETAMINOPHEN 1 TABLET(S): 5; 325 TABLET ORAL at 17:23

## 2017-09-20 RX ADMIN — Medication 10 MILLIGRAM(S): at 21:26

## 2017-09-20 RX ADMIN — SODIUM CHLORIDE 100 MILLILITER(S): 9 INJECTION INTRAMUSCULAR; INTRAVENOUS; SUBCUTANEOUS at 16:54

## 2017-09-20 RX ADMIN — SODIUM CHLORIDE 100 MILLILITER(S): 9 INJECTION INTRAMUSCULAR; INTRAVENOUS; SUBCUTANEOUS at 12:40

## 2017-09-20 RX ADMIN — Medication 325 MILLIGRAM(S): at 11:47

## 2017-09-20 RX ADMIN — Medication 650 MILLIGRAM(S): at 09:17

## 2017-09-20 RX ADMIN — APIXABAN 5 MILLIGRAM(S): 2.5 TABLET, FILM COATED ORAL at 21:26

## 2017-09-20 RX ADMIN — GABAPENTIN 800 MILLIGRAM(S): 400 CAPSULE ORAL at 21:26

## 2017-09-20 RX ADMIN — Medication 250 MILLIGRAM(S): at 09:17

## 2017-09-20 RX ADMIN — Medication 50 MILLIGRAM(S): at 20:10

## 2017-09-20 RX ADMIN — OXYCODONE AND ACETAMINOPHEN 1 TABLET(S): 5; 325 TABLET ORAL at 16:53

## 2017-09-20 RX ADMIN — Medication 40 MILLIGRAM(S): at 20:03

## 2017-09-20 RX ADMIN — APIXABAN 5 MILLIGRAM(S): 2.5 TABLET, FILM COATED ORAL at 09:32

## 2017-09-20 RX ADMIN — SODIUM CHLORIDE 1000 MILLILITER(S): 9 INJECTION INTRAMUSCULAR; INTRAVENOUS; SUBCUTANEOUS at 08:37

## 2017-09-20 RX ADMIN — Medication 100 MILLIGRAM(S): at 21:26

## 2017-09-20 RX ADMIN — Medication 650 MILLIGRAM(S): at 08:37

## 2017-09-20 RX ADMIN — PIPERACILLIN AND TAZOBACTAM 200 GRAM(S): 4; .5 INJECTION, POWDER, LYOPHILIZED, FOR SOLUTION INTRAVENOUS at 08:41

## 2017-09-20 RX ADMIN — AMLODIPINE BESYLATE 5 MILLIGRAM(S): 2.5 TABLET ORAL at 16:54

## 2017-09-20 RX ADMIN — GABAPENTIN 800 MILLIGRAM(S): 400 CAPSULE ORAL at 11:47

## 2017-09-20 NOTE — H&P ADULT - PROBLEM SELECTOR PLAN 2
May be combination of Metformin and Sepsis. Currently he has no evidence of hypoperfusion given warm extremities and mentating. Lactic Acid decrease to 3.7 after 2 liters of fluids. At this time would c/w Abx, hold metformin, and c/w IVF @ 100cc/hr

## 2017-09-20 NOTE — H&P ADULT - HISTORY OF PRESENT ILLNESS
77M DM2, HTN, Afib s/p ablation 2006, prior lung CA s/p lobectomy (RUL and part of LLL), B/L DVT on Eliquis, presents to [facility] ED for 77M DM2, HTN, Afib s/p ablation 2006, prior lung CA s/p lobectomy (RUL and part of LLL), B/L DVT on Eliquis, presents to [facility] ED for 1 day of fevers and chills. Patient in USOH last night and went to bed, but then woke up this AM feeling chills. Family member took his temperature and was found to have a low grade fever of 100.3, and as per patient his mind was "cloudy" at the time unsure of what time of day it was. He then came to the ER.     In the ED patient given Vancomycin 1g IV x 1 (9:00), Zosyn 3.375 g IV x 1 (8:00) and 2 liters NS bolus    Patient seen at bedside. He feels better after the antibiotics. He says his mind is clear again, and no longer having fevers and chills. He states that his legs might have become more discolored than their baseline and slightly more swollen. Of note he was recently admitted and discharged for cellulitis last month.

## 2017-09-20 NOTE — ED ADULT NURSE NOTE - OBJECTIVE STATEMENT
pt on bed aox3 reports fever started yesterday,Tmax 103 at home with dizziness,lightheaded early this morning, redness,  swelling discoloration cellulitis noted on both lower extremity worse on the Right. mild pain on the right calf area claimed Dx with DVT on Eliquis denies Ha palpitation CP SOB N V dysuria abdominal pain Pa at bedside evaluate the pt.

## 2017-09-20 NOTE — ED PROVIDER NOTE - FAMILY HISTORY
Family history of heart failure, father     Sibling  Still living? No  Family history of diabetes mellitus, Age at diagnosis: Age Unknown  Family history of liver cancer, Age at diagnosis: Age Unknown

## 2017-09-20 NOTE — H&P ADULT - PROBLEM SELECTOR PLAN 3
Non-purulent yet recurrent cellulitis. Has been in and out of the hospital twice this summer. Would start with Vancomycin, DC Zosyn follow up BCx.

## 2017-09-20 NOTE — H&P ADULT - NSHPPHYSICALEXAM_GEN_ALL_CORE
Vital Signs Last 24 Hrs  T(C): 36.7 (20 Sep 2017 11:27), Max: 38.8 (20 Sep 2017 07:57)  T(F): 98.1 (20 Sep 2017 11:27), Max: 101.9 (20 Sep 2017 07:57)  HR: 78 (20 Sep 2017 11:27) (78 - 110)  BP: 119/58 (20 Sep 2017 11:27) (119/58 - 167/80)  BP(mean): --  RR: 18 (20 Sep 2017 11:27) (16 - 18)  SpO2: 96% (20 Sep 2017 11:27) (96% - 100%)    General: NAD, non-toxic appearing, speaking in full sentences   HEENT: EOMI, PERRLA, no conjunctival pallor, MMM, no JVD, no thyromegaly, neck supple, trachea midline  CV: S1S2 RRR no MRG  Lungs: CTA BL  Abdomen: soft NTND +BS   Extremities: No CCE +WWP  Skin/MSK: No rashes, preserved ROM on active & passive movement  Neuro: AAOx3, no focal deficits (5/5 strength all extremities), no sensory deficits Vital Signs Last 24 Hrs  T(C): 36.7 (20 Sep 2017 11:27), Max: 38.8 (20 Sep 2017 07:57)  T(F): 98.1 (20 Sep 2017 11:27), Max: 101.9 (20 Sep 2017 07:57)  HR: 78 (20 Sep 2017 11:27) (78 - 110)  BP: 119/58 (20 Sep 2017 11:27) (119/58 - 167/80)  BP(mean): --  RR: 18 (20 Sep 2017 11:27) (16 - 18)  SpO2: 96% (20 Sep 2017 11:27) (96% - 100%)    General: NAD, obese, non-toxic appearing, speaking in full sentences   HEENT: EOMI, PERRLA, no conjunctival pallor, MMM, no JVD, no thyromegaly, neck supple, trachea midline  CV: S1S2 RRR no MRG  Lungs: decreased breath sounds RUL compared to Left, decreased breath sounds on Left base compared to right base   Abdomen: soft obese NTND +BS   Extremities: No clubbing or cyanosis, +2 pitting edema, circumferential hyperpigmented, erythematous skin changes of b/l legs extending from feet to below knees bilaterally. No weeping lesions, only +TTP to deep palpation  MSK: preserved ROM on active & passive movement  Neuro: AAOx3, no focal deficits (5/5 strength all extremities), no sensory deficits

## 2017-09-20 NOTE — ED PROVIDER NOTE - PROGRESS NOTE DETAILS
spoke with [phone] Dr. Israel Banks SAI Miller - spoke with [phone] Dr. Israel Banks who pt was admitted to prior, accepted pt. text paged MAR.

## 2017-09-20 NOTE — ED PROVIDER NOTE - MEDICAL DECISION MAKING DETAILS
78 y/o male with pmhx of HTN, A-fib s/p ablation (2006), DM type 2, right DVT on Eliquis, squamous cell lung CA s/p RUL and LLL lobectomy (2015), presents to ED for fever this morning. sepsis/sirs. plan: vancomycin, zosyn, tylenol for fever, 2 L of IV fluids, labs, VBG, blood cx, ekg, cxr, U/A, urine cx, admit to medicine

## 2017-09-20 NOTE — H&P ADULT - NSHPREVIEWOFSYSTEMS_GEN_ALL_CORE
REVIEW OF SYSTEMS:    CONSTITUTIONAL: (+) FEVER and CHILLS  EYES/ENT: No visual changes;  No vertigo or throat pain   NECK: No pain or stiffness  RESPIRATORY: No cough, wheezing, hemoptysis; No shortness of breath  CARDIOVASCULAR: No chest pain or palpitations  GASTROINTESTINAL: No abdominal or epigastric pain. No nausea, vomiting, or hematemesis; No diarrhea or constipation. No melena or hematochezia.  GENITOURINARY: No dysuria, frequency or hematuria  NEUROLOGICAL: No numbness or weakness  SKIN: (+) WORSENING B/L LE skin discoloration, pain, and swelling   All other review of systems is negative unless indicated above.

## 2017-09-20 NOTE — ED PROVIDER NOTE - CARDIAC PEDAL EDEMA
NON-PITTING/LE b/l nonpitting edema, right > left, +diffuse redness and purplish discoloration from inferior knees to toes, +hot to touch, DP pulses 2+ by ultrasound

## 2017-09-20 NOTE — ED PROVIDER NOTE - ATTENDING CONTRIBUTION TO CARE
DR Bloch- patient examined,  hx taken with wife as well. Np CP or SOB. Now oroiented x 3 Patient with fever, confusion , recent monthly admissions for cellulitis on suppression with amox. Mildly ill appearing, NAD, HEENT nml Lungs decreased BS  left base, heart sounds nml, abd soft leg with purple discoloration pulses only present by doppler, tender and swollen right leg with calf tenderness. imp Cellulitis.

## 2017-09-20 NOTE — ED ADULT NURSE REASSESSMENT NOTE - NS ED NURSE REASSESS COMMENT FT1
Report received from KARMEN Reed, pt A&Ox4 complaining of fevers, dizziness and redness and swelling to bilateral LE. Pt states he is currently being treated for a DVT on the right calf, taking Eliquis. Pt denies any chest pain or SOB at this time. IV access obtained, labs drawn and sent. Will continue to monitor.

## 2017-09-20 NOTE — ED ADULT TRIAGE NOTE - CHIEF COMPLAINT QUOTE
Pt arrives from home via EMS c/o fever starting 1 day ago.  103F at home.  Pt 99.3F in triage.  Pt feels weak.  Pt has dx blood clot to right leg.  Pt on Eliquis.

## 2017-09-20 NOTE — H&P ADULT - PROBLEM SELECTOR PLAN 7
As per prior charts, was placed on eliquis after failing xarelto therapy. Would c/w Eliquis 5 mg PO BID

## 2017-09-20 NOTE — CONSULT NOTE ADULT - SUBJECTIVE AND OBJECTIVE BOX
HPI:  77M DM2, HTN, Afib s/p ablation 2006, prior lung CA s/p lobectomy (RUL and part of LLL), B/L DVT on Eliquis, presents to [facility] ED for 1 day of fevers and chills. Patient in [facility] last night and went to bed, but then woke up this AM feeling chills. Family member took his temperature and was found to have a low grade fever of 100.3, and as per patient his mind was "cloudy" at the time unsure of what time of day it was. He then came to the ER.     In the ED patient given Vancomycin 1g IV x 1 (9:00), Zosyn 3.375 g IV x 1 (8:00) and 2 liters NS bolus    Feels better now.  States took amoxicillin ppx after last admission for cellulitis 2017.    Walks barefoot in home and deck.      PAST MEDICAL & SURGICAL HISTORY:  DVT (deep venous thrombosis): RLE dx 17  Venous stasis of both lower extremities  Balanitis  Squamous cell lung cancer  Atrial fibrillation: s/p ablation   Cellulitis: Both legs 2/2 chronic venous stasis  Morbid obesity  Anxiety  Neuropathy  Diabetes mellitus  HTN (hypertension)  Lung nodules: Flexible Bronchoscopy, Right VATS, Right Lung Resection - 1/21/15, LLL partial resection 2015  H/O prior ablation treatment: cardiac       Allergies    No Known Allergies    Intolerances        ANTIMICROBIALS:  vancomycin  IVPB 1000 every 12 hours      OTHER MEDS:  oxyCODONE    5 mG/acetaminophen 325 mG 1 Tablet(s) Oral every 4 hours PRN  apixaban 5 milliGRAM(s) Oral every 12 hours  gabapentin 800 milliGRAM(s) Oral three times a day  busPIRone 10 milliGRAM(s) Oral two times a day  metoprolol 50 milliGRAM(s) Oral two times a day  amLODIPine   Tablet 5 milliGRAM(s) Oral daily  ferrous    sulfate 325 milliGRAM(s) Oral daily  docusate sodium 100 milliGRAM(s) Oral two times a day PRN  furosemide    Tablet 40 milliGRAM(s) Oral two times a day  insulin lispro (HumaLOG) corrective regimen sliding scale   SubCutaneous three times a day before meals  dextrose 5%. 1000 milliLiter(s) IV Continuous <Continuous>  dextrose Gel 1 Dose(s) Oral once PRN  dextrose 50% Injectable 12.5 Gram(s) IV Push once  dextrose 50% Injectable 25 Gram(s) IV Push once  dextrose 50% Injectable 25 Gram(s) IV Push once  glucagon  Injectable 1 milliGRAM(s) IntraMuscular once PRN  sodium chloride 0.9%. 1000 milliLiter(s) IV Continuous <Continuous>      SOCIAL HISTORY: lives home    FAMILY HISTORY:  Family history of liver cancer (Sibling): sister  Family history of diabetes mellitus (Sibling): Brother  Family history of heart failure: father      REVIEW OF SYSTEMS  [  ] ROS unobtainable because:    [x  ] All other systems negative except as noted below:	    Constitutional:  [ x] fever [ ] weight loss  Skin:  [ ] rash [ ] phlebitis pain swelling redness right leg	  Eyes: [ ] icterus [ ] inflammation	  ENMT: [ ] discharge [ ] thrush [ ] ulcers [ ] exudates  Respiratory: [ ] dyspnea [ ] hemoptysis [ ] cough [ ] sputum	  Cardiovascular:  [ ] chest pain [ ] palpitations [ ] edema	  Gastrointestinal:  [ ] nausea [ ] vomiting [ ] diarrhea [ ] constipation [ ] pain	  Genitourinary:  [ ] dysuria [ ] frequency [ ] hematuria [ ] discharge [ ] flank pain  Musculoskeletal:  [ ] myalgias [ ] arthralgias [ ] arthritis	  Neurological:  [ ] headache [ ] seizures	  Psychiatric:  [ ] anxiety [ ] depression	  Hematology/Lymphatics:  [ ] lymphadenopathy  Endocrine:  [ ] adrenal [ ] thyroid  Allergic/Immunologic:	 [ ] transplant [ ] seasonal    PHYSICAL EXAM:  General:  non-toxic sitting side of stretcher in ER  HEAD/EYES:  white sclera   ENT:   supple   Cardiovascular:    normal   Respiratory:   clear to ausculation bilaterally  GI:   soft, non-tender, normal bowel sounds  :   no CVA tenderness   Musculoskeletal:  FROM knees, ankles  Neurologic:   non-focal exam   Skin:  swelling both legs  increased warmth and redness right lower le  Psychiatric:   appropriate affect  alert & oriented  Lines:   no phlebitis           Drug Dosing Weight  Height (cm): 187.96 (08 Aug 2017 17:07)  Weight (kg): 141.9 (08 Aug 2017 17:07)  BMI (kg/m2): 40.2 (08 Aug 2017 17:07)  BSA (m2): 2.63 (08 Aug 2017 17:07)    Vital Signs Last 24 Hrs  T(F): 98.1 (17 @ 11:27), Max: 101.9 (17 @ 07:57)    Vital Signs Last 24 Hrs  HR: 81 (17 @ 13:47) (78 - 110)  BP: 151/66 (17 @ 13:47) (119/58 - 167/80)  RR: 18 (17 @ 13:47)  SpO2: 98% (17 @ 13:47) (96% - 100%)  Wt(kg): --                          14.9   9.85  )-----------( 96       ( 20 Sep 2017 08:07 )             44.6           138  |  100  |  16  ----------------------------<  167<H>  3.9   |  21<L>  |  1.03    Ca    9.2      20 Sep 2017 08:07    TPro  7.3  /  Alb  4.0  /  TBili  0.8  /  DBili  x   /  AST  20  /  ALT  16  /  AlkPhos  50        Urinalysis Basic - ( 20 Sep 2017 13:45 )    Color: YELLOW / Appearance: CLEAR / S.029 / pH: 6.0  Gluc: TRACE / Ketone: NEGATIVE  / Bili: NEGATIVE / Urobili: NORMAL E.U.   Blood: NEGATIVE / Protein: 30 / Nitrite: NEGATIVE   Leuk Esterase: NEGATIVE / RBC: 0-2 / WBC 2-5   Sq Epi: x / Non Sq Epi: x / Bacteria: x        MICROBIOLOGY:    blood cultures x 2 sent    RADIOLOGY:    no new studies

## 2017-09-20 NOTE — ED PROVIDER NOTE - OBJECTIVE STATEMENT
78 y/o male with pmhx of HTN, A-fib s/p ablation (2006), DM type 2, right DVT on Eliquis, squamous cell lung CA s/p RUL and LLL lobectomy (2015), presents to ED for fever this morning. Son took temperate at home reports 100.3. Pt states he feels lightheaded, sweaty and thirsty. Was recently admitted to right LE cellulitis in August, did not follow up with outpatient ID. Was discharged on Keflex for a week and now taking Amoxicillin x 1 month as prescribed. Redness/swelling worsening. Pt did not take his Eliquis this morning, although took it as prescribed yesterday, 5mg BID. Compliant with medications. No sore throat, congestion, recent travel, sick contacts, chills, night sweats, cp, sob, palpitations, cough, abd pain, n/v/d, weakness, dysuria, hematuria, polyuria, weight loss. 78 y/o male with pmhx of HTN, A-fib s/p ablation (2006), DM type 2, right DVT on Eliquis, squamous cell lung CA s/p RUL and LLL lobectomy (2015), presents to ED for fever this morning. Son took temperate at home reports 100.3. Pt states he feels lightheaded, sweaty and thirsty. Was recently admitted to right LE cellulitis in August, did not follow up with outpatient ID. Was discharged on Keflex for a week and now taking Amoxicillin x 1 month as prescribed. Redness/swelling worsening. Pt did not take his Eliquis 5mg BID or Lopressor 50mg this morning, although took it as prescribed yesterday. Compliant with medications. No sore throat, congestion, recent travel, sick contacts, chills, night sweats, cp, sob, palpitations, cough, abd pain, n/v/d, weakness, dysuria, hematuria, polyuria, weight loss. 78 y/o male with pmhx of HTN, A-fib s/p ablation (2006), DM type 2, right DVT on Eliquis, squamous cell lung CA s/p RUL and LLL lobectomy (2015), presents to ED for fever this morning. Son took temperate at home reports 100.3. Pt states he feels lightheaded, sweaty and thirsty. Was recently admitted to right LE cellulitis in August, did not follow up with outpatient ID. Was discharged on Keflex for a week and now taking Amoxicillin x 1 month as prescribed. Redness/swelling worsening. Pt did not take his Eliquis 5mg BID or Lopressor 50mg this morning, although took it as prescribed yesterday. Compliant with medications. No sore throat, congestion, recent travel, sick contacts, chills, night sweats, cp, sob, palpitations, cough, abd pain, n/v/d, weakness, dysuria, hematuria, polyuria, weight loss.    Currently has no PCP

## 2017-09-20 NOTE — H&P ADULT - PROBLEM SELECTOR PLAN 1
Patient with clinical cellulitis in the setting of fevers, chills, altered sensorium (See HPI) and Lactic Acidosis. Likely Lactate is partially due to Metformin home dose, however given clinical history of patient's recurrent cellulitis may explain all of the symptoms. c/w IVF, ABx, and follow up BCx

## 2017-09-20 NOTE — H&P ADULT - ASSESSMENT
77M DM2, HTN, Afib s/p ablation 2006, prior lung CA s/p lobectomy (RUL and part of LLL), B/L DVT on Eliquis, presents to [facility] ED for

## 2017-09-20 NOTE — H&P ADULT - NSHPLABSRESULTS_GEN_ALL_CORE
CBC Full  -  ( 20 Sep 2017 08:07 )  WBC Count : 9.85 K/uL  Hemoglobin : 14.9 g/dL  Hematocrit : 44.6 %  Platelet Count - Automated : 96 K/uL  Mean Cell Volume : 87.5 fL  Mean Cell Hemoglobin : 29.2 pg  Mean Cell Hemoglobin Concentration : 33.4 %  Auto Neutrophil # : 8.93 K/uL  Auto Lymphocyte # : 0.44 K/uL  Auto Monocyte # : 0.39 K/uL  Auto Eosinophil # : 0.01 K/uL  Auto Basophil # : 0.02 K/uL  Auto Neutrophil % : 90.6 %  Auto Lymphocyte % : 4.5 %  Auto Monocyte % : 4.0 %  Auto Eosinophil % : 0.1 %  Auto Basophil % : 0.2 %    09-20    138  |  100  |  16  ----------------------------<  167<H>  3.9   |  21<L>  |  1.03    Ca    9.2      20 Sep 2017 08:07    TPro  7.3  /  Alb  4.0  /  TBili  0.8  /  DBili  x   /  AST  20  /  ALT  16  /  AlkPhos  50  09-20    Blood Gas Venous Comprehensive (09.20.17 @ 08:07)    Blood Gas Venous - Lactate: 4.6: Please note updated reference range. mmol/L    Blood Gas Venous - Chloride: 106 mmol/L    Blood Gas Venous - Creatinine: 0.87 mg/dL    pH, Venous: 7.40 pH    pCO2, Venous: 39 mmHg    pO2, Venous: 37 mmHg    HCO3, Venous: 24 mmol/L    Base Excess, Venous: -0.4: REFERENCE RANGE = -3 + 2 mmol/L mmol/L    Oxygen Saturation, Venous: 70.8 %    Blood Gas Venous - Sodium: 136 mmol/L    Blood Gas Venous - Potassium: 4.4 mmol/L    Blood Gas Venous - Glucose: 173    Blood Gas Venous - Hemoglobin: 15.7 g/dL    Blood Gas Venous - Hematocrit: 48.2: Delta: 39.7 on 08/08/  Delta: 39.7 on 08/08/ %

## 2017-09-20 NOTE — H&P ADULT - PROBLEM SELECTOR PLAN 5
On Metoprolol and Eliquis for rate control and AC. Would continue with both home doses of medications

## 2017-09-21 DIAGNOSIS — I82.539 CHRONIC EMBOLISM AND THROMBOSIS OF UNSPECIFIED POPLITEAL VEIN: ICD-10-CM

## 2017-09-21 DIAGNOSIS — I48.91 UNSPECIFIED ATRIAL FIBRILLATION: ICD-10-CM

## 2017-09-21 DIAGNOSIS — Z51.81 ENCOUNTER FOR THERAPEUTIC DRUG LEVEL MONITORING: ICD-10-CM

## 2017-09-21 LAB
ALBUMIN SERPL ELPH-MCNC: 3.7 G/DL — SIGNIFICANT CHANGE UP (ref 3.3–5)
ALP SERPL-CCNC: 41 U/L — SIGNIFICANT CHANGE UP (ref 40–120)
ALT FLD-CCNC: 13 U/L — SIGNIFICANT CHANGE UP (ref 4–41)
AST SERPL-CCNC: 13 U/L — SIGNIFICANT CHANGE UP (ref 4–40)
BACTERIA UR CULT: SIGNIFICANT CHANGE UP
BASOPHILS # BLD AUTO: 0.02 K/UL — SIGNIFICANT CHANGE UP (ref 0–0.2)
BASOPHILS NFR BLD AUTO: 0.2 % — SIGNIFICANT CHANGE UP (ref 0–2)
BILIRUB SERPL-MCNC: 1.1 MG/DL — SIGNIFICANT CHANGE UP (ref 0.2–1.2)
BUN SERPL-MCNC: 12 MG/DL — SIGNIFICANT CHANGE UP (ref 7–23)
CALCIUM SERPL-MCNC: 8.3 MG/DL — LOW (ref 8.4–10.5)
CHLORIDE SERPL-SCNC: 98 MMOL/L — SIGNIFICANT CHANGE UP (ref 98–107)
CO2 SERPL-SCNC: 24 MMOL/L — SIGNIFICANT CHANGE UP (ref 22–31)
CREAT SERPL-MCNC: 1.02 MG/DL — SIGNIFICANT CHANGE UP (ref 0.5–1.3)
EOSINOPHIL # BLD AUTO: 0.04 K/UL — SIGNIFICANT CHANGE UP (ref 0–0.5)
EOSINOPHIL NFR BLD AUTO: 0.5 % — SIGNIFICANT CHANGE UP (ref 0–6)
GLUCOSE SERPL-MCNC: 150 MG/DL — HIGH (ref 70–99)
HCT VFR BLD CALC: 40.4 % — SIGNIFICANT CHANGE UP (ref 39–50)
HGB BLD-MCNC: 13.4 G/DL — SIGNIFICANT CHANGE UP (ref 13–17)
IMM GRANULOCYTES # BLD AUTO: 0.08 # — SIGNIFICANT CHANGE UP
IMM GRANULOCYTES NFR BLD AUTO: 0.9 % — SIGNIFICANT CHANGE UP (ref 0–1.5)
LYMPHOCYTES # BLD AUTO: 0.73 K/UL — LOW (ref 1–3.3)
LYMPHOCYTES # BLD AUTO: 8.2 % — LOW (ref 13–44)
MCHC RBC-ENTMCNC: 29 PG — SIGNIFICANT CHANGE UP (ref 27–34)
MCHC RBC-ENTMCNC: 33.2 % — SIGNIFICANT CHANGE UP (ref 32–36)
MCV RBC AUTO: 87.4 FL — SIGNIFICANT CHANGE UP (ref 80–100)
MONOCYTES # BLD AUTO: 0.62 K/UL — SIGNIFICANT CHANGE UP (ref 0–0.9)
MONOCYTES NFR BLD AUTO: 7 % — SIGNIFICANT CHANGE UP (ref 2–14)
NEUTROPHILS # BLD AUTO: 7.39 K/UL — SIGNIFICANT CHANGE UP (ref 1.8–7.4)
NEUTROPHILS NFR BLD AUTO: 83.2 % — HIGH (ref 43–77)
NRBC # FLD: 0 — SIGNIFICANT CHANGE UP
PLATELET # BLD AUTO: 90 K/UL — LOW (ref 150–400)
PMV BLD: 10.2 FL — SIGNIFICANT CHANGE UP (ref 7–13)
POTASSIUM SERPL-MCNC: 3.5 MMOL/L — SIGNIFICANT CHANGE UP (ref 3.5–5.3)
POTASSIUM SERPL-SCNC: 3.5 MMOL/L — SIGNIFICANT CHANGE UP (ref 3.5–5.3)
PROT SERPL-MCNC: 7.1 G/DL — SIGNIFICANT CHANGE UP (ref 6–8.3)
RBC # BLD: 4.62 M/UL — SIGNIFICANT CHANGE UP (ref 4.2–5.8)
RBC # FLD: 15.7 % — HIGH (ref 10.3–14.5)
SODIUM SERPL-SCNC: 138 MMOL/L — SIGNIFICANT CHANGE UP (ref 135–145)
SPECIMEN SOURCE: SIGNIFICANT CHANGE UP
WBC # BLD: 8.88 K/UL — SIGNIFICANT CHANGE UP (ref 3.8–10.5)
WBC # FLD AUTO: 8.88 K/UL — SIGNIFICANT CHANGE UP (ref 3.8–10.5)

## 2017-09-21 PROCEDURE — 99255 IP/OBS CONSLTJ NEW/EST HI 80: CPT

## 2017-09-21 PROCEDURE — 99223 1ST HOSP IP/OBS HIGH 75: CPT

## 2017-09-21 PROCEDURE — 99232 SBSQ HOSP IP/OBS MODERATE 35: CPT

## 2017-09-21 RX ORDER — POLYETHYLENE GLYCOL 3350 17 G/17G
17 POWDER, FOR SOLUTION ORAL DAILY
Qty: 0 | Refills: 0 | Status: DISCONTINUED | OUTPATIENT
Start: 2017-09-21 | End: 2017-09-27

## 2017-09-21 RX ORDER — SENNA PLUS 8.6 MG/1
2 TABLET ORAL AT BEDTIME
Qty: 0 | Refills: 0 | Status: DISCONTINUED | OUTPATIENT
Start: 2017-09-21 | End: 2017-09-27

## 2017-09-21 RX ORDER — OXYCODONE HYDROCHLORIDE 5 MG/1
5 TABLET ORAL EVERY 4 HOURS
Qty: 0 | Refills: 0 | Status: DISCONTINUED | OUTPATIENT
Start: 2017-09-21 | End: 2017-09-27

## 2017-09-21 RX ORDER — ACETAMINOPHEN 500 MG
650 TABLET ORAL EVERY 6 HOURS
Qty: 0 | Refills: 0 | Status: DISCONTINUED | OUTPATIENT
Start: 2017-09-21 | End: 2017-09-27

## 2017-09-21 RX ORDER — ACETAMINOPHEN 500 MG
325 TABLET ORAL ONCE
Qty: 0 | Refills: 0 | Status: COMPLETED | OUTPATIENT
Start: 2017-09-21 | End: 2017-09-21

## 2017-09-21 RX ADMIN — Medication 40 MILLIGRAM(S): at 05:17

## 2017-09-21 RX ADMIN — Medication 2: at 12:01

## 2017-09-21 RX ADMIN — Medication 50 MILLIGRAM(S): at 18:37

## 2017-09-21 RX ADMIN — SODIUM CHLORIDE 100 MILLILITER(S): 9 INJECTION INTRAMUSCULAR; INTRAVENOUS; SUBCUTANEOUS at 08:31

## 2017-09-21 RX ADMIN — OXYCODONE AND ACETAMINOPHEN 1 TABLET(S): 5; 325 TABLET ORAL at 18:35

## 2017-09-21 RX ADMIN — Medication 10 MILLIGRAM(S): at 05:17

## 2017-09-21 RX ADMIN — Medication 2: at 08:29

## 2017-09-21 RX ADMIN — Medication 100 MILLIGRAM(S): at 13:33

## 2017-09-21 RX ADMIN — POLYETHYLENE GLYCOL 3350 17 GRAM(S): 17 POWDER, FOR SOLUTION ORAL at 11:35

## 2017-09-21 RX ADMIN — OXYCODONE AND ACETAMINOPHEN 1 TABLET(S): 5; 325 TABLET ORAL at 19:35

## 2017-09-21 RX ADMIN — Medication 325 MILLIGRAM(S): at 18:52

## 2017-09-21 RX ADMIN — Medication 40 MILLIGRAM(S): at 18:35

## 2017-09-21 RX ADMIN — APIXABAN 5 MILLIGRAM(S): 2.5 TABLET, FILM COATED ORAL at 05:17

## 2017-09-21 RX ADMIN — Medication 325 MILLIGRAM(S): at 11:35

## 2017-09-21 RX ADMIN — Medication 2: at 18:35

## 2017-09-21 RX ADMIN — Medication 50 MILLIGRAM(S): at 05:17

## 2017-09-21 RX ADMIN — GABAPENTIN 800 MILLIGRAM(S): 400 CAPSULE ORAL at 13:33

## 2017-09-21 RX ADMIN — GABAPENTIN 800 MILLIGRAM(S): 400 CAPSULE ORAL at 05:17

## 2017-09-21 RX ADMIN — Medication 10 MILLIGRAM(S): at 18:35

## 2017-09-21 RX ADMIN — AMLODIPINE BESYLATE 5 MILLIGRAM(S): 2.5 TABLET ORAL at 05:17

## 2017-09-21 RX ADMIN — Medication 100 MILLIGRAM(S): at 05:17

## 2017-09-21 RX ADMIN — APIXABAN 5 MILLIGRAM(S): 2.5 TABLET, FILM COATED ORAL at 18:36

## 2017-09-21 NOTE — CONSULT NOTE ADULT - SUBJECTIVE AND OBJECTIVE BOX
Cardiology/Vascular Medicine Inpatient Consultation Note    Asked by Dr. Wood to evaluate patient.    HISTORY OF PRESENT ILLNESS:  Patient is a 78 yo man with DM2, HTN, Afib s/p ablation 2006, prior lung CA s/p lobectomy (RUL and part of LLL), B/L DVT on Eliquis, presents to [facility] ED for 1 day of fevers and chills. Patient in USOH last night and went to bed, but then woke up this AM feeling chills. Family member took his temperature and was found to have a low grade fever of 100.3, and as per patient his mind was "cloudy" at the time unsure of what time of day it was. He then came to the ER.     In the ED patient given Vancomycin 1g IV x 1 (9:00), Zosyn 3.375 g IV x 1 (8:00) and 2 liters NS bolus    Patient seen at bedside. He feels better after the antibiotics. He says his mind is clear again, and no longer having fevers and chills. He states that his legs might have become more discolored than their baseline and slightly more swollen. Of note he was recently admitted and discharged for cellulitis last month. (20 Sep 2017 11:27)          Allergies  No Known Allergies    MEDICATIONS:  apixaban 5 milliGRAM(s) Oral every 12 hours  metoprolol 50 milliGRAM(s) Oral two times a day  amLODIPine   Tablet 5 milliGRAM(s) Oral daily  furosemide    Tablet 40 milliGRAM(s) Oral two times a day    ceFAZolin   IVPB 2000 milliGRAM(s) IV Intermittent every 8 hours      gabapentin 800 milliGRAM(s) Oral three times a day  busPIRone 10 milliGRAM(s) Oral two times a day  acetaminophen   Tablet 650 milliGRAM(s) Oral every 6 hours PRN  oxyCODONE    IR 5 milliGRAM(s) Oral every 4 hours PRN    docusate sodium 100 milliGRAM(s) Oral two times a day PRN  senna 2 Tablet(s) Oral at bedtime  polyethylene glycol 3350 17 Gram(s) Oral daily    insulin lispro (HumaLOG) corrective regimen sliding scale   SubCutaneous three times a day before meals  dextrose Gel 1 Dose(s) Oral once PRN  dextrose 50% Injectable 12.5 Gram(s) IV Push once  dextrose 50% Injectable 25 Gram(s) IV Push once  dextrose 50% Injectable 25 Gram(s) IV Push once  glucagon  Injectable 1 milliGRAM(s) IntraMuscular once PRN    ferrous    sulfate 325 milliGRAM(s) Oral daily  dextrose 5%. 1000 milliLiter(s) IV Continuous <Continuous>  sodium chloride 0.9%. 1000 milliLiter(s) IV Continuous <Continuous>      PAST MEDICAL & SURGICAL HISTORY:  DVT (deep venous thrombosis): RLE dx 6/28/17  Venous stasis of both lower extremities  Balanitis  Squamous cell lung cancer  Atrial fibrillation: s/p ablation 2006  Cellulitis: Both legs 2/2 chronic venous stasis  Morbid obesity  Anxiety  Neuropathy  Diabetes mellitus  HTN (hypertension)  Lung nodules: Flexible Bronchoscopy, Right VATS, Right Lung Resection - 1/21/15, LLL partial resection 2/2015  H/O prior ablation treatment: cardiac 2006      FAMILY HISTORY:  Family history of liver cancer (Sibling): sister  Family history of diabetes mellitus (Sibling): Brother  Family history of heart failure: father      SOCIAL HISTORY:    [ ] Non-smoker  [ ] Smoker  [ ] Alcohol    REVIEW OF SYSTEMS:  CONSTITUTIONAL: No fever, weight loss, or fatigue  EYES: No eye pain, visual disturbances, or discharge  ENMT:  No difficulty hearing, tinnitus, vertigo; No sinus or throat pain  NECK: No pain or stiffness  RESPIRATORY: No cough, wheezing, chills or hemoptysis; No Shortness of Breath  CARDIOVASCULAR: No chest pain, palpitations, passing out, dizziness, or leg swelling  GASTROINTESTINAL: No abdominal or epigastric pain. No nausea, vomiting, or hematemesis; No diarrhea or constipation. No melena or hematochezia.  GENITOURINARY: No dysuria, frequency, hematuria, or incontinence  NEUROLOGICAL: No headaches, memory loss, loss of strength, numbness, or tremors  SKIN: No itching, burning, rashes, or lesions   LYMPH Nodes: No enlarged glands  ENDOCRINE: No heat or cold intolerance; No hair loss  MUSCULOSKELETAL: No joint pain or swelling; No muscle, back, or extremity pain  PSYCHIATRIC: No depression, anxiety, mood swings, or difficulty sleeping  HEME/LYMPH: No easy bruising, or bleeding gums  ALLERY AND IMMUNOLOGIC: No hives or eczema	    PHYSICAL EXAM:  T(C): 37.2 (09-21-17 @ 20:23), Max: 38.9 (09-21-17 @ 18:32)  HR: 82 (09-21-17 @ 20:23) (69 - 89)  BP: 111/61 (09-21-17 @ 20:23) (111/61 - 159/89)  RR: 19 (09-21-17 @ 20:23) (17 - 19)  SpO2: 99% (09-21-17 @ 20:23) (95% - 100%)  Wt(kg): --  I&O's Summary      Appearance: Normal	  HEENT:   Normal oral mucosa, PERRL, EOMI	  Lymphatic: No lymphadenopathy  Cardiovascular: Normal S1 S2, No JVD, No murmurs, No edema  Respiratory: Lungs clear to auscultation	  Psychiatry: A & O x 3, Mood & affect appropriate  Gastrointestinal:  Soft, Non-tender, + BS	  Skin: No rashes, No ecchymoses, No cyanosis	  Neurologic: Non-focal  Extremities: Normal range of motion, No clubbing, cyanosis or edema  Vascular: Peripheral pulses palpable 2+ bilaterally      LABS:	 	               13.4   8.88  )-----------( 90       ( 21 Sep 2017 05:30 )             40.4     09-21    138  |  98  |  12  ----------------------------<  150<H>  3.5   |  24  |  1.02  09-20    138  |  100  |  16  ----------------------------<  167<H>  3.9   |  21<L>  |  1.03    Ca    8.3<L>      21 Sep 2017 05:30  Ca    9.2      20 Sep 2017 08:07    TPro  7.1  /  Alb  3.7  /  TBili  1.1  /  DBili  x   /  AST  13  /  ALT  13  /  AlkPhos  41  09-21  TPro  7.3  /  Alb  4.0  /  TBili  0.8  /  DBili  x   /  AST  20  /  ALT  16  /  AlkPhos  50  09-20    < from: Xray Chest 2 Views PA/Lat (09.20.17 @ 09:02) >  EXAM:  RAD CHEST PA LAT        PROCEDURE DATE:  Sep 20 2017         INTERPRETATION:  TIME OF EXAM: September 20, 2017 at 9:18 AM    CLINICAL INFORMATION: Chest pain    TECHNIQUE:   PA and lateral chest    INTERPRETATION:     Lungs are free of focalabnormalities. The heart is not enlarged and   there are no effusions or vascular congestion to indicate CHF.      COMPARISON:  August 8, 2017      IMPRESSION:  Clear lungs.      < from: TTE with Doppler (w/Cont) (07.03.17 @ 13:05) >  Patient name: CALLIE MACIAS  YOB: 1940   Age: 77 (M)   MR#: [id number]  Study Date: 7/3/2017  Location: [address] kSonographer: Jl Long  [facility]  Study quality: Technically Difficult  Referring Physician: Darren Wood MD  Blood Pressure: 160/88 mmHg  Height: 187 cm  Weight: 151 kg  BSA: 2.7 m2  ------------------------------------------------------------------------  PROCEDURE: Transthoracic echocardiogram with 2-D, M-Mode  and complete spectral and color flow Doppler.  Verbal consent was obtained for injection of echo contrast.  Following  intravenous injection of contrast, harmonic  imaging was performed.  INDICATION: Unspecified atrial fibrillation (I48.91),  Shortness of breath (R06.02)  ------------------------------------------------------------------------  OBSERVATIONS:  Mitral Valve: Mitral annular calcification, otherwise  normal mitral valve. Minimal mitral regurgitation.  Aortic Root: Normal aortic root.  Aortic Valve: Aortic valve leaflet morphology not well  visualized.  Left Atrium: Normal left atrium.  Left Ventricle: Endocardium not well visualized; despite  the use of IV contrast, unable to evaluate left ventricular  systolic function.  Right Heart: Normal right atrium. Unable to accurately  evaluate right ventricular size or systolic function.  Tricuspid valve not well visualized. Pulmonic valve not  well visualized.  Pericardium/PleuraNormal pericardium with no pericardial  effusion.  Hemodynamic: Estimated right ventricular systolicpressure  equals 41 mm Hg, assuming right atrial pressure equals 10  mm Hg, consistent with mild pulmonary hypertension.  ------------------------------------------------------------------------  CONCLUSIONS:  Technically very difficult study.  1. Mitral annular calcification, otherwise normal mitral  valve. Minimal mitral regurgitation.  2. Endocardium not well visualized; despite the use of IV  contrast, unable to evaluate left ventricular systolic  function.  3. Unable to accurately evaluate right ventricular size or  systolic function.  4. Estimated right ventricular systolic pressure equals 41  mm Hg, assuming right atrial pressure equals 10 mm Hg,  consistent with mild pulmonary hypertension.  ------------------------------------------------------------------------  Confirmed on  7/3/2017 - 14:34:08 by Thierry Cottrell M.D.  ------------------------------------------------------------------------    < end of copied text >    < from: US Duplex Venous Lower Ext Complete, Bilateral (07.11.17 @ 10:56) >  EXAM:  US DPLX LWR EXT VEINS COMPL BI        PROCEDURE DATE:  Jul 11 2017         INTERPRETATION:  CLINICAL INFORMATION: Lower extremity swelling. History   of DVT and cellulitis.    COMPARISON: Duplex sonography of the bilateral lower extremities dated   5/13/2015    TECHNIQUE: Duplex sonography of the BILATERAL LOWER extremities with   color and spectral Doppler, with and without compression.      FINDINGS:    There is normal compressibility of the left common femoral, femoral and   poplitealveins.     Normal compressibility of the right common and femoral veins are   demonstrated. There is noncompressibility with filling defect in the   right popliteal and gastrocnemius veins.     Calf veins are not visualized bilaterally.      IMPRESSION:     Above and below knee acute DVT in the right lower extremity as described.    Dr. Silver discussed these findings with SAI Richard on 7/11/2017 11:15   AM with read back.      REBEKAH SILVER M.D., RADIOLOGY RESIDENT  This document has been electronically signed.  JESUS MANUEL ALEXANDER M.D., ATTENDING RADIOLOGIST  This document has been electronically signed. Jul 11 2017 12:40PM

## 2017-09-22 LAB
ALBUMIN SERPL ELPH-MCNC: 3.3 G/DL — SIGNIFICANT CHANGE UP (ref 3.3–5)
ALP SERPL-CCNC: 36 U/L — LOW (ref 40–120)
ALT FLD-CCNC: 11 U/L — SIGNIFICANT CHANGE UP (ref 4–41)
AST SERPL-CCNC: 11 U/L — SIGNIFICANT CHANGE UP (ref 4–40)
BASOPHILS # BLD AUTO: 0.02 K/UL — SIGNIFICANT CHANGE UP (ref 0–0.2)
BASOPHILS NFR BLD AUTO: 0.3 % — SIGNIFICANT CHANGE UP (ref 0–2)
BILIRUB SERPL-MCNC: 0.7 MG/DL — SIGNIFICANT CHANGE UP (ref 0.2–1.2)
BUN SERPL-MCNC: 12 MG/DL — SIGNIFICANT CHANGE UP (ref 7–23)
CALCIUM SERPL-MCNC: 8.1 MG/DL — LOW (ref 8.4–10.5)
CHLORIDE SERPL-SCNC: 100 MMOL/L — SIGNIFICANT CHANGE UP (ref 98–107)
CO2 SERPL-SCNC: 24 MMOL/L — SIGNIFICANT CHANGE UP (ref 22–31)
CREAT SERPL-MCNC: 0.92 MG/DL — SIGNIFICANT CHANGE UP (ref 0.5–1.3)
EOSINOPHIL # BLD AUTO: 0.1 K/UL — SIGNIFICANT CHANGE UP (ref 0–0.5)
EOSINOPHIL NFR BLD AUTO: 1.6 % — SIGNIFICANT CHANGE UP (ref 0–6)
GLUCOSE SERPL-MCNC: 147 MG/DL — HIGH (ref 70–99)
HCT VFR BLD CALC: 36.2 % — LOW (ref 39–50)
HGB BLD-MCNC: 12.2 G/DL — LOW (ref 13–17)
IMM GRANULOCYTES # BLD AUTO: 0.03 # — SIGNIFICANT CHANGE UP
IMM GRANULOCYTES NFR BLD AUTO: 0.5 % — SIGNIFICANT CHANGE UP (ref 0–1.5)
LYMPHOCYTES # BLD AUTO: 0.81 K/UL — LOW (ref 1–3.3)
LYMPHOCYTES # BLD AUTO: 13.1 % — SIGNIFICANT CHANGE UP (ref 13–44)
MCHC RBC-ENTMCNC: 29.3 PG — SIGNIFICANT CHANGE UP (ref 27–34)
MCHC RBC-ENTMCNC: 33.7 % — SIGNIFICANT CHANGE UP (ref 32–36)
MCV RBC AUTO: 87 FL — SIGNIFICANT CHANGE UP (ref 80–100)
MONOCYTES # BLD AUTO: 0.49 K/UL — SIGNIFICANT CHANGE UP (ref 0–0.9)
MONOCYTES NFR BLD AUTO: 7.9 % — SIGNIFICANT CHANGE UP (ref 2–14)
NEUTROPHILS # BLD AUTO: 4.73 K/UL — SIGNIFICANT CHANGE UP (ref 1.8–7.4)
NEUTROPHILS NFR BLD AUTO: 76.6 % — SIGNIFICANT CHANGE UP (ref 43–77)
NRBC # FLD: 0 — SIGNIFICANT CHANGE UP
PLATELET # BLD AUTO: 88 K/UL — LOW (ref 150–400)
PMV BLD: 10.6 FL — SIGNIFICANT CHANGE UP (ref 7–13)
POTASSIUM SERPL-MCNC: 3.1 MMOL/L — LOW (ref 3.5–5.3)
POTASSIUM SERPL-SCNC: 3.1 MMOL/L — LOW (ref 3.5–5.3)
PROT SERPL-MCNC: 6.6 G/DL — SIGNIFICANT CHANGE UP (ref 6–8.3)
RBC # BLD: 4.16 M/UL — LOW (ref 4.2–5.8)
RBC # FLD: 15.5 % — HIGH (ref 10.3–14.5)
SODIUM SERPL-SCNC: 138 MMOL/L — SIGNIFICANT CHANGE UP (ref 135–145)
WBC # BLD: 6.18 K/UL — SIGNIFICANT CHANGE UP (ref 3.8–10.5)
WBC # FLD AUTO: 6.18 K/UL — SIGNIFICANT CHANGE UP (ref 3.8–10.5)

## 2017-09-22 PROCEDURE — 99231 SBSQ HOSP IP/OBS SF/LOW 25: CPT

## 2017-09-22 PROCEDURE — 99232 SBSQ HOSP IP/OBS MODERATE 35: CPT

## 2017-09-22 PROCEDURE — 99252 IP/OBS CONSLTJ NEW/EST SF 35: CPT

## 2017-09-22 PROCEDURE — 99233 SBSQ HOSP IP/OBS HIGH 50: CPT

## 2017-09-22 RX ORDER — POTASSIUM CHLORIDE 20 MEQ
40 PACKET (EA) ORAL EVERY 4 HOURS
Qty: 0 | Refills: 0 | Status: COMPLETED | OUTPATIENT
Start: 2017-09-22 | End: 2017-09-22

## 2017-09-22 RX ADMIN — Medication 10 MILLIGRAM(S): at 06:05

## 2017-09-22 RX ADMIN — Medication 40 MILLIGRAM(S): at 06:05

## 2017-09-22 RX ADMIN — Medication 50 MILLIGRAM(S): at 06:05

## 2017-09-22 RX ADMIN — GABAPENTIN 800 MILLIGRAM(S): 400 CAPSULE ORAL at 13:52

## 2017-09-22 RX ADMIN — Medication 40 MILLIGRAM(S): at 17:36

## 2017-09-22 RX ADMIN — OXYCODONE HYDROCHLORIDE 5 MILLIGRAM(S): 5 TABLET ORAL at 16:30

## 2017-09-22 RX ADMIN — Medication 2: at 13:03

## 2017-09-22 RX ADMIN — SENNA PLUS 2 TABLET(S): 8.6 TABLET ORAL at 22:30

## 2017-09-22 RX ADMIN — Medication 100 MILLIGRAM(S): at 00:15

## 2017-09-22 RX ADMIN — Medication 325 MILLIGRAM(S): at 11:44

## 2017-09-22 RX ADMIN — SENNA PLUS 2 TABLET(S): 8.6 TABLET ORAL at 00:15

## 2017-09-22 RX ADMIN — GABAPENTIN 800 MILLIGRAM(S): 400 CAPSULE ORAL at 22:30

## 2017-09-22 RX ADMIN — GABAPENTIN 800 MILLIGRAM(S): 400 CAPSULE ORAL at 00:15

## 2017-09-22 RX ADMIN — GABAPENTIN 800 MILLIGRAM(S): 400 CAPSULE ORAL at 06:05

## 2017-09-22 RX ADMIN — Medication 2: at 08:35

## 2017-09-22 RX ADMIN — Medication 100 MILLIGRAM(S): at 22:30

## 2017-09-22 RX ADMIN — Medication 100 MILLIGRAM(S): at 13:55

## 2017-09-22 RX ADMIN — APIXABAN 5 MILLIGRAM(S): 2.5 TABLET, FILM COATED ORAL at 17:35

## 2017-09-22 RX ADMIN — AMLODIPINE BESYLATE 5 MILLIGRAM(S): 2.5 TABLET ORAL at 06:05

## 2017-09-22 RX ADMIN — OXYCODONE HYDROCHLORIDE 5 MILLIGRAM(S): 5 TABLET ORAL at 15:44

## 2017-09-22 RX ADMIN — Medication 40 MILLIEQUIVALENT(S): at 11:43

## 2017-09-22 RX ADMIN — APIXABAN 5 MILLIGRAM(S): 2.5 TABLET, FILM COATED ORAL at 06:05

## 2017-09-22 RX ADMIN — Medication 40 MILLIEQUIVALENT(S): at 13:52

## 2017-09-22 RX ADMIN — Medication 100 MILLIGRAM(S): at 06:05

## 2017-09-22 RX ADMIN — Medication 10 MILLIGRAM(S): at 17:36

## 2017-09-22 RX ADMIN — Medication 50 MILLIGRAM(S): at 17:35

## 2017-09-22 NOTE — CONSULT NOTE ADULT - ASSESSMENT
Recurrent cellulitis right leg in 78 yo man with h/o lung cancer, DM, DVT.  Blood cultures testing.  Suggest:  Cefazolin 2 gm IV q 8 hours.
78yo M with b/l LE cellulitis, left leg coolness. Patient with pulses detected in the foot bilaterally. In the setting of edema/cellulitis, it becomes more difficult to palpate but there is a dopplerable signal.  - Low suspicion for acute limb ischemia.  - Continue IV abx as per ID for cellulitis  - Leg elevation  - Pain control  - D/w Dr. Kandice ROBERSON team surgery  [id number]

## 2017-09-22 NOTE — CONSULT NOTE ADULT - SUBJECTIVE AND OBJECTIVE BOX
77M with hx HTN, afib, recurrent cellulitis in his lower extremities, now admitted for cellulitis in both legs after waking up 1 day prior to presentation with fevers/chills. He complains of pain in his lower extremities, change in color, and swelling. He was admitted and is in the [facility] where he is being followed by infectious disease and is on ancef IV. His wife and nurse were concerned earlier today when they thought his left leg felt colder all of a sudden. Vascular consulted for evaluation of acute limb ischemia.      PMH  DVT (deep venous thrombosis)  Venous stasis of both lower extremities  Balanitis  Squamous cell lung cancer  Atrial fibrillation  Lung mass  Cellulitis  Abnormal finding  Morbid obesity  Cardiac arrhythmia  Anxiety  Neuropathy  Diabetes mellitus  HTN (hypertension)  Obesity    PSH  Lung nodules  H/O prior ablation treatment    MEDS    Allergies    No Known Allergies    Physical Exam  T(C): 36.8 (09-22-17 @ 14:30), Max: 38.9 (09-21-17 @ 18:32)  HR: 92 (09-22-17 @ 14:30) (65 - 92)  BP: 151/64 (09-22-17 @ 14:30) (111/61 - 159/89)  RR: 18 (09-22-17 @ 14:30) (16 - 19)  SpO2: 96% (09-22-17 @ 14:30) (96% - 99%)  Wt(kg): --  Tmax: T(C): , Max: 38.9 (09-21-17 @ 18:32)  Wt(kg): --    Gen: NAD  HEENT: normocephalic, atraumatic, no scleral icterus  CV: S1, S2, irregularly irregular  Pulm: CTA B/L  Abd: Soft, morbidly obese, ND, NTP, no rebound, no guarding, no palpable organomegaly/masses  RLE: warm, calf with extensive erythema, tenderness to palpation, edema. dopplerable PT/DP. Good motor function, decreased sensation  LLE: warm until midway down the calf when it becomes cooler. palpable PT, dopplerable DP. Good motor function, decreased sensation.      Labs:                        12.2   6.18  )-----------( 88       ( 22 Sep 2017 06:35 )             36.2     09-22    138  |  100  |  12  ----------------------------<  147<H>  3.1<L>   |  24  |  0.92    Ca    8.1<L>      22 Sep 2017 06:35    TPro  6.6  /  Alb  3.3  /  TBili  0.7  /  DBili  x   /  AST  11  /  ALT  11  /  AlkPhos  36<L>  09-22 77M with hx HTN, afib, recurrent cellulitis in his lower extremities, now admitted for cellulitis in both legs after waking up 1 day prior to presentation with fevers/chills. He complains of pain in his lower extremities, change in color, and swelling. He was admitted and is in the [facility] where he is being followed by infectious disease and is on ancef IV. His wife and nurse were concerned earlier today when they thought his left leg felt colder all of a sudden. Vascular consulted for evaluation of acute limb ischemia.      PMH  DVT (deep venous thrombosis)  Venous stasis of both lower extremities  Balanitis  Squamous cell lung cancer  Atrial fibrillation  Lung mass  Cellulitis  Abnormal finding  Morbid obesity  Cardiac arrhythmia  Anxiety  Neuropathy  Diabetes mellitus  HTN (hypertension)  Obesity    PSH  Lung nodules  H/O prior ablation treatment    MEDS    Allergies    No Known Allergies    REVIEW OF SYSTEMS:  CONSTITUTIONAL: + chills  EYES/ENT: No visual changes;  No vertigo or throat pain   NECK: No pain or stiffness  RESPIRATORY: shortness of breath  CARDIOVASCULAR: No chest pain or palpitations at present  GASTROINTESTINAL: No abdominal or epigastric pain. No nausea, vomiting, or hematemesis; No diarrhea or constipation. No melena or hematochezia.  GENITOURINARY: No dysuria, frequency, foamy urine, urinary urgency, incontinence or hematuria  NEUROLOGICAL: No numbness or weakness  SKIN: leg cellulitisy  All other review of systems is negative unless indicated above.      Physical Exam  T(C): 36.8 (09-22-17 @ 14:30), Max: 38.9 (09-21-17 @ 18:32)  HR: 92 (09-22-17 @ 14:30) (65 - 92)  BP: 151/64 (09-22-17 @ 14:30) (111/61 - 159/89)  RR: 18 (09-22-17 @ 14:30) (16 - 19)  SpO2: 96% (09-22-17 @ 14:30) (96% - 99%)  Wt(kg): --  Tmax: T(C): , Max: 38.9 (09-21-17 @ 18:32)  Wt(kg): --    Gen: NAD  HEENT: normocephalic, atraumatic, no scleral icterus  CV: S1, S2, irregularly irregular  Pulm: CTA B/L  Abd: Soft, morbidly obese, ND, NTP, no rebound, no guarding, no palpable organomegaly/masses  RLE: warm, calf with extensive erythema, tenderness to palpation, edema. dopplerable PT/DP. Good motor function, decreased sensation  LLE: warm until midway down the calf when it becomes cooler. palpable PT, dopplerable DP. Good motor function, decreased sensation.      Labs:                        12.2   6.18  )-----------( 88       ( 22 Sep 2017 06:35 )             36.2     09-22    138  |  100  |  12  ----------------------------<  147<H>  3.1<L>   |  24  |  0.92    Ca    8.1<L>      22 Sep 2017 06:35    TPro  6.6  /  Alb  3.3  /  TBili  0.7  /  DBili  x   /  AST  11  /  ALT  11  /  AlkPhos  36<L>  09-22

## 2017-09-22 NOTE — CONSULT NOTE ADULT - ATTENDING COMMENTS
Pt. was seen and examined. Agree with above. Plan d/w the team.  Pt. admitted for cellulitis on IV Abx. vascular consutled for concern of L leg coolness  On exam: bl LE are warm, slightly cooler on the L from mod calf down  Motor and sensation are intact bl  +swelling and bl cellulitis (worse on the R)  palpable fem/pop bl  R pedal pulses hard to palpate 2/2 swelling but biphasic doppler signals  L PT pulse is palpable.  A/P: LE swelling and cellulitis  no signs of leg ischemia  LE elevation and compression for swelling  cont Abx

## 2017-09-23 DIAGNOSIS — I10 ESSENTIAL (PRIMARY) HYPERTENSION: ICD-10-CM

## 2017-09-23 LAB
BUN SERPL-MCNC: 12 MG/DL — SIGNIFICANT CHANGE UP (ref 7–23)
CALCIUM SERPL-MCNC: 8.4 MG/DL — SIGNIFICANT CHANGE UP (ref 8.4–10.5)
CHLORIDE SERPL-SCNC: 101 MMOL/L — SIGNIFICANT CHANGE UP (ref 98–107)
CO2 SERPL-SCNC: 25 MMOL/L — SIGNIFICANT CHANGE UP (ref 22–31)
CREAT SERPL-MCNC: 0.98 MG/DL — SIGNIFICANT CHANGE UP (ref 0.5–1.3)
GLUCOSE SERPL-MCNC: 179 MG/DL — HIGH (ref 70–99)
HCT VFR BLD CALC: 36 % — LOW (ref 39–50)
HGB BLD-MCNC: 12 G/DL — LOW (ref 13–17)
MCHC RBC-ENTMCNC: 29.2 PG — SIGNIFICANT CHANGE UP (ref 27–34)
MCHC RBC-ENTMCNC: 33.3 % — SIGNIFICANT CHANGE UP (ref 32–36)
MCV RBC AUTO: 87.6 FL — SIGNIFICANT CHANGE UP (ref 80–100)
NRBC # FLD: 0 — SIGNIFICANT CHANGE UP
PLATELET # BLD AUTO: 106 K/UL — LOW (ref 150–400)
PMV BLD: 9.9 FL — SIGNIFICANT CHANGE UP (ref 7–13)
POTASSIUM SERPL-MCNC: 3.4 MMOL/L — LOW (ref 3.5–5.3)
POTASSIUM SERPL-SCNC: 3.4 MMOL/L — LOW (ref 3.5–5.3)
RBC # BLD: 4.11 M/UL — LOW (ref 4.2–5.8)
RBC # FLD: 15.3 % — HIGH (ref 10.3–14.5)
SODIUM SERPL-SCNC: 140 MMOL/L — SIGNIFICANT CHANGE UP (ref 135–145)
WBC # BLD: 5.37 K/UL — SIGNIFICANT CHANGE UP (ref 3.8–10.5)
WBC # FLD AUTO: 5.37 K/UL — SIGNIFICANT CHANGE UP (ref 3.8–10.5)

## 2017-09-23 PROCEDURE — 99232 SBSQ HOSP IP/OBS MODERATE 35: CPT

## 2017-09-23 RX ADMIN — Medication 50 MILLIGRAM(S): at 17:54

## 2017-09-23 RX ADMIN — Medication 325 MILLIGRAM(S): at 14:18

## 2017-09-23 RX ADMIN — Medication 50 MILLIGRAM(S): at 05:29

## 2017-09-23 RX ADMIN — Medication 100 MILLIGRAM(S): at 15:25

## 2017-09-23 RX ADMIN — Medication 2: at 12:51

## 2017-09-23 RX ADMIN — OXYCODONE HYDROCHLORIDE 5 MILLIGRAM(S): 5 TABLET ORAL at 18:50

## 2017-09-23 RX ADMIN — Medication 40 MILLIGRAM(S): at 05:29

## 2017-09-23 RX ADMIN — Medication 100 MILLIGRAM(S): at 22:33

## 2017-09-23 RX ADMIN — GABAPENTIN 800 MILLIGRAM(S): 400 CAPSULE ORAL at 14:18

## 2017-09-23 RX ADMIN — GABAPENTIN 800 MILLIGRAM(S): 400 CAPSULE ORAL at 22:34

## 2017-09-23 RX ADMIN — Medication 100 MILLIGRAM(S): at 06:37

## 2017-09-23 RX ADMIN — APIXABAN 5 MILLIGRAM(S): 2.5 TABLET, FILM COATED ORAL at 05:29

## 2017-09-23 RX ADMIN — GABAPENTIN 800 MILLIGRAM(S): 400 CAPSULE ORAL at 05:29

## 2017-09-23 RX ADMIN — Medication 10 MILLIGRAM(S): at 17:54

## 2017-09-23 RX ADMIN — AMLODIPINE BESYLATE 5 MILLIGRAM(S): 2.5 TABLET ORAL at 05:29

## 2017-09-23 RX ADMIN — OXYCODONE HYDROCHLORIDE 5 MILLIGRAM(S): 5 TABLET ORAL at 17:58

## 2017-09-23 RX ADMIN — Medication 40 MILLIGRAM(S): at 17:54

## 2017-09-23 RX ADMIN — Medication 10 MILLIGRAM(S): at 05:29

## 2017-09-23 RX ADMIN — Medication 2: at 17:55

## 2017-09-23 RX ADMIN — APIXABAN 5 MILLIGRAM(S): 2.5 TABLET, FILM COATED ORAL at 17:54

## 2017-09-23 RX ADMIN — Medication 2: at 09:13

## 2017-09-24 ENCOUNTER — TRANSCRIPTION ENCOUNTER (OUTPATIENT)
Age: 77
End: 2017-09-24

## 2017-09-24 LAB
BASOPHILS # BLD AUTO: 0.02 K/UL — SIGNIFICANT CHANGE UP (ref 0–0.2)
BASOPHILS NFR BLD AUTO: 0.3 % — SIGNIFICANT CHANGE UP (ref 0–2)
BUN SERPL-MCNC: 12 MG/DL — SIGNIFICANT CHANGE UP (ref 7–23)
CALCIUM SERPL-MCNC: 8.2 MG/DL — LOW (ref 8.4–10.5)
CHLORIDE SERPL-SCNC: 98 MMOL/L — SIGNIFICANT CHANGE UP (ref 98–107)
CO2 SERPL-SCNC: 25 MMOL/L — SIGNIFICANT CHANGE UP (ref 22–31)
CREAT SERPL-MCNC: 0.86 MG/DL — SIGNIFICANT CHANGE UP (ref 0.5–1.3)
EOSINOPHIL # BLD AUTO: 0.1 K/UL — SIGNIFICANT CHANGE UP (ref 0–0.5)
EOSINOPHIL NFR BLD AUTO: 1.7 % — SIGNIFICANT CHANGE UP (ref 0–6)
GLUCOSE SERPL-MCNC: 164 MG/DL — HIGH (ref 70–99)
HCT VFR BLD CALC: 37.4 % — LOW (ref 39–50)
HGB BLD-MCNC: 12.4 G/DL — LOW (ref 13–17)
IMM GRANULOCYTES # BLD AUTO: 0.05 # — SIGNIFICANT CHANGE UP
IMM GRANULOCYTES NFR BLD AUTO: 0.9 % — SIGNIFICANT CHANGE UP (ref 0–1.5)
LYMPHOCYTES # BLD AUTO: 0.84 K/UL — LOW (ref 1–3.3)
LYMPHOCYTES # BLD AUTO: 14.6 % — SIGNIFICANT CHANGE UP (ref 13–44)
MCHC RBC-ENTMCNC: 29.3 PG — SIGNIFICANT CHANGE UP (ref 27–34)
MCHC RBC-ENTMCNC: 33.2 % — SIGNIFICANT CHANGE UP (ref 32–36)
MCV RBC AUTO: 88.4 FL — SIGNIFICANT CHANGE UP (ref 80–100)
MONOCYTES # BLD AUTO: 0.77 K/UL — SIGNIFICANT CHANGE UP (ref 0–0.9)
MONOCYTES NFR BLD AUTO: 13.3 % — SIGNIFICANT CHANGE UP (ref 2–14)
NEUTROPHILS # BLD AUTO: 3.99 K/UL — SIGNIFICANT CHANGE UP (ref 1.8–7.4)
NEUTROPHILS NFR BLD AUTO: 69.2 % — SIGNIFICANT CHANGE UP (ref 43–77)
NRBC # FLD: 0 — SIGNIFICANT CHANGE UP
PLATELET # BLD AUTO: 113 K/UL — LOW (ref 150–400)
PMV BLD: 10 FL — SIGNIFICANT CHANGE UP (ref 7–13)
POTASSIUM SERPL-MCNC: 3.7 MMOL/L — SIGNIFICANT CHANGE UP (ref 3.5–5.3)
POTASSIUM SERPL-SCNC: 3.7 MMOL/L — SIGNIFICANT CHANGE UP (ref 3.5–5.3)
RBC # BLD: 4.23 M/UL — SIGNIFICANT CHANGE UP (ref 4.2–5.8)
RBC # FLD: 15.2 % — HIGH (ref 10.3–14.5)
SODIUM SERPL-SCNC: 139 MMOL/L — SIGNIFICANT CHANGE UP (ref 135–145)
WBC # BLD: 5.77 K/UL — SIGNIFICANT CHANGE UP (ref 3.8–10.5)
WBC # FLD AUTO: 5.77 K/UL — SIGNIFICANT CHANGE UP (ref 3.8–10.5)

## 2017-09-24 PROCEDURE — 99232 SBSQ HOSP IP/OBS MODERATE 35: CPT

## 2017-09-24 RX ORDER — LACTOBACILLUS ACIDOPHILUS 100MM CELL
1 CAPSULE ORAL
Qty: 0 | Refills: 0 | Status: DISCONTINUED | OUTPATIENT
Start: 2017-09-24 | End: 2017-09-27

## 2017-09-24 RX ADMIN — Medication 100 MILLIGRAM(S): at 06:52

## 2017-09-24 RX ADMIN — Medication 1 TABLET(S): at 13:31

## 2017-09-24 RX ADMIN — Medication 100 MILLIGRAM(S): at 22:03

## 2017-09-24 RX ADMIN — Medication 1 TABLET(S): at 17:47

## 2017-09-24 RX ADMIN — Medication 325 MILLIGRAM(S): at 08:14

## 2017-09-24 RX ADMIN — OXYCODONE HYDROCHLORIDE 5 MILLIGRAM(S): 5 TABLET ORAL at 19:40

## 2017-09-24 RX ADMIN — OXYCODONE HYDROCHLORIDE 5 MILLIGRAM(S): 5 TABLET ORAL at 20:15

## 2017-09-24 RX ADMIN — GABAPENTIN 800 MILLIGRAM(S): 400 CAPSULE ORAL at 22:04

## 2017-09-24 RX ADMIN — AMLODIPINE BESYLATE 5 MILLIGRAM(S): 2.5 TABLET ORAL at 06:51

## 2017-09-24 RX ADMIN — Medication 10 MILLIGRAM(S): at 06:52

## 2017-09-24 RX ADMIN — Medication 50 MILLIGRAM(S): at 06:52

## 2017-09-24 RX ADMIN — Medication 40 MILLIGRAM(S): at 06:52

## 2017-09-24 RX ADMIN — Medication 40 MILLIGRAM(S): at 17:47

## 2017-09-24 RX ADMIN — Medication 10 MILLIGRAM(S): at 17:47

## 2017-09-24 RX ADMIN — Medication 2: at 13:32

## 2017-09-24 RX ADMIN — Medication 2: at 08:14

## 2017-09-24 RX ADMIN — GABAPENTIN 800 MILLIGRAM(S): 400 CAPSULE ORAL at 13:31

## 2017-09-24 RX ADMIN — GABAPENTIN 800 MILLIGRAM(S): 400 CAPSULE ORAL at 06:52

## 2017-09-24 RX ADMIN — Medication 100 MILLIGRAM(S): at 13:31

## 2017-09-24 RX ADMIN — APIXABAN 5 MILLIGRAM(S): 2.5 TABLET, FILM COATED ORAL at 06:52

## 2017-09-24 RX ADMIN — APIXABAN 5 MILLIGRAM(S): 2.5 TABLET, FILM COATED ORAL at 17:47

## 2017-09-24 RX ADMIN — Medication 50 MILLIGRAM(S): at 17:47

## 2017-09-24 NOTE — DISCHARGE NOTE ADULT - ADDITIONAL INSTRUCTIONS
Follow up with PCP as outpatient for further management and treatment. Follow up with PCP as outpatient for further management and treatment.  advised the use of Circaid velcro compression devices which may be easier for him to use at home. Follow up with PCP as outpatient for further management and treatment.  advised the use of Circaid velcro compression devices which may be easier for him to use at home.Left foot 4th toe dorsal aspect arterial wound- Gently Cleanse with NS. Apply Betadine daily, allow to dry. Keep open to air.

## 2017-09-24 NOTE — DISCHARGE NOTE ADULT - PLAN OF CARE
Continue taking medications as prescribed, monitor BP at home. Follow up with PCP and Card as outpatient for further management. Continue taking Eliquis as prescribed. Follow up with PCP and Card as outpatient for further management. Please continue taking antibiotic medication as prescribed along with probiotic. Follow up with PCP as outpatient for further management and treatment. Remain free from infection, fevers Resolved You were treated with IV fluids in the hospital. Follow up with PCP as outpatient for further management and treatment. Follow up with PCP and Vascular Sx Dr Woodson as outpatient for further management. Follow up with PCP and Vascular Sx Dr Woodson and Dr. Quesada as outpatient for further management. Recommend compression therapy - advised the use of Circaid velcro compression devices which may be easier for him to use at home. maintain adequate blood pressure control continue eliquis remain free of infection Please continue taking antibiotic medication as prescribed along with probiotic. Follow up with PCP as outpatient for further management and treatment. Please follow up with infectious disease doctor Dr. Pike.  Please call to make an appointment within 1 week of surgery continue wound care Left foot 4th toe dorsal aspect arterial wound- Gently Cleanse with NS. Apply Betadine daily, allow to dry. Keep open to air. Please follow up with Dr. Woodson and Dr. Quesada

## 2017-09-24 NOTE — DISCHARGE NOTE ADULT - HOSPITAL COURSE
77M DM2, HTN, Afib s/p ablation 2006, prior lung CA s/p lobectomy (RUL and part of LLL), B/L DVT on Eliquis, presents to [facility] ED for 1 day of fevers and chills.     Hospital Course  RLE Cellulitis- chronic venous stasis, on home Lasshasha WHITMORE (Shahzad) consulted. Cefazolin 2g every 8hr started 9/20, will transition to oral keflex in few days and then suppressive keflex. 9/20 blood Cx- NTD, Urine culture: NTD. CXR- Clear lungs. Vasc Sx Dr Woodson consulted. Patient with pulses detected in the foot bilaterally. Low suspicion for acute limb ischemia. Leg elevation, Pain control, out pt f/u with Dr. Woodson    Lactic acidosis- hold Metformin, s/p IVF  Atrial fibrillation- On Metoprolol and Eliquis for rate control and AC. Card Dr Quesada consulted. Recommend compression therapy - advised the use of Circaid velcro compression devices which may be easier for him to use at home.    HTN- c/w Amlodipine. DVT- As per prior charts, was placed on Eliquis after failing Xarelto therapy, c/w Eliquis 5 mg PO BID.     Squamous cell lung cancer- Hx of lung CA s/p lobectomy. Follow up as outpatient with pulmonologist. 77M DM2, HTN, Afib s/p ablation 2006, prior lung CA s/p lobectomy (RUL and part of LLL), B/L DVT on Eliquis, presents to [facility] ED for 1 day of fevers and chills.     Hospital Course  RLE Cellulitis- chronic venous stasis, on home Yuridia WHITMORE (Shahzad) consulted. Cefazolin 2g every 8hr started 9/20, will transition to oral keflex in few days and then suppressive keflex. 9/20 blood Cx- NTD, Urine culture: NTD. CXR- Clear lungs. Vasc Sx Dr Woodson consulted. Patient with pulses detected in the foot bilaterally. Low suspicion for acute limb ischemia. Leg elevation, Pain control, out pt f/u with Dr. Woodson    Lactic acidosis- hold Metformin, s/p IVF  Atrial fibrillation- On Metoprolol and Eliquis for rate control and AC. Card Dr Quesada consulted. Recommend compression therapy - advised the use of Circaid velcro compression devices which may be easier for him to use at home.    HTN- c/w Amlodipine. DVT- As per prior charts, was placed on Eliquis after failing Xarelto therapy, c/w Eliquis 5 mg PO BID.     Squamous cell lung cancer- Hx of lung CA s/p lobectomy. Follow up as outpatient with pulmonologist.     Dispo; home

## 2017-09-24 NOTE — DISCHARGE NOTE ADULT - CARE PROVIDERS DIRECT ADDRESSES
,[email or web address]

## 2017-09-24 NOTE — DISCHARGE NOTE ADULT - PATIENT PORTAL LINK FT
“You can access the Follow[employer] Patient Portal, offered by [facility], by registering with the following website: [email or web address]”

## 2017-09-24 NOTE — DISCHARGE NOTE ADULT - SECONDARY DIAGNOSIS.
Essential hypertension Chronic atrial fibrillation Cellulitis, unspecified cellulitis site Lactic acidosis Venous stasis of both lower extremities Wound

## 2017-09-24 NOTE — DISCHARGE NOTE ADULT - CARE PLAN
Principal Discharge DX:	Fever in other diseases  Goal:	Remain free from infection, fevers  Instructions for follow-up, activity and diet:	Please continue taking antibiotic medication as prescribed along with probiotic. Follow up with PCP as outpatient for further management and treatment.  Secondary Diagnosis:	Lactic acidosis  Goal:	Resolved  Instructions for follow-up, activity and diet:	You were treated with IV fluids in the hospital. Follow up with PCP as outpatient for further management and treatment.  Secondary Diagnosis:	Venous stasis of both lower extremities  Instructions for follow-up, activity and diet:	Follow up with PCP and Vascular Sx Dr Woodson as outpatient for further management.  Secondary Diagnosis:	Essential hypertension  Instructions for follow-up, activity and diet:	Continue taking medications as prescribed, monitor BP at home. Follow up with PCP and Card as outpatient for further management.  Secondary Diagnosis:	Chronic atrial fibrillation  Instructions for follow-up, activity and diet:	Continue taking Eliquis as prescribed. Follow up with PCP and Card as outpatient for further management.  Secondary Diagnosis:	Cellulitis, unspecified cellulitis site  Instructions for follow-up, activity and diet:	Please continue taking antibiotic medication as prescribed along with probiotic. Follow up with PCP as outpatient for further management and treatment. Principal Discharge DX:	Fever in other diseases  Goal:	Remain free from infection, fevers  Instructions for follow-up, activity and diet:	Please continue taking antibiotic medication as prescribed along with probiotic. Follow up with PCP as outpatient for further management and treatment.  Secondary Diagnosis:	Lactic acidosis  Goal:	Resolved  Instructions for follow-up, activity and diet:	You were treated with IV fluids in the hospital. Follow up with PCP as outpatient for further management and treatment.  Secondary Diagnosis:	Venous stasis of both lower extremities  Instructions for follow-up, activity and diet:	Follow up with PCP and Vascular Sx Dr Woodson and Dr. Quesada as outpatient for further management. Recommend compression therapy - advised the use of Circaid velcro compression devices which may be easier for him to use at home.  Secondary Diagnosis:	Essential hypertension  Goal:	maintain adequate blood pressure control  Instructions for follow-up, activity and diet:	Continue taking medications as prescribed, monitor BP at home. Follow up with PCP and Card as outpatient for further management.  Secondary Diagnosis:	Chronic atrial fibrillation  Goal:	continue eliquis  Instructions for follow-up, activity and diet:	Continue taking Eliquis as prescribed. Follow up with PCP and Card as outpatient for further management.  Secondary Diagnosis:	Cellulitis, unspecified cellulitis site  Goal:	remain free of infection  Instructions for follow-up, activity and diet:	Please continue taking antibiotic medication as prescribed along with probiotic. Follow up with PCP as outpatient for further management and treatment. Please follow up with infectious disease doctor Dr. Pike.  Please call to make an appointment within 1 week of surgery Principal Discharge DX:	Fever in other diseases  Goal:	Remain free from infection, fevers  Instructions for follow-up, activity and diet:	Please continue taking antibiotic medication as prescribed along with probiotic. Follow up with PCP as outpatient for further management and treatment.  Secondary Diagnosis:	Lactic acidosis  Goal:	Resolved  Instructions for follow-up, activity and diet:	You were treated with IV fluids in the hospital. Follow up with PCP as outpatient for further management and treatment.  Secondary Diagnosis:	Venous stasis of both lower extremities  Instructions for follow-up, activity and diet:	Follow up with PCP and Vascular Sx Dr Woodson and Dr. Quesdaa as outpatient for further management. Recommend compression therapy - advised the use of Circaid velcro compression devices which may be easier for him to use at home.  Secondary Diagnosis:	Essential hypertension  Goal:	maintain adequate blood pressure control  Instructions for follow-up, activity and diet:	Continue taking medications as prescribed, monitor BP at home. Follow up with PCP and Card as outpatient for further management.  Secondary Diagnosis:	Chronic atrial fibrillation  Goal:	continue eliquis  Instructions for follow-up, activity and diet:	Continue taking Eliquis as prescribed. Follow up with PCP and Card as outpatient for further management.  Secondary Diagnosis:	Cellulitis, unspecified cellulitis site  Goal:	remain free of infection  Instructions for follow-up, activity and diet:	Please continue taking antibiotic medication as prescribed along with probiotic. Follow up with PCP as outpatient for further management and treatment. Please follow up with infectious disease doctor Dr. Pike.  Please call to make an appointment within 1 week of surgery  Secondary Diagnosis:	Wound  Goal:	continue wound care  Instructions for follow-up, activity and diet:	Left foot 4th toe dorsal aspect arterial wound- Gently Cleanse with NS. Apply Betadine daily, allow to dry. Keep open to air. Please follow up with Dr. Woodson and Dr. Quesada

## 2017-09-24 NOTE — DISCHARGE NOTE ADULT - MEDICATION SUMMARY - MEDICATIONS TO STOP TAKING
I will STOP taking the medications listed below when I get home from the hospital:    amoxicillin 250 mg oral capsule  -- 1 cap(s) by mouth 2 times a day MDD:for suppression therapy starting 08/17  -- Finish all this medication unless otherwise directed by prescriber.

## 2017-09-24 NOTE — DISCHARGE NOTE ADULT - MEDICATION SUMMARY - MEDICATIONS TO TAKE
I will START or STAY ON the medications listed below when I get home from the hospital:    naproxen 500 mg oral tablet  -- 1 tab(s) by mouth 2 times a day, As Needed  -- Indication: For Pain     oxyCODONE 5 mg oral tablet  -- 1 tab(s) by mouth every 6 hours, As Needed -Severe Pain (7 - 10) MDD:15mg  -- Indication: For Pain    apixaban 5 mg oral tablet  -- 1 tab(s) by mouth every 12 hours  -- Indication: For DVT (deep venous thrombosis)    gabapentin 800 mg oral tablet  -- 1 tab(s) by mouth 3 times a day  -- Indication: For Pain    Amaryl 1 mg oral tablet  -- 1 tab(s) by mouth once a day before breakfast  -- Indication: For Dm    metFORMIN 500 mg oral tablet, extended release  -- 1 tab(s) by mouth once a day  -- Indication: For Dm    busPIRone 10 mg oral tablet  -- 1 tab(s) by mouth 2 times a day  -- Indication: For Depression/anxiety    metoprolol tartrate 50 mg oral tablet  -- 1 tab(s) by mouth 2 times a day  -- Indication: For HTN (hypertension)    amLODIPine 5 mg oral tablet  -- 1 tab(s) by mouth once a day  -- Indication: For HTN (hypertension)    Keflex 500 mg oral capsule  -- 1 cap(s) by mouth every 6 hours   -- Finish all this medication unless otherwise directed by prescriber.    -- Indication: For Cellulitis    Keflex 500 mg oral capsule  -- 1 cap(s) by mouth every 12 hours to start on 10/4: plan for long term antibiotics.  Pt to folow up closely with infectious disease.   -- Finish all this medication unless otherwise directed by prescriber.    -- Indication: For Cellulitis    furosemide 40 mg oral tablet  -- 1 tab(s) by mouth 2 times a day  -- Indication: For Edema    ferrous sulfate 325 mg (65 mg elemental iron) oral tablet  -- 1 tab(s) by mouth once a day  -- Indication: For Supplement    polyethylene glycol 3350 oral powder for reconstitution  -- 17 gram(s) by mouth once a day  -- Indication: For bowel regimen     Colace 100 mg oral capsule  -- 1 cap(s) by mouth 2 times a day, As Needed  -- Indication: For bowel regimen     tiZANidine 4 mg oral tablet  -- 2 tab(s) by mouth once a day (at bedtime), As Needed  -- Indication: For muscle relaxant     lactobacillus acidophilus oral capsule  -- 1 cap(s) by mouth 3 times a day (with meals)   -- Indication: For Prophylaxis

## 2017-09-24 NOTE — DISCHARGE NOTE ADULT - CARE PROVIDER_API CALL
Manda Pike), Infectious Disease; Internal Medicine  [address] [place] [address]  Phone: [phone]  Fax: [phone] Manda Pike), Infectious Disease; Internal Medicine  [address] [place] [address]  Phone: [phone]  Fax: [phone]    Nik Quesada (MD; PhD), Cardiology; Internal Medicine; Vascular Medicine  [id number] [place] [address]  [place] [address]  Phone: [phone]  Fax: [phone]    Lachelle Woodson), Surgery  [address] [facility]  [address]  [place] [address]  Phone: [phone]  Fax: [phone]

## 2017-09-25 LAB
BACTERIA BLD CULT: SIGNIFICANT CHANGE UP
BACTERIA BLD CULT: SIGNIFICANT CHANGE UP

## 2017-09-25 PROCEDURE — 99232 SBSQ HOSP IP/OBS MODERATE 35: CPT

## 2017-09-25 RX ADMIN — Medication 325 MILLIGRAM(S): at 13:25

## 2017-09-25 RX ADMIN — Medication 100 MILLIGRAM(S): at 06:09

## 2017-09-25 RX ADMIN — APIXABAN 5 MILLIGRAM(S): 2.5 TABLET, FILM COATED ORAL at 06:10

## 2017-09-25 RX ADMIN — Medication 100 MILLIGRAM(S): at 13:25

## 2017-09-25 RX ADMIN — Medication 50 MILLIGRAM(S): at 18:12

## 2017-09-25 RX ADMIN — Medication 10 MILLIGRAM(S): at 06:10

## 2017-09-25 RX ADMIN — Medication 40 MILLIGRAM(S): at 18:13

## 2017-09-25 RX ADMIN — OXYCODONE HYDROCHLORIDE 5 MILLIGRAM(S): 5 TABLET ORAL at 18:12

## 2017-09-25 RX ADMIN — Medication 100 MILLIGRAM(S): at 22:25

## 2017-09-25 RX ADMIN — GABAPENTIN 800 MILLIGRAM(S): 400 CAPSULE ORAL at 13:25

## 2017-09-25 RX ADMIN — SENNA PLUS 2 TABLET(S): 8.6 TABLET ORAL at 22:25

## 2017-09-25 RX ADMIN — Medication 10 MILLIGRAM(S): at 18:11

## 2017-09-25 RX ADMIN — AMLODIPINE BESYLATE 5 MILLIGRAM(S): 2.5 TABLET ORAL at 06:10

## 2017-09-25 RX ADMIN — Medication 1 TABLET(S): at 18:11

## 2017-09-25 RX ADMIN — GABAPENTIN 800 MILLIGRAM(S): 400 CAPSULE ORAL at 22:25

## 2017-09-25 RX ADMIN — Medication 1 TABLET(S): at 08:54

## 2017-09-25 RX ADMIN — APIXABAN 5 MILLIGRAM(S): 2.5 TABLET, FILM COATED ORAL at 18:12

## 2017-09-25 RX ADMIN — OXYCODONE HYDROCHLORIDE 5 MILLIGRAM(S): 5 TABLET ORAL at 19:00

## 2017-09-25 RX ADMIN — Medication 2: at 08:54

## 2017-09-25 RX ADMIN — Medication 1 TABLET(S): at 13:25

## 2017-09-25 RX ADMIN — Medication 2: at 13:24

## 2017-09-25 RX ADMIN — Medication 40 MILLIGRAM(S): at 06:10

## 2017-09-25 RX ADMIN — Medication 50 MILLIGRAM(S): at 06:10

## 2017-09-25 RX ADMIN — Medication 2: at 18:11

## 2017-09-25 RX ADMIN — GABAPENTIN 800 MILLIGRAM(S): 400 CAPSULE ORAL at 06:10

## 2017-09-25 NOTE — ADVANCED PRACTICE NURSE CONSULT - REASON FOR CONSULT
Patient seen on skin care rounds after wound care referral received for assessment of skin impairment and recommendations of topical management. Chart reviewed: Serum albumin 3.3g/dL, HgbA1C 6.3%, Ludwig Range 18-19, BMI 40.8 kg/m2, patient interviewed. Patient H/O of DM2, HTN, Afib s/p ablation 2006, prior lung CA s/p lobectomy (RUL and part of LLL), Anxiety, B/L DVT on Eliquis, Neuropathy, Venous stasis of B/L LE, cellulitis bilateral lower legs 2/2 chronic venous stasis. Presents to [facility] ED for 1 day of fevers and chills. Admitted with Cellulitis, followed by Infectious Disease, Cardiology, Vascular Surgery. Vascular Surgery consulted for coolness of LLE, no signs of leg ischemia recommendations of elevation and compression for swelling. Patient states he lives at home with wife, ambulates with walker independently, continent of urine and stool. Patient states that he has been admitted to hospital several times for cellulitis of Right lower extremity.

## 2017-09-25 NOTE — ADVANCED PRACTICE NURSE CONSULT - RECOMMEDATIONS
Continue to monitor Cellulitis reaction of Right Lower extremity.  Recommend f/u monitoring with inpatient vascular surgery team. F/u with outpatient Vascular MD for compression stockings.  Rule out DVT of RLE.      Topical Recommendations:  Apply Attrac-tain lotion to Right lower leg area of lichenification (distal to medial and lateral malleolus). Apply Sween 24 to bilateral lower extremities daily.    Left foot 4th toe dorsal aspect arterial wound- Gently Cleanse with NS. Apply Betadine daily, allow to dry. Keep open to air.    Moisture associated dermatitis- Cleanse with skin cleanser, dry well. Apply Manuel moisture barrier cream daily and prn.    Continue low air loss bed therapy, continue elevation of B/L LE while in bed, continue to encourage turning & repositioning q2h, continue moisture management with barrier creams as recommended above & single breathable pad, continue measures to decrease friction/shear/pressure. Continue with nutritional support as per dietary/orders.  Findings and plan discussed with patient. Patient educated on topical wound therapy, all questions and concerns addressed.

## 2017-09-25 NOTE — ADVANCED PRACTICE NURSE CONSULT - ASSESSMENT
General: A&Ox4, ambulates independently with walker. Morbidly obese. Continent of urine and stool. Skin warm, dry with increased moisture in intertriginous folds, adequate skin turgor.    Vascular: Bilateral lower extremities with hemosiderin staining. Blanchable erythema of right lower leg with increased warmth. Lichenification of right lower extremity, distal to medial and lateral malleolus. Thin, shiny, taught skin of right lower leg. Increased coolness of midfoot to distal toes, left foot cooler than right foot.  Bilateral +2 pitting edema. Right lower leg>left lower leg in circumference. Sluggish capillary refill bilaterally. Nonpalpale Left DP pulse, with monophasic doppler sound. Weak palpable Right DP pulse with bilateral weak palpable PT pulses , and biphasic doppler sounds. + pallor on elevation and dependant rubbor. Reports numbness of bilateral feet.    Left foot 4th toe dorsal aspect- arterial wound- 100% blood filled blister, [id number], periwound skin with purple-maroon discoloration, cool to touch. Goals of care: monitor for changes in tissue type, protect periwound skin, provide antimicrobial.    Moisture associated dermatitis of sacral-gluteal fold- skin currently intact with increased moisture.

## 2017-09-26 PROCEDURE — 99232 SBSQ HOSP IP/OBS MODERATE 35: CPT

## 2017-09-26 RX ADMIN — GABAPENTIN 800 MILLIGRAM(S): 400 CAPSULE ORAL at 22:36

## 2017-09-26 RX ADMIN — Medication 1 TABLET(S): at 13:20

## 2017-09-26 RX ADMIN — APIXABAN 5 MILLIGRAM(S): 2.5 TABLET, FILM COATED ORAL at 17:43

## 2017-09-26 RX ADMIN — Medication 2: at 08:24

## 2017-09-26 RX ADMIN — GABAPENTIN 800 MILLIGRAM(S): 400 CAPSULE ORAL at 06:08

## 2017-09-26 RX ADMIN — Medication 10 MILLIGRAM(S): at 17:43

## 2017-09-26 RX ADMIN — Medication 40 MILLIGRAM(S): at 17:44

## 2017-09-26 RX ADMIN — Medication 40 MILLIGRAM(S): at 06:09

## 2017-09-26 RX ADMIN — AMLODIPINE BESYLATE 5 MILLIGRAM(S): 2.5 TABLET ORAL at 06:08

## 2017-09-26 RX ADMIN — Medication 325 MILLIGRAM(S): at 15:39

## 2017-09-26 RX ADMIN — Medication 10 MILLIGRAM(S): at 06:08

## 2017-09-26 RX ADMIN — Medication 50 MILLIGRAM(S): at 17:43

## 2017-09-26 RX ADMIN — APIXABAN 5 MILLIGRAM(S): 2.5 TABLET, FILM COATED ORAL at 06:08

## 2017-09-26 RX ADMIN — OXYCODONE HYDROCHLORIDE 5 MILLIGRAM(S): 5 TABLET ORAL at 15:39

## 2017-09-26 RX ADMIN — GABAPENTIN 800 MILLIGRAM(S): 400 CAPSULE ORAL at 15:39

## 2017-09-26 RX ADMIN — Medication 1 TABLET(S): at 08:24

## 2017-09-26 RX ADMIN — Medication 100 MILLIGRAM(S): at 06:09

## 2017-09-26 RX ADMIN — OXYCODONE HYDROCHLORIDE 5 MILLIGRAM(S): 5 TABLET ORAL at 16:20

## 2017-09-26 RX ADMIN — Medication 1 TABLET(S): at 17:42

## 2017-09-26 RX ADMIN — Medication 100 MILLIGRAM(S): at 23:20

## 2017-09-26 RX ADMIN — Medication 100 MILLIGRAM(S): at 15:39

## 2017-09-26 RX ADMIN — Medication 4: at 13:20

## 2017-09-26 RX ADMIN — Medication 50 MILLIGRAM(S): at 06:08

## 2017-09-27 VITALS
TEMPERATURE: 98 F | OXYGEN SATURATION: 96 % | SYSTOLIC BLOOD PRESSURE: 144 MMHG | RESPIRATION RATE: 18 BRPM | HEART RATE: 70 BPM | DIASTOLIC BLOOD PRESSURE: 60 MMHG

## 2017-09-27 PROCEDURE — 99232 SBSQ HOSP IP/OBS MODERATE 35: CPT

## 2017-09-27 RX ORDER — CEPHALEXIN 500 MG
1 CAPSULE ORAL
Qty: 60 | Refills: 0 | OUTPATIENT
Start: 2017-09-27 | End: 2017-10-27

## 2017-09-27 RX ORDER — CEPHALEXIN 500 MG
1 CAPSULE ORAL
Qty: 28 | Refills: 0 | OUTPATIENT
Start: 2017-09-27 | End: 2017-10-04

## 2017-09-27 RX ORDER — LACTOBACILLUS ACIDOPHILUS 100MM CELL
1 CAPSULE ORAL
Qty: 90 | Refills: 0 | OUTPATIENT
Start: 2017-09-27 | End: 2017-10-27

## 2017-09-27 RX ORDER — POLYETHYLENE GLYCOL 3350 17 G/17G
17 POWDER, FOR SOLUTION ORAL
Qty: 30 | Refills: 0 | OUTPATIENT
Start: 2017-09-27 | End: 2017-10-27

## 2017-09-27 RX ORDER — OXYCODONE HYDROCHLORIDE 5 MG/1
1 TABLET ORAL
Qty: 28 | Refills: 0 | OUTPATIENT
Start: 2017-09-27 | End: 2017-10-04

## 2017-09-27 RX ADMIN — Medication 2: at 13:22

## 2017-09-27 RX ADMIN — AMLODIPINE BESYLATE 5 MILLIGRAM(S): 2.5 TABLET ORAL at 05:42

## 2017-09-27 RX ADMIN — Medication 1 TABLET(S): at 13:22

## 2017-09-27 RX ADMIN — Medication 100 MILLIGRAM(S): at 05:42

## 2017-09-27 RX ADMIN — GABAPENTIN 800 MILLIGRAM(S): 400 CAPSULE ORAL at 05:42

## 2017-09-27 RX ADMIN — Medication 1 TABLET(S): at 08:44

## 2017-09-27 RX ADMIN — APIXABAN 5 MILLIGRAM(S): 2.5 TABLET, FILM COATED ORAL at 05:42

## 2017-09-27 RX ADMIN — Medication 10 MILLIGRAM(S): at 05:42

## 2017-09-27 RX ADMIN — Medication 100 MILLIGRAM(S): at 14:16

## 2017-09-27 RX ADMIN — Medication 50 MILLIGRAM(S): at 05:42

## 2017-09-27 RX ADMIN — GABAPENTIN 800 MILLIGRAM(S): 400 CAPSULE ORAL at 14:16

## 2017-09-27 RX ADMIN — Medication 40 MILLIGRAM(S): at 05:42

## 2017-09-27 RX ADMIN — Medication 2: at 08:43

## 2017-09-27 NOTE — PROGRESS NOTE ADULT - PROVIDER SPECIALTY LIST ADULT
Cardiology
Infectious Disease
Internal Medicine
Vascular Surgery
Infectious Disease
Infectious Disease
Cardiology
Internal Medicine
Cardiology

## 2017-09-27 NOTE — PROGRESS NOTE ADULT - PROBLEM SELECTOR PLAN 1
Continue with apixaban
sec to lower ext cellulitis  cont present abx, ID f/u
Continue with apixaban
sec to lower ext cellulitis  cont present abx, ID f/u

## 2017-09-27 NOTE — PROGRESS NOTE ADULT - PROBLEM SELECTOR PROBLEM 3
Atrial fibrillation, unspecified type
Cellulitis

## 2017-09-27 NOTE — PROGRESS NOTE ADULT - PROBLEM SELECTOR PLAN 2
Counseled on potentially using Circaid compression device when he is at home.  Information given to patient.  This may be easier to use than standard graduated compression stockings.
improving

## 2017-09-27 NOTE — PROGRESS NOTE ADULT - ASSESSMENT
Prior history of DVT  Chronic venous insufficiency  LE cellulitis s/p IV abx    On anticoagulation with apixaba  Stable volume status -- on home regimen of furosemide  Recommend compression therapy -- advised the use of Circaid velcro compression devices which may be easier for him to use at home.  D/C planning today per Medicine team.
77M DM2, HTN, Afib s/p ablation 2006, prior lung CA s/p lobectomy (RUL and part of LLL), B/L DVT on Eliquis, presents to [facility] ED for
Prior history of DVT  Chronic venous insufficiency  LE cellulitis    On anticoagulation with apixaban  Continue IV abx -- currently on cefazolin  Stable volume status -- on home regimen of furosemide  Recommend compression therapy -- advised the use of Circaid velcro compression devices which may be easier for him to use at home.
Recurrent cellulitis in man with h/o lung cancer, DM, venous stasis.  Likely strep.  Was receiving suppressive amoxacillin since last episode in August.  Would continue cefazolin 2 gm iv q 8 hours.  Anticipate will be able to transition to oral keflex in few days and then suppressive keflex.
Recurrent cellulitis in man with h/o lung cancer, DVT, DM, venous stasis.  Likely strep.  Has been hospitalized x 5 for cellulitis right leg in last few months. Now showing signs of improvement.  Would continue cefazolin 2 gm iv q 8 hours.  Will transition to oral route soon.
77M DM2, HTN, Afib s/p ablation 2006, prior lung CA s/p lobectomy (RUL and part of LLL), B/L DVT on Eliquis, presents to [facility] ED for
78yo M with RLE cellulits and concern for cool LLE    - elevate RLE  - continue ABX  - LLE with good blood flow and as per patient not change from baseline exam at home - very low concern for acute limb ischemia  - continue vascular checks  - will sign off, please call with any questions or acute change in patient status    C Team [id number]
Prior history of DVT  Chronic venous insufficiency  LE cellulitis    On anticoagulation  Continue IV abx  Recommend compression therapy -- advised the use of Circaid velcro compression devices which may be easier for him to use at home.
Prior history of DVT  Chronic venous insufficiency  LE cellulitis    On anticoagulation with apixaban  Continue IV abx -- currently on cefazolin  Stable volume status -- on home regimen of furosemide  Recommend compression therapy -- advised the use of Circaid velcro compression devices which may be easier for him to use at home.
Prior history of DVT  Chronic venous insufficiency  LE cellulitis    On anticoagulation with apixaban  Continue IV abx -- currently on cefazolin  Stable volume status -- on home regimen of furosemide  Recommend compression therapy -- advised the use of Circaid velcro compression devices which may be easier for him to use at home.
Recurrent cellulitis in man with h/o lung cancer, DVT, DM, venous stasis.  Likely strep.  Has been hospitalized x 5 for cellulitis right leg in last few months. ?infected thrombus.  Vascular to see.  Would continue cefazolin 2 gm iv q 8 hours.
Recurrent cellulitis in man with h/o lung cancer, DVT, DM, venous stasis.  Likely strep.  Has been hospitalized x 5 for cellulitis right leg in last few months. Now slowly improving.   can transition to oral keflex  500 mg po q 6 hours thru 10/3   Then keflex 500 mg po BID long term.  Discussed with NP and patient and his wife.  Please f/u with me in 2 weeks.
Recurrent cellulitis in man with h/o lung cancer, DVT, DM, venous stasis.  Likely strep.  Has been hospitalized x 5 for cellulitis right leg in last few months. Now slowly improving.  Would continue cefazolin 2 gm iv q 8 hours.  Hopefully can transition to oral keflex soon 500 mg po q 6 hours thru 10/3   Then keflex 500 mg po BID long term.

## 2017-09-27 NOTE — PROGRESS NOTE ADULT - PROBLEM SELECTOR PLAN 5
On apixaban
On Metoprolol and Eliquis for rate control and AC. Would continue with both home doses of medications
On apixaban

## 2017-09-27 NOTE — PROGRESS NOTE ADULT - PROBLEM SELECTOR PROBLEM 4
Anticoagulation management encounter
Essential hypertension
Venous stasis of both lower extremities

## 2017-09-27 NOTE — PROGRESS NOTE ADULT - PROBLEM SELECTOR PLAN 3
On Apixaban, rate controlled
recurrent , vascular, derm, ID eval
recurrent , vascular, derm, ID eval  vascular eval for left lower ext
recurrent , vascular, derm, ID eval  vascular eval for left lower ext

## 2017-09-27 NOTE — PROGRESS NOTE ADULT - SUBJECTIVE AND OBJECTIVE BOX
Cardiology/Vascular Medicine Inpatient Progress Note    No overnight complaints.  Denies subjective fevers overnight.  Legs less symptomatic, but still edematous, erythematous.  Currently on IV cefazolin    Vital Signs Last 24 Hrs  T(C): 37.1 (26 Sep 2017 06:06), Max: 37.1 (26 Sep 2017 06:06)  T(F): 98.7 (26 Sep 2017 06:06), Max: 98.7 (26 Sep 2017 06:06)  HR: 70 (26 Sep 2017 06:06) (70 - 89)  BP: 158/76 (26 Sep 2017 06:06) (141/75 - 160/81)  BP(mean): --  RR: 17 (26 Sep 2017 06:06) (17 - 18)  SpO2: 94% (26 Sep 2017 06:06) (94% - 98%)    Appearance: NAD, laying flat in bed  HEENT:   Normal oral mucosa, PERRL, EOMI	  Lymphatic: No lymphadenopathy  Cardiovascular: Normal S1 S2, No JVD, No murmurs, No edema  Respiratory: Lungs clear to auscultation	  Psychiatry: Awake alert  Gastrointestinal:  Soft, Non-tender, + BS	  Skin: No rashes, No ecchymoses, No cyanosis	  Neurologic: Non-focal  Extremities: As above, +edematous, erythematous, skin color changes consistent with CVI    MEDICATIONS  (STANDING):  apixaban 5 milliGRAM(s) Oral every 12 hours  gabapentin 800 milliGRAM(s) Oral three times a day  busPIRone 10 milliGRAM(s) Oral two times a day  metoprolol 50 milliGRAM(s) Oral two times a day  amLODIPine   Tablet 5 milliGRAM(s) Oral daily  ferrous    sulfate 325 milliGRAM(s) Oral daily  furosemide    Tablet 40 milliGRAM(s) Oral two times a day  insulin lispro (HumaLOG) corrective regimen sliding scale   SubCutaneous three times a day before meals  dextrose 5%. 1000 milliLiter(s) (50 mL/Hr) IV Continuous <Continuous>  dextrose 50% Injectable 12.5 Gram(s) IV Push once  dextrose 50% Injectable 25 Gram(s) IV Push once  dextrose 50% Injectable 25 Gram(s) IV Push once  ceFAZolin   IVPB 2000 milliGRAM(s) IV Intermittent every 8 hours  senna 2 Tablet(s) Oral at bedtime  polyethylene glycol 3350 17 Gram(s) Oral daily  lactobacillus acidophilus 1 Tablet(s) Oral three times a day with meals    MEDICATIONS  (PRN):  docusate sodium 100 milliGRAM(s) Oral two times a day PRN Constipation  dextrose Gel 1 Dose(s) Oral once PRN Blood Glucose LESS THAN 70 milliGRAM(s)/deciliter  glucagon  Injectable 1 milliGRAM(s) IntraMuscular once PRN Glucose LESS THAN 70 milligrams/deciliter  acetaminophen   Tablet 650 milliGRAM(s) Oral every 6 hours PRN For Temp greater than 38 C (100.4 F)  oxyCODONE    IR 5 milliGRAM(s) Oral every 4 hours PRN Severe Pain (7 - 10)    LABS:	 	              pending        < from: Xray Chest 2 Views PA/Lat (09.20.17 @ 09:02) >  EXAM:  RAD CHEST PA LAT        PROCEDURE DATE:  Sep 20 2017         INTERPRETATION:  TIME OF EXAM: September 20, 2017 at 9:18 AM    CLINICAL INFORMATION: Chest pain    TECHNIQUE:   PA and lateral chest    INTERPRETATION:     Lungs are free of focalabnormalities. The heart is not enlarged and   there are no effusions or vascular congestion to indicate CHF.      COMPARISON:  August 8, 2017      IMPRESSION:  Clear lungs.      < from: TTE with Doppler (w/Cont) (07.03.17 @ 13:05) >  Patient name: CALLIE MACIAS  YOB: 1940   Age: 77 (M)   MR#: [id number]  Study Date: 7/3/2017  Location: [address] kSonographer: Jl Long RDCS  Study quality: Technically Difficult  Referring Physician: Darren Wood MD  Blood Pressure: 160/88 mmHg  Height: 187 cm  Weight: 151 kg  BSA: 2.7 m2  ------------------------------------------------------------------------  PROCEDURE: Transthoracic echocardiogram with 2-D, M-Mode  and complete spectral and color flow Doppler.  Verbal consent was obtained for injection of echo contrast.  Following  intravenous injection of contrast, harmonic  imaging was performed.  INDICATION: Unspecified atrial fibrillation (I48.91),  Shortness of breath (R06.02)  ------------------------------------------------------------------------  OBSERVATIONS:  Mitral Valve: Mitral annular calcification, otherwise  normal mitral valve. Minimal mitral regurgitation.  Aortic Root: Normal aortic root.  Aortic Valve: Aortic valve leaflet morphology not well  visualized.  Left Atrium: Normal left atrium.  Left Ventricle: Endocardium not well visualized; despite  the use of IV contrast, unable to evaluate left ventricular  systolic function.  Right Heart: Normal right atrium. Unable to accurately  evaluate right ventricular size or systolic function.  Tricuspid valve not well visualized. Pulmonic valve not  well visualized.  Pericardium/PleuraNormal pericardium with no pericardial  effusion.  Hemodynamic: Estimated right ventricular systolicpressure  equals 41 mm Hg, assuming right atrial pressure equals 10  mm Hg, consistent with mild pulmonary hypertension.  ------------------------------------------------------------------------  CONCLUSIONS:  Technically very difficult study.  1. Mitral annular calcification, otherwise normal mitral  valve. Minimal mitral regurgitation.  2. Endocardium not well visualized; despite the use of IV  contrast, unable to evaluate left ventricular systolic  function.  3. Unable to accurately evaluate right ventricular size or  systolic function.  4. Estimated right ventricular systolic pressure equals 41  mm Hg, assuming right atrial pressure equals 10 mm Hg,  consistent with mild pulmonary hypertension.  ------------------------------------------------------------------------  Confirmed on  7/3/2017 - 14:34:08 by Thierry Cottrell M.D.  ------------------------------------------------------------------------    < end of copied text >    < from: US Duplex Venous Lower Ext Complete, Bilateral (07.11.17 @ 10:56) >  EXAM:  US DPLX LWR EXT VEINS COMPL BI        PROCEDURE DATE:  Jul 11 2017         INTERPRETATION:  CLINICAL INFORMATION: Lower extremity swelling. History   of DVT and cellulitis.    COMPARISON: Duplex sonography of the bilateral lower extremities dated   5/13/2015    TECHNIQUE: Duplex sonography of the BILATERAL LOWER extremities with   color and spectral Doppler, with and without compression.      FINDINGS:    There is normal compressibility of the left common femoral, femoral and   poplitealveins.     Normal compressibility of the right common and femoral veins are   demonstrated. There is noncompressibility with filling defect in the   right popliteal and gastrocnemius veins.     Calf veins are not visualized bilaterally.      IMPRESSION:     Above and below knee acute DVT in the right lower extremity as described.    Dr. Silver discussed these findings with SAI Richard on 7/11/2017 11:15   AM with read back.      REBEKAH SILVER M.D., RADIOLOGY RESIDENT  This document has been electronically signed.  JESUS MANUEL ALEXANDER M.D., ATTENDING RADIOLOGIST  This document has been electronically signed. Jul 11 2017 12:40PM
Follow Up:  cellulitis    Inverval History/ROS:  evaluated by vascular.  receiving lasix now.     Allergies  No Known Allergies        ANTIMICROBIALS:  ceFAZolin   IVPB 2000 every 8 hours      OTHER MEDS:  oxyCODONE    5 mG/acetaminophen 325 mG 1 Tablet(s) Oral every 4 hours PRN  apixaban 5 milliGRAM(s) Oral every 12 hours  gabapentin 800 milliGRAM(s) Oral three times a day  busPIRone 10 milliGRAM(s) Oral two times a day  metoprolol 50 milliGRAM(s) Oral two times a day  amLODIPine   Tablet 5 milliGRAM(s) Oral daily  ferrous    sulfate 325 milliGRAM(s) Oral daily  docusate sodium 100 milliGRAM(s) Oral two times a day PRN  furosemide    Tablet 40 milliGRAM(s) Oral two times a day  insulin lispro (HumaLOG) corrective regimen sliding scale   SubCutaneous three times a day before meals  dextrose 5%. 1000 milliLiter(s) IV Continuous <Continuous>  dextrose Gel 1 Dose(s) Oral once PRN  dextrose 50% Injectable 12.5 Gram(s) IV Push once  dextrose 50% Injectable 25 Gram(s) IV Push once  dextrose 50% Injectable 25 Gram(s) IV Push once  glucagon  Injectable 1 milliGRAM(s) IntraMuscular once PRN  sodium chloride 0.9%. 1000 milliLiter(s) IV Continuous <Continuous>  senna 2 Tablet(s) Oral at bedtime  polyethylene glycol 3350 17 Gram(s) Oral daily      Vital Signs Last 24 Hrs  Vital Signs Last 24 Hrs  T(F): 98 (17 @ 06:49), Max: 99.3 (17 @ 15:11)  HR: 75 (17 @ 06:49)  BP: 159/77 (17 @ 06:49)  RR: 17 (17 @ 06:49)  SpO2: 98% (17 @ 06:49) (95% - 98%)    PHYSICAL EXAM:  General:  non-toxic  HEAD/EYES:  white sclera   ENT:   supple   Cardiovascular:    normal   Respiratory:   clear to ausculation bilaterally  GI:   soft, non-tender, normal bowel sounds  Musculoskeletal:   no synovitis  Neurologic:  non-focal exam   Skin:  right lower leg swelling erythema and warmth decreased  Psychiatric:   appropriate affect  alert & oriented  Lines:   no phlebitis                                            12.4   5.77  )-----------( 113      ( 24 Sep 2017 06:30 )             37.4 -    139  |  98  |  12  ----------------------------<  164  3.7   |  25  |  0.86  Ca    8.2      24 Sep 2017 06:30      Urinalysis Basic - ( 20 Sep 2017 13:45 )    Color: YELLOW / Appearance: CLEAR / S.029 / pH: 6.0  Gluc: TRACE / Ketone: NEGATIVE  / Bili: NEGATIVE / Urobili: NORMAL E.U.   Blood: NEGATIVE / Protein: 30 / Nitrite: NEGATIVE   Leuk Esterase: NEGATIVE / RBC: 0-2 / WBC 2-5   Sq Epi: x / Non Sq Epi: x / Bacteria: x        MICROBIOLOGY:  blood culture negative            RADIOLOGY:
Follow Up:  cellulitis    Inverval History/ROS:  feels better than yesterday.  low grade fever.  right leg still painful.    Allergies  No Known Allergies        ANTIMICROBIALS:  ceFAZolin   IVPB 2000 every 8 hours      OTHER MEDS:  oxyCODONE    5 mG/acetaminophen 325 mG 1 Tablet(s) Oral every 4 hours PRN  apixaban 5 milliGRAM(s) Oral every 12 hours  gabapentin 800 milliGRAM(s) Oral three times a day  busPIRone 10 milliGRAM(s) Oral two times a day  metoprolol 50 milliGRAM(s) Oral two times a day  amLODIPine   Tablet 5 milliGRAM(s) Oral daily  ferrous    sulfate 325 milliGRAM(s) Oral daily  docusate sodium 100 milliGRAM(s) Oral two times a day PRN  furosemide    Tablet 40 milliGRAM(s) Oral two times a day  insulin lispro (HumaLOG) corrective regimen sliding scale   SubCutaneous three times a day before meals  dextrose 5%. 1000 milliLiter(s) IV Continuous <Continuous>  dextrose Gel 1 Dose(s) Oral once PRN  dextrose 50% Injectable 12.5 Gram(s) IV Push once  dextrose 50% Injectable 25 Gram(s) IV Push once  dextrose 50% Injectable 25 Gram(s) IV Push once  glucagon  Injectable 1 milliGRAM(s) IntraMuscular once PRN  sodium chloride 0.9%. 1000 milliLiter(s) IV Continuous <Continuous>  senna 2 Tablet(s) Oral at bedtime  polyethylene glycol 3350 17 Gram(s) Oral daily      Vital Signs Last 24 Hrs  T(F): 98.4 (17 @ 07:23), Max: 100 (17 @ 05:14)  HR: 77 (17 @ 05:14)  BP: 159/73 (17 @ 05:14)  RR: 18 (17 @ 05:14)  SpO2: 100% (17 @ 05:14) (98% - 100%)    PHYSICAL EXAM:  General:  non-toxic  HEAD/EYES:  white sclera   ENT:   supple   Cardiovascular:    normal   Respiratory:   clear to ausculation bilaterally  GI:   soft, non-tender, normal bowel sounds  Musculoskeletal:   no synovitis  Neurologic:  non-focal exam   Skin:  right lower leg swelling erythema and increased warmth below knee to toes  Psychiatric:   appropriate affect  alert & oriented  Lines:   no phlebitis                                 13.4   8.88  )-----------( 90       ( 21 Sep 2017 05:30 )             40.4       09-21    138  |  98  |  12  ----------------------------<  150<H>  3.5   |  24  |  1.02    Ca    8.3<L>      21 Sep 2017 05:30    TPro  7.1  /  Alb  3.7  /  TBili  1.1  /  DBili  x   /  AST  13  /  ALT  13  /  AlkPhos  41  -      Urinalysis Basic - ( 20 Sep 2017 13:45 )    Color: YELLOW / Appearance: CLEAR / S.029 / pH: 6.0  Gluc: TRACE / Ketone: NEGATIVE  / Bili: NEGATIVE / Urobili: NORMAL E.U.   Blood: NEGATIVE / Protein: 30 / Nitrite: NEGATIVE   Leuk Esterase: NEGATIVE / RBC: 0-2 / WBC 2-5   Sq Epi: x / Non Sq Epi: x / Bacteria: x        MICROBIOLOGY:  v            RADIOLOGY:
chief complaint : fever , chills      SUBJECTIVE / OVERNIGHT EVENTS: pt denie scp, shortness of breath, nausea, vomiting , concerned about recurrent infection of rt lower ext      MEDICATIONS  (STANDING):  apixaban 5 milliGRAM(s) Oral every 12 hours  gabapentin 800 milliGRAM(s) Oral three times a day  busPIRone 10 milliGRAM(s) Oral two times a day  metoprolol 50 milliGRAM(s) Oral two times a day  amLODIPine   Tablet 5 milliGRAM(s) Oral daily  ferrous    sulfate 325 milliGRAM(s) Oral daily  furosemide    Tablet 40 milliGRAM(s) Oral two times a day  insulin lispro (HumaLOG) corrective regimen sliding scale   SubCutaneous three times a day before meals  dextrose 5%. 1000 milliLiter(s) (50 mL/Hr) IV Continuous <Continuous>  dextrose 50% Injectable 12.5 Gram(s) IV Push once  dextrose 50% Injectable 25 Gram(s) IV Push once  dextrose 50% Injectable 25 Gram(s) IV Push once  ceFAZolin   IVPB 2000 milliGRAM(s) IV Intermittent every 8 hours  senna 2 Tablet(s) Oral at bedtime  polyethylene glycol 3350 17 Gram(s) Oral daily  lactobacillus acidophilus 1 Tablet(s) Oral three times a day with meals    MEDICATIONS  (PRN):  docusate sodium 100 milliGRAM(s) Oral two times a day PRN Constipation  dextrose Gel 1 Dose(s) Oral once PRN Blood Glucose LESS THAN 70 milliGRAM(s)/deciliter  glucagon  Injectable 1 milliGRAM(s) IntraMuscular once PRN Glucose LESS THAN 70 milligrams/deciliter  acetaminophen   Tablet 650 milliGRAM(s) Oral every 6 hours PRN For Temp greater than 38 C (100.4 F)  oxyCODONE    IR 5 milliGRAM(s) Oral every 4 hours PRN Severe Pain (7 - 10)      Vital Signs Last 24 Hrs  T(C): 36.8 (27 Sep 2017 15:00), Max: 36.8 (27 Sep 2017 05:37)  T(F): 98.3 (27 Sep 2017 15:00), Max: 98.3 (27 Sep 2017 15:00)  HR: 70 (27 Sep 2017 15:00) (66 - 70)  BP: 144/60 (27 Sep 2017 15:00) (144/60 - 145/68)  BP(mean): --  RR: 18 (27 Sep 2017 15:00) (18 - 18)  SpO2: 96% (27 Sep 2017 15:00) (96% - 99%)    Constitutional: No fever, fatigue  Skin: No rash.  Eyes: No recent vision problems or eye pain.  ENT: No congestion, ear pain, or sore throat.  Cardiovascular: No chest pain or palpation.  Respiratory: No cough, shortness of breath, congestion, or wheezing.  Gastrointestinal: No abdominal pain, nausea, vomiting, or diarrhea.  Genitourinary: No dysuria.  Musculoskeletal: No joint swelling.  Neurologic: No headache.    PHYSICAL EXAM:  GENERAL: NAD  EYES: EOMI, PERRLA  NECK: Supple, No JVD  CHEST/LUNG: dec breath sounds at bases   HEART:  S1 , S2 +  ABDOMEN: soft, bs+  EXTREMITIES:  rt lower ext edema > left , left lower ext sweaty, mildly cold  NEUROLOGY: alert awake oriented     LABS:        Creatinine Trend: 0.86 <--, 0.98 <--, 0.92 <--, 1.02 <--    Urine Studies:                                              LIVER FUNCTIONS - ( 22 Sep 2017 06:35 )  Alb: 3.3 g/dL / Pro: 6.6 g/dL / ALK PHOS: 36 u/L / ALT: 11 u/L / AST: 11 u/L / GGT: x
Cardiology/Vascular Medicine Inpatient Progress Note    No overnight complaints.  Denies subjective fevers overnight.  Legs less symptomatic, but still edematous, erythematous.  Currently on IV cefazolin    Vital Signs Last 24 Hrs  T(C): 36.4 (25 Sep 2017 13:54), Max: 36.7 (25 Sep 2017 06:03)  T(F): 97.5 (25 Sep 2017 13:54), Max: 98 (25 Sep 2017 06:03)  HR: 89 (25 Sep 2017 18:15) (66 - 89)  BP: 160/81 (25 Sep 2017 18:15) (150/79 - 160/81)  BP(mean): --  RR: 18 (25 Sep 2017 13:54) (16 - 18)  SpO2: 96% (25 Sep 2017 13:54) (96% - 96%)    Appearance: NAD, laying flat in bed  HEENT:   Normal oral mucosa, PERRL, EOMI	  Lymphatic: No lymphadenopathy  Cardiovascular: Normal S1 S2, No JVD, No murmurs, No edema  Respiratory: Lungs clear to auscultation	  Psychiatry: Awake alert  Gastrointestinal:  Soft, Non-tender, + BS	  Skin: No rashes, No ecchymoses, No cyanosis	  Neurologic: Non-focal  Extremities: As above, +edematous, erythematous, skin color changes consistent with CVI    MEDICATIONS  (STANDING):  apixaban 5 milliGRAM(s) Oral every 12 hours  gabapentin 800 milliGRAM(s) Oral three times a day  busPIRone 10 milliGRAM(s) Oral two times a day  metoprolol 50 milliGRAM(s) Oral two times a day  amLODIPine   Tablet 5 milliGRAM(s) Oral daily  ferrous    sulfate 325 milliGRAM(s) Oral daily  furosemide    Tablet 40 milliGRAM(s) Oral two times a day  insulin lispro (HumaLOG) corrective regimen sliding scale   SubCutaneous three times a day before meals  dextrose 5%. 1000 milliLiter(s) (50 mL/Hr) IV Continuous <Continuous>  dextrose 50% Injectable 12.5 Gram(s) IV Push once  dextrose 50% Injectable 25 Gram(s) IV Push once  dextrose 50% Injectable 25 Gram(s) IV Push once  ceFAZolin   IVPB 2000 milliGRAM(s) IV Intermittent every 8 hours  senna 2 Tablet(s) Oral at bedtime  polyethylene glycol 3350 17 Gram(s) Oral daily  lactobacillus acidophilus 1 Tablet(s) Oral three times a day with meals    MEDICATIONS  (PRN):  docusate sodium 100 milliGRAM(s) Oral two times a day PRN Constipation  dextrose Gel 1 Dose(s) Oral once PRN Blood Glucose LESS THAN 70 milliGRAM(s)/deciliter  glucagon  Injectable 1 milliGRAM(s) IntraMuscular once PRN Glucose LESS THAN 70 milligrams/deciliter  acetaminophen   Tablet 650 milliGRAM(s) Oral every 6 hours PRN For Temp greater than 38 C (100.4 F)  oxyCODONE    IR 5 milliGRAM(s) Oral every 4 hours PRN Severe Pain (7 - 10)    LABS:	 	                                            12.4   5.77  )-----------( 113      ( 24 Sep 2017 06:30 )             37.4     	09-24    139  |  98  |  12  ----------------------------<  164<H>  3.7   |  25  |  0.86    Ca    8.2<L>      24 Sep 2017 06:30          < from: Xray Chest 2 Views PA/Lat (09.20.17 @ 09:02) >  EXAM:  RAD CHEST PA LAT        PROCEDURE DATE:  Sep 20 2017         INTERPRETATION:  TIME OF EXAM: September 20, 2017 at 9:18 AM    CLINICAL INFORMATION: Chest pain    TECHNIQUE:   PA and lateral chest    INTERPRETATION:     Lungs are free of focalabnormalities. The heart is not enlarged and   there are no effusions or vascular congestion to indicate CHF.      COMPARISON:  August 8, 2017      IMPRESSION:  Clear lungs.      < from: TTE with Doppler (w/Cont) (07.03.17 @ 13:05) >  Patient name: CALLIE MACIAS  YOB: 1940   Age: 77 (M)   MR#: [id number]  Study Date: 7/3/2017  Location: [address] kSonographer: Jl Long RDCS  Study quality: Technically Difficult  Referring Physician: Darren Wood MD  Blood Pressure: 160/88 mmHg  Height: 187 cm  Weight: 151 kg  BSA: 2.7 m2  ------------------------------------------------------------------------  PROCEDURE: Transthoracic echocardiogram with 2-D, M-Mode  and complete spectral and color flow Doppler.  Verbal consent was obtained for injection of echo contrast.  Following  intravenous injection of contrast, harmonic  imaging was performed.  INDICATION: Unspecified atrial fibrillation (I48.91),  Shortness of breath (R06.02)  ------------------------------------------------------------------------  OBSERVATIONS:  Mitral Valve: Mitral annular calcification, otherwise  normal mitral valve. Minimal mitral regurgitation.  Aortic Root: Normal aortic root.  Aortic Valve: Aortic valve leaflet morphology not well  visualized.  Left Atrium: Normal left atrium.  Left Ventricle: Endocardium not well visualized; despite  the use of IV contrast, unable to evaluate left ventricular  systolic function.  Right Heart: Normal right atrium. Unable to accurately  evaluate right ventricular size or systolic function.  Tricuspid valve not well visualized. Pulmonic valve not  well visualized.  Pericardium/PleuraNormal pericardium with no pericardial  effusion.  Hemodynamic: Estimated right ventricular systolicpressure  equals 41 mm Hg, assuming right atrial pressure equals 10  mm Hg, consistent with mild pulmonary hypertension.  ------------------------------------------------------------------------  CONCLUSIONS:  Technically very difficult study.  1. Mitral annular calcification, otherwise normal mitral  valve. Minimal mitral regurgitation.  2. Endocardium not well visualized; despite the use of IV  contrast, unable to evaluate left ventricular systolic  function.  3. Unable to accurately evaluate right ventricular size or  systolic function.  4. Estimated right ventricular systolic pressure equals 41  mm Hg, assuming right atrial pressure equals 10 mm Hg,  consistent with mild pulmonary hypertension.  ------------------------------------------------------------------------  Confirmed on  7/3/2017 - 14:34:08 by Thierry Cottrell M.D.  ------------------------------------------------------------------------    < end of copied text >    < from: US Duplex Venous Lower Ext Complete, Bilateral (07.11.17 @ 10:56) >  EXAM:  US DPLX LWR EXT VEINS COMPL BI        PROCEDURE DATE:  Jul 11 2017         INTERPRETATION:  CLINICAL INFORMATION: Lower extremity swelling. History   of DVT and cellulitis.    COMPARISON: Duplex sonography of the bilateral lower extremities dated   5/13/2015    TECHNIQUE: Duplex sonography of the BILATERAL LOWER extremities with   color and spectral Doppler, with and without compression.      FINDINGS:    There is normal compressibility of the left common femoral, femoral and   poplitealveins.     Normal compressibility of the right common and femoral veins are   demonstrated. There is noncompressibility with filling defect in the   right popliteal and gastrocnemius veins.     Calf veins are not visualized bilaterally.      IMPRESSION:     Above and below knee acute DVT in the right lower extremity as described.    Dr. Silver discussed these findings with SAI Richard on 7/11/2017 11:15   AM with read back.      REBEKAH SILVER M.D., RADIOLOGY RESIDENT  This document has been electronically signed.  JESUS MANUEL ALEXANDER M.D., ATTENDING RADIOLOGIST  This document has been electronically signed. Jul 11 2017 12:40PM
Cardiology/Vascular Medicine Inpatient Progress Note    No overnight complaints.  Denies subjective fevers overnight.  Legs remain symptomatic +edematous, +erythematous.  Currently on IV cefazolin    Vital Signs Last 24 Hrs  T(C): 36.6 (23 Sep 2017 05:27), Max: 37.9 (22 Sep 2017 21:12)  T(F): 97.8 (23 Sep 2017 05:27), Max: 100.3 (22 Sep 2017 21:12)  HR: 69 (23 Sep 2017 05:27) (69 - 92)  BP: 152/90 (23 Sep 2017 05:27) (131/71 - 152/90)  BP(mean): 90 (22 Sep 2017 17:31) (90 - 90)  RR: 18 (23 Sep 2017 05:27) (17 - 18)  SpO2: 93% (23 Sep 2017 05:27) (93% - 96%)    Appearance: NAD, laying flat in bed  HEENT:   Normal oral mucosa, PERRL, EOMI	  Lymphatic: No lymphadenopathy  Cardiovascular: Normal S1 S2, No JVD, No murmurs, No edema  Respiratory: Lungs clear to auscultation	  Psychiatry: Awake alert  Gastrointestinal:  Soft, Non-tender, + BS	  Skin: No rashes, No ecchymoses, No cyanosis	  Neurologic: Non-focal  Extremities: As above, +edematous, erythematous, skin color changes consistent with CVI    MEDICATIONS  (STANDING):  apixaban 5 milliGRAM(s) Oral every 12 hours  gabapentin 800 milliGRAM(s) Oral three times a day  busPIRone 10 milliGRAM(s) Oral two times a day  metoprolol 50 milliGRAM(s) Oral two times a day  amLODIPine   Tablet 5 milliGRAM(s) Oral daily  ferrous    sulfate 325 milliGRAM(s) Oral daily  furosemide    Tablet 40 milliGRAM(s) Oral two times a day  insulin lispro (HumaLOG) corrective regimen sliding scale   SubCutaneous three times a day before meals  dextrose 5%. 1000 milliLiter(s) (50 mL/Hr) IV Continuous <Continuous>  dextrose 50% Injectable 12.5 Gram(s) IV Push once  dextrose 50% Injectable 25 Gram(s) IV Push once  dextrose 50% Injectable 25 Gram(s) IV Push once  sodium chloride 0.9%. 1000 milliLiter(s) (100 mL/Hr) IV Continuous <Continuous>  ceFAZolin   IVPB 2000 milliGRAM(s) IV Intermittent every 8 hours  senna 2 Tablet(s) Oral at bedtime  polyethylene glycol 3350 17 Gram(s) Oral daily    MEDICATIONS  (PRN):  docusate sodium 100 milliGRAM(s) Oral two times a day PRN Constipation  dextrose Gel 1 Dose(s) Oral once PRN Blood Glucose LESS THAN 70 milliGRAM(s)/deciliter  glucagon  Injectable 1 milliGRAM(s) IntraMuscular once PRN Glucose LESS THAN 70 milligrams/deciliter  acetaminophen   Tablet 650 milliGRAM(s) Oral every 6 hours PRN For Temp greater than 38 C (100.4 F)  oxyCODONE    IR 5 milliGRAM(s) Oral every 4 hours PRN Severe Pain (7 - 10)    LABS:	 	                                   12.0   5.37  )-----------( 106      ( 23 Sep 2017 07:15 )             36.0     09-23    140  |  101  |  12  ----------------------------<  179<H>  3.4<L>   |  25  |  0.98    Ca    8.4      23 Sep 2017 07:15    TPro  6.6  /  Alb  3.3  /  TBili  0.7  /  DBili  x   /  AST  11  /  ALT  11  /  AlkPhos  36<L>  09-22        < from: Xray Chest 2 Views PA/Lat (09.20.17 @ 09:02) >  EXAM:  RAD CHEST PA LAT        PROCEDURE DATE:  Sep 20 2017         INTERPRETATION:  TIME OF EXAM: September 20, 2017 at 9:18 AM    CLINICAL INFORMATION: Chest pain    TECHNIQUE:   PA and lateral chest    INTERPRETATION:     Lungs are free of focalabnormalities. The heart is not enlarged and   there are no effusions or vascular congestion to indicate CHF.      COMPARISON:  August 8, 2017      IMPRESSION:  Clear lungs.      < from: TTE with Doppler (w/Cont) (07.03.17 @ 13:05) >  Patient name: CALLIE MACIAS  YOB: 1940   Age: 77 (M)   MR#: [id number]  Study Date: 7/3/2017  Location: [address] kSonographer: Jl Long RDCS  Study quality: Technically Difficult  Referring Physician: Darren Wood MD  Blood Pressure: 160/88 mmHg  Height: 187 cm  Weight: 151 kg  BSA: 2.7 m2  ------------------------------------------------------------------------  PROCEDURE: Transthoracic echocardiogram with 2-D, M-Mode  and complete spectral and color flow Doppler.  Verbal consent was obtained for injection of echo contrast.  Following  intravenous injection of contrast, harmonic  imaging was performed.  INDICATION: Unspecified atrial fibrillation (I48.91),  Shortness of breath (R06.02)  ------------------------------------------------------------------------  OBSERVATIONS:  Mitral Valve: Mitral annular calcification, otherwise  normal mitral valve. Minimal mitral regurgitation.  Aortic Root: Normal aortic root.  Aortic Valve: Aortic valve leaflet morphology not well  visualized.  Left Atrium: Normal left atrium.  Left Ventricle: Endocardium not well visualized; despite  the use of IV contrast, unable to evaluate left ventricular  systolic function.  Right Heart: Normal right atrium. Unable to accurately  evaluate right ventricular size or systolic function.  Tricuspid valve not well visualized. Pulmonic valve not  well visualized.  Pericardium/PleuraNormal pericardium with no pericardial  effusion.  Hemodynamic: Estimated right ventricular systolicpressure  equals 41 mm Hg, assuming right atrial pressure equals 10  mm Hg, consistent with mild pulmonary hypertension.  ------------------------------------------------------------------------  CONCLUSIONS:  Technically very difficult study.  1. Mitral annular calcification, otherwise normal mitral  valve. Minimal mitral regurgitation.  2. Endocardium not well visualized; despite the use of IV  contrast, unable to evaluate left ventricular systolic  function.  3. Unable to accurately evaluate right ventricular size or  systolic function.  4. Estimated right ventricular systolic pressure equals 41  mm Hg, assuming right atrial pressure equals 10 mm Hg,  consistent with mild pulmonary hypertension.  ------------------------------------------------------------------------  Confirmed on  7/3/2017 - 14:34:08 by Thierry Cottrell M.D.  ------------------------------------------------------------------------    < end of copied text >    < from: US Duplex Venous Lower Ext Complete, Bilateral (07.11.17 @ 10:56) >  EXAM:  US DPLX LWR EXT VEINS COMPL BI        PROCEDURE DATE:  Jul 11 2017         INTERPRETATION:  CLINICAL INFORMATION: Lower extremity swelling. History   of DVT and cellulitis.    COMPARISON: Duplex sonography of the bilateral lower extremities dated   5/13/2015    TECHNIQUE: Duplex sonography of the BILATERAL LOWER extremities with   color and spectral Doppler, with and without compression.      FINDINGS:    There is normal compressibility of the left common femoral, femoral and   poplitealveins.     Normal compressibility of the right common and femoral veins are   demonstrated. There is noncompressibility with filling defect in the   right popliteal and gastrocnemius veins.     Calf veins are not visualized bilaterally.      IMPRESSION:     Above and below knee acute DVT in the right lower extremity as described.    Dr. Silver discussed these findings with SAI Richard on 7/11/2017 11:15   AM with read back.      REBEKAH SILVER M.D., RADIOLOGY RESIDENT  This document has been electronically signed.  JESUS MANUEL ALEXANDER M.D., ATTENDING RADIOLOGIST  This document has been electronically signed. Jul 11 2017 12:40PM
Cardiology/Vascular Medicine Inpatient Progress Note    No overnight complaints.  Denies subjective fevers overnight.  Legs remain symptomatic +edematous, +erythematous.  Started on IV cefazolin    Vital Signs Last 24 Hrs  T(C): 36.2 (22 Sep 2017 05:12), Max: 38.9 (21 Sep 2017 18:32)  T(F): 97.1 (22 Sep 2017 05:12), Max: 102 (21 Sep 2017 18:32)  HR: 65 (22 Sep 2017 05:12) (65 - 89)  BP: 149/71 (22 Sep 2017 05:12) (111/61 - 159/89)  BP(mean): --  RR: 16 (22 Sep 2017 05:12) (16 - 19)  SpO2: 97% (22 Sep 2017 05:12) (95% - 99%)    Appearance: NAD, laying flat in bed  HEENT:   Normal oral mucosa, PERRL, EOMI	  Lymphatic: No lymphadenopathy  Cardiovascular: Normal S1 S2, No JVD, No murmurs, No edema  Respiratory: Lungs clear to auscultation	  Psychiatry: Awake alert  Gastrointestinal:  Soft, Non-tender, + BS	  Skin: No rashes, No ecchymoses, No cyanosis	  Neurologic: Non-focal  Extremities: As above, +edematous, erythematous, skin color changes consistent with CVI    MEDICATIONS  (STANDING):  apixaban 5 milliGRAM(s) Oral every 12 hours  gabapentin 800 milliGRAM(s) Oral three times a day  busPIRone 10 milliGRAM(s) Oral two times a day  metoprolol 50 milliGRAM(s) Oral two times a day  amLODIPine   Tablet 5 milliGRAM(s) Oral daily  ferrous    sulfate 325 milliGRAM(s) Oral daily  furosemide    Tablet 40 milliGRAM(s) Oral two times a day  insulin lispro (HumaLOG) corrective regimen sliding scale   SubCutaneous three times a day before meals  dextrose 5%. 1000 milliLiter(s) (50 mL/Hr) IV Continuous <Continuous>  dextrose 50% Injectable 12.5 Gram(s) IV Push once  dextrose 50% Injectable 25 Gram(s) IV Push once  dextrose 50% Injectable 25 Gram(s) IV Push once  sodium chloride 0.9%. 1000 milliLiter(s) (100 mL/Hr) IV Continuous <Continuous>  ceFAZolin   IVPB 2000 milliGRAM(s) IV Intermittent every 8 hours  senna 2 Tablet(s) Oral at bedtime  polyethylene glycol 3350 17 Gram(s) Oral daily    MEDICATIONS  (PRN):  docusate sodium 100 milliGRAM(s) Oral two times a day PRN Constipation  dextrose Gel 1 Dose(s) Oral once PRN Blood Glucose LESS THAN 70 milliGRAM(s)/deciliter  glucagon  Injectable 1 milliGRAM(s) IntraMuscular once PRN Glucose LESS THAN 70 milligrams/deciliter  acetaminophen   Tablet 650 milliGRAM(s) Oral every 6 hours PRN For Temp greater than 38 C (100.4 F)  oxyCODONE    IR 5 milliGRAM(s) Oral every 4 hours PRN Severe Pain (7 - 10)      LABS:	 	                          12.2   6.18  )-----------( 88       ( 22 Sep 2017 06:35 )             36.2     09-22    138  |  100  |  12  ----------------------------<  147<H>  3.1<L>   |  24  |  0.92    Ca    8.1<L>      22 Sep 2017 06:35    TPro  6.6  /  Alb  3.3  /  TBili  0.7  /  DBili  x   /  AST  11  /  ALT  11  /  AlkPhos  36<L>  09-22      < from: Xray Chest 2 Views PA/Lat (09.20.17 @ 09:02) >  EXAM:  RAD CHEST PA LAT        PROCEDURE DATE:  Sep 20 2017         INTERPRETATION:  TIME OF EXAM: September 20, 2017 at 9:18 AM    CLINICAL INFORMATION: Chest pain    TECHNIQUE:   PA and lateral chest    INTERPRETATION:     Lungs are free of focalabnormalities. The heart is not enlarged and   there are no effusions or vascular congestion to indicate CHF.      COMPARISON:  August 8, 2017      IMPRESSION:  Clear lungs.      < from: TTE with Doppler (w/Cont) (07.03.17 @ 13:05) >  Patient name: CALLIE MACIAS  YOB: 1940   Age: 77 (M)   MR#: [id number]  Study Date: 7/3/2017  Location: [address] kSonographer: Jl Long RDCS  Study quality: Technically Difficult  Referring Physician: Darren Wood MD  Blood Pressure: 160/88 mmHg  Height: 187 cm  Weight: 151 kg  BSA: 2.7 m2  ------------------------------------------------------------------------  PROCEDURE: Transthoracic echocardiogram with 2-D, M-Mode  and complete spectral and color flow Doppler.  Verbal consent was obtained for injection of echo contrast.  Following  intravenous injection of contrast, harmonic  imaging was performed.  INDICATION: Unspecified atrial fibrillation (I48.91),  Shortness of breath (R06.02)  ------------------------------------------------------------------------  OBSERVATIONS:  Mitral Valve: Mitral annular calcification, otherwise  normal mitral valve. Minimal mitral regurgitation.  Aortic Root: Normal aortic root.  Aortic Valve: Aortic valve leaflet morphology not well  visualized.  Left Atrium: Normal left atrium.  Left Ventricle: Endocardium not well visualized; despite  the use of IV contrast, unable to evaluate left ventricular  systolic function.  Right Heart: Normal right atrium. Unable to accurately  evaluate right ventricular size or systolic function.  Tricuspid valve not well visualized. Pulmonic valve not  well visualized.  Pericardium/PleuraNormal pericardium with no pericardial  effusion.  Hemodynamic: Estimated right ventricular systolicpressure  equals 41 mm Hg, assuming right atrial pressure equals 10  mm Hg, consistent with mild pulmonary hypertension.  ------------------------------------------------------------------------  CONCLUSIONS:  Technically very difficult study.  1. Mitral annular calcification, otherwise normal mitral  valve. Minimal mitral regurgitation.  2. Endocardium not well visualized; despite the use of IV  contrast, unable to evaluate left ventricular systolic  function.  3. Unable to accurately evaluate right ventricular size or  systolic function.  4. Estimated right ventricular systolic pressure equals 41  mm Hg, assuming right atrial pressure equals 10 mm Hg,  consistent with mild pulmonary hypertension.  ------------------------------------------------------------------------  Confirmed on  7/3/2017 - 14:34:08 by Thierry Cottrell M.D.  ------------------------------------------------------------------------    < end of copied text >    < from: US Duplex Venous Lower Ext Complete, Bilateral (07.11.17 @ 10:56) >  EXAM:  US DPLX LWR EXT VEINS COMPL BI        PROCEDURE DATE:  Jul 11 2017         INTERPRETATION:  CLINICAL INFORMATION: Lower extremity swelling. History   of DVT and cellulitis.    COMPARISON: Duplex sonography of the bilateral lower extremities dated   5/13/2015    TECHNIQUE: Duplex sonography of the BILATERAL LOWER extremities with   color and spectral Doppler, with and without compression.      FINDINGS:    There is normal compressibility of the left common femoral, femoral and   poplitealveins.     Normal compressibility of the right common and femoral veins are   demonstrated. There is noncompressibility with filling defect in the   right popliteal and gastrocnemius veins.     Calf veins are not visualized bilaterally.      IMPRESSION:     Above and below knee acute DVT in the right lower extremity as described.    Dr. Silver discussed these findings with SAI Richard on 7/11/2017 11:15   AM with read back.      REBEKAH SILVER M.D., RADIOLOGY RESIDENT  This document has been electronically signed.  JESUS MANUEL ALEXANDER M.D., ATTENDING RADIOLOGIST  This document has been electronically signed. Jul 11 2017 12:40PM
Delayed entry note from 11am today    78yo M with RLE cellulitis and concern for cool LLE    Vital Signs Last 24 Hrs  T(C): 36.9 (23 Sep 2017 20:34), Max: 37.9 (22 Sep 2017 21:12)  T(F): 98.5 (23 Sep 2017 20:34), Max: 100.3 (22 Sep 2017 21:12)  HR: 80 (23 Sep 2017 20:34) (69 - 87)  BP: 139/65 (23 Sep 2017 20:34) (131/71 - 166/86)  BP(mean): --  RR: 17 (23 Sep 2017 20:34) (17 - 18)  SpO2: 95% (23 Sep 2017 20:34) (93% - 96%)      SUBJECTIVE: Pt seen and examined doing well. Complaining of RLE pain and swelling. Reports LLE feels cool compared to RLE but is RLE color, mild tingling, and swelling is the same as it always is at home.       General Appearance: awake, alert, NAD  Neck: supple  Chest: equal chest rise  CV: RRR  Abdomen: soft   Extremities:   RLE with erythema, pitting edema, warm, PT and DP signals  LLE cool, motor and sensory intact, palpable PT and DP signal    I&O's Summary    23 Sep 2017 07:01  -  23 Sep 2017 20:57  --------------------------------------------------------  IN: 0 mL / OUT: 1450 mL / NET: -1450 mL      I&O's Detail    23 Sep 2017 07:01  -  23 Sep 2017 20:57  --------------------------------------------------------  IN:  Total IN: 0 mL    OUT:    Voided: 1450 mL  Total OUT: 1450 mL    Total NET: -1450 mL          MEDICATIONS  (STANDING):  apixaban 5 milliGRAM(s) Oral every 12 hours  gabapentin 800 milliGRAM(s) Oral three times a day  busPIRone 10 milliGRAM(s) Oral two times a day  metoprolol 50 milliGRAM(s) Oral two times a day  amLODIPine   Tablet 5 milliGRAM(s) Oral daily  ferrous    sulfate 325 milliGRAM(s) Oral daily  furosemide    Tablet 40 milliGRAM(s) Oral two times a day  insulin lispro (HumaLOG) corrective regimen sliding scale   SubCutaneous three times a day before meals  dextrose 5%. 1000 milliLiter(s) (50 mL/Hr) IV Continuous <Continuous>  dextrose 50% Injectable 12.5 Gram(s) IV Push once  dextrose 50% Injectable 25 Gram(s) IV Push once  dextrose 50% Injectable 25 Gram(s) IV Push once  sodium chloride 0.9%. 1000 milliLiter(s) (100 mL/Hr) IV Continuous <Continuous>  ceFAZolin   IVPB 2000 milliGRAM(s) IV Intermittent every 8 hours  senna 2 Tablet(s) Oral at bedtime  polyethylene glycol 3350 17 Gram(s) Oral daily    MEDICATIONS  (PRN):  docusate sodium 100 milliGRAM(s) Oral two times a day PRN Constipation  dextrose Gel 1 Dose(s) Oral once PRN Blood Glucose LESS THAN 70 milliGRAM(s)/deciliter  glucagon  Injectable 1 milliGRAM(s) IntraMuscular once PRN Glucose LESS THAN 70 milligrams/deciliter  acetaminophen   Tablet 650 milliGRAM(s) Oral every 6 hours PRN For Temp greater than 38 C (100.4 F)  oxyCODONE    IR 5 milliGRAM(s) Oral every 4 hours PRN Severe Pain (7 - 10)      LABS:                        12.0   5.37  )-----------( 106      ( 23 Sep 2017 07:15 )             36.0     09-23    140  |  101  |  12  ----------------------------<  179<H>  3.4<L>   |  25  |  0.98    Ca    8.4      23 Sep 2017 07:15    TPro  6.6  /  Alb  3.3  /  TBili  0.7  /  DBili  x   /  AST  11  /  ALT  11  /  AlkPhos  36<L>  09-22
Follow Up:  cellulitis    Inverval History/ROS:  had rigor and 102 yesterday.  wife notes left leg cooler than usual.    Allergies  No Known Allergies        ANTIMICROBIALS:  ceFAZolin   IVPB 2000 every 8 hours      OTHER MEDS:  oxyCODONE    5 mG/acetaminophen 325 mG 1 Tablet(s) Oral every 4 hours PRN  apixaban 5 milliGRAM(s) Oral every 12 hours  gabapentin 800 milliGRAM(s) Oral three times a day  busPIRone 10 milliGRAM(s) Oral two times a day  metoprolol 50 milliGRAM(s) Oral two times a day  amLODIPine   Tablet 5 milliGRAM(s) Oral daily  ferrous    sulfate 325 milliGRAM(s) Oral daily  docusate sodium 100 milliGRAM(s) Oral two times a day PRN  furosemide    Tablet 40 milliGRAM(s) Oral two times a day  insulin lispro (HumaLOG) corrective regimen sliding scale   SubCutaneous three times a day before meals  dextrose 5%. 1000 milliLiter(s) IV Continuous <Continuous>  dextrose Gel 1 Dose(s) Oral once PRN  dextrose 50% Injectable 12.5 Gram(s) IV Push once  dextrose 50% Injectable 25 Gram(s) IV Push once  dextrose 50% Injectable 25 Gram(s) IV Push once  glucagon  Injectable 1 milliGRAM(s) IntraMuscular once PRN  sodium chloride 0.9%. 1000 milliLiter(s) IV Continuous <Continuous>  senna 2 Tablet(s) Oral at bedtime  polyethylene glycol 3350 17 Gram(s) Oral daily      Vital Signs Last 24 Hrs  T(F): 97.6 (17 @ 11:46), Max: 102 (17 @ 18:32)  HR: 65 (17 @ 05:12)  BP: 149/71 (17 @ 05:12)  RR: 16 (17 @ 05:12)  SpO2: 97% (17 @ 05:12) (97% - 99%)    PHYSICAL EXAM:  General:  non-toxic  HEAD/EYES:  white sclera   ENT:   supple   Cardiovascular:    normal   Respiratory:   clear to ausculation bilaterally  GI:   soft, non-tender, normal bowel sounds  Musculoskeletal:   no synovitis  Neurologic:  non-focal exam   Skin:  right lower leg swelling erythema and increased warmth below knee to toes unchanged left leg cool to touch  Psychiatric:   appropriate affect  alert & oriented  Lines:   no phlebitis                                 13.4   8.88  )-----------( 90       ( 21 Sep 2017 05:30 )             40.4                             12.2   6.18  )-----------( 88       ( 22 Sep 2017 06:35 )             36.2     138  |  100  |  12  ----------------------------<  147  3.1   |  24  |  0.92  Ca    8.1      22 Sep 2017 06:35  TPro  6.6  /  Alb  3.3  /  TBili  0.7  /  DBili  x   /  AST  11  /  ALT  11  /  AlkPhos  36        Urinalysis Basic - ( 20 Sep 2017 13:45 )    Color: YELLOW / Appearance: CLEAR / S.029 / pH: 6.0  Gluc: TRACE / Ketone: NEGATIVE  / Bili: NEGATIVE / Urobili: NORMAL E.U.   Blood: NEGATIVE / Protein: 30 / Nitrite: NEGATIVE   Leuk Esterase: NEGATIVE / RBC: 0-2 / WBC 2-5   Sq Epi: x / Non Sq Epi: x / Bacteria: x        MICROBIOLOGY:  Culture - Blood (17 @ 08:56)    Culture - Blood:   NO ORGANISMS ISOLATED  NO ORGANISMS ISOLATED AT 48 HRS.    Specimen Source: BLOOD VENOUS        Culture - Blood (17 @ 08:56)    Culture - Blood:   NO ORGANISMS ISOLATED  NO ORGANISMS ISOLATED AT 48 HRS.    Specimen Source: BLOOD PERIPHERAL    Culture - Urine (17 @ 14:15)    Culture - Urine:   NO GROWTH AT 24 HOURS    Specimen Source: URINE MIDSTREAM            RADIOLOGY:
Follow Up:  cellulitis    Inverval History/ROS:  no complaint.  elevating right leg.    Allergies  No Known Allergies        ANTIMICROBIALS:  ceFAZolin   IVPB 2000 every 8 hours      OTHER MEDS:  oxyCODONE    5 mG/acetaminophen 325 mG 1 Tablet(s) Oral every 4 hours PRN  apixaban 5 milliGRAM(s) Oral every 12 hours  gabapentin 800 milliGRAM(s) Oral three times a day  busPIRone 10 milliGRAM(s) Oral two times a day  metoprolol 50 milliGRAM(s) Oral two times a day  amLODIPine   Tablet 5 milliGRAM(s) Oral daily  ferrous    sulfate 325 milliGRAM(s) Oral daily  docusate sodium 100 milliGRAM(s) Oral two times a day PRN  furosemide    Tablet 40 milliGRAM(s) Oral two times a day  insulin lispro (HumaLOG) corrective regimen sliding scale   SubCutaneous three times a day before meals  dextrose 5%. 1000 milliLiter(s) IV Continuous <Continuous>  dextrose Gel 1 Dose(s) Oral once PRN  dextrose 50% Injectable 12.5 Gram(s) IV Push once  dextrose 50% Injectable 25 Gram(s) IV Push once  dextrose 50% Injectable 25 Gram(s) IV Push once  glucagon  Injectable 1 milliGRAM(s) IntraMuscular once PRN  sodium chloride 0.9%. 1000 milliLiter(s) IV Continuous <Continuous>  senna 2 Tablet(s) Oral at bedtime  polyethylene glycol 3350 17 Gram(s) Oral daily      Vital Signs Last 24 Hrs  T(F): 97.5 (09-25-17 @ 13:54), Max: 99.6 (09-24-17 @ 15:40)  HR: 76 (09-25-17 @ 13:54)  BP: 156/80 (09-25-17 @ 13:54)  RR: 18 (09-25-17 @ 13:54)  SpO2: 96% (09-25-17 @ 13:54) (96% - 97%)    PHYSICAL EXAM:  General:  non-toxic  HEAD/EYES:  white sclera   ENT:   supple   Cardiovascular:    normal   Respiratory:   clear to ausculation bilaterally  GI:   soft, non-tender, normal bowel sounds  Musculoskeletal:   no synovitis  Neurologic:  non-focal exam   Skin:  right lower leg swelling, decreased erythema foot and toes, still warm  Psychiatric:   appropriate affect  alert & oriented  Lines:   no phlebitis                                         12.4   5.77  )-----------( 113      ( 24 Sep 2017 06:30 )             37.4 09-24    139  |  98  |  12  ----------------------------<  164  3.7   |  25  |  0.86  Ca    8.2      24 Sep 2017 06:30          MICROBIOLOGY:  blood culture negative
Follow Up:  cellulitis    Inverval History/ROS:  planning d/c     Allergies  No Known Allergies        ANTIMICROBIALS:  ceFAZolin   IVPB 2000 every 8 hours      OTHER MEDS:  oxyCODONE    5 mG/acetaminophen 325 mG 1 Tablet(s) Oral every 4 hours PRN  apixaban 5 milliGRAM(s) Oral every 12 hours  gabapentin 800 milliGRAM(s) Oral three times a day  busPIRone 10 milliGRAM(s) Oral two times a day  metoprolol 50 milliGRAM(s) Oral two times a day  amLODIPine   Tablet 5 milliGRAM(s) Oral daily  ferrous    sulfate 325 milliGRAM(s) Oral daily  docusate sodium 100 milliGRAM(s) Oral two times a day PRN  furosemide    Tablet 40 milliGRAM(s) Oral two times a day  insulin lispro (HumaLOG) corrective regimen sliding scale   SubCutaneous three times a day before meals  dextrose 5%. 1000 milliLiter(s) IV Continuous <Continuous>  dextrose Gel 1 Dose(s) Oral once PRN  dextrose 50% Injectable 12.5 Gram(s) IV Push once  dextrose 50% Injectable 25 Gram(s) IV Push once  dextrose 50% Injectable 25 Gram(s) IV Push once  glucagon  Injectable 1 milliGRAM(s) IntraMuscular once PRN  sodium chloride 0.9%. 1000 milliLiter(s) IV Continuous <Continuous>  senna 2 Tablet(s) Oral at bedtime  polyethylene glycol 3350 17 Gram(s) Oral daily      Vital Signs Last 24 Hrs  Vital Signs Last 24 Hrs  T(F): 98.2 (09-27-17 @ 05:37), Max: 98.3 (09-26-17 @ 21:45)  HR: 66 (09-27-17 @ 05:37)  BP: 145/68 (09-27-17 @ 05:37)  RR: 18 (09-27-17 @ 05:37)  SpO2: 99% (09-27-17 @ 05:37) (96% - 99%)    PHYSICAL EXAM:  General:  non-toxic  HEAD/EYES:  white sclera   ENT:   supple   Cardiovascular:    normal   Respiratory:   clear to ausculation bilaterally  GI:   soft, non-tender, normal bowel sounds  Musculoskeletal:   no synovitis  Neurologic:  non-focal exam   Skin:  right lower leg swelling decreased, decreased erythema foot and toes, still warm  Psychiatric:   appropriate affect  alert & oriented  Lines:   no phlebitis                              MICROBIOLOGY:  blood culture negative
chief complaint : fever , chills        SUBJECTIVE / OVERNIGHT EVENTS: pt denie scp, shortness of breath, nausea, vomiting , concerned about recurrent infection of rt lower ext       MEDICATIONS  (STANDING):  apixaban 5 milliGRAM(s) Oral every 12 hours  gabapentin 800 milliGRAM(s) Oral three times a day  busPIRone 10 milliGRAM(s) Oral two times a day  metoprolol 50 milliGRAM(s) Oral two times a day  amLODIPine   Tablet 5 milliGRAM(s) Oral daily  ferrous    sulfate 325 milliGRAM(s) Oral daily  furosemide    Tablet 40 milliGRAM(s) Oral two times a day  insulin lispro (HumaLOG) corrective regimen sliding scale   SubCutaneous three times a day before meals  dextrose 5%. 1000 milliLiter(s) (50 mL/Hr) IV Continuous <Continuous>  dextrose 50% Injectable 12.5 Gram(s) IV Push once  dextrose 50% Injectable 25 Gram(s) IV Push once  dextrose 50% Injectable 25 Gram(s) IV Push once  sodium chloride 0.9%. 1000 milliLiter(s) (100 mL/Hr) IV Continuous <Continuous>  ceFAZolin   IVPB 2000 milliGRAM(s) IV Intermittent every 8 hours  senna 2 Tablet(s) Oral at bedtime  polyethylene glycol 3350 17 Gram(s) Oral daily    MEDICATIONS  (PRN):  docusate sodium 100 milliGRAM(s) Oral two times a day PRN Constipation  dextrose Gel 1 Dose(s) Oral once PRN Blood Glucose LESS THAN 70 milliGRAM(s)/deciliter  glucagon  Injectable 1 milliGRAM(s) IntraMuscular once PRN Glucose LESS THAN 70 milligrams/deciliter  acetaminophen   Tablet 650 milliGRAM(s) Oral every 6 hours PRN For Temp greater than 38 C (100.4 F)  oxyCODONE    IR 5 milliGRAM(s) Oral every 4 hours PRN Severe Pain (7 - 10)        CAPILLARY BLOOD GLUCOSE  199 (21 Sep 2017 16:26)  159 (21 Sep 2017 11:58)  161 (21 Sep 2017 07:39)        I&O's Summary      Constitutional: No fever, fatigue  Skin: No rash.  Eyes: No recent vision problems or eye pain.  ENT: No congestion, ear pain, or sore throat.  Cardiovascular: No chest pain or palpation.  Respiratory: No cough, shortness of breath, congestion, or wheezing.  Gastrointestinal: No abdominal pain, nausea, vomiting, or diarrhea.  Genitourinary: No dysuria.  Musculoskeletal: No joint swelling.  Neurologic: No headache.    PHYSICAL EXAM:  GENERAL: NAD  EYES: EOMI, PERRLA  NECK: Supple, No JVD  CHEST/LUNG: dec breath sounds at bases   HEART:  S1 , S2 +  ABDOMEN: soft, bs+  EXTREMITIES:  rt lower ext edema > left   NEUROLOGY: alert awake oriented       LABS:                        13.4   8.88  )-----------( 90       ( 21 Sep 2017 05:30 )             40.4     -    138  |  98  |  12  ----------------------------<  150<H>  3.5   |  24  |  1.02    Ca    8.3<L>      21 Sep 2017 05:30    TPro  7.1  /  Alb  3.7  /  TBili  1.1  /  DBili  x   /  AST  13  /  ALT  13  /  AlkPhos  41            Urinalysis Basic - ( 20 Sep 2017 13:45 )    Color: YELLOW / Appearance: CLEAR / S.029 / pH: 6.0  Gluc: TRACE / Ketone: NEGATIVE  / Bili: NEGATIVE / Urobili: NORMAL E.U.   Blood: NEGATIVE / Protein: 30 / Nitrite: NEGATIVE   Leuk Esterase: NEGATIVE / RBC: 0-2 / WBC 2-5   Sq Epi: x / Non Sq Epi: x / Bacteria: x        RADIOLOGY & ADDITIONAL TESTS:    Imaging Personally Reviewed:    Consultant(s) Notes Reviewed:      Care Discussed with Consultants/Other Providers:
chief complaint : fever , chills      SUBJECTIVE / OVERNIGHT EVENTS: pt denie scp, shortness of breath, nausea, vomiting , concerned about recurrent infection of rt lower ext    MEDICATIONS  (STANDING):  apixaban 5 milliGRAM(s) Oral every 12 hours  gabapentin 800 milliGRAM(s) Oral three times a day  busPIRone 10 milliGRAM(s) Oral two times a day  metoprolol 50 milliGRAM(s) Oral two times a day  amLODIPine   Tablet 5 milliGRAM(s) Oral daily  ferrous    sulfate 325 milliGRAM(s) Oral daily  furosemide    Tablet 40 milliGRAM(s) Oral two times a day  insulin lispro (HumaLOG) corrective regimen sliding scale   SubCutaneous three times a day before meals  dextrose 5%. 1000 milliLiter(s) (50 mL/Hr) IV Continuous <Continuous>  dextrose 50% Injectable 12.5 Gram(s) IV Push once  dextrose 50% Injectable 25 Gram(s) IV Push once  dextrose 50% Injectable 25 Gram(s) IV Push once  ceFAZolin   IVPB 2000 milliGRAM(s) IV Intermittent every 8 hours  senna 2 Tablet(s) Oral at bedtime  polyethylene glycol 3350 17 Gram(s) Oral daily  lactobacillus acidophilus 1 Tablet(s) Oral three times a day with meals    MEDICATIONS  (PRN):  docusate sodium 100 milliGRAM(s) Oral two times a day PRN Constipation  dextrose Gel 1 Dose(s) Oral once PRN Blood Glucose LESS THAN 70 milliGRAM(s)/deciliter  glucagon  Injectable 1 milliGRAM(s) IntraMuscular once PRN Glucose LESS THAN 70 milligrams/deciliter  acetaminophen   Tablet 650 milliGRAM(s) Oral every 6 hours PRN For Temp greater than 38 C (100.4 F)  oxyCODONE    IR 5 milliGRAM(s) Oral every 4 hours PRN Severe Pain (7 - 10)    Vital Signs Last 24 Hrs  T(C): 36.7 (26 Sep 2017 13:47), Max: 37.1 (26 Sep 2017 06:06)  T(F): 98.1 (26 Sep 2017 13:47), Max: 98.7 (26 Sep 2017 06:06)  HR: 77 (26 Sep 2017 13:47) (70 - 89)  BP: 166/77 (26 Sep 2017 13:47) (141/75 - 166/77)  BP(mean): --  RR: 18 (26 Sep 2017 13:47) (17 - 18)  SpO2: 96% (26 Sep 2017 13:47) (94% - 98%)    Constitutional: No fever, fatigue  Skin: No rash.  Eyes: No recent vision problems or eye pain.  ENT: No congestion, ear pain, or sore throat.  Cardiovascular: No chest pain or palpation.  Respiratory: No cough, shortness of breath, congestion, or wheezing.  Gastrointestinal: No abdominal pain, nausea, vomiting, or diarrhea.  Genitourinary: No dysuria.  Musculoskeletal: No joint swelling.  Neurologic: No headache.    PHYSICAL EXAM:  GENERAL: NAD  EYES: EOMI, PERRLA  NECK: Supple, No JVD  CHEST/LUNG: dec breath sounds at bases   HEART:  S1 , S2 +  ABDOMEN: soft, bs+  EXTREMITIES:  rt lower ext edema > left , left lower ext sweaty, mildly cold  NEUROLOGY: alert awake oriented     LABS:        Creatinine Trend: 0.86 <--, 0.98 <--, 0.92 <--, 1.02 <--, 1.03 <--    Urine Studies:  Urinalysis Basic - ( 20 Sep 2017 13:45 )    Color: YELLOW / Appearance: CLEAR / S.029 / pH: 6.0  Gluc: TRACE / Ketone: NEGATIVE  / Bili: NEGATIVE / Urobili: NORMAL E.U.   Blood: NEGATIVE / Protein: 30 / Nitrite: NEGATIVE   Leuk Esterase: NEGATIVE / RBC: 0-2 / WBC 2-5   Sq Epi:  / Non Sq Epi:  / Bacteria:                                       LIVER FUNCTIONS - ( 22 Sep 2017 06:35 )  Alb: 3.3 g/dL / Pro: 6.6 g/dL / ALK PHOS: 36 u/L / ALT: 11 u/L / AST: 11 u/L / GGT: x
chief complaint : fever , chills  SUBJECTIVE / OVERNIGHT EVENTS: pt denie scp, shortness of breath, nausea, vomiting , concerned about recurrent infection of rt lower ext     MEDICATIONS  (STANDING):  apixaban 5 milliGRAM(s) Oral every 12 hours  gabapentin 800 milliGRAM(s) Oral three times a day  busPIRone 10 milliGRAM(s) Oral two times a day  metoprolol 50 milliGRAM(s) Oral two times a day  amLODIPine   Tablet 5 milliGRAM(s) Oral daily  ferrous    sulfate 325 milliGRAM(s) Oral daily  furosemide    Tablet 40 milliGRAM(s) Oral two times a day  insulin lispro (HumaLOG) corrective regimen sliding scale   SubCutaneous three times a day before meals  dextrose 5%. 1000 milliLiter(s) (50 mL/Hr) IV Continuous <Continuous>  dextrose 50% Injectable 12.5 Gram(s) IV Push once  dextrose 50% Injectable 25 Gram(s) IV Push once  dextrose 50% Injectable 25 Gram(s) IV Push once  sodium chloride 0.9%. 1000 milliLiter(s) (100 mL/Hr) IV Continuous <Continuous>  ceFAZolin   IVPB 2000 milliGRAM(s) IV Intermittent every 8 hours  senna 2 Tablet(s) Oral at bedtime  polyethylene glycol 3350 17 Gram(s) Oral daily    MEDICATIONS  (PRN):  docusate sodium 100 milliGRAM(s) Oral two times a day PRN Constipation  dextrose Gel 1 Dose(s) Oral once PRN Blood Glucose LESS THAN 70 milliGRAM(s)/deciliter  glucagon  Injectable 1 milliGRAM(s) IntraMuscular once PRN Glucose LESS THAN 70 milligrams/deciliter  acetaminophen   Tablet 650 milliGRAM(s) Oral every 6 hours PRN For Temp greater than 38 C (100.4 F)  oxyCODONE    IR 5 milliGRAM(s) Oral every 4 hours PRN Severe Pain (7 - 10)    Vital Signs Last 24 Hrs  T(C): 37.4 (23 Sep 2017 15:11), Max: 37.9 (22 Sep 2017 21:12)  T(F): 99.3 (23 Sep 2017 15:11), Max: 100.3 (22 Sep 2017 21:12)  HR: 87 (23 Sep 2017 17:50) (69 - 87)  BP: 159/85 (23 Sep 2017 17:50) (131/71 - 166/86)  BP(mean): --  RR: 18 (23 Sep 2017 15:11) (17 - 18)  SpO2: 96% (23 Sep 2017 15:11) (93% - 96%)    Constitutional: No fever, fatigue  Skin: No rash.  Eyes: No recent vision problems or eye pain.  ENT: No congestion, ear pain, or sore throat.  Cardiovascular: No chest pain or palpation.  Respiratory: No cough, shortness of breath, congestion, or wheezing.  Gastrointestinal: No abdominal pain, nausea, vomiting, or diarrhea.  Genitourinary: No dysuria.  Musculoskeletal: No joint swelling.  Neurologic: No headache.    PHYSICAL EXAM:  GENERAL: NAD  EYES: EOMI, PERRLA  NECK: Supple, No JVD  CHEST/LUNG: dec breath sounds at bases   HEART:  S1 , S2 +  ABDOMEN: soft, bs+  EXTREMITIES:  rt lower ext edema > left , left lower ext sweaty, mildly cold  NEUROLOGY: alert awake oriented     LABS:      140  |  101  |  12  ----------------------------<  179<H>  3.4<L>   |  25  |  0.98    Ca    8.4      23 Sep 2017 07:15    TPro  6.6  /  Alb  3.3  /  TBili  0.7  /  DBili      /  AST  11  /  ALT  11  /  AlkPhos  36<L>      Creatinine Trend: 0.98 <--, 0.92 <--, 1.02 <--, 1.03 <--                        12.0   5.37  )-----------( 106      ( 23 Sep 2017 07:15 )             36.0     Urine Studies:  Urinalysis Basic - ( 20 Sep 2017 13:45 )    Color: YELLOW / Appearance: CLEAR / S.029 / pH: 6.0  Gluc: TRACE / Ketone: NEGATIVE  / Bili: NEGATIVE / Urobili: NORMAL E.U.   Blood: NEGATIVE / Protein: 30 / Nitrite: NEGATIVE   Leuk Esterase: NEGATIVE / RBC: 0-2 / WBC 2-5   Sq Epi:  / Non Sq Epi:  / Bacteria:               LIVER FUNCTIONS - ( 22 Sep 2017 06:35 )  Alb: 3.3 g/dL / Pro: 6.6 g/dL / ALK PHOS: 36 u/L / ALT: 11 u/L / AST: 11 u/L / GGT: x
chief complaint : fever , chills  SUBJECTIVE / OVERNIGHT EVENTS: pt denie scp, shortness of breath, nausea, vomiting , concerned about recurrent infection of rt lower ext     MEDICATIONS  (STANDING):  apixaban 5 milliGRAM(s) Oral every 12 hours  gabapentin 800 milliGRAM(s) Oral three times a day  busPIRone 10 milliGRAM(s) Oral two times a day  metoprolol 50 milliGRAM(s) Oral two times a day  amLODIPine   Tablet 5 milliGRAM(s) Oral daily  ferrous    sulfate 325 milliGRAM(s) Oral daily  furosemide    Tablet 40 milliGRAM(s) Oral two times a day  insulin lispro (HumaLOG) corrective regimen sliding scale   SubCutaneous three times a day before meals  dextrose 5%. 1000 milliLiter(s) (50 mL/Hr) IV Continuous <Continuous>  dextrose 50% Injectable 12.5 Gram(s) IV Push once  dextrose 50% Injectable 25 Gram(s) IV Push once  dextrose 50% Injectable 25 Gram(s) IV Push once  sodium chloride 0.9%. 1000 milliLiter(s) (100 mL/Hr) IV Continuous <Continuous>  ceFAZolin   IVPB 2000 milliGRAM(s) IV Intermittent every 8 hours  senna 2 Tablet(s) Oral at bedtime  polyethylene glycol 3350 17 Gram(s) Oral daily    MEDICATIONS  (PRN):  docusate sodium 100 milliGRAM(s) Oral two times a day PRN Constipation  dextrose Gel 1 Dose(s) Oral once PRN Blood Glucose LESS THAN 70 milliGRAM(s)/deciliter  glucagon  Injectable 1 milliGRAM(s) IntraMuscular once PRN Glucose LESS THAN 70 milligrams/deciliter  acetaminophen   Tablet 650 milliGRAM(s) Oral every 6 hours PRN For Temp greater than 38 C (100.4 F)  oxyCODONE    IR 5 milliGRAM(s) Oral every 4 hours PRN Severe Pain (7 - 10)    Vital Signs Last 24 Hrs  T(C): 37.9 (22 Sep 2017 21:12), Max: 37.9 (22 Sep 2017 21:12)  T(F): 100.3 (22 Sep 2017 21:12), Max: 100.3 (22 Sep 2017 21:12)  HR: 80 (22 Sep 2017 21:12) (65 - 92)  BP: 131/71 (22 Sep 2017 21:12) (131/71 - 151/64)  BP(mean): 90 (22 Sep 2017 17:31) (90 - 90)  RR: 17 (22 Sep 2017 21:12) (16 - 18)  SpO2: 94% (22 Sep 2017 21:12) (94% - 97%)    Constitutional: No fever, fatigue  Skin: No rash.  Eyes: No recent vision problems or eye pain.  ENT: No congestion, ear pain, or sore throat.  Cardiovascular: No chest pain or palpation.  Respiratory: No cough, shortness of breath, congestion, or wheezing.  Gastrointestinal: No abdominal pain, nausea, vomiting, or diarrhea.  Genitourinary: No dysuria.  Musculoskeletal: No joint swelling.  Neurologic: No headache.    PHYSICAL EXAM:  GENERAL: NAD  EYES: EOMI, PERRLA  NECK: Supple, No JVD  CHEST/LUNG: dec breath sounds at bases   HEART:  S1 , S2 +  ABDOMEN: soft, bs+  EXTREMITIES:  rt lower ext edema > left , left lower ext sweaty, mildly cold  NEUROLOGY: alert awake oriented     LABS:      138  |  100  |  12  ----------------------------<  147<H>  3.1<L>   |  24  |  0.92    Ca    8.1<L>      22 Sep 2017 06:35    TPro  6.6  /  Alb  3.3  /  TBili  0.7  /  DBili      /  AST  11  /  ALT  11  /  AlkPhos  36<L>      Creatinine Trend: 0.92 <--, 1.02 <--, 1.03 <--                        12.2   6.18  )-----------( 88       ( 22 Sep 2017 06:35 )             36.2     Urine Studies:  Urinalysis Basic - ( 20 Sep 2017 13:45 )    Color: YELLOW / Appearance: CLEAR / S.029 / pH: 6.0  Gluc: TRACE / Ketone: NEGATIVE  / Bili: NEGATIVE / Urobili: NORMAL E.U.   Blood: NEGATIVE / Protein: 30 / Nitrite: NEGATIVE   Leuk Esterase: NEGATIVE / RBC: 0-2 / WBC 2-5   Sq Epi:  / Non Sq Epi:  / Bacteria:               LIVER FUNCTIONS - ( 22 Sep 2017 06:35 )  Alb: 3.3 g/dL / Pro: 6.6 g/dL / ALK PHOS: 36 u/L / ALT: 11 u/L / AST: 11 u/L / GGT: x
chief complaint : fever , chills  chief complaint : rt lower ext pain     SUBJECTIVE / OVERNIGHT EVENTS: pt denie scp, shortness of breath, nausea, vomiting , concerned about recurrent infection of rt lower ext    MEDICATIONS  (STANDING):  apixaban 5 milliGRAM(s) Oral every 12 hours  gabapentin 800 milliGRAM(s) Oral three times a day  busPIRone 10 milliGRAM(s) Oral two times a day  metoprolol 50 milliGRAM(s) Oral two times a day  amLODIPine   Tablet 5 milliGRAM(s) Oral daily  ferrous    sulfate 325 milliGRAM(s) Oral daily  furosemide    Tablet 40 milliGRAM(s) Oral two times a day  insulin lispro (HumaLOG) corrective regimen sliding scale   SubCutaneous three times a day before meals  dextrose 5%. 1000 milliLiter(s) (50 mL/Hr) IV Continuous <Continuous>  dextrose 50% Injectable 12.5 Gram(s) IV Push once  dextrose 50% Injectable 25 Gram(s) IV Push once  dextrose 50% Injectable 25 Gram(s) IV Push once  ceFAZolin   IVPB 2000 milliGRAM(s) IV Intermittent every 8 hours  senna 2 Tablet(s) Oral at bedtime  polyethylene glycol 3350 17 Gram(s) Oral daily  lactobacillus acidophilus 1 Tablet(s) Oral three times a day with meals    MEDICATIONS  (PRN):  docusate sodium 100 milliGRAM(s) Oral two times a day PRN Constipation  dextrose Gel 1 Dose(s) Oral once PRN Blood Glucose LESS THAN 70 milliGRAM(s)/deciliter  glucagon  Injectable 1 milliGRAM(s) IntraMuscular once PRN Glucose LESS THAN 70 milligrams/deciliter  acetaminophen   Tablet 650 milliGRAM(s) Oral every 6 hours PRN For Temp greater than 38 C (100.4 F)  oxyCODONE    IR 5 milliGRAM(s) Oral every 4 hours PRN Severe Pain (7 - 10)    Vital Signs Last 24 Hrs  T(C): 36.6 (25 Sep 2017 21:11), Max: 36.7 (25 Sep 2017 06:03)  T(F): 97.8 (25 Sep 2017 21:11), Max: 98 (25 Sep 2017 06:03)  HR: 72 (25 Sep 2017 21:11) (66 - 89)  BP: 141/75 (25 Sep 2017 21:11) (141/75 - 160/81)  BP(mean): --  RR: 18 (25 Sep 2017 21:11) (16 - 18)  SpO2: 98% (25 Sep 2017 21:11) (96% - 98%)    Constitutional: No fever, fatigue  Skin: No rash.  Eyes: No recent vision problems or eye pain.  ENT: No congestion, ear pain, or sore throat.  Cardiovascular: No chest pain or palpation.  Respiratory: No cough, shortness of breath, congestion, or wheezing.  Gastrointestinal: No abdominal pain, nausea, vomiting, or diarrhea.  Genitourinary: No dysuria.  Musculoskeletal: No joint swelling.  Neurologic: No headache.    PHYSICAL EXAM:  GENERAL: NAD  EYES: EOMI, PERRLA  NECK: Supple, No JVD  CHEST/LUNG: dec breath sounds at bases   HEART:  S1 , S2 +  ABDOMEN: soft, bs+  EXTREMITIES:  rt lower ext edema > left , left lower ext sweaty, mildly cold  NEUROLOGY: alert awake oriented     LABS:      139  |  98  |  12  ----------------------------<  164<H>  3.7   |  25  |  0.86    Ca    8.2<L>      24 Sep 2017 06:30      Creatinine Trend: 0.86 <--, 0.98 <--, 0.92 <--, 1.02 <--, 1.03 <--                        12.4   5.77  )-----------( 113      ( 24 Sep 2017 06:30 )             37.4     Urine Studies:  Urinalysis Basic - ( 20 Sep 2017 13:45 )    Color: YELLOW / Appearance: CLEAR / S.029 / pH: 6.0  Gluc: TRACE / Ketone: NEGATIVE  / Bili: NEGATIVE / Urobili: NORMAL E.U.   Blood: NEGATIVE / Protein: 30 / Nitrite: NEGATIVE   Leuk Esterase: NEGATIVE / RBC: 0-2 / WBC 2-5   Sq Epi:  / Non Sq Epi:  / Bacteria:                               LIVER FUNCTIONS - ( 22 Sep 2017 06:35 )  Alb: 3.3 g/dL / Pro: 6.6 g/dL / ALK PHOS: 36 u/L / ALT: 11 u/L / AST: 11 u/L / GGT: x
chief complaint : fever , chills  chief complaint : rt lower ext pain     SUBJECTIVE / OVERNIGHT EVENTS: pt denie scp, shortness of breath, nausea, vomiting , concerned about recurrent infection of rt lower ext    MEDICATIONS  (STANDING):  apixaban 5 milliGRAM(s) Oral every 12 hours  gabapentin 800 milliGRAM(s) Oral three times a day  busPIRone 10 milliGRAM(s) Oral two times a day  metoprolol 50 milliGRAM(s) Oral two times a day  amLODIPine   Tablet 5 milliGRAM(s) Oral daily  ferrous    sulfate 325 milliGRAM(s) Oral daily  furosemide    Tablet 40 milliGRAM(s) Oral two times a day  insulin lispro (HumaLOG) corrective regimen sliding scale   SubCutaneous three times a day before meals  dextrose 5%. 1000 milliLiter(s) (50 mL/Hr) IV Continuous <Continuous>  dextrose 50% Injectable 12.5 Gram(s) IV Push once  dextrose 50% Injectable 25 Gram(s) IV Push once  dextrose 50% Injectable 25 Gram(s) IV Push once  ceFAZolin   IVPB 2000 milliGRAM(s) IV Intermittent every 8 hours  senna 2 Tablet(s) Oral at bedtime  polyethylene glycol 3350 17 Gram(s) Oral daily  lactobacillus acidophilus 1 Tablet(s) Oral three times a day with meals    MEDICATIONS  (PRN):  docusate sodium 100 milliGRAM(s) Oral two times a day PRN Constipation  dextrose Gel 1 Dose(s) Oral once PRN Blood Glucose LESS THAN 70 milliGRAM(s)/deciliter  glucagon  Injectable 1 milliGRAM(s) IntraMuscular once PRN Glucose LESS THAN 70 milligrams/deciliter  acetaminophen   Tablet 650 milliGRAM(s) Oral every 6 hours PRN For Temp greater than 38 C (100.4 F)  oxyCODONE    IR 5 milliGRAM(s) Oral every 4 hours PRN Severe Pain (7 - 10)    Vital Signs Last 24 Hrs  T(C): 37.6 (24 Sep 2017 15:40), Max: 37.6 (24 Sep 2017 15:40)  T(F): 99.6 (24 Sep 2017 15:40), Max: 99.6 (24 Sep 2017 15:40)  HR: 86 (24 Sep 2017 17:45) (75 - 86)  BP: 160/86 (24 Sep 2017 17:45) (139/65 - 171/86)  BP(mean): --  RR: 18 (24 Sep 2017 15:40) (17 - 18)  SpO2: 97% (24 Sep 2017 15:40) (95% - 98%)    Constitutional: No fever, fatigue  Skin: No rash.  Eyes: No recent vision problems or eye pain.  ENT: No congestion, ear pain, or sore throat.  Cardiovascular: No chest pain or palpation.  Respiratory: No cough, shortness of breath, congestion, or wheezing.  Gastrointestinal: No abdominal pain, nausea, vomiting, or diarrhea.  Genitourinary: No dysuria.  Musculoskeletal: No joint swelling.  Neurologic: No headache.    PHYSICAL EXAM:  GENERAL: NAD  EYES: EOMI, PERRLA  NECK: Supple, No JVD  CHEST/LUNG: dec breath sounds at bases   HEART:  S1 , S2 +  ABDOMEN: soft, bs+  EXTREMITIES:  rt lower ext edema > left , left lower ext sweaty, mildly cold  NEUROLOGY: alert awake oriented     LABS:      139  |  98  |  12  ----------------------------<  164<H>  3.7   |  25  |  0.86    Ca    8.2<L>      24 Sep 2017 06:30      Creatinine Trend: 0.86 <--, 0.98 <--, 0.92 <--, 1.02 <--, 1.03 <--                        12.4   5.77  )-----------( 113      ( 24 Sep 2017 06:30 )             37.4     Urine Studies:  Urinalysis Basic - ( 20 Sep 2017 13:45 )    Color: YELLOW / Appearance: CLEAR / S.029 / pH: 6.0  Gluc: TRACE / Ketone: NEGATIVE  / Bili: NEGATIVE / Urobili: NORMAL E.U.   Blood: NEGATIVE / Protein: 30 / Nitrite: NEGATIVE   Leuk Esterase: NEGATIVE / RBC: 0-2 / WBC 2-5   Sq Epi:  / Non Sq Epi:  / Bacteria:                     LIVER FUNCTIONS - ( 22 Sep 2017 06:35 )  Alb: 3.3 g/dL / Pro: 6.6 g/dL / ALK PHOS: 36 u/L / ALT: 11 u/L / AST: 11 u/L / GGT: x
Cardiology/Vascular Medicine Inpatient Progress Note    Patient seen earlier this AM on rounds.  No overnight complaints.  Denies subjective fevers overnight.  Legs less symptomatic, clinically improved.  D/C planning today.    Vital Signs Last 24 Hrs  T(C): 36.8 (27 Sep 2017 15:00), Max: 36.8 (26 Sep 2017 21:45)  T(F): 98.3 (27 Sep 2017 15:00), Max: 98.3 (26 Sep 2017 21:45)  HR: 70 (27 Sep 2017 15:00) (66 - 70)  BP: 144/60 (27 Sep 2017 15:00) (144/60 - 175/89)  BP(mean): --  RR: 18 (27 Sep 2017 15:00) (17 - 18)  SpO2: 96% (27 Sep 2017 15:00) (96% - 99%)    Appearance: NAD, laying flat in bed  HEENT:   Normal oral mucosa, PERRL, EOMI	  Lymphatic: No lymphadenopathy  Cardiovascular: Normal S1 S2, No JVD, No murmurs, No edema  Respiratory: Lungs clear to auscultation	  Psychiatry: Awake alert  Gastrointestinal:  Soft, Non-tender, + BS	  Skin: No rashes, No ecchymoses, No cyanosis	  Neurologic: Non-focal  Extremities: As above, +edematous, erythematous, skin color changes consistent with CVI    MEDICATIONS  (STANDING):  apixaban 5 milliGRAM(s) Oral every 12 hours  gabapentin 800 milliGRAM(s) Oral three times a day  busPIRone 10 milliGRAM(s) Oral two times a day  metoprolol 50 milliGRAM(s) Oral two times a day  amLODIPine   Tablet 5 milliGRAM(s) Oral daily  ferrous    sulfate 325 milliGRAM(s) Oral daily  furosemide    Tablet 40 milliGRAM(s) Oral two times a day  insulin lispro (HumaLOG) corrective regimen sliding scale   SubCutaneous three times a day before meals  dextrose 5%. 1000 milliLiter(s) (50 mL/Hr) IV Continuous <Continuous>  dextrose 50% Injectable 12.5 Gram(s) IV Push once  dextrose 50% Injectable 25 Gram(s) IV Push once  dextrose 50% Injectable 25 Gram(s) IV Push once  ceFAZolin   IVPB 2000 milliGRAM(s) IV Intermittent every 8 hours  senna 2 Tablet(s) Oral at bedtime  polyethylene glycol 3350 17 Gram(s) Oral daily  lactobacillus acidophilus 1 Tablet(s) Oral three times a day with meals    MEDICATIONS  (PRN):  docusate sodium 100 milliGRAM(s) Oral two times a day PRN Constipation  dextrose Gel 1 Dose(s) Oral once PRN Blood Glucose LESS THAN 70 milliGRAM(s)/deciliter  glucagon  Injectable 1 milliGRAM(s) IntraMuscular once PRN Glucose LESS THAN 70 milligrams/deciliter  acetaminophen   Tablet 650 milliGRAM(s) Oral every 6 hours PRN For Temp greater than 38 C (100.4 F)  oxyCODONE    IR 5 milliGRAM(s) Oral every 4 hours PRN Severe Pain (7 - 10)      LABS:	 	                      < from: Xray Chest 2 Views PA/Lat (09.20.17 @ 09:02) >  EXAM:  RAD CHEST PA LAT        PROCEDURE DATE:  Sep 20 2017         INTERPRETATION:  TIME OF EXAM: September 20, 2017 at 9:18 AM    CLINICAL INFORMATION: Chest pain    TECHNIQUE:   PA and lateral chest    INTERPRETATION:     Lungs are free of focalabnormalities. The heart is not enlarged and   there are no effusions or vascular congestion to indicate CHF.      COMPARISON:  August 8, 2017      IMPRESSION:  Clear lungs.      < from: TTE with Doppler (w/Cont) (07.03.17 @ 13:05) >  Patient name: CALLIE MACIAS  YOB: 1940   Age: 77 (M)   MR#: [id number]  Study Date: 7/3/2017  Location: [address] kSonographer: Jl Long RDCS  Study quality: Technically Difficult  Referring Physician: Darren Wood MD  Blood Pressure: 160/88 mmHg  Height: 187 cm  Weight: 151 kg  BSA: 2.7 m2  ------------------------------------------------------------------------  PROCEDURE: Transthoracic echocardiogram with 2-D, M-Mode  and complete spectral and color flow Doppler.  Verbal consent was obtained for injection of echo contrast.  Following  intravenous injection of contrast, harmonic  imaging was performed.  INDICATION: Unspecified atrial fibrillation (I48.91),  Shortness of breath (R06.02)  ------------------------------------------------------------------------  OBSERVATIONS:  Mitral Valve: Mitral annular calcification, otherwise  normal mitral valve. Minimal mitral regurgitation.  Aortic Root: Normal aortic root.  Aortic Valve: Aortic valve leaflet morphology not well  visualized.  Left Atrium: Normal left atrium.  Left Ventricle: Endocardium not well visualized; despite  the use of IV contrast, unable to evaluate left ventricular  systolic function.  Right Heart: Normal right atrium. Unable to accurately  evaluate right ventricular size or systolic function.  Tricuspid valve not well visualized. Pulmonic valve not  well visualized.  Pericardium/PleuraNormal pericardium with no pericardial  effusion.  Hemodynamic: Estimated right ventricular systolicpressure  equals 41 mm Hg, assuming right atrial pressure equals 10  mm Hg, consistent with mild pulmonary hypertension.  ------------------------------------------------------------------------  CONCLUSIONS:  Technically very difficult study.  1. Mitral annular calcification, otherwise normal mitral  valve. Minimal mitral regurgitation.  2. Endocardium not well visualized; despite the use of IV  contrast, unable to evaluate left ventricular systolic  function.  3. Unable to accurately evaluate right ventricular size or  systolic function.  4. Estimated right ventricular systolic pressure equals 41  mm Hg, assuming right atrial pressure equals 10 mm Hg,  consistent with mild pulmonary hypertension.  ------------------------------------------------------------------------  Confirmed on  7/3/2017 - 14:34:08 by Thierry Cottrell M.D.  ------------------------------------------------------------------------    < end of copied text >    < from: US Duplex Venous Lower Ext Complete, Bilateral (07.11.17 @ 10:56) >  EXAM:  US DPLX LWR EXT VEINS COMPL BI        PROCEDURE DATE:  Jul 11 2017         INTERPRETATION:  CLINICAL INFORMATION: Lower extremity swelling. History   of DVT and cellulitis.    COMPARISON: Duplex sonography of the bilateral lower extremities dated   5/13/2015    TECHNIQUE: Duplex sonography of the BILATERAL LOWER extremities with   color and spectral Doppler, with and without compression.      FINDINGS:    There is normal compressibility of the left common femoral, femoral and   poplitealveins.     Normal compressibility of the right common and femoral veins are   demonstrated. There is noncompressibility with filling defect in the   right popliteal and gastrocnemius veins.     Calf veins are not visualized bilaterally.      IMPRESSION:     Above and below knee acute DVT in the right lower extremity as described.    Dr. Silver discussed these findings with SAI Richard on 7/11/2017 11:15   AM with read back.      REBEKAH SILVER M.D., RADIOLOGY RESIDENT  This document has been electronically signed.  JESUS MANUEL ALEXANDER M.D., ATTENDING RADIOLOGIST  This document has been electronically signed. Jul 11 2017 12:40PM

## 2017-09-27 NOTE — PROGRESS NOTE ADULT - PROBLEM SELECTOR PROBLEM 2
Venous stasis of both lower extremities
Lactic acidosis
Venous stasis of both lower extremities
Lactic acidosis
Venous stasis of both lower extremities

## 2017-09-27 NOTE — DIETITIAN INITIAL EVALUATION ADULT. - NS AS NUTRI INTERV ED CONTENT
Purpose of the nutrition education/Survival information/Nutrition relationship to health/disease/Recommended modifications

## 2017-09-27 NOTE — PROGRESS NOTE ADULT - PROBLEM SELECTOR PROBLEM 1
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
Severe sepsis
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
Severe sepsis

## 2017-09-27 NOTE — DIETITIAN INITIAL EVALUATION ADULT. - OTHER INFO
Pt identified via length of stay. Pt 76 yo male appears alert, oriented. Per Pt his appetite good; No chew/swallow problem voiced; no nausea/vomiting/diarrhea reported @ present. Per Pt his usual body weight: 330#. No weight loss or weight changes voiced. Pt's diet order includes Consistent Carbohydrate, DASH/TLC (cholesterol and sodium restricted) restrictions. Prescribed diet discussed with Pt & his family @ bed side. RDN answered questions/concerns related to diet. Written materials on diet also provided. RDN remains available, Pt made aware.

## 2017-09-27 NOTE — CHART NOTE - NSCHARTNOTEFT_GEN_A_CORE
d/w Dr. Pike ok to transition pt to oral today.  d/w Dr. andrews. Pt medically cleared for discharge.

## 2017-09-27 NOTE — PROGRESS NOTE ADULT - PROBLEM SELECTOR PLAN 4
BPs acceptable at this time, may need better control and medical optimization
Chronic venous stasis on home Lasix. Would continue lasix in concurrent treatment with cellulitis.

## 2017-09-27 NOTE — PROGRESS NOTE ADULT - PROBLEM SELECTOR PROBLEM 5
Anticoagulation management encounter
Atrial fibrillation

## 2017-09-27 NOTE — DIETITIAN INITIAL EVALUATION ADULT. - NS AS NUTRI INTERV MEALS SNACK
1. Suggest: PO diet rx: Consistent Carbohydrate (no snacks), DASH/TLC (cholesterol and sodium restricted), Low Fat;           2. Monitor PO diet tolerance;             3. Monitor labs, weights, hydration status;               4. Recommend: to follow up with weight management dietitians;/Other (specify)/Diets modified for specific foods and ingredients

## 2017-11-02 ENCOUNTER — APPOINTMENT (OUTPATIENT)
Dept: CARDIOLOGY | Facility: CLINIC | Age: 77
End: 2017-11-02
Payer: MEDICARE

## 2017-11-02 ENCOUNTER — NON-APPOINTMENT (OUTPATIENT)
Age: 77
End: 2017-11-02

## 2017-11-02 VITALS
DIASTOLIC BLOOD PRESSURE: 81 MMHG | RESPIRATION RATE: 16 BRPM | OXYGEN SATURATION: 94 % | SYSTOLIC BLOOD PRESSURE: 152 MMHG | BODY MASS INDEX: 38.36 KG/M2 | HEIGHT: 76 IN | WEIGHT: 315 LBS | HEART RATE: 66 BPM

## 2017-11-02 VITALS — OXYGEN SATURATION: 95 %

## 2017-11-02 PROCEDURE — 99215 OFFICE O/P EST HI 40 MIN: CPT

## 2017-11-02 PROCEDURE — 93000 ELECTROCARDIOGRAM COMPLETE: CPT

## 2017-11-06 ENCOUNTER — INPATIENT (INPATIENT)
Facility: HOSPITAL | Age: 77
LOS: 8 days | Discharge: HOME | End: 2017-11-15
Attending: INTERNAL MEDICINE | Admitting: INTERNAL MEDICINE
Payer: MEDICARE

## 2017-11-06 VITALS
RESPIRATION RATE: 16 BRPM | DIASTOLIC BLOOD PRESSURE: 67 MMHG | SYSTOLIC BLOOD PRESSURE: 145 MMHG | OXYGEN SATURATION: 100 % | TEMPERATURE: 99 F | HEART RATE: 98 BPM

## 2017-11-06 DIAGNOSIS — L03.90 CELLULITIS, UNSPECIFIED: ICD-10-CM

## 2017-11-06 DIAGNOSIS — R91.8 OTHER NONSPECIFIC ABNORMAL FINDING OF LUNG FIELD: Chronic | ICD-10-CM

## 2017-11-06 DIAGNOSIS — Z98.89 OTHER SPECIFIED POSTPROCEDURAL STATES: Chronic | ICD-10-CM

## 2017-11-06 LAB
ALBUMIN SERPL ELPH-MCNC: 3.7 G/DL — SIGNIFICANT CHANGE UP (ref 3.3–5)
ALP SERPL-CCNC: 42 U/L — SIGNIFICANT CHANGE UP (ref 40–120)
ALT FLD-CCNC: 14 U/L — SIGNIFICANT CHANGE UP (ref 4–41)
AST SERPL-CCNC: 13 U/L — SIGNIFICANT CHANGE UP (ref 4–40)
BASE EXCESS BLDV CALC-SCNC: 0.4 MMOL/L — SIGNIFICANT CHANGE UP
BILIRUB SERPL-MCNC: 0.9 MG/DL — SIGNIFICANT CHANGE UP (ref 0.2–1.2)
BLOOD GAS VENOUS - CREATININE: 0.69 MG/DL — SIGNIFICANT CHANGE UP (ref 0.5–1.3)
BUN SERPL-MCNC: 22 MG/DL — SIGNIFICANT CHANGE UP (ref 7–23)
CALCIUM SERPL-MCNC: 8.6 MG/DL — SIGNIFICANT CHANGE UP (ref 8.4–10.5)
CHLORIDE BLDV-SCNC: 106 MMOL/L — SIGNIFICANT CHANGE UP (ref 96–108)
CHLORIDE SERPL-SCNC: 97 MMOL/L — LOW (ref 98–107)
CO2 SERPL-SCNC: 21 MMOL/L — LOW (ref 22–31)
CREAT SERPL-MCNC: 1.22 MG/DL — SIGNIFICANT CHANGE UP (ref 0.5–1.3)
GAS PNL BLDV: 133 MMOL/L — LOW (ref 136–146)
GLUCOSE BLDV-MCNC: 131 — HIGH (ref 70–99)
GLUCOSE SERPL-MCNC: 154 MG/DL — HIGH (ref 70–99)
HCO3 BLDV-SCNC: 25 MMOL/L — SIGNIFICANT CHANGE UP (ref 20–27)
HCT VFR BLD CALC: 44.8 % — SIGNIFICANT CHANGE UP (ref 39–50)
HCT VFR BLDV CALC: 39 % — SIGNIFICANT CHANGE UP (ref 39–51)
HGB BLD-MCNC: 15.1 G/DL — SIGNIFICANT CHANGE UP (ref 13–17)
HGB BLDV-MCNC: 12.7 G/DL — LOW (ref 13–17)
LACTATE BLDV-MCNC: 1.5 MMOL/L — SIGNIFICANT CHANGE UP (ref 0.5–2)
MANUAL SMEAR VERIFICATION: SIGNIFICANT CHANGE UP
MCHC RBC-ENTMCNC: 29.4 PG — SIGNIFICANT CHANGE UP (ref 27–34)
MCHC RBC-ENTMCNC: 33.7 % — SIGNIFICANT CHANGE UP (ref 32–36)
MCV RBC AUTO: 87.2 FL — SIGNIFICANT CHANGE UP (ref 80–100)
MORPHOLOGY BLD-IMP: SIGNIFICANT CHANGE UP
NRBC # FLD: 0 — SIGNIFICANT CHANGE UP
PCO2 BLDV: 40 MMHG — LOW (ref 41–51)
PH BLDV: 7.41 PH — SIGNIFICANT CHANGE UP (ref 7.32–7.43)
PLATELET # BLD AUTO: 91 K/UL — LOW (ref 150–400)
PLATELET COUNT - ESTIMATE: SIGNIFICANT CHANGE UP
PMV BLD: 11 FL — SIGNIFICANT CHANGE UP (ref 7–13)
PO2 BLDV: 78 MMHG — HIGH (ref 35–40)
POTASSIUM BLDV-SCNC: 3.5 MMOL/L — SIGNIFICANT CHANGE UP (ref 3.4–4.5)
POTASSIUM SERPL-MCNC: 3.9 MMOL/L — SIGNIFICANT CHANGE UP (ref 3.5–5.3)
POTASSIUM SERPL-SCNC: 3.9 MMOL/L — SIGNIFICANT CHANGE UP (ref 3.5–5.3)
PROT SERPL-MCNC: 7.3 G/DL — SIGNIFICANT CHANGE UP (ref 6–8.3)
RBC # BLD: 5.14 M/UL — SIGNIFICANT CHANGE UP (ref 4.2–5.8)
RBC # FLD: 15.9 % — HIGH (ref 10.3–14.5)
SAO2 % BLDV: 96.9 % — HIGH (ref 60–85)
SODIUM SERPL-SCNC: 135 MMOL/L — SIGNIFICANT CHANGE UP (ref 135–145)
WBC # BLD: 11.7 K/UL — HIGH (ref 3.8–10.5)
WBC # FLD AUTO: 11.7 K/UL — HIGH (ref 3.8–10.5)

## 2017-11-06 PROCEDURE — 93970 EXTREMITY STUDY: CPT | Mod: 26

## 2017-11-06 PROCEDURE — 99223 1ST HOSP IP/OBS HIGH 75: CPT

## 2017-11-06 RX ORDER — GABAPENTIN 400 MG/1
800 CAPSULE ORAL ONCE
Qty: 0 | Refills: 0 | Status: COMPLETED | OUTPATIENT
Start: 2017-11-06 | End: 2017-11-06

## 2017-11-06 RX ORDER — PIPERACILLIN AND TAZOBACTAM 4; .5 G/20ML; G/20ML
3.38 INJECTION, POWDER, LYOPHILIZED, FOR SOLUTION INTRAVENOUS ONCE
Qty: 0 | Refills: 0 | Status: COMPLETED | OUTPATIENT
Start: 2017-11-06 | End: 2017-11-06

## 2017-11-06 RX ORDER — VANCOMYCIN HCL 1 G
1000 VIAL (EA) INTRAVENOUS ONCE
Qty: 0 | Refills: 0 | Status: COMPLETED | OUTPATIENT
Start: 2017-11-06 | End: 2017-11-07

## 2017-11-06 RX ADMIN — PIPERACILLIN AND TAZOBACTAM 200 GRAM(S): 4; .5 INJECTION, POWDER, LYOPHILIZED, FOR SOLUTION INTRAVENOUS at 22:52

## 2017-11-06 RX ADMIN — GABAPENTIN 800 MILLIGRAM(S): 400 CAPSULE ORAL at 22:52

## 2017-11-06 NOTE — ED ADULT TRIAGE NOTE - CHIEF COMPLAINT QUOTE
pt c/o fever since this morning, c/o fatigue and sts right leg feels hotter than normal. pmh dvt, sepsis, afib, tmax at home 102.2

## 2017-11-06 NOTE — H&P ADULT - ATTENDING COMMENTS
Pt was seen & examined by me, Dr. FELICE Melendez on 11/6/17.    Dr. Wood will resumer the care of pt in AM.

## 2017-11-06 NOTE — H&P ADULT - RS GEN PE MLT RESP DETAILS PC
no chest wall tenderness/good air movement/clear to auscultation bilaterally/respirations non-labored

## 2017-11-06 NOTE — H&P ADULT - PROBLEM SELECTOR PLAN 1
recurrent Rt Leg Cellulitis, + DM, Diabetic Neuropathy, ID consult, Wound consult  IV Zosyn, IV Vanco, F/U CBC, CMP, Cultures  Bacid, pain control, Fall/aspiration precaution  PT consult

## 2017-11-06 NOTE — H&P ADULT - PROBLEM SELECTOR PLAN 3
Vascular Cardiology consult, decrease Lasix to 40 mg PO QD, due to active infection, Prevent dehydration and CASSANDRA

## 2017-11-06 NOTE — H&P ADULT - ASSESSMENT
76 y/o Male HX of  DM2, HTN, Afib s/p ablation 2006, prior lung CA s/p lobectomy (RUL and part of LLL), B/L DVT on Eliquis, Diabetic Neuropathy, Obesity, Anxiety, Chronic Venous stasis , pt was in [facility] in Sept. 2017 was treated for RLE Cellulitis , seen By ID , pt was on IV Cefazolin and sent home on PO Keflex, pt was seen by Cardiology as well,   Patient  p/w Right  LE pain/redness. Pt. states that he woke up yesterday and noticed his RLE was extremely red and painful, He states he has been treated for cellulitis multiple times in the past. He also reports subjective fevers. Pt. denies CP, SOB, N/V/D, no cough, no HA, no Dizziness, no abdominal pain, + Chronic B/L Lower Ext Swelling Rt > left on PO Lasix  , + Fever , S/P IV zosyn, IV Vanco, in the ER, pt A+O x3, Pt still is on PO Keflex at home,

## 2017-11-06 NOTE — ED PROVIDER NOTE - ATTENDING CONTRIBUTION TO CARE
Attending note:   After face to face evaluation of this patient, I concur with above noted hx, pe, and care plan for this patient. +CHronic recurrent r leg cellulitis again with fever and sudden redness.  Evaluation in progress

## 2017-11-06 NOTE — ED PROVIDER NOTE - MEDICAL DECISION MAKING DETAILS
78yo M PMHx afib on Eliquis DM DVT p/w CC RLE pain/redness - consistent w/ cellulitis - will start broad spectrum abx send basic labs and blood cultures, B/L LE duplex and admit

## 2017-11-06 NOTE — H&P ADULT - HISTORY OF PRESENT ILLNESS
77M DM2, HTN, Afib s/p ablation 2006, prior lung CA s/p lobectomy (RUL and part of LLL), B/L DVT on Eliquis, Diabetic Neuropathy, Obesity, Anxiety, Chronic Venous stasis , pt was in [facility] in Sept. 2017 was treated for RLE Cellulitis , seen By ID , pt was on IV Cefazolin and sent home on PO Keflex, pt was seen by Cardiology as well, 76 y/o Male HX of  DM2, HTN, Afib s/p ablation 2006, prior lung CA s/p lobectomy (RUL and part of LLL), B/L DVT on Eliquis, Diabetic Neuropathy, Obesity, Anxiety, Chronic Venous stasis , pt was in [facility] in Sept. 2017 was treated for RLE Cellulitis , seen By ID , pt was on IV Cefazolin and sent home on PO Keflex, pt was seen by Cardiology as well,   Patient  p/w Right  LE pain/redness. Pt. states that he woke up yesterday and noticed his RLE was extremely red and painful, He states he has been treated for cellulitis multiple times in the past. He also reports subjective fevers. Pt. denies CP, SOB, N/V/D, no cough, no HA, no Dizziness, no abdominal pain, + Chronic B/L Lower Ext Swelling Rt > left on PO Lasix  , + Fever , S/P IV zosyn, IV Vanco, in the ER, pt A+O x3, Pt still is on PO Keflex at home,     Labs: PT 16.7, INR 1.48, PTT 35.5, Na 135, k+ 3.9, CO2=21, BUN 22, Creatinine 1.22, Glucose 154, LFT normal, WBC 11.7, Hgb 15.1, Platelet 91     Vitals: Tem 100.4, HR 89, /79 , RR 18, 96% RA 76 y/o Male HX of  DM2, HTN, Afib s/p ablation 2006, prior lung CA s/p lobectomy (RUL and part of LLL), B/L DVT on Eliquis, Diabetic Neuropathy, Obesity, Anxiety, Chronic Venous stasis , pt was in [facility] in Sept. 2017 was treated for RLE Cellulitis , seen By ID , pt was on IV Cefazolin and sent home on PO Keflex, pt was seen by Cardiology as well,   Patient  p/w Right  LE pain/redness. Pt. states that he woke up yesterday and noticed his RLE was extremely red and painful, He states he has been treated for cellulitis multiple times in the past. He also reports subjective fevers. Pt. denies CP, SOB, N/V/D, no cough, no HA, no Dizziness, no abdominal pain, + Chronic B/L Lower Ext Swelling Rt > left on PO Lasix  , + Fever , S/P IV zosyn, IV Vanco, in the ER, pt A+O x3, Pt still is on PO Keflex at home,     B/L Venous Doppler of legs: Partially occlusive thrombus within the right popliteal vein behind the knee. Compressible right gastrocnemius vein. Findings are improved and chronic compared to previous ultrasound of 7/11/17.  Bilateral calf veins not visualized.    Labs: PT 16.7, INR 1.48, PTT 35.5, Na 135, k+ 3.9, CO2=21, BUN 22, Creatinine 1.22, Glucose 154, LFT normal, WBC 11.7, Hgb 15.1, Platelet 91     Vitals: Tem 100.4, HR 89, /79 , RR 18, 96% RA

## 2017-11-06 NOTE — ED PROVIDER NOTE - OBJECTIVE STATEMENT
78yo M PMHx DVT, afib, DM, cellulitis p/w CC LE pain/redness. Pt. states that he woke up today and noticed his RLE was extremely red and painful, He states he has been treated for cellulitis multiple times in the past. He also reports subjective fevers. Pt. denies CP, SOB, N/V/D. 78yo M PMHx DVT, afib, DM, cellulitis p/w CC LE pain/redness. Pt. states that he woke up today and noticed his RLE was extremely red and painful, He states he has been treated for cellulitis multiple times in the past. He also reports subjective fevers. Pt. denies CP, SOB, N/V/D..

## 2017-11-07 DIAGNOSIS — I48.91 UNSPECIFIED ATRIAL FIBRILLATION: ICD-10-CM

## 2017-11-07 DIAGNOSIS — L03.90 CELLULITIS, UNSPECIFIED: ICD-10-CM

## 2017-11-07 DIAGNOSIS — Z29.9 ENCOUNTER FOR PROPHYLACTIC MEASURES, UNSPECIFIED: ICD-10-CM

## 2017-11-07 DIAGNOSIS — E11.9 TYPE 2 DIABETES MELLITUS WITHOUT COMPLICATIONS: ICD-10-CM

## 2017-11-07 DIAGNOSIS — E11.40 TYPE 2 DIABETES MELLITUS WITH DIABETIC NEUROPATHY, UNSPECIFIED: ICD-10-CM

## 2017-11-07 DIAGNOSIS — R60.0 LOCALIZED EDEMA: ICD-10-CM

## 2017-11-07 DIAGNOSIS — I87.8 OTHER SPECIFIED DISORDERS OF VEINS: ICD-10-CM

## 2017-11-07 DIAGNOSIS — F41.9 ANXIETY DISORDER, UNSPECIFIED: ICD-10-CM

## 2017-11-07 DIAGNOSIS — I82.409 ACUTE EMBOLISM AND THROMBOSIS OF UNSPECIFIED DEEP VEINS OF UNSPECIFIED LOWER EXTREMITY: ICD-10-CM

## 2017-11-07 DIAGNOSIS — I10 ESSENTIAL (PRIMARY) HYPERTENSION: ICD-10-CM

## 2017-11-07 LAB
ALBUMIN SERPL ELPH-MCNC: 3.8 G/DL — SIGNIFICANT CHANGE UP (ref 3.3–5)
ALP SERPL-CCNC: 43 U/L — SIGNIFICANT CHANGE UP (ref 40–120)
ALT FLD-CCNC: 12 U/L — SIGNIFICANT CHANGE UP (ref 4–41)
APPEARANCE UR: CLEAR — SIGNIFICANT CHANGE UP
APTT BLD: 35.5 SEC — SIGNIFICANT CHANGE UP (ref 27.5–37.4)
AST SERPL-CCNC: 17 U/L — SIGNIFICANT CHANGE UP (ref 4–40)
BASOPHILS # BLD AUTO: 0.02 K/UL — SIGNIFICANT CHANGE UP (ref 0–0.2)
BASOPHILS NFR BLD AUTO: 0.2 % — SIGNIFICANT CHANGE UP (ref 0–2)
BILIRUB SERPL-MCNC: 1.2 MG/DL — SIGNIFICANT CHANGE UP (ref 0.2–1.2)
BILIRUB UR-MCNC: NEGATIVE — SIGNIFICANT CHANGE UP
BLOOD UR QL VISUAL: HIGH
BUN SERPL-MCNC: 21 MG/DL — SIGNIFICANT CHANGE UP (ref 7–23)
CALCIUM SERPL-MCNC: 8.5 MG/DL — SIGNIFICANT CHANGE UP (ref 8.4–10.5)
CHLORIDE SERPL-SCNC: 96 MMOL/L — LOW (ref 98–107)
CHOLEST SERPL-MCNC: 97 MG/DL — LOW (ref 120–199)
CO2 SERPL-SCNC: 23 MMOL/L — SIGNIFICANT CHANGE UP (ref 22–31)
COLOR SPEC: YELLOW — SIGNIFICANT CHANGE UP
CREAT SERPL-MCNC: 1.27 MG/DL — SIGNIFICANT CHANGE UP (ref 0.5–1.3)
EOSINOPHIL # BLD AUTO: 0.02 K/UL — SIGNIFICANT CHANGE UP (ref 0–0.5)
EOSINOPHIL NFR BLD AUTO: 0.2 % — SIGNIFICANT CHANGE UP (ref 0–6)
FERRITIN SERPL-MCNC: 566.6 NG/ML — HIGH (ref 30–400)
FOLATE SERPL-MCNC: 11.9 NG/ML — SIGNIFICANT CHANGE UP (ref 4.7–20)
GLUCOSE BLDC GLUCOMTR-MCNC: 138 MG/DL — HIGH (ref 70–99)
GLUCOSE BLDC GLUCOMTR-MCNC: 168 MG/DL — HIGH (ref 70–99)
GLUCOSE BLDC GLUCOMTR-MCNC: 168 MG/DL — HIGH (ref 70–99)
GLUCOSE BLDC GLUCOMTR-MCNC: 177 MG/DL — HIGH (ref 70–99)
GLUCOSE SERPL-MCNC: 143 MG/DL — HIGH (ref 70–99)
GLUCOSE UR-MCNC: NEGATIVE — SIGNIFICANT CHANGE UP
HAV IGM SER-ACNC: NONREACTIVE — SIGNIFICANT CHANGE UP
HBA1C BLD-MCNC: 6.5 % — HIGH (ref 4–5.6)
HBV CORE IGM SER-ACNC: NONREACTIVE — SIGNIFICANT CHANGE UP
HBV SURFACE AG SER-ACNC: NONREACTIVE — SIGNIFICANT CHANGE UP
HCT VFR BLD CALC: 41.7 % — SIGNIFICANT CHANGE UP (ref 39–50)
HCV AB S/CO SERPL IA: 0.1 S/CO — SIGNIFICANT CHANGE UP
HCV AB SERPL-IMP: SIGNIFICANT CHANGE UP
HDLC SERPL-MCNC: 27 MG/DL — LOW (ref 35–55)
HGB BLD-MCNC: 13.7 G/DL — SIGNIFICANT CHANGE UP (ref 13–17)
IMM GRANULOCYTES # BLD AUTO: 0.1 # — SIGNIFICANT CHANGE UP
IMM GRANULOCYTES NFR BLD AUTO: 1 % — SIGNIFICANT CHANGE UP (ref 0–1.5)
INR BLD: 1.48 — HIGH (ref 0.88–1.17)
IRON SATN MFR SERPL: 22 UG/DL — LOW (ref 45–165)
IRON SATN MFR SERPL: 246 UG/DL — SIGNIFICANT CHANGE UP (ref 155–535)
KETONES UR-MCNC: NEGATIVE — SIGNIFICANT CHANGE UP
LEUKOCYTE ESTERASE UR-ACNC: NEGATIVE — SIGNIFICANT CHANGE UP
LIPID PNL WITH DIRECT LDL SERPL: 32 MG/DL — SIGNIFICANT CHANGE UP
LYMPHOCYTES # BLD AUTO: 0.61 K/UL — LOW (ref 1–3.3)
LYMPHOCYTES # BLD AUTO: 5.9 % — LOW (ref 13–44)
MAGNESIUM SERPL-MCNC: 1.5 MG/DL — LOW (ref 1.6–2.6)
MCHC RBC-ENTMCNC: 29.1 PG — SIGNIFICANT CHANGE UP (ref 27–34)
MCHC RBC-ENTMCNC: 32.9 % — SIGNIFICANT CHANGE UP (ref 32–36)
MCV RBC AUTO: 88.5 FL — SIGNIFICANT CHANGE UP (ref 80–100)
MONOCYTES # BLD AUTO: 0.56 K/UL — SIGNIFICANT CHANGE UP (ref 0–0.9)
MONOCYTES NFR BLD AUTO: 5.4 % — SIGNIFICANT CHANGE UP (ref 2–14)
MUCOUS THREADS # UR AUTO: SIGNIFICANT CHANGE UP
NEUTROPHILS # BLD AUTO: 9 K/UL — HIGH (ref 1.8–7.4)
NEUTROPHILS NFR BLD AUTO: 87.3 % — HIGH (ref 43–77)
NITRITE UR-MCNC: NEGATIVE — SIGNIFICANT CHANGE UP
NON-SQ EPI CELLS # UR AUTO: <1 — SIGNIFICANT CHANGE UP
NRBC # FLD: 0 — SIGNIFICANT CHANGE UP
PH UR: 5.5 — SIGNIFICANT CHANGE UP (ref 4.6–8)
PHOSPHATE SERPL-MCNC: 2.3 MG/DL — LOW (ref 2.5–4.5)
PLATELET # BLD AUTO: 101 K/UL — LOW (ref 150–400)
PMV BLD: 10.1 FL — SIGNIFICANT CHANGE UP (ref 7–13)
POTASSIUM SERPL-MCNC: 3.2 MMOL/L — LOW (ref 3.5–5.3)
POTASSIUM SERPL-SCNC: 3.2 MMOL/L — LOW (ref 3.5–5.3)
PROT SERPL-MCNC: 7.3 G/DL — SIGNIFICANT CHANGE UP (ref 6–8.3)
PROT UR-MCNC: 30 — SIGNIFICANT CHANGE UP
PROTHROM AB SERPL-ACNC: 16.7 SEC — HIGH (ref 9.8–13.1)
RBC # BLD: 4.71 M/UL — SIGNIFICANT CHANGE UP (ref 4.2–5.8)
RBC # FLD: 15.9 % — HIGH (ref 10.3–14.5)
RBC CASTS # UR COMP ASSIST: SIGNIFICANT CHANGE UP (ref 0–?)
SODIUM SERPL-SCNC: 137 MMOL/L — SIGNIFICANT CHANGE UP (ref 135–145)
SP GR SPEC: 1.02 — SIGNIFICANT CHANGE UP (ref 1–1.03)
SPECIMEN SOURCE: SIGNIFICANT CHANGE UP
SPECIMEN SOURCE: SIGNIFICANT CHANGE UP
T4 FREE SERPL-MCNC: 0.98 NG/DL — SIGNIFICANT CHANGE UP (ref 0.9–1.8)
TRIGL SERPL-MCNC: 235 MG/DL — HIGH (ref 10–149)
TSH SERPL-MCNC: 1.15 UIU/ML — SIGNIFICANT CHANGE UP (ref 0.27–4.2)
UIBC SERPL-MCNC: 224 UG/DL — SIGNIFICANT CHANGE UP (ref 110–370)
UROBILINOGEN FLD QL: NORMAL E.U. — SIGNIFICANT CHANGE UP (ref 0.1–0.2)
VIT B12 SERPL-MCNC: 187 PG/ML — LOW (ref 200–900)
WBC # BLD: 10.31 K/UL — SIGNIFICANT CHANGE UP (ref 3.8–10.5)
WBC # FLD AUTO: 10.31 K/UL — SIGNIFICANT CHANGE UP (ref 3.8–10.5)
WBC UR QL: SIGNIFICANT CHANGE UP (ref 0–?)

## 2017-11-07 PROCEDURE — 99223 1ST HOSP IP/OBS HIGH 75: CPT

## 2017-11-07 RX ORDER — PIPERACILLIN AND TAZOBACTAM 4; .5 G/20ML; G/20ML
3.38 INJECTION, POWDER, LYOPHILIZED, FOR SOLUTION INTRAVENOUS EVERY 8 HOURS
Qty: 0 | Refills: 0 | Status: DISCONTINUED | OUTPATIENT
Start: 2017-11-07 | End: 2017-11-08

## 2017-11-07 RX ORDER — FUROSEMIDE 40 MG
40 TABLET ORAL
Qty: 0 | Refills: 0 | Status: DISCONTINUED | OUTPATIENT
Start: 2017-11-07 | End: 2017-11-07

## 2017-11-07 RX ORDER — OXYCODONE HYDROCHLORIDE 5 MG/1
5 TABLET ORAL EVERY 6 HOURS
Qty: 0 | Refills: 0 | Status: DISCONTINUED | OUTPATIENT
Start: 2017-11-07 | End: 2017-11-09

## 2017-11-07 RX ORDER — DEXTROSE 50 % IN WATER 50 %
12.5 SYRINGE (ML) INTRAVENOUS ONCE
Qty: 0 | Refills: 0 | Status: DISCONTINUED | OUTPATIENT
Start: 2017-11-07 | End: 2017-11-15

## 2017-11-07 RX ORDER — ACETAMINOPHEN 500 MG
650 TABLET ORAL EVERY 6 HOURS
Qty: 0 | Refills: 0 | Status: DISCONTINUED | OUTPATIENT
Start: 2017-11-07 | End: 2017-11-15

## 2017-11-07 RX ORDER — INSULIN LISPRO 100/ML
VIAL (ML) SUBCUTANEOUS
Qty: 0 | Refills: 0 | Status: DISCONTINUED | OUTPATIENT
Start: 2017-11-07 | End: 2017-11-15

## 2017-11-07 RX ORDER — INFLUENZA VIRUS VACCINE 15; 15; 15; 15 UG/.5ML; UG/.5ML; UG/.5ML; UG/.5ML
0.5 SUSPENSION INTRAMUSCULAR ONCE
Qty: 0 | Refills: 0 | Status: COMPLETED | OUTPATIENT
Start: 2017-11-07 | End: 2017-11-07

## 2017-11-07 RX ORDER — DEXTROSE 50 % IN WATER 50 %
1 SYRINGE (ML) INTRAVENOUS ONCE
Qty: 0 | Refills: 0 | Status: DISCONTINUED | OUTPATIENT
Start: 2017-11-07 | End: 2017-11-15

## 2017-11-07 RX ORDER — FUROSEMIDE 40 MG
40 TABLET ORAL DAILY
Qty: 0 | Refills: 0 | Status: DISCONTINUED | OUTPATIENT
Start: 2017-11-07 | End: 2017-11-15

## 2017-11-07 RX ORDER — GLUCAGON INJECTION, SOLUTION 0.5 MG/.1ML
1 INJECTION, SOLUTION SUBCUTANEOUS ONCE
Qty: 0 | Refills: 0 | Status: DISCONTINUED | OUTPATIENT
Start: 2017-11-07 | End: 2017-11-15

## 2017-11-07 RX ORDER — VANCOMYCIN HCL 1 G
1000 VIAL (EA) INTRAVENOUS EVERY 24 HOURS
Qty: 0 | Refills: 0 | Status: DISCONTINUED | OUTPATIENT
Start: 2017-11-07 | End: 2017-11-08

## 2017-11-07 RX ORDER — FERROUS SULFATE 325(65) MG
325 TABLET ORAL DAILY
Qty: 0 | Refills: 0 | Status: DISCONTINUED | OUTPATIENT
Start: 2017-11-07 | End: 2017-11-15

## 2017-11-07 RX ORDER — AMLODIPINE BESYLATE 2.5 MG/1
5 TABLET ORAL DAILY
Qty: 0 | Refills: 0 | Status: DISCONTINUED | OUTPATIENT
Start: 2017-11-07 | End: 2017-11-15

## 2017-11-07 RX ORDER — METOPROLOL TARTRATE 50 MG
50 TABLET ORAL
Qty: 0 | Refills: 0 | Status: DISCONTINUED | OUTPATIENT
Start: 2017-11-07 | End: 2017-11-15

## 2017-11-07 RX ORDER — LACTOBACILLUS ACIDOPHILUS 100MM CELL
1 CAPSULE ORAL EVERY 12 HOURS
Qty: 0 | Refills: 0 | Status: DISCONTINUED | OUTPATIENT
Start: 2017-11-07 | End: 2017-11-15

## 2017-11-07 RX ORDER — SODIUM CHLORIDE 9 MG/ML
1000 INJECTION, SOLUTION INTRAVENOUS
Qty: 0 | Refills: 0 | Status: DISCONTINUED | OUTPATIENT
Start: 2017-11-07 | End: 2017-11-15

## 2017-11-07 RX ORDER — GABAPENTIN 400 MG/1
800 CAPSULE ORAL THREE TIMES A DAY
Qty: 0 | Refills: 0 | Status: DISCONTINUED | OUTPATIENT
Start: 2017-11-07 | End: 2017-11-15

## 2017-11-07 RX ORDER — POLYETHYLENE GLYCOL 3350 17 G/17G
17 POWDER, FOR SOLUTION ORAL DAILY
Qty: 0 | Refills: 0 | Status: DISCONTINUED | OUTPATIENT
Start: 2017-11-07 | End: 2017-11-15

## 2017-11-07 RX ORDER — APIXABAN 2.5 MG/1
5 TABLET, FILM COATED ORAL EVERY 12 HOURS
Qty: 0 | Refills: 0 | Status: DISCONTINUED | OUTPATIENT
Start: 2017-11-07 | End: 2017-11-07

## 2017-11-07 RX ORDER — DOCUSATE SODIUM 100 MG
100 CAPSULE ORAL THREE TIMES A DAY
Qty: 0 | Refills: 0 | Status: DISCONTINUED | OUTPATIENT
Start: 2017-11-07 | End: 2017-11-15

## 2017-11-07 RX ORDER — INSULIN LISPRO 100/ML
VIAL (ML) SUBCUTANEOUS AT BEDTIME
Qty: 0 | Refills: 0 | Status: DISCONTINUED | OUTPATIENT
Start: 2017-11-07 | End: 2017-11-15

## 2017-11-07 RX ORDER — DEXTROSE 50 % IN WATER 50 %
25 SYRINGE (ML) INTRAVENOUS ONCE
Qty: 0 | Refills: 0 | Status: DISCONTINUED | OUTPATIENT
Start: 2017-11-07 | End: 2017-11-15

## 2017-11-07 RX ORDER — APIXABAN 2.5 MG/1
5 TABLET, FILM COATED ORAL EVERY 12 HOURS
Qty: 0 | Refills: 0 | Status: DISCONTINUED | OUTPATIENT
Start: 2017-11-07 | End: 2017-11-15

## 2017-11-07 RX ADMIN — GABAPENTIN 800 MILLIGRAM(S): 400 CAPSULE ORAL at 21:13

## 2017-11-07 RX ADMIN — AMLODIPINE BESYLATE 5 MILLIGRAM(S): 2.5 TABLET ORAL at 05:14

## 2017-11-07 RX ADMIN — GABAPENTIN 800 MILLIGRAM(S): 400 CAPSULE ORAL at 05:14

## 2017-11-07 RX ADMIN — PIPERACILLIN AND TAZOBACTAM 25 GRAM(S): 4; .5 INJECTION, POWDER, LYOPHILIZED, FOR SOLUTION INTRAVENOUS at 15:09

## 2017-11-07 RX ADMIN — Medication 1 TABLET(S): at 05:13

## 2017-11-07 RX ADMIN — PIPERACILLIN AND TAZOBACTAM 25 GRAM(S): 4; .5 INJECTION, POWDER, LYOPHILIZED, FOR SOLUTION INTRAVENOUS at 05:13

## 2017-11-07 RX ADMIN — Medication 10 MILLIGRAM(S): at 05:13

## 2017-11-07 RX ADMIN — GABAPENTIN 800 MILLIGRAM(S): 400 CAPSULE ORAL at 15:10

## 2017-11-07 RX ADMIN — OXYCODONE HYDROCHLORIDE 5 MILLIGRAM(S): 5 TABLET ORAL at 16:37

## 2017-11-07 RX ADMIN — APIXABAN 5 MILLIGRAM(S): 2.5 TABLET, FILM COATED ORAL at 05:14

## 2017-11-07 RX ADMIN — Medication 250 MILLIGRAM(S): at 01:21

## 2017-11-07 RX ADMIN — Medication 50 MILLIGRAM(S): at 05:14

## 2017-11-07 RX ADMIN — Medication 40 MILLIGRAM(S): at 05:14

## 2017-11-07 RX ADMIN — APIXABAN 5 MILLIGRAM(S): 2.5 TABLET, FILM COATED ORAL at 17:56

## 2017-11-07 RX ADMIN — Medication 1: at 08:34

## 2017-11-07 RX ADMIN — Medication 1: at 12:05

## 2017-11-07 RX ADMIN — Medication 50 MILLIGRAM(S): at 17:57

## 2017-11-07 RX ADMIN — Medication 325 MILLIGRAM(S): at 12:05

## 2017-11-07 RX ADMIN — Medication 10 MILLIGRAM(S): at 17:57

## 2017-11-07 RX ADMIN — OXYCODONE HYDROCHLORIDE 5 MILLIGRAM(S): 5 TABLET ORAL at 17:30

## 2017-11-07 RX ADMIN — Medication 650 MILLIGRAM(S): at 03:19

## 2017-11-07 RX ADMIN — PIPERACILLIN AND TAZOBACTAM 25 GRAM(S): 4; .5 INJECTION, POWDER, LYOPHILIZED, FOR SOLUTION INTRAVENOUS at 21:14

## 2017-11-07 RX ADMIN — Medication 1 TABLET(S): at 17:57

## 2017-11-07 NOTE — PROGRESS NOTE ADULT - SUBJECTIVE AND OBJECTIVE BOX
chief complaint : rt leg swelling erythema        SUBJECTIVE / OVERNIGHT EVENTS: pt denies chest pain, shortness of breath, nausea, vomiting       MEDICATIONS  (STANDING):  amLODIPine   Tablet 5 milliGRAM(s) Oral daily  apixaban 5 milliGRAM(s) Oral every 12 hours  busPIRone 10 milliGRAM(s) Oral two times a day  dextrose 5%. 1000 milliLiter(s) (50 mL/Hr) IV Continuous <Continuous>  dextrose 50% Injectable 12.5 Gram(s) IV Push once  dextrose 50% Injectable 25 Gram(s) IV Push once  dextrose 50% Injectable 25 Gram(s) IV Push once  ferrous    sulfate 325 milliGRAM(s) Oral daily  furosemide    Tablet 40 milliGRAM(s) Oral daily  gabapentin 800 milliGRAM(s) Oral three times a day  insulin lispro (HumaLOG) corrective regimen sliding scale   SubCutaneous three times a day before meals  insulin lispro (HumaLOG) corrective regimen sliding scale   SubCutaneous at bedtime  lactobacillus acidophilus 1 Tablet(s) Oral every 12 hours  metoprolol     tartrate 50 milliGRAM(s) Oral two times a day  piperacillin/tazobactam IVPB. 3.375 Gram(s) IV Intermittent every 8 hours  vancomycin  IVPB 1000 milliGRAM(s) IV Intermittent every 24 hours    MEDICATIONS  (PRN):  acetaminophen   Tablet 650 milliGRAM(s) Oral every 6 hours PRN For Temp greater than 38 C (100.4 F)  dextrose Gel 1 Dose(s) Oral once PRN Blood Glucose LESS THAN 70 milliGRAM(s)/deciliter  docusate sodium 100 milliGRAM(s) Oral three times a day PRN Constipation  glucagon  Injectable 1 milliGRAM(s) IntraMuscular once PRN Glucose LESS THAN 70 milligrams/deciliter  oxyCODONE    IR 5 milliGRAM(s) Oral every 6 hours PRN Mod-severe pain  polyethylene glycol 3350 17 Gram(s) Oral daily PRN Constipation        CAPILLARY BLOOD GLUCOSE      POCT Blood Glucose.: 168 mg/dL (2017 22:04)  POCT Blood Glucose.: 138 mg/dL (2017 17:04)  POCT Blood Glucose.: 168 mg/dL (2017 11:30)  POCT Blood Glucose.: 177 mg/dL (2017 08:22)    I&O's Summary    2017 07:01  -  2017 07:00  --------------------------------------------------------  IN: 160 mL / OUT: 0 mL / NET: 160 mL        Constitutional: No fever, fatigue  Skin: No rash.  Eyes: No recent vision problems or eye pain.  ENT: No congestion, ear pain, or sore throat.  Cardiovascular: No chest pain or palpation.  Respiratory: No cough, shortness of breath, congestion, or wheezing.  Gastrointestinal: No abdominal pain, nausea, vomiting, or diarrhea.  Genitourinary: No dysuria.  Musculoskeletal: lower ext swelling / erythema+  Neurologic: No headache.    PHYSICAL EXAM:  GENERAL: NAD  EYES: EOMI, PERRLA  NECK: Supple, No JVD  CHEST/LUNG: dec breath sounds at bases   HEART:  S1 , S2 +  ABDOMEN: obese, soft, bs+  EXTREMITIES:  heena lower ext edema rt> left , erythema +  NEUROLOGY:      LABS:                        13.7   10.31 )-----------( 101      ( 2017 05:30 )             41.7         137  |  96<L>  |  21  ----------------------------<  143<H>  3.2<L>   |  23  |  1.27    Ca    8.5      2017 05:30  Phos  2.3       Mg     1.5         TPro  7.3  /  Alb  3.8  /  TBili  1.2  /  DBili  x   /  AST  17  /  ALT  12  /  AlkPhos  43  07    PT/INR - ( 2017 00:30 )   PT: 16.7 SEC;   INR: 1.48          PTT - ( 2017 00:30 )  PTT:35.5 SEC      Urinalysis Basic - ( 2017 15:00 )    Color: YELLOW / Appearance: CLEAR / S.021 / pH: 5.5  Gluc: NEGATIVE / Ketone: NEGATIVE  / Bili: NEGATIVE / Urobili: NORMAL E.U.   Blood: SMALL / Protein: 30 / Nitrite: NEGATIVE   Leuk Esterase: NEGATIVE / RBC: 0-2 / WBC 2-5   Sq Epi: x / Non Sq Epi: x / Bacteria: x        RADIOLOGY & ADDITIONAL TESTS:    Imaging Personally Reviewed:    Consultant(s) Notes Reviewed:      Care Discussed with Consultants/Other Providers:

## 2017-11-07 NOTE — PHYSICAL THERAPY INITIAL EVALUATION ADULT - CRITERIA FOR SKILLED THERAPEUTIC INTERVENTIONS
therapy frequency/anticipated discharge recommendation/impairments found/predicted duration of therapy intervention/anticipated equipment needs at discharge/rehab potential

## 2017-11-07 NOTE — ADVANCED PRACTICE NURSE CONSULT - ASSESSMENT
General: A&Ox4, ambulates independently with walker. (+) obese. Continent of urine and stool. Skin warm, dry with increased moisture in intertriginous folds, adequate skin turgor.     Vascular: Bilateral lower extremities with hemosiderin staining. Blanchable erythema of B/L LE and heels. Thin, shiny, taught skin of right lower leg. Increased coolness of distal toes. Bilateral +2 pitting edema. Right lower leg>left lower leg in circumference. Sluggish capillary refill bilaterally. Nonpalpale Left DP pulse, with monophasic doppler sound. Weak palpable Right DP pulse with bilateral weak palpable PT pulses , and biphasic doppler sounds (no changes in pulses of B/L feet from previous assessment). (+) pallor on elevation and dependant rubor. Reports numbness of bilateral feet and hands. Multiple reabsorbed blisters noted (left dorsal 4th toe and right dorsal 2nd and 4th toes). Patient reports that the blisters started after wearing sandals and states that edema of B/L LE varies.    Moisture associated dermatitis of pannus, b/l groin and sacral fold: skin currently intact with increased moisture.

## 2017-11-07 NOTE — CONSULT NOTE ADULT - SUBJECTIVE AND OBJECTIVE BOX
Cardiology/Vascular Medicine Inpatient Consultation Note        HISTORY OF PRESENT ILLNESS:  Patient recently established care in my office.  He is a 76 y/o man HX of  DM2, HTN, Afib s/p ablation 2006, prior lung CA s/p lobectomy (RUL and part of LLL), B/L DVT on Eliquis, Diabetic Neuropathy, Obesity, Anxiety, Chronic Venous stasis , pt was in [facility] in Sept. 2017 was treated for RLE Cellulitis , seen By ID , pt was on IV Cefazolin and sent home on PO Keflex.    Patient p/w Right LE pain/redness. Pt. states that he woke up yesterday and noticed his RLE was extremely red and painful, He states he has been treated for cellulitis multiple times in the past. He also reports subjective fevers. Pt. denies CP, SOB, N/V/D, no cough, no HA, no Dizziness, no abdominal pain, + Chronic B/L Lower Ext Swelling Rt > left on PO Lasix  , + Fever , S/P IV zosyn, IV Vanco, in the ER, pt A+O x3, Pt still is on PO Keflex at home,     B/L Venous Doppler of legs: Partially occlusive thrombus within the right popliteal vein behind the knee. Compressible right gastrocnemius vein. Findings are improved and chronic compared to previous ultrasound of 7/11/17.  Bilateral calf veins not visualized.    Labs: PT 16.7, INR 1.48, PTT 35.5, Na 135, k+ 3.9, CO2=21, BUN 22, Creatinine 1.22, Glucose 154, LFT normal, WBC 11.7, Hgb 15.1, Platelet 91     Vitals: Tem 100.4, HR 89, /79 , RR 18, 96% RA (06 Nov 2017 23:34)      Allergies  No Known Allergies  	    MEDICATIONS:  amLODIPine   Tablet 5 milliGRAM(s) Oral daily  apixaban 5 milliGRAM(s) Oral every 12 hours  furosemide    Tablet 40 milliGRAM(s) Oral daily  metoprolol     tartrate 50 milliGRAM(s) Oral two times a day    piperacillin/tazobactam IVPB. 3.375 Gram(s) IV Intermittent every 8 hours  vancomycin  IVPB 1000 milliGRAM(s) IV Intermittent every 24 hours    acetaminophen   Tablet 650 milliGRAM(s) Oral every 6 hours PRN  busPIRone 10 milliGRAM(s) Oral two times a day  gabapentin 800 milliGRAM(s) Oral three times a day  oxyCODONE    IR 5 milliGRAM(s) Oral every 6 hours PRN    docusate sodium 100 milliGRAM(s) Oral three times a day PRN  polyethylene glycol 3350 17 Gram(s) Oral daily PRN    dextrose 50% Injectable 12.5 Gram(s) IV Push once  dextrose 50% Injectable 25 Gram(s) IV Push once  dextrose 50% Injectable 25 Gram(s) IV Push once  dextrose Gel 1 Dose(s) Oral once PRN  glucagon  Injectable 1 milliGRAM(s) IntraMuscular once PRN  insulin lispro (HumaLOG) corrective regimen sliding scale   SubCutaneous three times a day before meals  insulin lispro (HumaLOG) corrective regimen sliding scale   SubCutaneous at bedtime    dextrose 5%. 1000 milliLiter(s) IV Continuous <Continuous>  ferrous    sulfate 325 milliGRAM(s) Oral daily  influenza   Vaccine 0.5 milliLiter(s) IntraMuscular once      PAST MEDICAL & SURGICAL HISTORY:  DVT (deep venous thrombosis): RLE dx 6/28/17  Venous stasis of both lower extremities  Balanitis  Squamous cell lung cancer  Atrial fibrillation: s/p ablation 2006  Cellulitis: Both legs 2/2 chronic venous stasis  Morbid obesity  Anxiety  Neuropathy  Diabetes mellitus  HTN (hypertension)  Lung nodules: Flexible Bronchoscopy, Right VATS, Right Lung Resection - 1/21/15, LLL partial resection 2/2015  H/O prior ablation treatment: cardiac 2006      FAMILY HISTORY:  Family history of liver cancer (Sibling): sister  Family history of diabetes mellitus (Sibling): Brother  Family history of heart failure: father      SOCIAL HISTORY:    NC    REVIEW OF SYSTEMS:  CONSTITUTIONAL: No fever, weight loss, or fatigue  EYES: No eye pain, visual disturbances, or discharge  ENMT:  No difficulty hearing, tinnitus, vertigo; No sinus or throat pain  NECK: No pain or stiffness  RESPIRATORY: No cough, wheezing, chills or hemoptysis; No Shortness of Breath  CARDIOVASCULAR: As above  GASTROINTESTINAL: No abdominal or epigastric pain. No nausea, vomiting, or hematemesis; No diarrhea or constipation. No melena or hematochezia.  GENITOURINARY: No dysuria, frequency, hematuria, or incontinence  NEUROLOGICAL: No headaches, memory loss, loss of strength, numbness, or tremors  SKIN: As above  LYMPH Nodes: No enlarged glands  ENDOCRINE: No heat or cold intolerance; No hair loss  MUSCULOSKELETAL: As above  PSYCHIATRIC: No depression, anxiety, mood swings,  ALLERGY AND IMMUNOLOGIC: No hives or eczema	      PHYSICAL EXAM:  T(C): 37.2 (11-07-17 @ 05:10), Max: 38 (11-07-17 @ 02:45)  HR: 88 (11-07-17 @ 05:10) (88 - 98)  BP: 150/76 (11-07-17 @ 05:10) (130/70 - 152/79)  RR: 18 (11-07-17 @ 05:10) (16 - 18)  SpO2: 96% (11-07-17 @ 05:10) (96% - 100%)  Wt(kg): --  I&O's Summary    06 Nov 2017 07:01  -  07 Nov 2017 07:00  --------------------------------------------------------  IN: 160 mL / OUT: 0 mL / NET: 160 mL      Appearance: NAD	  HEENT:   Normal oral mucosa, PERRL, EOMI	  Lymphatic: No lymphadenopathy  Cardiovascular: Normal S1 S2, No JVD, No murmurs, No edema  Respiratory: Decreased BS b/l bases  Psychiatry: Awake alert  Gastrointestinal:  Obese, soft, Non-tender, + BS	  Skin: No rashes, No ecchymoses, No cyanosis	  Neurologic: Non-focal  Extremities: As described above  Vascular: Peripheral pulses palpable 2+ bilaterally        LABS:	 	    MEDICATIONS  (STANDING):  amLODIPine   Tablet 5 milliGRAM(s) Oral daily  apixaban 5 milliGRAM(s) Oral every 12 hours  busPIRone 10 milliGRAM(s) Oral two times a day  dextrose 5%. 1000 milliLiter(s) (50 mL/Hr) IV Continuous <Continuous>  dextrose 50% Injectable 12.5 Gram(s) IV Push once  dextrose 50% Injectable 25 Gram(s) IV Push once  dextrose 50% Injectable 25 Gram(s) IV Push once  ferrous    sulfate 325 milliGRAM(s) Oral daily  furosemide    Tablet 40 milliGRAM(s) Oral daily  gabapentin 800 milliGRAM(s) Oral three times a day  influenza   Vaccine 0.5 milliLiter(s) IntraMuscular once  insulin lispro (HumaLOG) corrective regimen sliding scale   SubCutaneous three times a day before meals  insulin lispro (HumaLOG) corrective regimen sliding scale   SubCutaneous at bedtime  lactobacillus acidophilus 1 Tablet(s) Oral every 12 hours  metoprolol     tartrate 50 milliGRAM(s) Oral two times a day  piperacillin/tazobactam IVPB. 3.375 Gram(s) IV Intermittent every 8 hours  vancomycin  IVPB 1000 milliGRAM(s) IV Intermittent every 24 hours    MEDICATIONS  (PRN):  acetaminophen   Tablet 650 milliGRAM(s) Oral every 6 hours PRN For Temp greater than 38 C (100.4 F)  dextrose Gel 1 Dose(s) Oral once PRN Blood Glucose LESS THAN 70 milliGRAM(s)/deciliter  docusate sodium 100 milliGRAM(s) Oral three times a day PRN Constipation  glucagon  Injectable 1 milliGRAM(s) IntraMuscular once PRN Glucose LESS THAN 70 milligrams/deciliter  oxyCODONE    IR 5 milliGRAM(s) Oral every 6 hours PRN Mod-severe pain  polyethylene glycol 3350 17 Gram(s) Oral daily PRN Constipation    11-07    137  |  96<L>  |  21  ----------------------------<  143<H>  3.2<L>   |  23  |  1.27  11-06    135  |  97<L>  |  22  ----------------------------<  154<H>  3.9   |  21<L>  |  1.22    Ca    8.5      07 Nov 2017 05:30  Ca    8.6      06 Nov 2017 20:04  Phos  2.3     11-07  Mg     1.5     11-07    TPro  7.3  /  Alb  3.8  /  TBili  1.2  /  DBili  x   /  AST  17  /  ALT  12  /  AlkPhos  43  11-07  TPro  7.3  /  Alb  3.7  /  TBili  0.9  /  DBili  x   /  AST  13  /  ALT  14  /  AlkPhos  42  11-06      proBNP:   Lipid Profile: Cholesterol, total97 mg/dL<L> [120 - 199]  Direct LDL32 mg/dL  HDL Cholesterol, serum27 mg/dL<L> [35 - 55]  Triglycerides, dllhe624 mg/dL<H> [10 - 149]    HgA1c: Hemoglobin A1C, Whole Blood: 6.5 % (11-07 @ 05:30)    TSH: Thyroid Stimulating Hormone, Serum: 1.15 uIU/mL (11-07-17 @ 05:30)      < from: US Duplex Venous Lower Ext Complete, Bilateral (11.06.17 @ 22:50) >  EXAM:  US DPLX LWR EXT VEINS COMPL BI        PROCEDURE DATE:  Nov 6 2017         INTERPRETATION:  CLINICAL INFORMATION: History of DVT. Extremity pain and   swelling.    COMPARISON: Bilateral lower from a duplex 7/11/2017.    TECHNIQUE: Duplex sonography of the BILATERAL LOWER extremities with   color and spectral Doppler, with and without compression.      FINDINGS:    Partially occlusive thrombus is noted within the right popliteal vein.   The right gastrocnemius vein demonstrates normal compressibility.    There is normal compressibility of the bilateral common femoral, femoral   and left popliteal veins. Doppler examination shows normal spontaneous   and phasic flow.  Bilateral calf veins are not visualized.      IMPRESSION:     Partially occlusive thrombus within the right popliteal vein behind the   knee. Compressible right gastrocnemius vein.  Findings are improved and   chronic compared to previous ultrasound of 7/11/17.  Bilateral calf veins not visualized.      SCARLETT WEBSTER M.D., RADIOLOGY RESIDENT  This document has been electronically signed.  OLGA HARRIS M.D, ATTENDING RADIOLOGIST  This document has been electronically signed. Nov 6 2017 11:06PM        < from: TTE with Doppler (w/Cont) (07.03.17 @ 13:05) >  Patient name: CALLIE MACIAS  YOB: 1940   Age: 77 (M)   MR#: [id number]  Study Date: 7/3/2017  Location: [address] kSonographer: Jl Long  HOMAR  Study quality: Technically Difficult  Referring Physician: Darren Wood MD  Blood Pressure: 160/88 mmHg  Height: 187 cm  Weight: 151 kg  BSA: 2.7 m2  ------------------------------------------------------------------------  PROCEDURE: Transthoracic echocardiogram with 2-D, M-Mode  and complete spectral and color flow Doppler.  Verbal consent was obtained for injection of echo contrast.  Following  intravenous injection of contrast, harmonic  imaging was performed.  INDICATION: Unspecified atrial fibrillation (I48.91),  Shortness of breath (R06.02)  ------------------------------------------------------------------------  OBSERVATIONS:  Mitral Valve: Mitral annular calcification, otherwise  normal mitral valve. Minimal mitral regurgitation.  Aortic Root: Normal aortic root.  Aortic Valve: Aortic valve leaflet morphology not well  visualized.  Left Atrium: Normal left atrium.  Left Ventricle: Endocardium not well visualized; despite  the use of IV contrast, unable to evaluate left ventricular  systolic function.  Right Heart: Normal right atrium. Unable to accurately  evaluate right ventricular size or systolic function.  Tricuspid valve not well visualized. Pulmonic valve not  well visualized.  Pericardium/PleuraNormal pericardium with no pericardial  effusion.  Hemodynamic: Estimated right ventricular systolicpressure  equals 41 mm Hg, assuming right atrial pressure equals 10  mm Hg, consistent with mild pulmonary hypertension.  ------------------------------------------------------------------------  CONCLUSIONS:  Technically very difficult study.  1. Mitral annular calcification, otherwise normal mitral  valve. Minimal mitral regurgitation.  2. Endocardium not well visualized; despite the use of IV  contrast, unable to evaluate left ventricular systolic  function.  3. Unable to accurately evaluate right ventricular size or  systolic function.  4. Estimated right ventricular systolic pressure equals 41  mm Hg, assuming right atrial pressure equals 10 mm Hg,  consistent with mild pulmonary hypertension.  ------------------------------------------------------------------------  Confirmed on  7/3/2017 - 14:34:08 by Thierry Cottrell M.D.  ------------------------------------------------------------------------    < end of copied text >                < end of copied text >

## 2017-11-07 NOTE — ADVANCED PRACTICE NURSE CONSULT - REASON FOR CONSULT
Patient seen on skin care rounds after wound care referral received for assessment of skin impairment and recommendations of topical management. Known to [facility] service line from previous admission. Chart reviewed: Serum albumin 3.8g/dL, Ludwig Range 19, BMI 42.5 kg/m2, dopper of B/L LE completed on this admission (see results tab for further details). Patient H/O of DM2, HTN, Afib s/p ablation 2006, prior lung CA s/p lobectomy (RUL and part of LLL), Anxiety, B/L DVT on Eliquis, Neuropathy, Venous stasis of B/L LE, cellulitis bilateral lower legs 2/2 chronic venous stasis. Admitted with Cellulitis. Seen by cardiology services for chronic DVT.

## 2017-11-07 NOTE — PROGRESS NOTE ADULT - ASSESSMENT
78 y/o Male HX of  DM2, HTN, Afib s/p ablation 2006, prior lung CA s/p lobectomy (RUL and part of LLL), B/L DVT on Eliquis, Diabetic Neuropathy, Obesity, Anxiety, Chronic Venous stasis , pt was in [facility] in Sept. 2017 was treated for RLE Cellulitis , seen By ID , pt was on IV Cefazolin and sent home on PO Keflex, pt was seen by Cardiology as well,   Patient  p/w Right  LE pain/redness. Pt. states that he woke up yesterday and noticed his RLE was extremely red and painful, He states he has been treated for cellulitis multiple times in the past. He also reports subjective fevers. Pt. denies CP, SOB, N/V/D, no cough, no HA, no Dizziness, no abdominal pain, + Chronic B/L Lower Ext Swelling Rt > left on PO Lasix  , + Fever , S/P IV zosyn, IV Vanco, in the ER, pt A+O x3, Pt still is on PO Keflex at home,

## 2017-11-07 NOTE — PHYSICAL THERAPY INITIAL EVALUATION ADULT - PATIENT PROFILE REVIEW, REHAB EVAL
Spoke to KARMEN Munoz - pt OK to be seen for PT today. Activity order: ambulate as tolerated, ambulate with assist/yes

## 2017-11-08 LAB
APPEARANCE UR: CLEAR — SIGNIFICANT CHANGE UP
BILIRUB UR-MCNC: NEGATIVE — SIGNIFICANT CHANGE UP
BLOOD UR QL VISUAL: NEGATIVE — SIGNIFICANT CHANGE UP
BUN SERPL-MCNC: 18 MG/DL — SIGNIFICANT CHANGE UP (ref 7–23)
CALCIUM SERPL-MCNC: 8.7 MG/DL — SIGNIFICANT CHANGE UP (ref 8.4–10.5)
CHLORIDE SERPL-SCNC: 94 MMOL/L — LOW (ref 98–107)
CO2 SERPL-SCNC: 27 MMOL/L — SIGNIFICANT CHANGE UP (ref 22–31)
COLOR SPEC: YELLOW — SIGNIFICANT CHANGE UP
CREAT SERPL-MCNC: 1.2 MG/DL — SIGNIFICANT CHANGE UP (ref 0.5–1.3)
GLUCOSE BLDC GLUCOMTR-MCNC: 150 MG/DL — HIGH (ref 70–99)
GLUCOSE BLDC GLUCOMTR-MCNC: 157 MG/DL — HIGH (ref 70–99)
GLUCOSE BLDC GLUCOMTR-MCNC: 168 MG/DL — HIGH (ref 70–99)
GLUCOSE BLDC GLUCOMTR-MCNC: 181 MG/DL — HIGH (ref 70–99)
GLUCOSE SERPL-MCNC: 160 MG/DL — HIGH (ref 70–99)
GLUCOSE UR-MCNC: NEGATIVE — SIGNIFICANT CHANGE UP
HBA1C BLD-MCNC: 6.5 % — HIGH (ref 4–5.6)
KETONES UR-MCNC: NEGATIVE — SIGNIFICANT CHANGE UP
LEUKOCYTE ESTERASE UR-ACNC: NEGATIVE — SIGNIFICANT CHANGE UP
MAGNESIUM SERPL-MCNC: 1.8 MG/DL — SIGNIFICANT CHANGE UP (ref 1.6–2.6)
NITRITE UR-MCNC: NEGATIVE — SIGNIFICANT CHANGE UP
PH UR: 6 — SIGNIFICANT CHANGE UP (ref 4.6–8)
PHOSPHATE SERPL-MCNC: 2 MG/DL — LOW (ref 2.5–4.5)
POTASSIUM SERPL-MCNC: 3.2 MMOL/L — LOW (ref 3.5–5.3)
POTASSIUM SERPL-SCNC: 3.2 MMOL/L — LOW (ref 3.5–5.3)
PROT UR-MCNC: 30 — SIGNIFICANT CHANGE UP
RBC CASTS # UR COMP ASSIST: SIGNIFICANT CHANGE UP (ref 0–?)
SODIUM SERPL-SCNC: 135 MMOL/L — SIGNIFICANT CHANGE UP (ref 135–145)
SP GR SPEC: 1.02 — SIGNIFICANT CHANGE UP (ref 1–1.03)
SPECIMEN SOURCE: SIGNIFICANT CHANGE UP
UROBILINOGEN FLD QL: NORMAL E.U. — SIGNIFICANT CHANGE UP (ref 0.1–0.2)
WBC UR QL: SIGNIFICANT CHANGE UP (ref 0–?)

## 2017-11-08 PROCEDURE — 99222 1ST HOSP IP/OBS MODERATE 55: CPT

## 2017-11-08 RX ORDER — CEFAZOLIN SODIUM 1 G
2000 VIAL (EA) INJECTION ONCE
Qty: 0 | Refills: 0 | Status: COMPLETED | OUTPATIENT
Start: 2017-11-08 | End: 2017-11-08

## 2017-11-08 RX ORDER — CEFAZOLIN SODIUM 1 G
VIAL (EA) INJECTION
Qty: 0 | Refills: 0 | Status: DISCONTINUED | OUTPATIENT
Start: 2017-11-08 | End: 2017-11-15

## 2017-11-08 RX ORDER — SENNA PLUS 8.6 MG/1
2 TABLET ORAL AT BEDTIME
Qty: 0 | Refills: 0 | Status: DISCONTINUED | OUTPATIENT
Start: 2017-11-08 | End: 2017-11-15

## 2017-11-08 RX ORDER — POTASSIUM CHLORIDE 20 MEQ
40 PACKET (EA) ORAL ONCE
Qty: 0 | Refills: 0 | Status: DISCONTINUED | OUTPATIENT
Start: 2017-11-08 | End: 2017-11-15

## 2017-11-08 RX ORDER — POTASSIUM PHOSPHATE, MONOBASIC POTASSIUM PHOSPHATE, DIBASIC 236; 224 MG/ML; MG/ML
15 INJECTION, SOLUTION INTRAVENOUS ONCE
Qty: 0 | Refills: 0 | Status: COMPLETED | OUTPATIENT
Start: 2017-11-08 | End: 2017-11-08

## 2017-11-08 RX ORDER — CEFAZOLIN SODIUM 1 G
2000 VIAL (EA) INJECTION EVERY 8 HOURS
Qty: 0 | Refills: 0 | Status: DISCONTINUED | OUTPATIENT
Start: 2017-11-08 | End: 2017-11-15

## 2017-11-08 RX ADMIN — OXYCODONE HYDROCHLORIDE 5 MILLIGRAM(S): 5 TABLET ORAL at 22:45

## 2017-11-08 RX ADMIN — Medication 250 MILLIGRAM(S): at 01:12

## 2017-11-08 RX ADMIN — POTASSIUM PHOSPHATE, MONOBASIC POTASSIUM PHOSPHATE, DIBASIC 62.5 MILLIMOLE(S): 236; 224 INJECTION, SOLUTION INTRAVENOUS at 12:53

## 2017-11-08 RX ADMIN — PIPERACILLIN AND TAZOBACTAM 25 GRAM(S): 4; .5 INJECTION, POWDER, LYOPHILIZED, FOR SOLUTION INTRAVENOUS at 05:18

## 2017-11-08 RX ADMIN — GABAPENTIN 800 MILLIGRAM(S): 400 CAPSULE ORAL at 14:11

## 2017-11-08 RX ADMIN — Medication 1: at 12:54

## 2017-11-08 RX ADMIN — OXYCODONE HYDROCHLORIDE 5 MILLIGRAM(S): 5 TABLET ORAL at 14:08

## 2017-11-08 RX ADMIN — APIXABAN 5 MILLIGRAM(S): 2.5 TABLET, FILM COATED ORAL at 18:15

## 2017-11-08 RX ADMIN — Medication 50 MILLIGRAM(S): at 05:18

## 2017-11-08 RX ADMIN — Medication 10 MILLIGRAM(S): at 05:18

## 2017-11-08 RX ADMIN — OXYCODONE HYDROCHLORIDE 5 MILLIGRAM(S): 5 TABLET ORAL at 15:00

## 2017-11-08 RX ADMIN — Medication 100 MILLIGRAM(S): at 22:04

## 2017-11-08 RX ADMIN — Medication 10 MILLIGRAM(S): at 18:40

## 2017-11-08 RX ADMIN — AMLODIPINE BESYLATE 5 MILLIGRAM(S): 2.5 TABLET ORAL at 05:18

## 2017-11-08 RX ADMIN — Medication 50 MILLIGRAM(S): at 18:15

## 2017-11-08 RX ADMIN — GABAPENTIN 800 MILLIGRAM(S): 400 CAPSULE ORAL at 05:18

## 2017-11-08 RX ADMIN — Medication 1: at 09:01

## 2017-11-08 RX ADMIN — APIXABAN 5 MILLIGRAM(S): 2.5 TABLET, FILM COATED ORAL at 05:18

## 2017-11-08 RX ADMIN — OXYCODONE HYDROCHLORIDE 5 MILLIGRAM(S): 5 TABLET ORAL at 22:05

## 2017-11-08 RX ADMIN — Medication 1 TABLET(S): at 18:15

## 2017-11-08 RX ADMIN — Medication 100 MILLIGRAM(S): at 16:43

## 2017-11-08 RX ADMIN — Medication 1 TABLET(S): at 05:18

## 2017-11-08 RX ADMIN — SENNA PLUS 2 TABLET(S): 8.6 TABLET ORAL at 22:05

## 2017-11-08 RX ADMIN — GABAPENTIN 800 MILLIGRAM(S): 400 CAPSULE ORAL at 22:04

## 2017-11-08 RX ADMIN — Medication 325 MILLIGRAM(S): at 12:53

## 2017-11-08 RX ADMIN — Medication 40 MILLIGRAM(S): at 05:18

## 2017-11-08 NOTE — PROGRESS NOTE ADULT - SUBJECTIVE AND OBJECTIVE BOX
chief complaint : rt leg swelling erythema        SUBJECTIVE / OVERNIGHT EVENTS: pt denies chest pain, shortness of breath, nausea, vomiting     MEDICATIONS  (STANDING):  amLODIPine   Tablet 5 milliGRAM(s) Oral daily  apixaban 5 milliGRAM(s) Oral every 12 hours  busPIRone 10 milliGRAM(s) Oral two times a day  ceFAZolin   IVPB      ceFAZolin   IVPB 2000 milliGRAM(s) IV Intermittent every 8 hours  dextrose 5%. 1000 milliLiter(s) (50 mL/Hr) IV Continuous <Continuous>  dextrose 50% Injectable 12.5 Gram(s) IV Push once  dextrose 50% Injectable 25 Gram(s) IV Push once  dextrose 50% Injectable 25 Gram(s) IV Push once  ferrous    sulfate 325 milliGRAM(s) Oral daily  furosemide    Tablet 40 milliGRAM(s) Oral daily  gabapentin 800 milliGRAM(s) Oral three times a day  insulin lispro (HumaLOG) corrective regimen sliding scale   SubCutaneous three times a day before meals  insulin lispro (HumaLOG) corrective regimen sliding scale   SubCutaneous at bedtime  lactobacillus acidophilus 1 Tablet(s) Oral every 12 hours  metoprolol     tartrate 50 milliGRAM(s) Oral two times a day  potassium chloride    Tablet ER 40 milliEquivalent(s) Oral once  senna 2 Tablet(s) Oral at bedtime    MEDICATIONS  (PRN):  acetaminophen   Tablet 650 milliGRAM(s) Oral every 6 hours PRN For Temp greater than 38 C (100.4 F)  dextrose Gel 1 Dose(s) Oral once PRN Blood Glucose LESS THAN 70 milliGRAM(s)/deciliter  docusate sodium 100 milliGRAM(s) Oral three times a day PRN Constipation  glucagon  Injectable 1 milliGRAM(s) IntraMuscular once PRN Glucose LESS THAN 70 milligrams/deciliter  oxyCODONE    IR 5 milliGRAM(s) Oral every 6 hours PRN Mod-severe pain  polyethylene glycol 3350 17 Gram(s) Oral daily PRN Constipation      Vital Signs Last 24 Hrs  T(C): 37.1 (2017 21:46), Max: 37.2 (2017 01:41)  T(F): 98.7 (2017 21:46), Max: 98.9 (2017 01:41)  HR: 79 (2017 21:46) (55 - 86)  BP: 143/79 (2017 21:46) (110/67 - 145/76)  BP(mean): --  RR: 17 (2017 21:46) (17 - 18)  SpO2: 100% (2017 21:46) (98% - 100%)    Constitutional: No fever, fatigue  Skin: No rash.  Eyes: No recent vision problems or eye pain.  ENT: No congestion, ear pain, or sore throat.  Cardiovascular: No chest pain or palpation.  Respiratory: No cough, shortness of breath, congestion, or wheezing.  Gastrointestinal: No abdominal pain, nausea, vomiting, or diarrhea.  Genitourinary: No dysuria.  Musculoskeletal: lower ext swelling / erythema+  Neurologic: No headache.    PHYSICAL EXAM:  GENERAL: NAD  EYES: EOMI, PERRLA  NECK: Supple, No JVD  CHEST/LUNG: dec breath sounds at bases   HEART:  S1 , S2 +  ABDOMEN: obese, soft, bs+  EXTREMITIES:  heena lower ext edema rt> left , erythema +  NEUROLOGY:alert awake oriented      LABS:      135  |  94<L>  |  18  ----------------------------<  160<H>  3.2<L>   |  27  |  1.20    Ca    8.7      2017 06:37  Phos  2.0       Mg     1.8         TPro  7.3  /  Alb  3.8  /  TBili  1.2  /  DBili      /  AST  17  /  ALT  12  /  AlkPhos  43  11-07    Creatinine Trend: 1.20 <--, 1.27 <--, 1.22 <--                        13.7   10.31 )-----------( 101      ( 2017 05:30 )             41.7     Urine Studies:  Urinalysis Basic - ( 2017 06:30 )    Color: YELLOW / Appearance: CLEAR / S.018 / pH: 6.0  Gluc: NEGATIVE / Ketone: NEGATIVE  / Bili: NEGATIVE / Urobili: NORMAL E.U.   Blood: NEGATIVE / Protein: 30 / Nitrite: NEGATIVE   Leuk Esterase: NEGATIVE / RBC: 0-2 / WBC 2-5   Sq Epi:  / Non Sq Epi:  / Bacteria:               LIVER FUNCTIONS - ( 2017 05:30 )  Alb: 3.8 g/dL / Pro: 7.3 g/dL / ALK PHOS: 43 u/L / ALT: 12 u/L / AST: 17 u/L / GGT: x           PT/INR - ( 2017 00:30 )   PT: 16.7 SEC;   INR: 1.48          PTT - ( 2017 00:30 )  PTT:35.5 SEC

## 2017-11-08 NOTE — CONSULT NOTE ADULT - SUBJECTIVE AND OBJECTIVE BOX
"HPI: 76 y/o Male HX of  DM2, HTN, Afib s/p ablation , prior lung CA s/p lobectomy (RUL and part of LLL), B/L DVT on Eliquis, Diabetic Neuropathy, Obesity, Anxiety, Chronic Venous stasis , pt was in [facility] in 2017 was treated for RLE Cellulitis , seen By ID , pt was on IV Cefazolin and sent home on PO Keflex, pt was seen by Cardiology as well,   Patient  p/w Right  LE pain/redness. Pt. states that he woke up yesterday and noticed his RLE was extremely red and painful, He states he has been treated for cellulitis multiple times in the past. He also reports subjective fevers. Pt. denies CP, SOB, N/V/D, no cough, no HA, no Dizziness, no abdominal pain, + Chronic B/L Lower Ext Swelling Rt > left on PO Lasix  , + Fever , S/P IV zosyn, IV Vanco, in the ER, pt A+O x3, Pt still is on PO Keflex at home,     B/L Venous Doppler of legs: Partially occlusive thrombus within the right popliteal vein behind the knee. Compressible right gastrocnemius vein. Findings are improved and chronic compared to previous ultrasound of 17.  Bilateral calf veins not visualized."    Above reviewed. Saw/spoke to patient. Patient presents with worsening swelling of RLE. Patient with baseline lower ext chronic venous statis. Patient states this time was doing well, but noted over past week had increasing redness, swelling, pain to RLE. Noted warmth to RLE as well. Patient developed fevers and chills. Has not been improving but was not given any antibiotics as outpatient. Patient had similar episode 2 months ago, and had resolution with cefazolin at that time. Patient has not had cough, no SOB, no abd pain, no N/V/D, no other complaints. Patient improved after admission. Not noted to have any purulent drainage from site.    PAST MEDICAL & SURGICAL HISTORY:  DVT (deep venous thrombosis): RLE dx 17  Venous stasis of both lower extremities  Balanitis  Squamous cell lung cancer  Atrial fibrillation: s/p ablation   Cellulitis: Both legs 2/2 chronic venous stasis  Morbid obesity  Anxiety  Neuropathy  Diabetes mellitus  HTN (hypertension)  Lung nodules: Flexible Bronchoscopy, Right VATS, Right Lung Resection - 1/21/15, LLL partial resection 2015  H/O prior ablation treatment: cardiac 2006    Allergies    No Known Allergies    ANTIMICROBIALS:  piperacillin/tazobactam IVPB. 3.375 every 8 hours  vancomycin  IVPB 1000 every 24 hours    OTHER MEDS:  acetaminophen   Tablet 650 milliGRAM(s) Oral every 6 hours PRN  amLODIPine   Tablet 5 milliGRAM(s) Oral daily  apixaban 5 milliGRAM(s) Oral every 12 hours  busPIRone 10 milliGRAM(s) Oral two times a day  dextrose 5%. 1000 milliLiter(s) IV Continuous <Continuous>  dextrose 50% Injectable 12.5 Gram(s) IV Push once  dextrose 50% Injectable 25 Gram(s) IV Push once  dextrose 50% Injectable 25 Gram(s) IV Push once  dextrose Gel 1 Dose(s) Oral once PRN  docusate sodium 100 milliGRAM(s) Oral three times a day PRN  ferrous    sulfate 325 milliGRAM(s) Oral daily  furosemide    Tablet 40 milliGRAM(s) Oral daily  gabapentin 800 milliGRAM(s) Oral three times a day  glucagon  Injectable 1 milliGRAM(s) IntraMuscular once PRN  insulin lispro (HumaLOG) corrective regimen sliding scale   SubCutaneous three times a day before meals  insulin lispro (HumaLOG) corrective regimen sliding scale   SubCutaneous at bedtime  lactobacillus acidophilus 1 Tablet(s) Oral every 12 hours  metoprolol     tartrate 50 milliGRAM(s) Oral two times a day  oxyCODONE    IR 5 milliGRAM(s) Oral every 6 hours PRN  polyethylene glycol 3350 17 Gram(s) Oral daily PRN  potassium chloride    Tablet ER 40 milliEquivalent(s) Oral once    SOCIAL HISTORY: No tobacco, no alcohol, no illicit drugs    FAMILY HISTORY:  Family history of liver cancer (Sibling): sister  Family history of diabetes mellitus (Sibling): Brother  Family history of heart failure: father    Drug Dosing Weight  Height (cm): 187.96 (2017 02:45)  Weight (kg): 150 (2017 02:45)  BMI (kg/m2): 42.5 (2017 02:45)  BSA (m2): 2.69 (2017 02:45)    PE:    Vital Signs Last 24 Hrs  T(C): 36.9 (2017 12:39), Max: 37.3 (2017 20:02)  T(F): 98.4 (2017 12:39), Max: 99.2 (2017 20:02)  HR: 55 (2017 12:39) (55 - 86)  BP: 126/67 (2017 12:39) (106/58 - 145/76)  BP(mean): --  RR: 17 (2017 12:39) (17 - 18)  SpO2: 99% (2017 12:39) (98% - 99%)    Gen: AOx3, NAD, non-toxic, pleasant  CV: S1+S2 normal, no murmurs, nontachycardic  Resp: Clear bilat, no resp distress, no crackles/wheezes  Abd: Soft, nontender, +BS  Ext: No LE edema, no wounds  : No Garcia, no suprapubic tenderness  IV/Skin: No thrombophlebitis; RLE redness, warmth swelling calf compared to LLE. Also with significant swelling and warmth in foot. Not particularly tender to palpation; LLE chronic venous stasis    LABS:                        13.7   10.31 )-----------( 101      ( 2017 05:30 )             41.7     11-08    135  |  94<L>  |  18  ----------------------------<  160<H>  3.2<L>   |  27  |  1.20    Ca    8.7      2017 06:37  Phos  2.0       Mg     1.8         TPro  7.3  /  Alb  3.8  /  TBili  1.2  /  DBili  x   /  AST  17  /  ALT  12  /  AlkPhos  43      Urinalysis Basic - ( 2017 06:30 )    Color: YELLOW / Appearance: CLEAR / S.018 / pH: 6.0  Gluc: NEGATIVE / Ketone: NEGATIVE  / Bili: NEGATIVE / Urobili: NORMAL E.U.   Blood: NEGATIVE / Protein: 30 / Nitrite: NEGATIVE   Leuk Esterase: NEGATIVE / RBC: 0-2 / WBC 2-5   Sq Epi: x / Non Sq Epi: x / Bacteria: x    MICROBIOLOGY:    URINE MIDSTREAM  17 NGTD    BLOOD PERIPHERAL  11-06-17 NGTD    RADIOLOGY:     USG RLE:    IMPRESSION:     Partially occlusive thrombus within the right popliteal vein behind the   knee. Compressible right gastrocnemius vein.  Findings are improved and   chronic compared to previous ultrasound of 17.  Bilateral calf veins not visualized. "HPI: 76 y/o Male HX of  DM2, HTN, Afib s/p ablation , prior lung CA s/p lobectomy (RUL and part of LLL), B/L DVT on Eliquis, Diabetic Neuropathy, Obesity, Anxiety, Chronic Venous stasis , pt was in [facility] in 2017 was treated for RLE Cellulitis , seen By ID , pt was on IV Cefazolin and sent home on PO Keflex, pt was seen by Cardiology as well,   Patient  p/w Right  LE pain/redness. Pt. states that he woke up yesterday and noticed his RLE was extremely red and painful, He states he has been treated for cellulitis multiple times in the past. He also reports subjective fevers. Pt. denies CP, SOB, N/V/D, no cough, no HA, no Dizziness, no abdominal pain, + Chronic B/L Lower Ext Swelling Rt > left on PO Lasix  , + Fever , S/P IV zosyn, IV Vanco, in the ER, pt A+O x3, Pt still is on PO Keflex at home,     B/L Venous Doppler of legs: Partially occlusive thrombus within the right popliteal vein behind the knee. Compressible right gastrocnemius vein. Findings are improved and chronic compared to previous ultrasound of 17.  Bilateral calf veins not visualized."    Above reviewed. Saw/spoke to patient. Patient presents with worsening swelling of RLE. Patient with baseline lower ext chronic venous statis. Patient states this time was doing well, but noted over past week had increasing redness, swelling, pain to RLE. Noted warmth to RLE as well. Patient developed fevers and chills. Has not been improving but was not given any antibiotics as outpatient. Patient had similar episode 2 months ago, and had resolution with cefazolin at that time. Patient has not had cough, no SOB, no abd pain, no N/V/D, no other complaints. Patient improved after admission. Not noted to have any purulent drainage from site.    PAST MEDICAL & SURGICAL HISTORY:  DVT (deep venous thrombosis): RLE dx 17  Venous stasis of both lower extremities  Balanitis  Squamous cell lung cancer  Atrial fibrillation: s/p ablation   Cellulitis: Both legs 2/2 chronic venous stasis  Morbid obesity  Anxiety  Neuropathy  Diabetes mellitus  HTN (hypertension)  Lung nodules: Flexible Bronchoscopy, Right VATS, Right Lung Resection - 1/21/15, LLL partial resection 2015  H/O prior ablation treatment: cardiac 2006    Allergies    No Known Allergies    ANTIMICROBIALS:  piperacillin/tazobactam IVPB. 3.375 every 8 hours  vancomycin  IVPB 1000 every 24 hours    OTHER MEDS:  acetaminophen   Tablet 650 milliGRAM(s) Oral every 6 hours PRN  amLODIPine   Tablet 5 milliGRAM(s) Oral daily  apixaban 5 milliGRAM(s) Oral every 12 hours  busPIRone 10 milliGRAM(s) Oral two times a day  dextrose 5%. 1000 milliLiter(s) IV Continuous <Continuous>  dextrose 50% Injectable 12.5 Gram(s) IV Push once  dextrose 50% Injectable 25 Gram(s) IV Push once  dextrose 50% Injectable 25 Gram(s) IV Push once  dextrose Gel 1 Dose(s) Oral once PRN  docusate sodium 100 milliGRAM(s) Oral three times a day PRN  ferrous    sulfate 325 milliGRAM(s) Oral daily  furosemide    Tablet 40 milliGRAM(s) Oral daily  gabapentin 800 milliGRAM(s) Oral three times a day  glucagon  Injectable 1 milliGRAM(s) IntraMuscular once PRN  insulin lispro (HumaLOG) corrective regimen sliding scale   SubCutaneous three times a day before meals  insulin lispro (HumaLOG) corrective regimen sliding scale   SubCutaneous at bedtime  lactobacillus acidophilus 1 Tablet(s) Oral every 12 hours  metoprolol     tartrate 50 milliGRAM(s) Oral two times a day  oxyCODONE    IR 5 milliGRAM(s) Oral every 6 hours PRN  polyethylene glycol 3350 17 Gram(s) Oral daily PRN  potassium chloride    Tablet ER 40 milliEquivalent(s) Oral once    SOCIAL HISTORY: No tobacco, no alcohol, no illicit drugs    FAMILY HISTORY:  Family history of liver cancer (Sibling): sister  Family history of diabetes mellitus (Sibling): Brother  Family history of heart failure: father    Drug Dosing Weight  Height (cm): 187.96 (2017 02:45)  Weight (kg): 150 (2017 02:45)  BMI (kg/m2): 42.5 (2017 02:45)  BSA (m2): 2.69 (2017 02:45)    PE:    Vital Signs Last 24 Hrs  T(C): 36.9 (2017 12:39), Max: 37.3 (2017 20:02)  T(F): 98.4 (2017 12:39), Max: 99.2 (2017 20:02)  HR: 55 (2017 12:39) (55 - 86)  BP: 126/67 (2017 12:39) (106/58 - 145/76)  BP(mean): --  RR: 17 (2017 12:39) (17 - 18)  SpO2: 99% (2017 12:39) (98% - 99%)    Gen: AOx3, NAD, non-toxic, pleasant  CV: S1+S2 normal, no murmurs, nontachycardic  Resp: Clear bilat, no resp distress, no crackles/wheezes  Abd: Soft, nontender, +BS  Ext: No LE edema, no wounds  : No Garcia, no suprapubic tenderness  IV/Skin: No thrombophlebitis; RLE redness, warmth swelling calf compared to LLE. Also with significant swelling and warmth in foot. Not particularly tender to palpation; LLE chronic venous stasis  Neuro: No sensory deficits, no motor deficits    LABS:                        13.7   10.31 )-----------( 101      ( 2017 05:30 )             41.7     11-08    135  |  94<L>  |  18  ----------------------------<  160<H>  3.2<L>   |  27  |  1.20    Ca    8.7      2017 06:37  Phos  2.0       Mg     1.8         TPro  7.3  /  Alb  3.8  /  TBili  1.2  /  DBili  x   /  AST  17  /  ALT  12  /  AlkPhos  43      Urinalysis Basic - ( 2017 06:30 )    Color: YELLOW / Appearance: CLEAR / S.018 / pH: 6.0  Gluc: NEGATIVE / Ketone: NEGATIVE  / Bili: NEGATIVE / Urobili: NORMAL E.U.   Blood: NEGATIVE / Protein: 30 / Nitrite: NEGATIVE   Leuk Esterase: NEGATIVE / RBC: 0-2 / WBC 2-5   Sq Epi: x / Non Sq Epi: x / Bacteria: x    MICROBIOLOGY:    URINE MIDSTREAM  11-07-17 NGTD    BLOOD PERIPHERAL  17 NGTD    RADIOLOGY:     USG RLE:    IMPRESSION:     Partially occlusive thrombus within the right popliteal vein behind the   knee. Compressible right gastrocnemius vein.  Findings are improved and   chronic compared to previous ultrasound of 17.  Bilateral calf veins not visualized.

## 2017-11-09 LAB
BACTERIA UR CULT: SIGNIFICANT CHANGE UP
BACTERIA UR CULT: SIGNIFICANT CHANGE UP
BUN SERPL-MCNC: 16 MG/DL — SIGNIFICANT CHANGE UP (ref 7–23)
CALCIUM SERPL-MCNC: 8.6 MG/DL — SIGNIFICANT CHANGE UP (ref 8.4–10.5)
CHLORIDE SERPL-SCNC: 94 MMOL/L — LOW (ref 98–107)
CO2 SERPL-SCNC: 28 MMOL/L — SIGNIFICANT CHANGE UP (ref 22–31)
CREAT SERPL-MCNC: 1.01 MG/DL — SIGNIFICANT CHANGE UP (ref 0.5–1.3)
GLUCOSE BLDC GLUCOMTR-MCNC: 154 MG/DL — HIGH (ref 70–99)
GLUCOSE BLDC GLUCOMTR-MCNC: 172 MG/DL — HIGH (ref 70–99)
GLUCOSE BLDC GLUCOMTR-MCNC: 177 MG/DL — HIGH (ref 70–99)
GLUCOSE BLDC GLUCOMTR-MCNC: 189 MG/DL — HIGH (ref 70–99)
GLUCOSE SERPL-MCNC: 193 MG/DL — HIGH (ref 70–99)
POTASSIUM SERPL-MCNC: 3.3 MMOL/L — LOW (ref 3.5–5.3)
POTASSIUM SERPL-SCNC: 3.3 MMOL/L — LOW (ref 3.5–5.3)
SODIUM SERPL-SCNC: 138 MMOL/L — SIGNIFICANT CHANGE UP (ref 135–145)
SPECIMEN SOURCE: SIGNIFICANT CHANGE UP

## 2017-11-09 PROCEDURE — 99232 SBSQ HOSP IP/OBS MODERATE 35: CPT

## 2017-11-09 RX ORDER — OXYCODONE HYDROCHLORIDE 5 MG/1
5 TABLET ORAL EVERY 6 HOURS
Qty: 0 | Refills: 0 | Status: DISCONTINUED | OUTPATIENT
Start: 2017-11-09 | End: 2017-11-15

## 2017-11-09 RX ORDER — POTASSIUM CHLORIDE 20 MEQ
40 PACKET (EA) ORAL ONCE
Qty: 0 | Refills: 0 | Status: COMPLETED | OUTPATIENT
Start: 2017-11-09 | End: 2017-11-09

## 2017-11-09 RX ADMIN — Medication 1: at 17:30

## 2017-11-09 RX ADMIN — Medication 100 MILLIGRAM(S): at 17:45

## 2017-11-09 RX ADMIN — Medication 10 MILLIGRAM(S): at 17:39

## 2017-11-09 RX ADMIN — APIXABAN 5 MILLIGRAM(S): 2.5 TABLET, FILM COATED ORAL at 17:39

## 2017-11-09 RX ADMIN — GABAPENTIN 800 MILLIGRAM(S): 400 CAPSULE ORAL at 14:03

## 2017-11-09 RX ADMIN — Medication 100 MILLIGRAM(S): at 14:02

## 2017-11-09 RX ADMIN — Medication 40 MILLIGRAM(S): at 05:36

## 2017-11-09 RX ADMIN — Medication 50 MILLIGRAM(S): at 05:36

## 2017-11-09 RX ADMIN — Medication 100 MILLIGRAM(S): at 21:38

## 2017-11-09 RX ADMIN — Medication 325 MILLIGRAM(S): at 12:15

## 2017-11-09 RX ADMIN — GABAPENTIN 800 MILLIGRAM(S): 400 CAPSULE ORAL at 05:36

## 2017-11-09 RX ADMIN — Medication 1: at 12:15

## 2017-11-09 RX ADMIN — OXYCODONE HYDROCHLORIDE 5 MILLIGRAM(S): 5 TABLET ORAL at 13:15

## 2017-11-09 RX ADMIN — SENNA PLUS 2 TABLET(S): 8.6 TABLET ORAL at 21:38

## 2017-11-09 RX ADMIN — Medication 1: at 08:38

## 2017-11-09 RX ADMIN — Medication 10 MILLIGRAM(S): at 05:36

## 2017-11-09 RX ADMIN — OXYCODONE HYDROCHLORIDE 5 MILLIGRAM(S): 5 TABLET ORAL at 21:41

## 2017-11-09 RX ADMIN — APIXABAN 5 MILLIGRAM(S): 2.5 TABLET, FILM COATED ORAL at 05:36

## 2017-11-09 RX ADMIN — Medication 40 MILLIEQUIVALENT(S): at 16:37

## 2017-11-09 RX ADMIN — OXYCODONE HYDROCHLORIDE 5 MILLIGRAM(S): 5 TABLET ORAL at 12:46

## 2017-11-09 RX ADMIN — Medication 1 TABLET(S): at 17:39

## 2017-11-09 RX ADMIN — OXYCODONE HYDROCHLORIDE 5 MILLIGRAM(S): 5 TABLET ORAL at 22:30

## 2017-11-09 RX ADMIN — Medication 1 TABLET(S): at 05:36

## 2017-11-09 RX ADMIN — Medication 50 MILLIGRAM(S): at 17:39

## 2017-11-09 RX ADMIN — AMLODIPINE BESYLATE 5 MILLIGRAM(S): 2.5 TABLET ORAL at 05:37

## 2017-11-09 RX ADMIN — GABAPENTIN 800 MILLIGRAM(S): 400 CAPSULE ORAL at 21:37

## 2017-11-09 RX ADMIN — Medication 100 MILLIGRAM(S): at 05:37

## 2017-11-09 NOTE — PROGRESS NOTE ADULT - ASSESSMENT
76 y/o Male HX of  DM2, HTN, Afib s/p ablation 2006, prior lung CA s/p lobectomy (RUL and part of LLL), B/L DVT on Eliquis, Diabetic Neuropathy, Obesity, Anxiety, Chronic Venous stasis , pt was in [facility] in Sept. 2017 was treated for RLE Cellulitis, now presents with RLE swelling. With R>L swelling, warmth, redness. Significantly worse on R side as compared to L. Some of this may be related to DVT, however, have suspicion for cellulitis considering reassuring signs on LE USG. No signs purulence. Cellulitis 2/2 strep on clinical grounds. Fever, leukocytosis resolved.  - Cefazolin 2g q 8  - F/U BCX  - Monitor RLE for improvement warmth, erythema    David Hogue MD  Pager [phone]  After 5pm and on weekends call [phone]

## 2017-11-09 NOTE — PROGRESS NOTE ADULT - SUBJECTIVE AND OBJECTIVE BOX
CC: F/U RLE Cellulitis    Saw/spoke to patient. No fevers, no chills. Feels the same. No pain in RLE, still warm, but states has been ambulatory.    Allergies  No Known Allergies    ANTIMICROBIALS:  ceFAZolin   IVPB    ceFAZolin   IVPB 2000 every 8 hours    PE:    Vital Signs Last 24 Hrs  T(C): 36.9 (2017 05:34), Max: 37.1 (2017 21:46)  T(F): 98.4 (2017 05:34), Max: 98.7 (2017 21:46)  HR: 75 (2017 05:34) (55 - 79)  BP: 159/77 (2017 05:34) (126/67 - 159/77)  BP(mean): --  RR: 17 (2017 05:34) (17 - 17)  SpO2: 97% (2017 05:34) (97% - 100%)    Gen: AOx3, NAD, non-toxic, pleasant  CV: S1+S2 normal, no murmurs, nontachycardic  Resp: Clear bilat, no resp distress, no crackles/wheezes  Abd: Soft, nontender, +BS  Ext: Bilateral chronic venous stasis; RLE redness/warmth, no purulence    LABS:        135  |  94<L>  |  18  ----------------------------<  160<H>  3.2<L>   |  27  |  1.20    Ca    8.7      2017 06:37  Phos  2.0       Mg     1.8         Urinalysis Basic - ( 2017 06:30 )    Color: YELLOW / Appearance: CLEAR / S.018 / pH: 6.0  Gluc: NEGATIVE / Ketone: NEGATIVE  / Bili: NEGATIVE / Urobili: NORMAL E.U.   Blood: NEGATIVE / Protein: 30 / Nitrite: NEGATIVE   Leuk Esterase: NEGATIVE / RBC: 0-2 / WBC 2-5   Sq Epi: x / Non Sq Epi: x / Bacteria: x    MICROBIOLOGY:    URINE MIDSTREAM  17 NGTD    URINE MIDSTREAM  17 NGTD    BLOOD PERIPHERAL  17 NGTD    RADIOLOGY:    No new available

## 2017-11-09 NOTE — PROGRESS NOTE ADULT - SUBJECTIVE AND OBJECTIVE BOX
chief complaint : rt leg swelling erythema        SUBJECTIVE / OVERNIGHT EVENTS: pt denies chest pain, shortness of breath, nausea, vomiting     MEDICATIONS  (STANDING):  amLODIPine   Tablet 5 milliGRAM(s) Oral daily  apixaban 5 milliGRAM(s) Oral every 12 hours  busPIRone 10 milliGRAM(s) Oral two times a day  ceFAZolin   IVPB      ceFAZolin   IVPB 2000 milliGRAM(s) IV Intermittent every 8 hours  dextrose 5%. 1000 milliLiter(s) (50 mL/Hr) IV Continuous <Continuous>  dextrose 50% Injectable 12.5 Gram(s) IV Push once  dextrose 50% Injectable 25 Gram(s) IV Push once  dextrose 50% Injectable 25 Gram(s) IV Push once  ferrous    sulfate 325 milliGRAM(s) Oral daily  furosemide    Tablet 40 milliGRAM(s) Oral daily  gabapentin 800 milliGRAM(s) Oral three times a day  insulin lispro (HumaLOG) corrective regimen sliding scale   SubCutaneous three times a day before meals  insulin lispro (HumaLOG) corrective regimen sliding scale   SubCutaneous at bedtime  lactobacillus acidophilus 1 Tablet(s) Oral every 12 hours  metoprolol     tartrate 50 milliGRAM(s) Oral two times a day  potassium chloride    Tablet ER 40 milliEquivalent(s) Oral once  senna 2 Tablet(s) Oral at bedtime    MEDICATIONS  (PRN):  acetaminophen   Tablet 650 milliGRAM(s) Oral every 6 hours PRN For Temp greater than 38 C (100.4 F)  dextrose Gel 1 Dose(s) Oral once PRN Blood Glucose LESS THAN 70 milliGRAM(s)/deciliter  docusate sodium 100 milliGRAM(s) Oral three times a day PRN Constipation  glucagon  Injectable 1 milliGRAM(s) IntraMuscular once PRN Glucose LESS THAN 70 milligrams/deciliter  oxyCODONE    IR 5 milliGRAM(s) Oral every 6 hours PRN Mod-severe pain  polyethylene glycol 3350 17 Gram(s) Oral daily PRN Constipation      Vital Signs Last 24 Hrs  T(C): 36.6 (2017 12:29), Max: 37.1 (2017 21:46)  T(F): 97.9 (2017 12:29), Max: 98.7 (2017 21:46)  HR: 83 (2017 17:37) (75 - 87)  BP: 149/77 (2017 17:37) (134/64 - 159/77)  BP(mean): --  RR: 18 (2017 12:29) (17 - 18)  SpO2: 99% (2017 12:29) (97% - 100%)    Constitutional: No fever, fatigue  Skin: No rash.  Eyes: No recent vision problems or eye pain.  ENT: No congestion, ear pain, or sore throat.  Cardiovascular: No chest pain or palpation.  Respiratory: No cough, shortness of breath, congestion, or wheezing.  Gastrointestinal: No abdominal pain, nausea, vomiting, or diarrhea.  Genitourinary: No dysuria.  Musculoskeletal: lower ext swelling / erythema+  Neurologic: No headache.    PHYSICAL EXAM:  GENERAL: NAD  EYES: EOMI, PERRLA  NECK: Supple, No JVD  CHEST/LUNG: dec breath sounds at bases   HEART:  S1 , S2 +  ABDOMEN: obese, soft, bs+  EXTREMITIES:  heena lower ext edema rt> left , erythema +  NEUROLOGY:alert awake oriented      LABS:      138  |  94<L>  |  16  ----------------------------<  193<H>  3.3<L>   |  28  |  1.01    Ca    8.6      2017 12:47  Phos  2.0       Mg     1.8           Creatinine Trend: 1.01 <--, 1.20 <--, 1.27 <--, 1.22 <--    Urine Studies:  Urinalysis Basic - ( 2017 06:30 )    Color: YELLOW / Appearance: CLEAR / S.018 / pH: 6.0  Gluc: NEGATIVE / Ketone: NEGATIVE  / Bili: NEGATIVE / Urobili: NORMAL E.U.   Blood: NEGATIVE / Protein: 30 / Nitrite: NEGATIVE   Leuk Esterase: NEGATIVE / RBC: 0-2 / WBC 2-5   Sq Epi:  / Non Sq Epi:  / Bacteria:

## 2017-11-10 LAB
BUN SERPL-MCNC: 18 MG/DL — SIGNIFICANT CHANGE UP (ref 7–23)
CALCIUM SERPL-MCNC: 9 MG/DL — SIGNIFICANT CHANGE UP (ref 8.4–10.5)
CHLORIDE SERPL-SCNC: 96 MMOL/L — LOW (ref 98–107)
CO2 SERPL-SCNC: 28 MMOL/L — SIGNIFICANT CHANGE UP (ref 22–31)
CREAT SERPL-MCNC: 1.03 MG/DL — SIGNIFICANT CHANGE UP (ref 0.5–1.3)
GLUCOSE BLDC GLUCOMTR-MCNC: 149 MG/DL — HIGH (ref 70–99)
GLUCOSE BLDC GLUCOMTR-MCNC: 166 MG/DL — HIGH (ref 70–99)
GLUCOSE BLDC GLUCOMTR-MCNC: 203 MG/DL — HIGH (ref 70–99)
GLUCOSE BLDC GLUCOMTR-MCNC: 205 MG/DL — HIGH (ref 70–99)
GLUCOSE SERPL-MCNC: 162 MG/DL — HIGH (ref 70–99)
HCT VFR BLD CALC: 41 % — SIGNIFICANT CHANGE UP (ref 39–50)
HGB BLD-MCNC: 13.3 G/DL — SIGNIFICANT CHANGE UP (ref 13–17)
MCHC RBC-ENTMCNC: 28.8 PG — SIGNIFICANT CHANGE UP (ref 27–34)
MCHC RBC-ENTMCNC: 32.4 % — SIGNIFICANT CHANGE UP (ref 32–36)
MCV RBC AUTO: 88.7 FL — SIGNIFICANT CHANGE UP (ref 80–100)
NRBC # FLD: 0 — SIGNIFICANT CHANGE UP
PLATELET # BLD AUTO: 111 K/UL — LOW (ref 150–400)
PMV BLD: 9.9 FL — SIGNIFICANT CHANGE UP (ref 7–13)
POTASSIUM SERPL-MCNC: 3.8 MMOL/L — SIGNIFICANT CHANGE UP (ref 3.5–5.3)
POTASSIUM SERPL-SCNC: 3.8 MMOL/L — SIGNIFICANT CHANGE UP (ref 3.5–5.3)
RBC # BLD: 4.62 M/UL — SIGNIFICANT CHANGE UP (ref 4.2–5.8)
RBC # FLD: 15.6 % — HIGH (ref 10.3–14.5)
SODIUM SERPL-SCNC: 137 MMOL/L — SIGNIFICANT CHANGE UP (ref 135–145)
WBC # BLD: 5.83 K/UL — SIGNIFICANT CHANGE UP (ref 3.8–10.5)
WBC # FLD AUTO: 5.83 K/UL — SIGNIFICANT CHANGE UP (ref 3.8–10.5)

## 2017-11-10 PROCEDURE — 99232 SBSQ HOSP IP/OBS MODERATE 35: CPT

## 2017-11-10 RX ADMIN — GABAPENTIN 800 MILLIGRAM(S): 400 CAPSULE ORAL at 13:24

## 2017-11-10 RX ADMIN — Medication 50 MILLIGRAM(S): at 05:51

## 2017-11-10 RX ADMIN — Medication 1 TABLET(S): at 05:51

## 2017-11-10 RX ADMIN — APIXABAN 5 MILLIGRAM(S): 2.5 TABLET, FILM COATED ORAL at 17:47

## 2017-11-10 RX ADMIN — Medication 2: at 12:38

## 2017-11-10 RX ADMIN — Medication 100 MILLIGRAM(S): at 21:21

## 2017-11-10 RX ADMIN — Medication 100 MILLIGRAM(S): at 13:24

## 2017-11-10 RX ADMIN — Medication 40 MILLIGRAM(S): at 05:51

## 2017-11-10 RX ADMIN — Medication 10 MILLIGRAM(S): at 17:47

## 2017-11-10 RX ADMIN — Medication 325 MILLIGRAM(S): at 12:40

## 2017-11-10 RX ADMIN — Medication 1: at 08:26

## 2017-11-10 RX ADMIN — OXYCODONE HYDROCHLORIDE 5 MILLIGRAM(S): 5 TABLET ORAL at 19:27

## 2017-11-10 RX ADMIN — GABAPENTIN 800 MILLIGRAM(S): 400 CAPSULE ORAL at 21:21

## 2017-11-10 RX ADMIN — Medication 100 MILLIGRAM(S): at 05:51

## 2017-11-10 RX ADMIN — GABAPENTIN 800 MILLIGRAM(S): 400 CAPSULE ORAL at 05:52

## 2017-11-10 RX ADMIN — APIXABAN 5 MILLIGRAM(S): 2.5 TABLET, FILM COATED ORAL at 05:51

## 2017-11-10 RX ADMIN — AMLODIPINE BESYLATE 5 MILLIGRAM(S): 2.5 TABLET ORAL at 05:51

## 2017-11-10 RX ADMIN — OXYCODONE HYDROCHLORIDE 5 MILLIGRAM(S): 5 TABLET ORAL at 20:13

## 2017-11-10 RX ADMIN — Medication 10 MILLIGRAM(S): at 05:51

## 2017-11-10 RX ADMIN — Medication 50 MILLIGRAM(S): at 17:47

## 2017-11-10 RX ADMIN — Medication 2: at 17:38

## 2017-11-10 RX ADMIN — Medication 1 TABLET(S): at 17:47

## 2017-11-10 NOTE — PROGRESS NOTE ADULT - SUBJECTIVE AND OBJECTIVE BOX
chief complaint : rt leg swelling erythema        SUBJECTIVE / OVERNIGHT EVENTS: pt denies chest pain, shortness of breath, nausea, vomiting       MEDICATIONS  (STANDING):  amLODIPine   Tablet 5 milliGRAM(s) Oral daily  apixaban 5 milliGRAM(s) Oral every 12 hours  busPIRone 10 milliGRAM(s) Oral two times a day  ceFAZolin   IVPB      ceFAZolin   IVPB 2000 milliGRAM(s) IV Intermittent every 8 hours  dextrose 5%. 1000 milliLiter(s) (50 mL/Hr) IV Continuous <Continuous>  dextrose 50% Injectable 12.5 Gram(s) IV Push once  dextrose 50% Injectable 25 Gram(s) IV Push once  dextrose 50% Injectable 25 Gram(s) IV Push once  ferrous    sulfate 325 milliGRAM(s) Oral daily  furosemide    Tablet 40 milliGRAM(s) Oral daily  gabapentin 800 milliGRAM(s) Oral three times a day  insulin lispro (HumaLOG) corrective regimen sliding scale   SubCutaneous three times a day before meals  insulin lispro (HumaLOG) corrective regimen sliding scale   SubCutaneous at bedtime  lactobacillus acidophilus 1 Tablet(s) Oral every 12 hours  metoprolol     tartrate 50 milliGRAM(s) Oral two times a day  potassium chloride    Tablet ER 40 milliEquivalent(s) Oral once  senna 2 Tablet(s) Oral at bedtime    MEDICATIONS  (PRN):  acetaminophen   Tablet 650 milliGRAM(s) Oral every 6 hours PRN For Temp greater than 38 C (100.4 F)  dextrose Gel 1 Dose(s) Oral once PRN Blood Glucose LESS THAN 70 milliGRAM(s)/deciliter  docusate sodium 100 milliGRAM(s) Oral three times a day PRN Constipation  glucagon  Injectable 1 milliGRAM(s) IntraMuscular once PRN Glucose LESS THAN 70 milligrams/deciliter  oxyCODONE    IR 5 milliGRAM(s) Oral every 6 hours PRN Mod-severe pain  polyethylene glycol 3350 17 Gram(s) Oral daily PRN Constipation      Vital Signs Last 24 Hrs  T(C): 36.8 (10 Nov 2017 13:18), Max: 36.9 (2017 21:51)  T(F): 98.2 (10 Nov 2017 13:18), Max: 98.5 (2017 21:51)  HR: 79 (10 Nov 2017 17:43) (73 - 79)  BP: 144/72 (10 Nov 2017 17:43) (144/72 - 169/88)  BP(mean): --  RR: 18 (10 Nov 2017 13:18) (18 - 20)  SpO2: 94% (10 Nov 2017 13:18) (94% - 100%)  Constitutional: No fever, fatigue  Skin: No rash.  Eyes: No recent vision problems or eye pain.  ENT: No congestion, ear pain, or sore throat.  Cardiovascular: No chest pain or palpation.  Respiratory: No cough, shortness of breath, congestion, or wheezing.  Gastrointestinal: No abdominal pain, nausea, vomiting, or diarrhea.  Genitourinary: No dysuria.  Musculoskeletal: lower ext swelling / erythema+  Neurologic: No headache.    PHYSICAL EXAM:  GENERAL: NAD  EYES: EOMI, PERRLA  NECK: Supple, No JVD  CHEST/LUNG: dec breath sounds at bases   HEART:  S1 , S2 +  ABDOMEN: obese, soft, bs+  EXTREMITIES:  heena lower ext edema rt> left , erythema +  NEUROLOGY:alert awake oriented    LABS:  11-10    137  |  96<L>  |  18  ----------------------------<  162<H>  3.8   |  28  |  1.03    Ca    9.0      10 Nov 2017 06:00      Creatinine Trend: 1.03 <--, 1.01 <--, 1.20 <--, 1.27 <--, 1.22 <--                        13.3   5.83  )-----------( 111      ( 10 Nov 2017 06:00 )             41.0     Urine Studies:  Urinalysis Basic - ( 2017 06:30 )    Color: YELLOW / Appearance: CLEAR / S.018 / pH: 6.0  Gluc: NEGATIVE / Ketone: NEGATIVE  / Bili: NEGATIVE / Urobili: NORMAL E.U.   Blood: NEGATIVE / Protein: 30 / Nitrite: NEGATIVE   Leuk Esterase: NEGATIVE / RBC: 0-2 / WBC 2-5   Sq Epi:  / Non Sq Epi:  / Bacteria:

## 2017-11-10 NOTE — PROGRESS NOTE ADULT - ASSESSMENT
78 y/o Male HX of  DM2, HTN, Afib s/p ablation 2006, prior lung CA s/p lobectomy (RUL and part of LLL), B/L DVT on Eliquis, Diabetic Neuropathy, Obesity, Anxiety, Chronic Venous stasis , pt was in [facility] in Sept. 2017 was treated for RLE Cellulitis, now presents with RLE swelling. With R>L swelling, warmth, redness. Significantly worse on R side as compared to L. Some of this may be related to DVT, however, have suspicion for cellulitis considering reassuring signs on LE USG. No signs purulence. Cellulitis 2/2 strep on clinical grounds. Fever, leukocytosis resolved. Slowly resolving cellulitis; still warmth, non-tender.  - Cefazolin 2g q 8  - When decreased warmth and continued improvement, would change to Keflex 500mg po q 6 to complete 14 days  - F/U BCX  - Monitor RLE for improvement warmth, erythema  - Over weekend, call ID with questions or change in status.    David Hogue MD  Pager [phone]  After 5pm and on weekends call [phone]

## 2017-11-10 NOTE — PROGRESS NOTE ADULT - SUBJECTIVE AND OBJECTIVE BOX
CC: F/U Cellulitis    Saw/spoke to patient. No fevers, no chills. Feels improved but still RLE warmth. No pain.    Allergies  No Known Allergies    ANTIMICROBIALS:  ceFAZolin   IVPB    ceFAZolin   IVPB 2000 every 8 hours    PE:    Vital Signs Last 24 Hrs  T(C): 36.8 (10 Nov 2017 13:18), Max: 36.9 (09 Nov 2017 21:51)  T(F): 98.2 (10 Nov 2017 13:18), Max: 98.5 (09 Nov 2017 21:51)  HR: 76 (10 Nov 2017 13:18) (73 - 83)  BP: 146/55 (10 Nov 2017 13:18) (146/55 - 169/88)  BP(mean): --  RR: 18 (10 Nov 2017 13:18) (18 - 20)  SpO2: 94% (10 Nov 2017 13:18) (94% - 100%)    Gen: AOx3, NAD, non-toxic, pleasant  CV: S1+S2 normal, no murmurs, nontachycardic  Resp: Clear bilat, no resp distress, no crackles/wheezes  Abd: Soft, nontender, +BS  Ext: No LE edema, no wounds    LABS:                        13.3   5.83  )-----------( 111      ( 10 Nov 2017 06:00 )             41.0     11-10    137  |  96<L>  |  18  ----------------------------<  162<H>  3.8   |  28  |  1.03    Ca    9.0      10 Nov 2017 06:00    MICROBIOLOGY:    URINE MIDSTREAM  11-08-17 NGTD    URINE MIDSTREAM  11-07-17 NGTD    BLOOD PERIPHERAL  11-06-17 NGTD    RADIOLOGY:    No new available

## 2017-11-11 LAB
BACTERIA BLD CULT: SIGNIFICANT CHANGE UP
BACTERIA BLD CULT: SIGNIFICANT CHANGE UP
BUN SERPL-MCNC: 16 MG/DL — SIGNIFICANT CHANGE UP (ref 7–23)
CALCIUM SERPL-MCNC: 8.8 MG/DL — SIGNIFICANT CHANGE UP (ref 8.4–10.5)
CHLORIDE SERPL-SCNC: 97 MMOL/L — LOW (ref 98–107)
CO2 SERPL-SCNC: 29 MMOL/L — SIGNIFICANT CHANGE UP (ref 22–31)
CREAT SERPL-MCNC: 1.02 MG/DL — SIGNIFICANT CHANGE UP (ref 0.5–1.3)
GLUCOSE BLDC GLUCOMTR-MCNC: 157 MG/DL — HIGH (ref 70–99)
GLUCOSE BLDC GLUCOMTR-MCNC: 176 MG/DL — HIGH (ref 70–99)
GLUCOSE BLDC GLUCOMTR-MCNC: 181 MG/DL — HIGH (ref 70–99)
GLUCOSE BLDC GLUCOMTR-MCNC: 214 MG/DL — HIGH (ref 70–99)
GLUCOSE SERPL-MCNC: 169 MG/DL — HIGH (ref 70–99)
HCT VFR BLD CALC: 37.7 % — LOW (ref 39–50)
HGB BLD-MCNC: 12.1 G/DL — LOW (ref 13–17)
MCHC RBC-ENTMCNC: 28.6 PG — SIGNIFICANT CHANGE UP (ref 27–34)
MCHC RBC-ENTMCNC: 32.1 % — SIGNIFICANT CHANGE UP (ref 32–36)
MCV RBC AUTO: 89.1 FL — SIGNIFICANT CHANGE UP (ref 80–100)
NRBC # FLD: 0 — SIGNIFICANT CHANGE UP
PLATELET # BLD AUTO: 127 K/UL — LOW (ref 150–400)
PMV BLD: 9.8 FL — SIGNIFICANT CHANGE UP (ref 7–13)
POTASSIUM SERPL-MCNC: 3.2 MMOL/L — LOW (ref 3.5–5.3)
POTASSIUM SERPL-SCNC: 3.2 MMOL/L — LOW (ref 3.5–5.3)
RBC # BLD: 4.23 M/UL — SIGNIFICANT CHANGE UP (ref 4.2–5.8)
RBC # FLD: 15.5 % — HIGH (ref 10.3–14.5)
SODIUM SERPL-SCNC: 139 MMOL/L — SIGNIFICANT CHANGE UP (ref 135–145)
WBC # BLD: 5.41 K/UL — SIGNIFICANT CHANGE UP (ref 3.8–10.5)
WBC # FLD AUTO: 5.41 K/UL — SIGNIFICANT CHANGE UP (ref 3.8–10.5)

## 2017-11-11 RX ORDER — POTASSIUM CHLORIDE 20 MEQ
40 PACKET (EA) ORAL ONCE
Qty: 0 | Refills: 0 | Status: COMPLETED | OUTPATIENT
Start: 2017-11-11 | End: 2017-11-11

## 2017-11-11 RX ADMIN — Medication 100 MILLIGRAM(S): at 14:38

## 2017-11-11 RX ADMIN — APIXABAN 5 MILLIGRAM(S): 2.5 TABLET, FILM COATED ORAL at 17:29

## 2017-11-11 RX ADMIN — APIXABAN 5 MILLIGRAM(S): 2.5 TABLET, FILM COATED ORAL at 05:35

## 2017-11-11 RX ADMIN — Medication 325 MILLIGRAM(S): at 11:31

## 2017-11-11 RX ADMIN — GABAPENTIN 800 MILLIGRAM(S): 400 CAPSULE ORAL at 14:38

## 2017-11-11 RX ADMIN — OXYCODONE HYDROCHLORIDE 5 MILLIGRAM(S): 5 TABLET ORAL at 21:28

## 2017-11-11 RX ADMIN — Medication 1: at 17:28

## 2017-11-11 RX ADMIN — Medication 1 TABLET(S): at 17:28

## 2017-11-11 RX ADMIN — OXYCODONE HYDROCHLORIDE 5 MILLIGRAM(S): 5 TABLET ORAL at 15:59

## 2017-11-11 RX ADMIN — Medication 1 TABLET(S): at 05:35

## 2017-11-11 RX ADMIN — Medication 10 MILLIGRAM(S): at 17:28

## 2017-11-11 RX ADMIN — GABAPENTIN 800 MILLIGRAM(S): 400 CAPSULE ORAL at 21:21

## 2017-11-11 RX ADMIN — AMLODIPINE BESYLATE 5 MILLIGRAM(S): 2.5 TABLET ORAL at 05:35

## 2017-11-11 RX ADMIN — Medication 10 MILLIGRAM(S): at 05:35

## 2017-11-11 RX ADMIN — Medication 100 MILLIGRAM(S): at 05:35

## 2017-11-11 RX ADMIN — Medication 50 MILLIGRAM(S): at 17:28

## 2017-11-11 RX ADMIN — Medication 1: at 08:47

## 2017-11-11 RX ADMIN — SENNA PLUS 2 TABLET(S): 8.6 TABLET ORAL at 21:21

## 2017-11-11 RX ADMIN — GABAPENTIN 800 MILLIGRAM(S): 400 CAPSULE ORAL at 05:35

## 2017-11-11 RX ADMIN — Medication 50 MILLIGRAM(S): at 05:35

## 2017-11-11 RX ADMIN — Medication 100 MILLIGRAM(S): at 21:20

## 2017-11-11 RX ADMIN — OXYCODONE HYDROCHLORIDE 5 MILLIGRAM(S): 5 TABLET ORAL at 22:19

## 2017-11-11 RX ADMIN — OXYCODONE HYDROCHLORIDE 5 MILLIGRAM(S): 5 TABLET ORAL at 14:38

## 2017-11-11 RX ADMIN — Medication 40 MILLIGRAM(S): at 05:35

## 2017-11-11 RX ADMIN — Medication 1: at 12:16

## 2017-11-11 RX ADMIN — Medication 40 MILLIEQUIVALENT(S): at 11:31

## 2017-11-11 NOTE — PROGRESS NOTE ADULT - SUBJECTIVE AND OBJECTIVE BOX
chief complaint : rt leg swelling erythema        SUBJECTIVE / OVERNIGHT EVENTS: pt denies chest pain, shortness of breath, nausea, vomiting     MEDICATIONS  (STANDING):  amLODIPine   Tablet 5 milliGRAM(s) Oral daily  apixaban 5 milliGRAM(s) Oral every 12 hours  busPIRone 10 milliGRAM(s) Oral two times a day  ceFAZolin   IVPB      ceFAZolin   IVPB 2000 milliGRAM(s) IV Intermittent every 8 hours  dextrose 5%. 1000 milliLiter(s) (50 mL/Hr) IV Continuous <Continuous>  dextrose 50% Injectable 12.5 Gram(s) IV Push once  dextrose 50% Injectable 25 Gram(s) IV Push once  dextrose 50% Injectable 25 Gram(s) IV Push once  ferrous    sulfate 325 milliGRAM(s) Oral daily  furosemide    Tablet 40 milliGRAM(s) Oral daily  gabapentin 800 milliGRAM(s) Oral three times a day  insulin lispro (HumaLOG) corrective regimen sliding scale   SubCutaneous three times a day before meals  insulin lispro (HumaLOG) corrective regimen sliding scale   SubCutaneous at bedtime  lactobacillus acidophilus 1 Tablet(s) Oral every 12 hours  metoprolol     tartrate 50 milliGRAM(s) Oral two times a day  potassium chloride    Tablet ER 40 milliEquivalent(s) Oral once  senna 2 Tablet(s) Oral at bedtime    MEDICATIONS  (PRN):  acetaminophen   Tablet 650 milliGRAM(s) Oral every 6 hours PRN For Temp greater than 38 C (100.4 F)  dextrose Gel 1 Dose(s) Oral once PRN Blood Glucose LESS THAN 70 milliGRAM(s)/deciliter  docusate sodium 100 milliGRAM(s) Oral three times a day PRN Constipation  glucagon  Injectable 1 milliGRAM(s) IntraMuscular once PRN Glucose LESS THAN 70 milligrams/deciliter  oxyCODONE    IR 5 milliGRAM(s) Oral every 6 hours PRN Mod-severe pain  polyethylene glycol 3350 17 Gram(s) Oral daily PRN Constipation    Vital Signs Last 24 Hrs  T(C): 37.1 (2017 12:35), Max: 37.1 (2017 12:35)  T(F): 98.7 (2017 12:35), Max: 98.7 (2017 12:35)  HR: 71 (2017 17:25) (71 - 86)  BP: 144/68 (2017 17:25) (139/57 - 156/80)  BP(mean): --  RR: 17 (2017 17:25) (17 - 18)  SpO2: 97% (2017 17:25) (97% - 100%)    Constitutional: No fever, fatigue  Skin: No rash.  Eyes: No recent vision problems or eye pain.  ENT: No congestion, ear pain, or sore throat.  Cardiovascular: No chest pain or palpation.  Respiratory: No cough, shortness of breath, congestion, or wheezing.  Gastrointestinal: No abdominal pain, nausea, vomiting, or diarrhea.  Genitourinary: No dysuria.  Musculoskeletal: lower ext swelling / erythema+  Neurologic: No headache.    PHYSICAL EXAM:  GENERAL: NAD  EYES: EOMI, PERRLA  NECK: Supple, No JVD  CHEST/LUNG: dec breath sounds at bases   HEART:  S1 , S2 +  ABDOMEN: obese, soft, bs+  EXTREMITIES:  heena lower ext edema rt> left , erythema +  NEUROLOGY:alert awake oriented    LABS:      139  |  97<L>  |  16  ----------------------------<  169<H>  3.2<L>   |  29  |  1.02    Ca    8.8      2017 05:10      Creatinine Trend: 1.02 <--, 1.03 <--, 1.01 <--, 1.20 <--, 1.27 <--, 1.22 <--                        12.1   5.41  )-----------( 127      ( 2017 05:10 )             37.7     Urine Studies:  Urinalysis Basic - ( 2017 06:30 )    Color: YELLOW / Appearance: CLEAR / S.018 / pH: 6.0  Gluc: NEGATIVE / Ketone: NEGATIVE  / Bili: NEGATIVE / Urobili: NORMAL E.U.   Blood: NEGATIVE / Protein: 30 / Nitrite: NEGATIVE   Leuk Esterase: NEGATIVE / RBC: 0-2 / WBC 2-5   Sq Epi:  / Non Sq Epi:  / Bacteria:

## 2017-11-12 LAB
BUN SERPL-MCNC: 17 MG/DL — SIGNIFICANT CHANGE UP (ref 7–23)
CALCIUM SERPL-MCNC: 8.6 MG/DL — SIGNIFICANT CHANGE UP (ref 8.4–10.5)
CHLORIDE SERPL-SCNC: 99 MMOL/L — SIGNIFICANT CHANGE UP (ref 98–107)
CO2 SERPL-SCNC: 26 MMOL/L — SIGNIFICANT CHANGE UP (ref 22–31)
CREAT SERPL-MCNC: 0.93 MG/DL — SIGNIFICANT CHANGE UP (ref 0.5–1.3)
GLUCOSE BLDC GLUCOMTR-MCNC: 133 MG/DL — HIGH (ref 70–99)
GLUCOSE BLDC GLUCOMTR-MCNC: 152 MG/DL — HIGH (ref 70–99)
GLUCOSE BLDC GLUCOMTR-MCNC: 169 MG/DL — HIGH (ref 70–99)
GLUCOSE BLDC GLUCOMTR-MCNC: 182 MG/DL — HIGH (ref 70–99)
GLUCOSE SERPL-MCNC: 138 MG/DL — HIGH (ref 70–99)
HCT VFR BLD CALC: 37.5 % — LOW (ref 39–50)
HGB BLD-MCNC: 12.3 G/DL — LOW (ref 13–17)
MCHC RBC-ENTMCNC: 29.1 PG — SIGNIFICANT CHANGE UP (ref 27–34)
MCHC RBC-ENTMCNC: 32.8 % — SIGNIFICANT CHANGE UP (ref 32–36)
MCV RBC AUTO: 88.7 FL — SIGNIFICANT CHANGE UP (ref 80–100)
NRBC # FLD: 0 — SIGNIFICANT CHANGE UP
PLATELET # BLD AUTO: 118 K/UL — LOW (ref 150–400)
PMV BLD: 9.5 FL — SIGNIFICANT CHANGE UP (ref 7–13)
POTASSIUM SERPL-MCNC: 3.3 MMOL/L — LOW (ref 3.5–5.3)
POTASSIUM SERPL-SCNC: 3.3 MMOL/L — LOW (ref 3.5–5.3)
RBC # BLD: 4.23 M/UL — SIGNIFICANT CHANGE UP (ref 4.2–5.8)
RBC # FLD: 15.4 % — HIGH (ref 10.3–14.5)
SODIUM SERPL-SCNC: 138 MMOL/L — SIGNIFICANT CHANGE UP (ref 135–145)
WBC # BLD: 4.68 K/UL — SIGNIFICANT CHANGE UP (ref 3.8–10.5)
WBC # FLD AUTO: 4.68 K/UL — SIGNIFICANT CHANGE UP (ref 3.8–10.5)

## 2017-11-12 PROCEDURE — 99231 SBSQ HOSP IP/OBS SF/LOW 25: CPT

## 2017-11-12 RX ORDER — POTASSIUM CHLORIDE 20 MEQ
40 PACKET (EA) ORAL EVERY 4 HOURS
Qty: 0 | Refills: 0 | Status: COMPLETED | OUTPATIENT
Start: 2017-11-12 | End: 2017-11-12

## 2017-11-12 RX ADMIN — AMLODIPINE BESYLATE 5 MILLIGRAM(S): 2.5 TABLET ORAL at 05:42

## 2017-11-12 RX ADMIN — GABAPENTIN 800 MILLIGRAM(S): 400 CAPSULE ORAL at 14:48

## 2017-11-12 RX ADMIN — OXYCODONE HYDROCHLORIDE 5 MILLIGRAM(S): 5 TABLET ORAL at 14:53

## 2017-11-12 RX ADMIN — OXYCODONE HYDROCHLORIDE 5 MILLIGRAM(S): 5 TABLET ORAL at 22:04

## 2017-11-12 RX ADMIN — APIXABAN 5 MILLIGRAM(S): 2.5 TABLET, FILM COATED ORAL at 18:16

## 2017-11-12 RX ADMIN — Medication 1 TABLET(S): at 05:42

## 2017-11-12 RX ADMIN — APIXABAN 5 MILLIGRAM(S): 2.5 TABLET, FILM COATED ORAL at 05:42

## 2017-11-12 RX ADMIN — Medication 10 MILLIGRAM(S): at 05:42

## 2017-11-12 RX ADMIN — GABAPENTIN 800 MILLIGRAM(S): 400 CAPSULE ORAL at 05:41

## 2017-11-12 RX ADMIN — Medication 325 MILLIGRAM(S): at 11:54

## 2017-11-12 RX ADMIN — Medication 40 MILLIEQUIVALENT(S): at 18:18

## 2017-11-12 RX ADMIN — Medication 40 MILLIGRAM(S): at 05:42

## 2017-11-12 RX ADMIN — Medication 100 MILLIGRAM(S): at 14:48

## 2017-11-12 RX ADMIN — Medication 1: at 08:55

## 2017-11-12 RX ADMIN — Medication 40 MILLIEQUIVALENT(S): at 15:29

## 2017-11-12 RX ADMIN — Medication 100 MILLIGRAM(S): at 21:08

## 2017-11-12 RX ADMIN — SENNA PLUS 2 TABLET(S): 8.6 TABLET ORAL at 21:07

## 2017-11-12 RX ADMIN — Medication 50 MILLIGRAM(S): at 18:16

## 2017-11-12 RX ADMIN — GABAPENTIN 800 MILLIGRAM(S): 400 CAPSULE ORAL at 21:07

## 2017-11-12 RX ADMIN — Medication 1 TABLET(S): at 18:16

## 2017-11-12 RX ADMIN — Medication 1 APPLICATION(S): at 22:42

## 2017-11-12 RX ADMIN — Medication 50 MILLIGRAM(S): at 05:42

## 2017-11-12 RX ADMIN — OXYCODONE HYDROCHLORIDE 5 MILLIGRAM(S): 5 TABLET ORAL at 23:00

## 2017-11-12 RX ADMIN — OXYCODONE HYDROCHLORIDE 5 MILLIGRAM(S): 5 TABLET ORAL at 15:34

## 2017-11-12 RX ADMIN — Medication 1: at 11:54

## 2017-11-12 RX ADMIN — Medication 10 MILLIGRAM(S): at 18:16

## 2017-11-12 RX ADMIN — Medication 100 MILLIGRAM(S): at 05:41

## 2017-11-12 NOTE — PROGRESS NOTE ADULT - SUBJECTIVE AND OBJECTIVE BOX
chief complaint : rt leg swelling erythema        SUBJECTIVE / OVERNIGHT EVENTS: pt denies chest pain, shortness of breath, nausea, vomiting     MEDICATIONS  (STANDING):  amLODIPine   Tablet 5 milliGRAM(s) Oral daily  apixaban 5 milliGRAM(s) Oral every 12 hours  busPIRone 10 milliGRAM(s) Oral two times a day  ceFAZolin   IVPB      ceFAZolin   IVPB 2000 milliGRAM(s) IV Intermittent every 8 hours  dextrose 5%. 1000 milliLiter(s) (50 mL/Hr) IV Continuous <Continuous>  dextrose 50% Injectable 12.5 Gram(s) IV Push once  dextrose 50% Injectable 25 Gram(s) IV Push once  dextrose 50% Injectable 25 Gram(s) IV Push once  ferrous    sulfate 325 milliGRAM(s) Oral daily  furosemide    Tablet 40 milliGRAM(s) Oral daily  gabapentin 800 milliGRAM(s) Oral three times a day  insulin lispro (HumaLOG) corrective regimen sliding scale   SubCutaneous three times a day before meals  insulin lispro (HumaLOG) corrective regimen sliding scale   SubCutaneous at bedtime  lactobacillus acidophilus 1 Tablet(s) Oral every 12 hours  metoprolol     tartrate 50 milliGRAM(s) Oral two times a day  potassium chloride    Tablet ER 40 milliEquivalent(s) Oral once  potassium chloride    Tablet ER 40 milliEquivalent(s) Oral every 4 hours  senna 2 Tablet(s) Oral at bedtime    MEDICATIONS  (PRN):  acetaminophen   Tablet 650 milliGRAM(s) Oral every 6 hours PRN For Temp greater than 38 C (100.4 F)  dextrose Gel 1 Dose(s) Oral once PRN Blood Glucose LESS THAN 70 milliGRAM(s)/deciliter  docusate sodium 100 milliGRAM(s) Oral three times a day PRN Constipation  glucagon  Injectable 1 milliGRAM(s) IntraMuscular once PRN Glucose LESS THAN 70 milligrams/deciliter  oxyCODONE    IR 5 milliGRAM(s) Oral every 6 hours PRN Mod-severe pain  polyethylene glycol 3350 17 Gram(s) Oral daily PRN Constipation      Vital Signs Last 24 Hrs  T(C): 36.9 (2017 12:27), Max: 37.2 (2017 21:09)  T(F): 98.4 (2017 12:27), Max: 98.9 (2017 21:09)  HR: 100 (2017 12:27) (66 - 100)  BP: 115/82 (2017 12:27) (115/82 - 155/75)  BP(mean): --  RR: 17 (2017 12:27) (17 - 18)  SpO2: 99% (2017 12:27) (97% - 100%)  Constitutional: No fever, fatigue  Skin: No rash.  Eyes: No recent vision problems or eye pain.  ENT: No congestion, ear pain, or sore throat.  Cardiovascular: No chest pain or palpation.  Respiratory: No cough, shortness of breath, congestion, or wheezing.  Gastrointestinal: No abdominal pain, nausea, vomiting, or diarrhea.  Genitourinary: No dysuria.  Musculoskeletal: lower ext swelling / erythema+  Neurologic: No headache.    PHYSICAL EXAM:  GENERAL: NAD  EYES: EOMI, PERRLA  NECK: Supple, No JVD  CHEST/LUNG: dec breath sounds at bases   HEART:  S1 , S2 +  ABDOMEN: obese, soft, bs+  EXTREMITIES:  heena lower ext edema rt> left , erythema +  NEUROLOGY:alert awake oriented    LABS:      138  |  99  |  17  ----------------------------<  138<H>  3.3<L>   |  26  |  0.93    Ca    8.6      2017 05:10      Creatinine Trend: 0.93 <--, 1.02 <--, 1.03 <--, 1.01 <--, 1.20 <--, 1.27 <--, 1.22 <--                        12.3   4.68  )-----------( 118      ( 2017 05:10 )             37.5     Urine Studies:  Urinalysis Basic - ( 2017 06:30 )    Color: YELLOW / Appearance: CLEAR / S.018 / pH: 6.0  Gluc: NEGATIVE / Ketone: NEGATIVE  / Bili: NEGATIVE / Urobili: NORMAL E.U.   Blood: NEGATIVE / Protein: 30 / Nitrite: NEGATIVE   Leuk Esterase: NEGATIVE / RBC: 0-2 / WBC 2-5   Sq Epi:  / Non Sq Epi:  / Bacteria:

## 2017-11-12 NOTE — CHART NOTE - NSCHARTNOTEFT_GEN_A_CORE
Contacted by RN after patient sustained an accidental spill of hot coffee over his Rt hand. Cool compress immediately applied to site and cleansed with mild soap and water.  Pt endorses burning pain at the site- denies other areas of involvement.     O:  T(C): 36.7 (11-12-17 @ 21:48), Max: 36.7 (11-12-17 @ 21:48)  T(F): 98 (11-12-17 @ 21:48), Max: 98 (11-12-17 @ 21:48)  HR: 72 (11-12-17 @ 21:48) (72 - 82)  BP: 136/63 (11-12-17 @ 21:48) (136/63 - 145/76)  RR: 17 (11-12-17 @ 21:48) (17 - 18)  SpO2: 100% (11-12-17 @ 21:48) (98% - 100%)  Wt(kg): --                        12.3   4.68  )-----------( 118      ( 12 Nov 2017 05:10 )             37.5     11-12    138  |  99  |  17  ----------------------------<  138<H>  3.3<L>   |  26  |  0.93    Ca    8.6      12 Nov 2017 05:10              Gen: NAD; A&Ox3; sitting on edge of bed with cool compress over Rt hand.  Skin: Dorsum of Rt hand red with intact skin and splotches of superficial burn tracking onto radial aspect of palm and distally up dorsum of forearm. Skin blanches with good cap refill distally. No blistering or partial involvement of epidermis noted. Motor and sensory intact. .     A/P: 78 y/o Male HX of  DM2, HTN, Afib s/p ablation 2006, prior lung CA s/p lobectomy (RUL and part of LLL), B/L DVT on Eliquis, Diabetic Neuropathy, Obesity, Anxiety, Chronic Venous stasis p/w Right  LE cellulitis now p/w superficial burn to dorsum of Rt hand sustained from a hot coffee spill during this hospitalization.    Infection control:        -Skin intact-  affected area cleansed with mild soap and water w/ cool compresses as tolerated       -Recommend Silvadene to be applied topically to area twice daily and covered with a loose sterile dressing until healed  Pain control: cool compresses prn and continue with pain meds as prescribed   D/W pt and RN to monitor for signs of blistering or skin breakdown and call provider if occurs

## 2017-11-13 LAB
BUN SERPL-MCNC: 14 MG/DL — SIGNIFICANT CHANGE UP (ref 7–23)
CALCIUM SERPL-MCNC: 8.7 MG/DL — SIGNIFICANT CHANGE UP (ref 8.4–10.5)
CHLORIDE SERPL-SCNC: 100 MMOL/L — SIGNIFICANT CHANGE UP (ref 98–107)
CO2 SERPL-SCNC: 28 MMOL/L — SIGNIFICANT CHANGE UP (ref 22–31)
CREAT SERPL-MCNC: 0.92 MG/DL — SIGNIFICANT CHANGE UP (ref 0.5–1.3)
GLUCOSE BLDC GLUCOMTR-MCNC: 127 MG/DL — HIGH (ref 70–99)
GLUCOSE BLDC GLUCOMTR-MCNC: 140 MG/DL — HIGH (ref 70–99)
GLUCOSE BLDC GLUCOMTR-MCNC: 142 MG/DL — HIGH (ref 70–99)
GLUCOSE BLDC GLUCOMTR-MCNC: 151 MG/DL — HIGH (ref 70–99)
GLUCOSE SERPL-MCNC: 144 MG/DL — HIGH (ref 70–99)
HCT VFR BLD CALC: 37.8 % — LOW (ref 39–50)
HGB BLD-MCNC: 12.4 G/DL — LOW (ref 13–17)
MCHC RBC-ENTMCNC: 28.7 PG — SIGNIFICANT CHANGE UP (ref 27–34)
MCHC RBC-ENTMCNC: 32.8 % — SIGNIFICANT CHANGE UP (ref 32–36)
MCV RBC AUTO: 87.5 FL — SIGNIFICANT CHANGE UP (ref 80–100)
NRBC # FLD: 0 — SIGNIFICANT CHANGE UP
PLATELET # BLD AUTO: 147 K/UL — LOW (ref 150–400)
PMV BLD: 9.3 FL — SIGNIFICANT CHANGE UP (ref 7–13)
POTASSIUM SERPL-MCNC: 4 MMOL/L — SIGNIFICANT CHANGE UP (ref 3.5–5.3)
POTASSIUM SERPL-SCNC: 4 MMOL/L — SIGNIFICANT CHANGE UP (ref 3.5–5.3)
RBC # BLD: 4.32 M/UL — SIGNIFICANT CHANGE UP (ref 4.2–5.8)
RBC # FLD: 15.3 % — HIGH (ref 10.3–14.5)
SODIUM SERPL-SCNC: 138 MMOL/L — SIGNIFICANT CHANGE UP (ref 135–145)
WBC # BLD: 3.61 K/UL — LOW (ref 3.8–10.5)
WBC # FLD AUTO: 3.61 K/UL — LOW (ref 3.8–10.5)

## 2017-11-13 PROCEDURE — 99232 SBSQ HOSP IP/OBS MODERATE 35: CPT

## 2017-11-13 RX ADMIN — APIXABAN 5 MILLIGRAM(S): 2.5 TABLET, FILM COATED ORAL at 05:16

## 2017-11-13 RX ADMIN — Medication 10 MILLIGRAM(S): at 05:14

## 2017-11-13 RX ADMIN — Medication 10 MILLIGRAM(S): at 17:23

## 2017-11-13 RX ADMIN — Medication 100 MILLIGRAM(S): at 13:30

## 2017-11-13 RX ADMIN — Medication 100 MILLIGRAM(S): at 05:14

## 2017-11-13 RX ADMIN — Medication 325 MILLIGRAM(S): at 12:38

## 2017-11-13 RX ADMIN — GABAPENTIN 800 MILLIGRAM(S): 400 CAPSULE ORAL at 05:15

## 2017-11-13 RX ADMIN — OXYCODONE HYDROCHLORIDE 5 MILLIGRAM(S): 5 TABLET ORAL at 17:55

## 2017-11-13 RX ADMIN — Medication 1 APPLICATION(S): at 21:05

## 2017-11-13 RX ADMIN — AMLODIPINE BESYLATE 5 MILLIGRAM(S): 2.5 TABLET ORAL at 05:14

## 2017-11-13 RX ADMIN — GABAPENTIN 800 MILLIGRAM(S): 400 CAPSULE ORAL at 21:06

## 2017-11-13 RX ADMIN — OXYCODONE HYDROCHLORIDE 5 MILLIGRAM(S): 5 TABLET ORAL at 17:23

## 2017-11-13 RX ADMIN — GABAPENTIN 800 MILLIGRAM(S): 400 CAPSULE ORAL at 13:30

## 2017-11-13 RX ADMIN — Medication 1 TABLET(S): at 05:15

## 2017-11-13 RX ADMIN — Medication 1 APPLICATION(S): at 12:38

## 2017-11-13 RX ADMIN — Medication 100 MILLIGRAM(S): at 21:05

## 2017-11-13 RX ADMIN — SENNA PLUS 2 TABLET(S): 8.6 TABLET ORAL at 21:06

## 2017-11-13 RX ADMIN — Medication 40 MILLIGRAM(S): at 05:15

## 2017-11-13 RX ADMIN — Medication 1 TABLET(S): at 17:23

## 2017-11-13 RX ADMIN — APIXABAN 5 MILLIGRAM(S): 2.5 TABLET, FILM COATED ORAL at 17:23

## 2017-11-13 RX ADMIN — Medication 50 MILLIGRAM(S): at 05:15

## 2017-11-13 RX ADMIN — Medication 50 MILLIGRAM(S): at 17:23

## 2017-11-13 RX ADMIN — Medication 1: at 12:39

## 2017-11-13 NOTE — PROVIDER CONTACT NOTE (OTHER) - SITUATION
Pca bumped the patient table accidentally and the  hot coffee spilled over right dorsal hand of the patient .

## 2017-11-13 NOTE — PROGRESS NOTE ADULT - ASSESSMENT
76 y/o Male HX of  DM2, HTN, Afib s/p ablation 2006, prior lung CA s/p lobectomy (RUL and part of LLL), B/L DVT on Eliquis, Diabetic Neuropathy, Obesity, Anxiety, Chronic Venous stasis , pt was in [facility] in Sept. 2017 was treated for RLE Cellulitis, now presents with RLE swelling. With R>L swelling, warmth, redness. Significantly worse on R side as compared to L. Some of this may be related to DVT, however, have suspicion for cellulitis considering reassuring signs on LE USG. No signs purulence. Cellulitis 2/2 strep on clinical grounds. Fever, leukocytosis resolved. Slowly resolving cellulitis; still warmth, non-tender.  - Cefazolin 2g q 8  - When decreased warmth and continued improvement, would change to Keflex 500mg po q 6 to complete 14 days--after would likely resume suppression dose Keflex  - F/U in ID clinic on discharge  - Will sign off. Please call with further questions or change in status.    David Hogue MD  Pager [phone]  After 5pm and on weekends call [phone]

## 2017-11-13 NOTE — PROGRESS NOTE ADULT - SUBJECTIVE AND OBJECTIVE BOX
chief complaint : rt leg swelling erythema        SUBJECTIVE / OVERNIGHT EVENTS: pt denies chest pain, shortness of breath, nausea, vomiting     MEDICATIONS  (STANDING):  amLODIPine   Tablet 5 milliGRAM(s) Oral daily  apixaban 5 milliGRAM(s) Oral every 12 hours  busPIRone 10 milliGRAM(s) Oral two times a day  ceFAZolin   IVPB      ceFAZolin   IVPB 2000 milliGRAM(s) IV Intermittent every 8 hours  dextrose 5%. 1000 milliLiter(s) (50 mL/Hr) IV Continuous <Continuous>  dextrose 50% Injectable 12.5 Gram(s) IV Push once  dextrose 50% Injectable 25 Gram(s) IV Push once  dextrose 50% Injectable 25 Gram(s) IV Push once  ferrous    sulfate 325 milliGRAM(s) Oral daily  furosemide    Tablet 40 milliGRAM(s) Oral daily  gabapentin 800 milliGRAM(s) Oral three times a day  insulin lispro (HumaLOG) corrective regimen sliding scale   SubCutaneous three times a day before meals  insulin lispro (HumaLOG) corrective regimen sliding scale   SubCutaneous at bedtime  lactobacillus acidophilus 1 Tablet(s) Oral every 12 hours  metoprolol     tartrate 50 milliGRAM(s) Oral two times a day  potassium chloride    Tablet ER 40 milliEquivalent(s) Oral once  senna 2 Tablet(s) Oral at bedtime  silver sulfADIAZINE 1% Cream 1 Application(s) Topical two times a day    MEDICATIONS  (PRN):  acetaminophen   Tablet 650 milliGRAM(s) Oral every 6 hours PRN For Temp greater than 38 C (100.4 F)  dextrose Gel 1 Dose(s) Oral once PRN Blood Glucose LESS THAN 70 milliGRAM(s)/deciliter  docusate sodium 100 milliGRAM(s) Oral three times a day PRN Constipation  glucagon  Injectable 1 milliGRAM(s) IntraMuscular once PRN Glucose LESS THAN 70 milligrams/deciliter  oxyCODONE    IR 5 milliGRAM(s) Oral every 6 hours PRN Mod-severe pain  polyethylene glycol 3350 17 Gram(s) Oral daily PRN Constipation      Vital Signs Last 24 Hrs  T(C): 36.7 (2017 20:49), Max: 36.9 (2017 14:45)  T(F): 98.1 (2017 20:49), Max: 98.4 (2017 14:45)  HR: 64 (2017 20:49) (64 - 78)  BP: 151/65 (2017 20:49) (140/68 - 151/73)  BP(mean): --  RR: 17 (2017 20:49) (17 - 18)  SpO2: 98% (2017 20:49) (97% - 99%)    Constitutional: No fever, fatigue  Skin: No rash.  Eyes: No recent vision problems or eye pain.  ENT: No congestion, ear pain, or sore throat.  Cardiovascular: No chest pain or palpation.  Respiratory: No cough, shortness of breath, congestion, or wheezing.  Gastrointestinal: No abdominal pain, nausea, vomiting, or diarrhea.  Genitourinary: No dysuria.  Musculoskeletal: lower ext swelling / erythema+  Neurologic: No headache.    PHYSICAL EXAM:  GENERAL: NAD  EYES: EOMI, PERRLA  NECK: Supple, No JVD  CHEST/LUNG: dec breath sounds at bases   HEART:  S1 , S2 +  ABDOMEN: obese, soft, bs+  EXTREMITIES:  heena lower ext edema rt> left , erythema +  NEUROLOGY:alert awake oriented    LABS:      138  |  100  |  14  ----------------------------<  144<H>  4.0   |  28  |  0.92    Ca    8.7      2017 05:52      Creatinine Trend: 0.92 <--, 0.93 <--, 1.02 <--, 1.03 <--, 1.01 <--, 1.20 <--, 1.27 <--                        12.4   3.61  )-----------( 147      ( 2017 05:52 )             37.8     Urine Studies:  Urinalysis Basic - ( 2017 06:30 )    Color: YELLOW / Appearance: CLEAR / S.018 / pH: 6.0  Gluc: NEGATIVE / Ketone: NEGATIVE  / Bili: NEGATIVE / Urobili: NORMAL E.U.   Blood: NEGATIVE / Protein: 30 / Nitrite: NEGATIVE   Leuk Esterase: NEGATIVE / RBC: 0-2 / WBC 2-5   Sq Epi:  / Non Sq Epi:  / Bacteria:

## 2017-11-13 NOTE — PROGRESS NOTE ADULT - SUBJECTIVE AND OBJECTIVE BOX
CC: RLE cellulitis    Saw/spoke to patient. No fevers, no chills, no new complaints. Patient with persistent RLE warmth, but overall improved. No CP/SOB, no new complaints.    Allergies  No Known Allergies    ANTIMICROBIALS:  ceFAZolin   IVPB    ceFAZolin   IVPB 2000 every 8 hours    PE:    Vital Signs Last 24 Hrs  T(C): 36.9 (13 Nov 2017 14:45), Max: 36.9 (13 Nov 2017 14:45)  T(F): 98.4 (13 Nov 2017 14:45), Max: 98.4 (13 Nov 2017 14:45)  HR: 67 (13 Nov 2017 14:45) (67 - 82)  BP: 151/73 (13 Nov 2017 14:45) (136/63 - 151/73)  BP(mean): --  RR: 18 (13 Nov 2017 14:45) (17 - 18)  SpO2: 97% (13 Nov 2017 14:45) (97% - 100%)    Gen: AOx3, NAD, non-toxic, pleasant  CV: S1+S2 normal, no murmurs, nontachycardic  Resp: Clear bilat, no resp distress, no crackles/wheezes  Abd: Soft, nontender, +BS  Ext: RLE mild warmth (improved), persistent redness and swelling    LABS:                        12.4   3.61  )-----------( 147      ( 13 Nov 2017 05:52 )             37.8     11-13    138  |  100  |  14  ----------------------------<  144<H>  4.0   |  28  |  0.92    Ca    8.7      13 Nov 2017 05:52    MICROBIOLOGY:    URINE MIDSTREAM  11-08-17 NGTD    URINE MIDSTREAM  11-07-17 NGTD    BLOOD PERIPHERAL  11-06-17 NGTD    RADIOLOGY:    No new available

## 2017-11-14 LAB
BUN SERPL-MCNC: 16 MG/DL — SIGNIFICANT CHANGE UP (ref 7–23)
CALCIUM SERPL-MCNC: 8.9 MG/DL — SIGNIFICANT CHANGE UP (ref 8.4–10.5)
CHLORIDE SERPL-SCNC: 98 MMOL/L — SIGNIFICANT CHANGE UP (ref 98–107)
CO2 SERPL-SCNC: 25 MMOL/L — SIGNIFICANT CHANGE UP (ref 22–31)
CREAT SERPL-MCNC: 1 MG/DL — SIGNIFICANT CHANGE UP (ref 0.5–1.3)
GLUCOSE BLDC GLUCOMTR-MCNC: 126 MG/DL — HIGH (ref 70–99)
GLUCOSE BLDC GLUCOMTR-MCNC: 130 MG/DL — HIGH (ref 70–99)
GLUCOSE BLDC GLUCOMTR-MCNC: 164 MG/DL — HIGH (ref 70–99)
GLUCOSE BLDC GLUCOMTR-MCNC: 167 MG/DL — HIGH (ref 70–99)
GLUCOSE SERPL-MCNC: 150 MG/DL — HIGH (ref 70–99)
HCT VFR BLD CALC: 38.5 % — LOW (ref 39–50)
HGB BLD-MCNC: 12.7 G/DL — LOW (ref 13–17)
MCHC RBC-ENTMCNC: 28.9 PG — SIGNIFICANT CHANGE UP (ref 27–34)
MCHC RBC-ENTMCNC: 33 % — SIGNIFICANT CHANGE UP (ref 32–36)
MCV RBC AUTO: 87.7 FL — SIGNIFICANT CHANGE UP (ref 80–100)
NRBC # FLD: 0 — SIGNIFICANT CHANGE UP
PLATELET # BLD AUTO: 166 K/UL — SIGNIFICANT CHANGE UP (ref 150–400)
PMV BLD: 9.4 FL — SIGNIFICANT CHANGE UP (ref 7–13)
POTASSIUM SERPL-MCNC: 4 MMOL/L — SIGNIFICANT CHANGE UP (ref 3.5–5.3)
POTASSIUM SERPL-SCNC: 4 MMOL/L — SIGNIFICANT CHANGE UP (ref 3.5–5.3)
RBC # BLD: 4.39 M/UL — SIGNIFICANT CHANGE UP (ref 4.2–5.8)
RBC # FLD: 15.2 % — HIGH (ref 10.3–14.5)
SODIUM SERPL-SCNC: 137 MMOL/L — SIGNIFICANT CHANGE UP (ref 135–145)
WBC # BLD: 4.37 K/UL — SIGNIFICANT CHANGE UP (ref 3.8–10.5)
WBC # FLD AUTO: 4.37 K/UL — SIGNIFICANT CHANGE UP (ref 3.8–10.5)

## 2017-11-14 RX ADMIN — Medication 10 MILLIGRAM(S): at 05:04

## 2017-11-14 RX ADMIN — Medication 1: at 12:12

## 2017-11-14 RX ADMIN — OXYCODONE HYDROCHLORIDE 5 MILLIGRAM(S): 5 TABLET ORAL at 22:29

## 2017-11-14 RX ADMIN — Medication 1 TABLET(S): at 17:13

## 2017-11-14 RX ADMIN — SENNA PLUS 2 TABLET(S): 8.6 TABLET ORAL at 21:14

## 2017-11-14 RX ADMIN — Medication 1 APPLICATION(S): at 21:13

## 2017-11-14 RX ADMIN — Medication 100 MILLIGRAM(S): at 21:12

## 2017-11-14 RX ADMIN — APIXABAN 5 MILLIGRAM(S): 2.5 TABLET, FILM COATED ORAL at 17:13

## 2017-11-14 RX ADMIN — Medication 325 MILLIGRAM(S): at 12:12

## 2017-11-14 RX ADMIN — Medication 50 MILLIGRAM(S): at 17:14

## 2017-11-14 RX ADMIN — GABAPENTIN 800 MILLIGRAM(S): 400 CAPSULE ORAL at 21:14

## 2017-11-14 RX ADMIN — OXYCODONE HYDROCHLORIDE 5 MILLIGRAM(S): 5 TABLET ORAL at 10:24

## 2017-11-14 RX ADMIN — AMLODIPINE BESYLATE 5 MILLIGRAM(S): 2.5 TABLET ORAL at 05:04

## 2017-11-14 RX ADMIN — Medication 100 MILLIGRAM(S): at 13:10

## 2017-11-14 RX ADMIN — Medication 100 MILLIGRAM(S): at 05:03

## 2017-11-14 RX ADMIN — Medication 1: at 08:50

## 2017-11-14 RX ADMIN — Medication 1 APPLICATION(S): at 12:12

## 2017-11-14 RX ADMIN — Medication 10 MILLIGRAM(S): at 17:13

## 2017-11-14 RX ADMIN — GABAPENTIN 800 MILLIGRAM(S): 400 CAPSULE ORAL at 05:04

## 2017-11-14 RX ADMIN — APIXABAN 5 MILLIGRAM(S): 2.5 TABLET, FILM COATED ORAL at 05:04

## 2017-11-14 RX ADMIN — Medication 40 MILLIGRAM(S): at 05:04

## 2017-11-14 RX ADMIN — Medication 50 MILLIGRAM(S): at 05:04

## 2017-11-14 RX ADMIN — OXYCODONE HYDROCHLORIDE 5 MILLIGRAM(S): 5 TABLET ORAL at 11:07

## 2017-11-14 RX ADMIN — Medication 1 TABLET(S): at 05:04

## 2017-11-14 RX ADMIN — GABAPENTIN 800 MILLIGRAM(S): 400 CAPSULE ORAL at 13:10

## 2017-11-14 RX ADMIN — OXYCODONE HYDROCHLORIDE 5 MILLIGRAM(S): 5 TABLET ORAL at 21:21

## 2017-11-14 NOTE — PROGRESS NOTE ADULT - SUBJECTIVE AND OBJECTIVE BOX
MEDICATIONS  (STANDING):  amLODIPine   Tablet 5 milliGRAM(s) Oral daily  apixaban 5 milliGRAM(s) Oral every 12 hours  busPIRone 10 milliGRAM(s) Oral two times a day  ceFAZolin   IVPB      ceFAZolin   IVPB 2000 milliGRAM(s) IV Intermittent every 8 hours  dextrose 5%. 1000 milliLiter(s) (50 mL/Hr) IV Continuous <Continuous>  dextrose 50% Injectable 12.5 Gram(s) IV Push once  dextrose 50% Injectable 25 Gram(s) IV Push once  dextrose 50% Injectable 25 Gram(s) IV Push once  ferrous    sulfate 325 milliGRAM(s) Oral daily  furosemide    Tablet 40 milliGRAM(s) Oral daily  gabapentin 800 milliGRAM(s) Oral three times a day  insulin lispro (HumaLOG) corrective regimen sliding scale   SubCutaneous three times a day before meals  insulin lispro (HumaLOG) corrective regimen sliding scale   SubCutaneous at bedtime  lactobacillus acidophilus 1 Tablet(s) Oral every 12 hours  metoprolol     tartrate 50 milliGRAM(s) Oral two times a day  potassium chloride    Tablet ER 40 milliEquivalent(s) Oral once  senna 2 Tablet(s) Oral at bedtime  silver sulfADIAZINE 1% Cream 1 Application(s) Topical two times a day    MEDICATIONS  (PRN):  acetaminophen   Tablet 650 milliGRAM(s) Oral every 6 hours PRN For Temp greater than 38 C (100.4 F)  dextrose Gel 1 Dose(s) Oral once PRN Blood Glucose LESS THAN 70 milliGRAM(s)/deciliter  docusate sodium 100 milliGRAM(s) Oral three times a day PRN Constipation  glucagon  Injectable 1 milliGRAM(s) IntraMuscular once PRN Glucose LESS THAN 70 milligrams/deciliter  oxyCODONE    IR 5 milliGRAM(s) Oral every 6 hours PRN Mod-severe pain  polyethylene glycol 3350 17 Gram(s) Oral daily PRN Constipation  chief complaint : rt leg swelling erythema    Vital Signs Last 24 Hrs  T(C): 36.5 (2017 21:48), Max: 36.9 (2017 12:40)  T(F): 97.7 (2017 21:48), Max: 98.4 (2017 12:40)  HR: 65 (2017 21:48) (64 - 69)  BP: 153/74 (2017 21:48) (132/67 - 153/74)  BP(mean): --  RR: 18 (2017 21:48) (18 - 18)  SpO2: 96% (2017 21:48) (96% - 99%)        SUBJECTIVE / OVERNIGHT EVENTS: pt denies chest pain, shortness of breath, nausea, vomiting       Constitutional: No fever, fatigue  Skin: No rash.  Eyes: No recent vision problems or eye pain.  ENT: No congestion, ear pain, or sore throat.  Cardiovascular: No chest pain or palpation.  Respiratory: No cough, shortness of breath, congestion, or wheezing.  Gastrointestinal: No abdominal pain, nausea, vomiting, or diarrhea.  Genitourinary: No dysuria.  Musculoskeletal: lower ext swelling / erythema+  Neurologic: No headache.    PHYSICAL EXAM:  GENERAL: NAD  EYES: EOMI, PERRLA  NECK: Supple, No JVD  CHEST/LUNG: dec breath sounds at bases   HEART:  S1 , S2 +  ABDOMEN: obese, soft, bs+  EXTREMITIES:  heena lower ext edema rt> left , erythema +  NEUROLOGY:alert awake oriented    LABS:      137  |  98  |  16  ----------------------------<  150<H>  4.0   |  25  |  1.00    Ca    8.9      2017 06:00      Creatinine Trend: 1.00 <--, 0.92 <--, 0.93 <--, 1.02 <--, 1.03 <--, 1.01 <--, 1.20 <--                        12.7   4.37  )-----------( 166      ( 2017 06:00 )             38.5     Urine Studies:  Urinalysis Basic - ( 2017 06:30 )    Color: YELLOW / Appearance: CLEAR / S.018 / pH: 6.0  Gluc: NEGATIVE / Ketone: NEGATIVE  / Bili: NEGATIVE / Urobili: NORMAL E.U.   Blood: NEGATIVE / Protein: 30 / Nitrite: NEGATIVE   Leuk Esterase: NEGATIVE / RBC: 0-2 / WBC 2-5   Sq Epi:  / Non Sq Epi:  / Bacteria:

## 2017-11-14 NOTE — CHART NOTE - NSCHARTNOTEFT_GEN_A_CORE
78 y/o M with RLE cellulitis not improving with antibiotics. Dermatology consult called. Will see patient tomorrow.     ADS  [id number]

## 2017-11-15 ENCOUNTER — TRANSCRIPTION ENCOUNTER (OUTPATIENT)
Age: 77
End: 2017-11-15

## 2017-11-15 VITALS — HEART RATE: 62 BPM | DIASTOLIC BLOOD PRESSURE: 77 MMHG | SYSTOLIC BLOOD PRESSURE: 147 MMHG

## 2017-11-15 DIAGNOSIS — B35.3 TINEA PEDIS: ICD-10-CM

## 2017-11-15 DIAGNOSIS — I87.2 VENOUS INSUFFICIENCY (CHRONIC) (PERIPHERAL): ICD-10-CM

## 2017-11-15 LAB
BUN SERPL-MCNC: 18 MG/DL — SIGNIFICANT CHANGE UP (ref 7–23)
CALCIUM SERPL-MCNC: 8.7 MG/DL — SIGNIFICANT CHANGE UP (ref 8.4–10.5)
CHLORIDE SERPL-SCNC: 99 MMOL/L — SIGNIFICANT CHANGE UP (ref 98–107)
CO2 SERPL-SCNC: 27 MMOL/L — SIGNIFICANT CHANGE UP (ref 22–31)
CREAT SERPL-MCNC: 1.08 MG/DL — SIGNIFICANT CHANGE UP (ref 0.5–1.3)
GLUCOSE BLDC GLUCOMTR-MCNC: 121 MG/DL — HIGH (ref 70–99)
GLUCOSE BLDC GLUCOMTR-MCNC: 148 MG/DL — HIGH (ref 70–99)
GLUCOSE BLDC GLUCOMTR-MCNC: 170 MG/DL — HIGH (ref 70–99)
GLUCOSE SERPL-MCNC: 133 MG/DL — HIGH (ref 70–99)
HCT VFR BLD CALC: 38.8 % — LOW (ref 39–50)
HGB BLD-MCNC: 12.5 G/DL — LOW (ref 13–17)
MCHC RBC-ENTMCNC: 28.7 PG — SIGNIFICANT CHANGE UP (ref 27–34)
MCHC RBC-ENTMCNC: 32.2 % — SIGNIFICANT CHANGE UP (ref 32–36)
MCV RBC AUTO: 89 FL — SIGNIFICANT CHANGE UP (ref 80–100)
NRBC # FLD: 0 — SIGNIFICANT CHANGE UP
PLATELET # BLD AUTO: 155 K/UL — SIGNIFICANT CHANGE UP (ref 150–400)
PMV BLD: 9.6 FL — SIGNIFICANT CHANGE UP (ref 7–13)
POTASSIUM SERPL-MCNC: 4.1 MMOL/L — SIGNIFICANT CHANGE UP (ref 3.5–5.3)
POTASSIUM SERPL-SCNC: 4.1 MMOL/L — SIGNIFICANT CHANGE UP (ref 3.5–5.3)
RBC # BLD: 4.36 M/UL — SIGNIFICANT CHANGE UP (ref 4.2–5.8)
RBC # FLD: 14.8 % — HIGH (ref 10.3–14.5)
SODIUM SERPL-SCNC: 138 MMOL/L — SIGNIFICANT CHANGE UP (ref 135–145)
WBC # BLD: 4.24 K/UL — SIGNIFICANT CHANGE UP (ref 3.8–10.5)
WBC # FLD AUTO: 4.24 K/UL — SIGNIFICANT CHANGE UP (ref 3.8–10.5)

## 2017-11-15 PROCEDURE — 99223 1ST HOSP IP/OBS HIGH 75: CPT | Mod: GC

## 2017-11-15 RX ORDER — CEPHALEXIN 500 MG
1 CAPSULE ORAL
Qty: 120 | Refills: 0 | OUTPATIENT
Start: 2017-11-15 | End: 2017-12-15

## 2017-11-15 RX ORDER — FUROSEMIDE 40 MG
1 TABLET ORAL
Qty: 0 | Refills: 0 | COMMUNITY
Start: 2017-11-15

## 2017-11-15 RX ADMIN — Medication 1: at 12:36

## 2017-11-15 RX ADMIN — Medication 100 MILLIGRAM(S): at 13:22

## 2017-11-15 RX ADMIN — Medication 40 MILLIGRAM(S): at 05:02

## 2017-11-15 RX ADMIN — APIXABAN 5 MILLIGRAM(S): 2.5 TABLET, FILM COATED ORAL at 05:02

## 2017-11-15 RX ADMIN — Medication 100 MILLIGRAM(S): at 05:04

## 2017-11-15 RX ADMIN — OXYCODONE HYDROCHLORIDE 5 MILLIGRAM(S): 5 TABLET ORAL at 18:53

## 2017-11-15 RX ADMIN — GABAPENTIN 800 MILLIGRAM(S): 400 CAPSULE ORAL at 05:02

## 2017-11-15 RX ADMIN — OXYCODONE HYDROCHLORIDE 5 MILLIGRAM(S): 5 TABLET ORAL at 11:07

## 2017-11-15 RX ADMIN — Medication 1 TABLET(S): at 05:02

## 2017-11-15 RX ADMIN — OXYCODONE HYDROCHLORIDE 5 MILLIGRAM(S): 5 TABLET ORAL at 17:56

## 2017-11-15 RX ADMIN — Medication 10 MILLIGRAM(S): at 05:02

## 2017-11-15 RX ADMIN — APIXABAN 5 MILLIGRAM(S): 2.5 TABLET, FILM COATED ORAL at 17:55

## 2017-11-15 RX ADMIN — AMLODIPINE BESYLATE 5 MILLIGRAM(S): 2.5 TABLET ORAL at 05:02

## 2017-11-15 RX ADMIN — Medication 10 MILLIGRAM(S): at 17:56

## 2017-11-15 RX ADMIN — Medication 50 MILLIGRAM(S): at 05:02

## 2017-11-15 RX ADMIN — GABAPENTIN 800 MILLIGRAM(S): 400 CAPSULE ORAL at 13:22

## 2017-11-15 RX ADMIN — Medication 1 APPLICATION(S): at 11:08

## 2017-11-15 RX ADMIN — Medication 50 MILLIGRAM(S): at 17:55

## 2017-11-15 RX ADMIN — Medication 325 MILLIGRAM(S): at 11:08

## 2017-11-15 RX ADMIN — OXYCODONE HYDROCHLORIDE 5 MILLIGRAM(S): 5 TABLET ORAL at 12:00

## 2017-11-15 RX ADMIN — Medication 1 TABLET(S): at 17:55

## 2017-11-15 NOTE — DISCHARGE NOTE ADULT - HOSPITAL COURSE
76 y/o Male HX of  DM2, HTN, Afib s/p ablation 2006, prior lung CA s/p lobectomy (RUL and part of LLL), B/L DVT on Eliquis, Diabetic Neuropathy, Obesity, Anxiety, Chronic Venous stasis p/w Right  LE pain/redness.    Cellulitis.    -Recurrent RLE Cellulitis  -Hx of DM, Diabetic Neuropathy  -Zosyn/Vanco, Ancef as per ID Cx   -Bacid, pain control  -PT consult - home with continuation of outpatient PT services  -Fall/aspiration precautions     DVT (deep venous thrombosis).     -Eliquis     Venous stasis of both lower extremities.    -Vascular Cardiology Cx - decrease Lasix to 40 mg PO QD, due to active infection, prevent dehydration and CASSANDRA.     Atrial fibrillation.    -Eliquis     Anxiety.    -Buspar     Diabetes mellitus.   -Hold PO meds  -HgbA1c --------------------------------------------------  -ISS    HTN (hypertension).    -Metoprolol, Norvasc     Edema extremities.    -Lasix.     Diabetic neuropathy.    -Neurontin, pain control,   -Pt requesting change to lyrica, as per Dr Wood ----> keep Gabapentin for now and give Oxy PRN   -Elevate LE, do not sit in chair for hours     Preventive measure  -Eliquis    11/13 - right hand burn 2/2 splilled hot coffee  -Silvadene dressing ordered 78 y/o Male HX of  DM2, HTN, Afib s/p ablation 2006, prior lung CA s/p lobectomy (RUL and part of LLL), B/L DVT on Eliquis, Diabetic Neuropathy, Obesity, Anxiety, Chronic Venous stasis p/w Right  LE pain/redness.    Cellulitis.    -Recurrent RLE Cellulitis  -Hx of DM, Diabetic Neuropathy  -Zosyn/Vanco, Ancef as per ID Cx ----> continue with Keflex 500 mg q 6 to complete 14 course of Abx   -Bacid, pain control  -Fall/aspiration precautions   -Stable, F/U outpatient PCP     DVT (deep venous thrombosis).     -Eliquis   -Stable, F/U outpatient PCP     Venous stasis of both lower extremities.    -Vascular Cardiology Cx - decrease Lasix to 40 mg PO QD, due to active infection, prevent dehydration and CASSANDRA.     Atrial fibrillation.    -Eliquis   -Stable, F/U outpatient PCP     Anxiety.    -Buspar   -Stable, F/U outpatient PCP     Diabetes mellitus.   -HgbA1c 6.5%  -Continue home meds  -Stable, F/U outpatient PCP     HTN (hypertension).    -Metoprolol, Norvasc   -Stable, F/U outpatient PCP     Edema extremities.    -Lasix    -Stable, F/U outpatient PCP     Diabetic neuropathy.    -Neurontin, pain control,   -Pt requesting change to Lyrica, as per Dr Wood ----> keep Gabapentin for now and give Oxy PRN   -Elevate LE, do not sit in chair for hours   -Stable, F/U outpatient PCP     11/13 - right hand burn 2/2 spilled hot coffee  -Silvadene dressing ordered    Discharge to home with outpatient PT 78 y/o Male HX of  DM2, HTN, Afib s/p ablation 2006, prior lung CA s/p lobectomy (RUL and part of LLL), B/L DVT on Eliquis, Diabetic Neuropathy, Obesity, Anxiety, Chronic Venous stasis p/w Right  LE pain/redness.    Cellulitis.    -Recurrent RLE Cellulitis  -Hx of DM, Diabetic Neuropathy  -Zosyn/ Vanco, Ancef as per ID Cx ----> continue with Keflex 500 mg q 6 to complete 14 course of Abx   -Derm Cx - progressive stasis dermatitis likely 2/2 known DVT, obesity, recommend diligent compression, leg elevation, gentle skin care, cleansers, emollients. Continue Clotrimazole cream BID   -Bacid, pain control  -Fall/aspiration precautions   -Stable, F/U outpatient ID/Vascular     DVT (deep venous thrombosis).     -Eliquis   -Stable, F/U outpatient PCP     Venous stasis of both lower extremities.    -Vascular Cardiology Cx - decrease Lasix to 40 mg PO QD, due to active infection, prevent dehydration and CASSANDRA.   -Stable, F/U outpatient ID/Vascular     Atrial fibrillation.    -Eliquis   -Stable, F/U outpatient PCP     Anxiety.    -Buspar   -Stable, F/U outpatient PCP     Diabetes mellitus.   -HgbA1c 6.5%  -Continue home meds  -Stable, F/U outpatient PCP     HTN (hypertension).    -Metoprolol, Norvasc   -Stable, F/U outpatient PCP     Edema extremities.    -Lasix    -Stable, F/U outpatient PCP     Diabetic neuropathy.    -Neurontin, pain control,   -Pt requesting change to Lyrica, as per Dr Wood ----> keep Gabapentin for now and give Oxy PRN   -Elevate LE, do not sit in chair for hours   -Stable, F/U outpatient PCP     11/13 - right hand burn 2/2 spilled hot coffee  -Silvadene dressing ordered    Discharge to home with outpatient PT

## 2017-11-15 NOTE — DISCHARGE NOTE ADULT - PROVIDER TOKENS
TOKEN:'[id number]:'[id number] TOKEN:'[id number]:'[id number]

## 2017-11-15 NOTE — CONSULT NOTE ADULT - ASSESSMENT
77M a/w low grade fevers and leukocytosis localizing symptoms to RLE now s/p 7d abx, reports improvement, residual stasis changes.
Chronic lower extremity DVTs  Venous insufficiency leading to cellulitis and ulcerations  Resuming IV Zosyn and vanco for now  On anticoagulation for DVT.
78 y/o Male HX of  DM2, HTN, Afib s/p ablation 2006, prior lung CA s/p lobectomy (RUL and part of LLL), B/L DVT on Eliquis, Diabetic Neuropathy, Obesity, Anxiety, Chronic Venous stasis , pt was in [facility] in Sept. 2017 was treated for RLE Cellulitis, now presents with RLE swelling. With R>L swelling, warmth, redness. Significantly worse on R side as compared. Some of this may be related to DVT, however, have suspicion for cellulitis considering reassuring signs on LE USG. No signs purulence. Cellulitis 2/2 strep on clinical grounds. Low grade fever, resolved; initially mild leukocytosis.  - Cefazolin 2g q 8 (obese patient, recurrent episodes of cellulitis)  - DC Vanco/zosyn  - F/U BCX, UCX    David Hogue MD  Pager [phone]  After 5pm and on weekends call [phone]

## 2017-11-15 NOTE — CONSULT NOTE ADULT - SUBJECTIVE AND OBJECTIVE BOX
HPI:  78 y/o Male HX of  DM2, HTN, Afib s/p ablation 2006, prior lung CA s/p lobectomy (RUL and part of LLL), B/L DVT on Eliquis, Diabetic Neuropathy, Obesity, Anxiety, Chronic Venous stasis , pt was in [facility] in Sept. 2017 was treated for RLE Cellulitis , now p/w low grade fevers, borderline leukocytosis and ? more inflamed RLE. He states he has been treated for cellulitis multiple times in the past and usually improves. He has been on abx x7d and is noting some improvement but the leg is still red. No further fevers, leukocytosis resolved, no pain in the leg. Occ uses compression.    B/L Venous Doppler of legs: Partially occlusive thrombus within the right popliteal vein behind the knee. Compressible right gastrocnemius vein. Findings are improved and chronic compared to previous ultrasound of 7/11/17.  Bilateral calf veins not visualized.    PAST MEDICAL & SURGICAL HISTORY:  DVT (deep venous thrombosis): RLE dx 6/28/17  Venous stasis of both lower extremities  Balanitis  Squamous cell lung cancer  Atrial fibrillation: s/p ablation 2006  Cellulitis: Both legs 2/2 chronic venous stasis  Morbid obesity  Anxiety  Neuropathy  Diabetes mellitus  HTN (hypertension)  Lung nodules: Flexible Bronchoscopy, Right VATS, Right Lung Resection - 1/21/15, LLL partial resection 2/2015  H/O prior ablation treatment: cardiac 2006      REVIEW OF SYSTEMS      General: no fevers/chills, no lethary	    Skin/Breast: see HPI  	  Ophthalmologic: no eye pain or change in vision  	  ENMT: no dysphagia or change in hearing    Respiratory and Thorax: no SOB or cough  	  Cardiovascular: no palpitations or chest pain    Gastrointestinal: no abdomenal pain or blood in stool     Genitourinary: no dysuria or frequency    Musculoskeletal: no joint pains or weakness	    Neurological:no weakness, numbness , or tingling    MEDICATIONS  (STANDING):  amLODIPine   Tablet 5 milliGRAM(s) Oral daily  apixaban 5 milliGRAM(s) Oral every 12 hours  busPIRone 10 milliGRAM(s) Oral two times a day  ceFAZolin   IVPB      ceFAZolin   IVPB 2000 milliGRAM(s) IV Intermittent every 8 hours  dextrose 5%. 1000 milliLiter(s) (50 mL/Hr) IV Continuous <Continuous>  dextrose 50% Injectable 12.5 Gram(s) IV Push once  dextrose 50% Injectable 25 Gram(s) IV Push once  dextrose 50% Injectable 25 Gram(s) IV Push once  ferrous    sulfate 325 milliGRAM(s) Oral daily  furosemide    Tablet 40 milliGRAM(s) Oral daily  gabapentin 800 milliGRAM(s) Oral three times a day  insulin lispro (HumaLOG) corrective regimen sliding scale   SubCutaneous three times a day before meals  insulin lispro (HumaLOG) corrective regimen sliding scale   SubCutaneous at bedtime  lactobacillus acidophilus 1 Tablet(s) Oral every 12 hours  metoprolol     tartrate 50 milliGRAM(s) Oral two times a day  potassium chloride    Tablet ER 40 milliEquivalent(s) Oral once  senna 2 Tablet(s) Oral at bedtime  silver sulfADIAZINE 1% Cream 1 Application(s) Topical two times a day    MEDICATIONS  (PRN):  acetaminophen   Tablet 650 milliGRAM(s) Oral every 6 hours PRN For Temp greater than 38 C (100.4 F)  dextrose Gel 1 Dose(s) Oral once PRN Blood Glucose LESS THAN 70 milliGRAM(s)/deciliter  docusate sodium 100 milliGRAM(s) Oral three times a day PRN Constipation  glucagon  Injectable 1 milliGRAM(s) IntraMuscular once PRN Glucose LESS THAN 70 milligrams/deciliter  oxyCODONE    IR 5 milliGRAM(s) Oral every 6 hours PRN Mod-severe pain  polyethylene glycol 3350 17 Gram(s) Oral daily PRN Constipation      Allergies    No Known Allergies    SOCIAL HISTORY: Non-contributory    FAMILY HISTORY:  Family history of liver cancer (Sibling): sister  Family history of diabetes mellitus (Sibling): Brother  Family history of heart failure: father      Vital Signs Last 24 Hrs  T(C): 36.6 (15 Nov 2017 12:47), Max: 36.6 (15 Nov 2017 04:57)  T(F): 97.9 (15 Nov 2017 12:47), Max: 97.9 (15 Nov 2017 12:47)  HR: 73 (15 Nov 2017 12:47) (64 - 73)  BP: 137/70 (15 Nov 2017 12:47) (137/70 - 153/74)  BP(mean): --  RR: 19 (15 Nov 2017 12:47) (18 - 19)  SpO2: 95% (15 Nov 2017 12:47) (95% - 99%)    PHYSICAL EXAM:     The patient was alert and oriented X 3, well nourished, and in no  apparent distress.  OP showed no ulcerations  There was no visible lymphadenopathy.  Conjunctiva were non injected  There was no clubbing or edema of extremities.  The scalp, hair, face, eyebrows, lips, OP, neck, chest, back,   extremities X 4, nails were examined.  There was no hyperhidrosis or bromhidrosis.    Of note on skin exam:   R leg with pitting edema, redness, verrucous changes and some serous crusting up to the upper shin. Similar, less prominent findings on L leg. No tenderness or warmth.  b/l feet with onychodystrophy and scale    LABS:                        12.5   4.24  )-----------( 155      ( 15 Nov 2017 04:30 )             38.8     11-15    138  |  99  |  18  ----------------------------<  133<H>  4.1   |  27  |  1.08    Ca    8.7      15 Nov 2017 04:30

## 2017-11-15 NOTE — CONSULT NOTE ADULT - PROBLEM SELECTOR RECOMMENDATION 2
May represent portal of entry for recurrent cellulitis  rec starting clotrimazole cream BID to feet x weeks

## 2017-11-15 NOTE — DISCHARGE NOTE ADULT - PLAN OF CARE
Resolution of infection You present with cellulitis of right lower extremity. You were treated with antibiotics with improvement. Continue with Keflex 500 mg q 6 hours starting tomorrow till 11/22 to complete a course of antibiotics. Follow up Dr. Hogue or at the [facility]. Call [phone] to make an appointment. Stable. Your Lasix dosage was decreased to 40 mg once a day. Follow up PCP regarding further management. Monitor blood pressure routinely. Stable. Continue with Eliquis. Follow up PCP regarding further management. HgbA1c 6.5%. Stable. Continue with home medications. Monitor fingersticks routinely. Follow up PCP regarding further management. Stable. Continue with home medications. Follow up PCP regarding further management. You present with cellulitis of right lower extremity. You were treated with antibiotics with improvement. Continue with Keflex 500 mg q 6 hours starting tomorrow till 11/22 to complete a course of antibiotics. Follow up Dr. Hogue or at the [facility] within 2 weeks. Call [phone] to make an appointment. Follow up Dr. Woodson (Vascular) within 2 weeks regarding further management and workup. You were seen by Dermatology. You were recommended to continue with Clotrimazole cream to foot area diligent compression, leg elevation, gentle skin care, cleansers, emollients.

## 2017-11-15 NOTE — CONSULT NOTE ADULT - PROBLEM SELECTOR RECOMMENDATION 9
Possibly with cellulitis on admission but now appears resolved s/p abx  progressive stasis dermatitis likely 2/2 known DVT, obesity  -recommend diligent compression, leg elevation  -Abx per ID  -gentle skin care, cleansers, emollients

## 2017-11-15 NOTE — DISCHARGE NOTE ADULT - PATIENT PORTAL LINK FT
“You can access the Follow[employer] Patient Portal, offered by [facility], by registering with the following website: [email or web address]”

## 2017-11-15 NOTE — DISCHARGE NOTE ADULT - CARE PLAN
Principal Discharge DX:	Cellulitis  Goal:	Resolution of infection  Instructions for follow-up, activity and diet:	You present with cellulitis of right lower extremity. You were treated with antibiotics with improvement. Continue with Keflex 500 mg q 6 hours starting tomorrow till 11/22 to complete a course of antibiotics. Follow up Dr. Hogue or at the [facility]. Call [phone] to make an appointment.  Secondary Diagnosis:	HTN (hypertension)  Instructions for follow-up, activity and diet:	Stable. Your Lasix dosage was decreased to 40 mg once a day. Follow up PCP regarding further management. Monitor blood pressure routinely.  Secondary Diagnosis:	DVT (deep venous thrombosis)  Instructions for follow-up, activity and diet:	Stable. Continue with Eliquis. Follow up PCP regarding further management.  Secondary Diagnosis:	Diabetes mellitus  Instructions for follow-up, activity and diet:	HgbA1c 6.5%. Stable. Continue with home medications. Monitor fingersticks routinely. Follow up PCP regarding further management.  Secondary Diagnosis:	Atrial fibrillation  Instructions for follow-up, activity and diet:	Stable. Continue with home medications. Follow up PCP regarding further management. Principal Discharge DX:	Cellulitis  Goal:	Resolution of infection  Instructions for follow-up, activity and diet:	You present with cellulitis of right lower extremity. You were treated with antibiotics with improvement. Continue with Keflex 500 mg q 6 hours starting tomorrow till 11/22 to complete a course of antibiotics. Follow up Dr. Hogue or at the [facility] within 2 weeks. Call [phone] to make an appointment. Follow up Dr. Woodson (Vascular) within 2 weeks regarding further management and workup.  Secondary Diagnosis:	HTN (hypertension)  Instructions for follow-up, activity and diet:	Stable. Your Lasix dosage was decreased to 40 mg once a day. Follow up PCP regarding further management. Monitor blood pressure routinely.  Secondary Diagnosis:	DVT (deep venous thrombosis)  Instructions for follow-up, activity and diet:	Stable. Continue with Eliquis. Follow up PCP regarding further management.  Secondary Diagnosis:	Diabetes mellitus  Instructions for follow-up, activity and diet:	HgbA1c 6.5%. Stable. Continue with home medications. Monitor fingersticks routinely. Follow up PCP regarding further management.  Secondary Diagnosis:	Atrial fibrillation  Instructions for follow-up, activity and diet:	Stable. Continue with home medications. Follow up PCP regarding further management. Principal Discharge DX:	Cellulitis  Goal:	Resolution of infection  Instructions for follow-up, activity and diet:	You present with cellulitis of right lower extremity. You were treated with antibiotics with improvement. Continue with Keflex 500 mg q 6 hours starting tomorrow till 11/22 to complete a course of antibiotics. Follow up Dr. Hogue or at the [facility] within 2 weeks. Call [phone] to make an appointment. Follow up Dr. Woodson (Vascular) within 2 weeks regarding further management and workup.  Secondary Diagnosis:	HTN (hypertension)  Instructions for follow-up, activity and diet:	Stable. Your Lasix dosage was decreased to 40 mg once a day. Follow up PCP regarding further management. Monitor blood pressure routinely.  Secondary Diagnosis:	DVT (deep venous thrombosis)  Instructions for follow-up, activity and diet:	Stable. Continue with Eliquis. Follow up PCP regarding further management.  Secondary Diagnosis:	Diabetes mellitus  Instructions for follow-up, activity and diet:	HgbA1c 6.5%. Stable. Continue with home medications. Monitor fingersticks routinely. Follow up PCP regarding further management.  Secondary Diagnosis:	Atrial fibrillation  Instructions for follow-up, activity and diet:	Stable. Continue with home medications. Follow up PCP regarding further management.  Instructions for follow-up, activity and diet:	You were seen by Dermatology. You were recommended to continue with Clotrimazole cream to foot area diligent compression, leg elevation, gentle skin care, cleansers, emollients.

## 2017-11-15 NOTE — DISCHARGE NOTE ADULT - CARE PROVIDER_API CALL
Ezra Hogue), Physician Assistant Services  [address] [address]  Phone: [phone]  Fax: [phone]    Darren Wood (JULIETH), Internal Medicine  [address] [place], [place] [address]  Phone: [phone]  Fax: [phone] Ezra Hogue), Physician Assistant Services  [address] [address]  Phone: [phone]  Fax: [phone]    Darren Wood (JULIETH), Internal Medicine  [address] [place] [address]  Phone: [phone]  Fax: [phone]    Lachelle Woodson), Surgery  [address] [facility]  [address]  [place] [address]  Phone: [phone]  Fax: [phone]

## 2017-11-15 NOTE — DISCHARGE NOTE ADULT - CARE PROVIDERS DIRECT ADDRESSES
,DirectAddress_Unknown,DirectAddress_Unknown ,DirectAddress_Unknown,DirectAddress_Unknown,[email or web address]

## 2017-11-15 NOTE — DISCHARGE NOTE ADULT - ADDITIONAL INSTRUCTIONS
Wound Care  Left 4th toe, Right 2nd and 4th toes: Apply betadine daily, allow to dry.  Apply liquid barrier film to B/L heels.  Apply sween 24 moisturizing lotion to B/L LE and UE daily.  Discussed use of cotton T-shirt to wick moisture from pannus at home if noticed increase in moisture.

## 2017-11-15 NOTE — DISCHARGE NOTE ADULT - MEDICATION SUMMARY - MEDICATIONS TO TAKE
Pt called; c/o nausea and projectile vomiting. MD notified. VO obtained for Zofran sublingual and a Phenergan suppository. NG tube flushed to ensure patency. Zofran administered; will monitor.    I will START or STAY ON the medications listed below when I get home from the hospital:    naproxen 500 mg oral tablet  -- 1 tab(s) by mouth 2 times a day, As Needed  -- Indication: For Mild to moderate pain    oxyCODONE 5 mg oral tablet  -- 1 tab(s) by mouth every 6 hours, As Needed -Severe Pain (7 - 10) MDD:15mg  -- Indication: For Severe pain    apixaban 5 mg oral tablet  -- 1 tab(s) by mouth every 12 hours  -- Indication: For DVT (deep venous thrombosis)    gabapentin 800 mg oral tablet  -- 1 tab(s) by mouth 3 times a day  -- Indication: For Diabetic neuropathy    Amaryl 1 mg oral tablet  -- 1 tab(s) by mouth once a day before breakfast  -- Indication: For Diabetes mellitus    metFORMIN 500 mg oral tablet, extended release  -- 1 tab(s) by mouth once a day  -- Indication: For Diabetes mellitus    busPIRone 10 mg oral tablet  -- 1 tab(s) by mouth 2 times a day  -- Indication: For Anxiety    metoprolol tartrate 50 mg oral tablet  -- 1 tab(s) by mouth 2 times a day  -- Indication: For HTN (hypertension)    amLODIPine 5 mg oral tablet  -- 1 tab(s) by mouth once a day  -- Indication: For HTN (hypertension)    Keflex 500 mg oral capsule  -- 1 cap(s) by mouth every 6 hours x 7 days  to start on 11/16. Plan for long-term antibiotics, follow up closely with infectious disease   -- Finish all this medication unless otherwise directed by prescriber.    -- Indication: For Cellulitis    silver sulfADIAZINE 1% topical cream  -- 1 application on skin 2 times a day  Apply to right hand   -- Indication: For Right hand burn    furosemide 40 mg oral tablet  -- 1 tab(s) by mouth once a day  -- Indication: For HTN (hypertension)    ferrous sulfate 325 mg (65 mg elemental iron) oral tablet  -- 1 tab(s) by mouth once a day  -- Indication: For Supplement    Colace 100 mg oral capsule  -- 1 cap(s) by mouth 2 times a day, As Needed  -- Indication: For Constipation    polyethylene glycol 3350 oral powder for reconstitution  -- 17 gram(s) by mouth once a day  -- Indication: For Constipation    tiZANidine 4 mg oral tablet  -- 2 tab(s) by mouth once a day (at bedtime), As Needed  -- Indication: For Muscle spasm    lactobacillus acidophilus oral capsule  -- 1 cap(s) by mouth 3 times a day (with meals)   -- Indication: For Probiotics I will START or STAY ON the medications listed below when I get home from the hospital:    naproxen 500 mg oral tablet  -- 1 tab(s) by mouth 2 times a day, As Needed  -- Indication: For Mild to moderate pain    oxyCODONE 5 mg oral tablet  -- 1 tab(s) by mouth every 6 hours, As Needed -Severe Pain (7 - 10) MDD:15mg  -- Indication: For Severe pain    apixaban 5 mg oral tablet  -- 1 tab(s) by mouth every 12 hours  -- Indication: For DVT (deep venous thrombosis)    gabapentin 800 mg oral tablet  -- 1 tab(s) by mouth 3 times a day  -- Indication: For Diabetic neuropathy    Amaryl 1 mg oral tablet  -- 1 tab(s) by mouth once a day before breakfast  -- Indication: For Diabetes mellitus    metFORMIN 500 mg oral tablet, extended release  -- 1 tab(s) by mouth once a day  -- Indication: For Diabetes mellitus    busPIRone 10 mg oral tablet  -- 1 tab(s) by mouth 2 times a day  -- Indication: For Anxiety    metoprolol tartrate 50 mg oral tablet  -- 1 tab(s) by mouth 2 times a day  -- Indication: For HTN (hypertension)    amLODIPine 5 mg oral tablet  -- 1 tab(s) by mouth once a day  -- Indication: For HTN (hypertension)    Keflex 500 mg oral capsule  -- 1 cap(s) by mouth every 6 hours x 7 days  to start on 11/16. Plan for long-term antibiotics, follow up closely with infectious disease   -- Finish all this medication unless otherwise directed by prescriber.    -- Indication: For Cellulitis    silver sulfADIAZINE 1% topical cream  -- 1 application on skin 2 times a day  Apply to right hand   -- Indication: For Right hand burn    clotrimazole 1% topical cream  -- Apply on skin to affected area 2 times a day   -- For external use only.    -- Indication: For Fungal infection    furosemide 40 mg oral tablet  -- 1 tab(s) by mouth once a day  -- Indication: For HTN (hypertension)    ferrous sulfate 325 mg (65 mg elemental iron) oral tablet  -- 1 tab(s) by mouth once a day  -- Indication: For Supplement    Colace 100 mg oral capsule  -- 1 cap(s) by mouth 2 times a day, As Needed  -- Indication: For Constipation    polyethylene glycol 3350 oral powder for reconstitution  -- 17 gram(s) by mouth once a day  -- Indication: For Constipation    tiZANidine 4 mg oral tablet  -- 2 tab(s) by mouth once a day (at bedtime), As Needed  -- Indication: For Muscle spasm    lactobacillus acidophilus oral capsule  -- 1 cap(s) by mouth 3 times a day (with meals)   -- Indication: For Probiotics

## 2017-11-15 NOTE — DISCHARGE NOTE ADULT - MEDICATION SUMMARY - MEDICATIONS TO CHANGE
I will SWITCH the dose or number of times a day I take the medications listed below when I get home from the hospital:    furosemide 40 mg oral tablet  -- 1 tab(s) by mouth 2 times a day    Keflex 500 mg oral capsule  -- 1 cap(s) by mouth every 12 hours to start on 10/4: plan for long term antibiotics.  Pt to folow up closely with infectious disease.   -- Finish all this medication unless otherwise directed by prescriber.

## 2017-11-30 NOTE — PATIENT PROFILE ADULT. - URINARY CATHETER
21   \"I am ready to get dressed. \" Pt sitting on stretcher, pt dressed. IV discontinued. Pt reports pain in right knee 4/10 r/t surgery. First dose oral pain med given, hydrocodone/tyrlenol one tab po given, se MAR for documentation. 1025  Pt reports he has used crutches prior to admission. Crutch walking instructions,WBAT, provided. With return demonstration. Written discharge instructions given to [facility]. Pt transfer to wheelchair. Pt reports unable to void at this time. 1039  Pt discharge to car accompanied by [facility]. no

## 2017-12-19 ENCOUNTER — APPOINTMENT (OUTPATIENT)
Dept: INFECTIOUS DISEASE | Facility: CLINIC | Age: 77
End: 2017-12-19
Payer: MEDICARE

## 2017-12-19 VITALS
WEIGHT: 315 LBS | DIASTOLIC BLOOD PRESSURE: 71 MMHG | TEMPERATURE: 98.2 F | SYSTOLIC BLOOD PRESSURE: 146 MMHG | BODY MASS INDEX: 38.36 KG/M2 | HEART RATE: 64 BPM | HEIGHT: 76 IN

## 2017-12-19 PROCEDURE — 99213 OFFICE O/P EST LOW 20 MIN: CPT

## 2018-02-10 ENCOUNTER — OUTPATIENT (OUTPATIENT)
Dept: OUTPATIENT SERVICES | Facility: HOSPITAL | Age: 78
LOS: 1 days | End: 2018-02-10
Payer: COMMERCIAL

## 2018-02-10 ENCOUNTER — APPOINTMENT (OUTPATIENT)
Dept: RADIOLOGY | Facility: IMAGING CENTER | Age: 78
End: 2018-02-10
Payer: MEDICARE

## 2018-02-10 DIAGNOSIS — R91.8 OTHER NONSPECIFIC ABNORMAL FINDING OF LUNG FIELD: Chronic | ICD-10-CM

## 2018-02-10 DIAGNOSIS — C34.90 MALIGNANT NEOPLASM OF UNSPECIFIED PART OF UNSPECIFIED BRONCHUS OR LUNG: ICD-10-CM

## 2018-02-10 DIAGNOSIS — Z98.89 OTHER SPECIFIED POSTPROCEDURAL STATES: Chronic | ICD-10-CM

## 2018-02-10 PROCEDURE — 71046 X-RAY EXAM CHEST 2 VIEWS: CPT

## 2018-02-10 PROCEDURE — 71046 X-RAY EXAM CHEST 2 VIEWS: CPT | Mod: 26

## 2018-02-13 ENCOUNTER — APPOINTMENT (OUTPATIENT)
Dept: THORACIC SURGERY | Facility: CLINIC | Age: 78
End: 2018-02-13
Payer: MEDICARE

## 2018-02-13 VITALS
SYSTOLIC BLOOD PRESSURE: 130 MMHG | BODY MASS INDEX: 38.36 KG/M2 | HEART RATE: 68 BPM | WEIGHT: 315 LBS | HEIGHT: 76 IN | OXYGEN SATURATION: 96 % | DIASTOLIC BLOOD PRESSURE: 63 MMHG

## 2018-02-13 PROCEDURE — 99213 OFFICE O/P EST LOW 20 MIN: CPT

## 2018-02-13 RX ORDER — CLOTRIMAZOLE 10 MG/G
1 CREAM TOPICAL
Qty: 15 | Refills: 0 | Status: COMPLETED | COMMUNITY
Start: 2017-11-15

## 2018-02-13 RX ORDER — AMOXICILLIN 250 MG/1
250 CAPSULE ORAL
Qty: 180 | Refills: 0 | Status: COMPLETED | COMMUNITY
Start: 2017-08-13

## 2018-02-13 RX ORDER — 70%ISOPROPYL ALCOHOL 0.7 ML/ML
70 SWAB TOPICAL
Qty: 100 | Refills: 0 | Status: COMPLETED | COMMUNITY
Start: 2017-10-03

## 2018-02-13 RX ORDER — RIVAROXABAN 20 MG/1
20 TABLET, FILM COATED ORAL
Qty: 30 | Refills: 0 | Status: COMPLETED | COMMUNITY
Start: 2017-06-30

## 2018-02-13 RX ORDER — POLYETHYLENE GLYCOL 3350 17 G/17G
17 POWDER, FOR SOLUTION ORAL
Qty: 30 | Refills: 0 | Status: COMPLETED | COMMUNITY
Start: 2017-09-27

## 2018-02-13 RX ORDER — METOPROLOL TARTRATE 25 MG/1
25 TABLET, FILM COATED ORAL
Qty: 360 | Refills: 0 | Status: COMPLETED | COMMUNITY
Start: 2017-09-19

## 2018-02-20 ENCOUNTER — APPOINTMENT (OUTPATIENT)
Dept: INFECTIOUS DISEASE | Facility: CLINIC | Age: 78
End: 2018-02-20
Payer: MEDICARE

## 2018-02-20 ENCOUNTER — MEDICATION RENEWAL (OUTPATIENT)
Age: 78
End: 2018-02-20

## 2018-02-20 VITALS
TEMPERATURE: 98.4 F | HEIGHT: 76 IN | WEIGHT: 315 LBS | BODY MASS INDEX: 38.36 KG/M2 | SYSTOLIC BLOOD PRESSURE: 146 MMHG | HEART RATE: 58 BPM | DIASTOLIC BLOOD PRESSURE: 68 MMHG | OXYGEN SATURATION: 95 %

## 2018-02-20 PROCEDURE — 99213 OFFICE O/P EST LOW 20 MIN: CPT

## 2018-03-01 ENCOUNTER — OUTPATIENT (OUTPATIENT)
Dept: OUTPATIENT SERVICES | Facility: HOSPITAL | Age: 78
LOS: 1 days | End: 2018-03-01

## 2018-03-01 ENCOUNTER — APPOINTMENT (OUTPATIENT)
Dept: CV DIAGNOSITCS | Facility: HOSPITAL | Age: 78
End: 2018-03-01
Payer: MEDICARE

## 2018-03-01 ENCOUNTER — APPOINTMENT (OUTPATIENT)
Dept: CARDIOLOGY | Facility: CLINIC | Age: 78
End: 2018-03-01
Payer: MEDICARE

## 2018-03-01 VITALS — SYSTOLIC BLOOD PRESSURE: 134 MMHG | DIASTOLIC BLOOD PRESSURE: 81 MMHG

## 2018-03-01 VITALS
WEIGHT: 315 LBS | BODY MASS INDEX: 38.36 KG/M2 | HEART RATE: 62 BPM | DIASTOLIC BLOOD PRESSURE: 78 MMHG | HEIGHT: 76 IN | RESPIRATION RATE: 16 BRPM | OXYGEN SATURATION: 95 % | SYSTOLIC BLOOD PRESSURE: 151 MMHG

## 2018-03-01 DIAGNOSIS — R91.8 OTHER NONSPECIFIC ABNORMAL FINDING OF LUNG FIELD: Chronic | ICD-10-CM

## 2018-03-01 DIAGNOSIS — I87.2 VENOUS INSUFFICIENCY (CHRONIC) (PERIPHERAL): ICD-10-CM

## 2018-03-01 DIAGNOSIS — Z98.89 OTHER SPECIFIED POSTPROCEDURAL STATES: Chronic | ICD-10-CM

## 2018-03-01 PROCEDURE — 99215 OFFICE O/P EST HI 40 MIN: CPT

## 2018-03-01 PROCEDURE — 93000 ELECTROCARDIOGRAM COMPLETE: CPT

## 2018-03-01 PROCEDURE — 93970 EXTREMITY STUDY: CPT | Mod: 26

## 2018-03-02 LAB — DEPRECATED D DIMER PPP IA-ACNC: <150 NG/ML DDU

## 2018-06-04 ENCOUNTER — LABORATORY RESULT (OUTPATIENT)
Age: 78
End: 2018-06-04

## 2018-06-04 ENCOUNTER — APPOINTMENT (OUTPATIENT)
Dept: INFECTIOUS DISEASE | Facility: CLINIC | Age: 78
End: 2018-06-04
Payer: MEDICARE

## 2018-06-04 VITALS
BODY MASS INDEX: 37.14 KG/M2 | WEIGHT: 305 LBS | TEMPERATURE: 97.1 F | DIASTOLIC BLOOD PRESSURE: 75 MMHG | HEART RATE: 54 BPM | SYSTOLIC BLOOD PRESSURE: 145 MMHG | OXYGEN SATURATION: 95 % | HEIGHT: 76 IN

## 2018-06-04 PROCEDURE — 99213 OFFICE O/P EST LOW 20 MIN: CPT

## 2018-06-05 LAB
ALBUMIN SERPL ELPH-MCNC: 4.6 G/DL
ALP BLD-CCNC: 53 U/L
ALT SERPL-CCNC: 9 U/L
ANION GAP SERPL CALC-SCNC: 15 MMOL/L
AST SERPL-CCNC: 16 U/L
BASOPHILS # BLD AUTO: 0.03 K/UL
BASOPHILS NFR BLD AUTO: 0.6 %
BILIRUB SERPL-MCNC: 0.6 MG/DL
BUN SERPL-MCNC: 19 MG/DL
CALCIUM SERPL-MCNC: 9.5 MG/DL
CHLORIDE SERPL-SCNC: 101 MMOL/L
CO2 SERPL-SCNC: 27 MMOL/L
CREAT SERPL-MCNC: 1.09 MG/DL
EOSINOPHIL # BLD AUTO: 0.06 K/UL
EOSINOPHIL NFR BLD AUTO: 1.2 %
GLUCOSE SERPL-MCNC: 86 MG/DL
HCT VFR BLD CALC: 49.7 %
HGB BLD-MCNC: 16.3 G/DL
IMM GRANULOCYTES NFR BLD AUTO: 0.2 %
LYMPHOCYTES # BLD AUTO: 1.42 K/UL
LYMPHOCYTES NFR BLD AUTO: 28.6 %
MAN DIFF?: NORMAL
MCHC RBC-ENTMCNC: 29.4 PG
MCHC RBC-ENTMCNC: 32.8 GM/DL
MCV RBC AUTO: 89.7 FL
MONOCYTES # BLD AUTO: 0.51 K/UL
MONOCYTES NFR BLD AUTO: 10.3 %
NEUTROPHILS # BLD AUTO: 2.93 K/UL
NEUTROPHILS NFR BLD AUTO: 59.1 %
PLATELET # BLD AUTO: 93 K/UL
POTASSIUM SERPL-SCNC: 4.1 MMOL/L
PROT SERPL-MCNC: 8 G/DL
RBC # BLD: 5.54 M/UL
RBC # FLD: 14.7 %
SODIUM SERPL-SCNC: 143 MMOL/L
WBC # FLD AUTO: 4.96 K/UL

## 2018-07-24 PROBLEM — I82.409 ACUTE EMBOLISM AND THROMBOSIS OF UNSPECIFIED DEEP VEINS OF UNSPECIFIED LOWER EXTREMITY: Chronic | Status: ACTIVE | Noted: 2017-07-01

## 2018-08-03 NOTE — CHART NOTE - NSCHARTNOTESELECT_GEN_ALL_CORE
DISPLAY PLAN FREE TEXT Event Note DISPLAY PLAN FREE TEXT DISPLAY PLAN FREE TEXT DISPLAY PLAN FREE TEXT DISPLAY PLAN FREE TEXT

## 2018-08-07 ENCOUNTER — OUTPATIENT (OUTPATIENT)
Dept: OUTPATIENT SERVICES | Facility: HOSPITAL | Age: 78
LOS: 1 days | End: 2018-08-07
Payer: COMMERCIAL

## 2018-08-07 ENCOUNTER — APPOINTMENT (OUTPATIENT)
Dept: CT IMAGING | Facility: IMAGING CENTER | Age: 78
End: 2018-08-07
Payer: MEDICARE

## 2018-08-07 DIAGNOSIS — C34.90 MALIGNANT NEOPLASM OF UNSPECIFIED PART OF UNSPECIFIED BRONCHUS OR LUNG: ICD-10-CM

## 2018-08-07 DIAGNOSIS — Z98.89 OTHER SPECIFIED POSTPROCEDURAL STATES: Chronic | ICD-10-CM

## 2018-08-07 DIAGNOSIS — R91.8 OTHER NONSPECIFIC ABNORMAL FINDING OF LUNG FIELD: Chronic | ICD-10-CM

## 2018-08-07 PROCEDURE — 71250 CT THORAX DX C-: CPT | Mod: 26

## 2018-08-07 PROCEDURE — 71250 CT THORAX DX C-: CPT

## 2018-08-09 NOTE — PROGRESS NOTE ADULT - PROBLEM SELECTOR PROBLEM 9
August 9, 2018                 [facility] [place] [facility]  Obstetrics and Gynecology  [address], [address]  [facility] [address]  Phone: [phone]  Fax: [phone]   August 9, 2018     Patient: Yun Munoz   YOB: 1989   Date of Visit: 8/9/2018       To Whom it May Concern:    I am witting on behalf of Yun Munoz to document the medical necessity for the purchase of breastfeeding-related products and services. Mrs. Munoz is currently pregnant with a due date of 9/15/2018. If you have any questions or concerns please feel free to contact me.      Sincerely,           Angy Bowers MD      Anxiety

## 2018-08-14 ENCOUNTER — APPOINTMENT (OUTPATIENT)
Dept: THORACIC SURGERY | Facility: CLINIC | Age: 78
End: 2018-08-14
Payer: MEDICARE

## 2018-08-14 VITALS
HEART RATE: 65 BPM | HEIGHT: 74 IN | OXYGEN SATURATION: 96 % | TEMPERATURE: 98.5 F | BODY MASS INDEX: 38.24 KG/M2 | DIASTOLIC BLOOD PRESSURE: 80 MMHG | SYSTOLIC BLOOD PRESSURE: 135 MMHG | WEIGHT: 298 LBS | RESPIRATION RATE: 15 BRPM

## 2018-08-14 PROCEDURE — 99213 OFFICE O/P EST LOW 20 MIN: CPT

## 2018-08-14 RX ORDER — OXYCODONE AND ACETAMINOPHEN 5; 325 MG/1; MG/1
5-325 TABLET ORAL
Refills: 0 | Status: DISCONTINUED | COMMUNITY
End: 2018-08-14

## 2018-09-06 ENCOUNTER — APPOINTMENT (OUTPATIENT)
Dept: CARDIOLOGY | Facility: CLINIC | Age: 78
End: 2018-09-06
Payer: MEDICARE

## 2018-09-06 VITALS
WEIGHT: 294 LBS | HEART RATE: 59 BPM | TEMPERATURE: 98.1 F | RESPIRATION RATE: 20 BRPM | DIASTOLIC BLOOD PRESSURE: 91 MMHG | BODY MASS INDEX: 37.73 KG/M2 | SYSTOLIC BLOOD PRESSURE: 128 MMHG | HEIGHT: 74 IN | OXYGEN SATURATION: 96 %

## 2018-09-06 PROCEDURE — 99215 OFFICE O/P EST HI 40 MIN: CPT

## 2018-09-06 PROCEDURE — 93000 ELECTROCARDIOGRAM COMPLETE: CPT

## 2018-09-06 RX ORDER — METOPROLOL TARTRATE 25 MG/1
25 TABLET, FILM COATED ORAL
Qty: 180 | Refills: 3 | Status: ACTIVE | COMMUNITY
Start: 2017-07-05

## 2018-09-07 LAB
INR PPP: 1.46 RATIO
PT BLD: 16.6 SEC

## 2018-09-09 ENCOUNTER — NON-APPOINTMENT (OUTPATIENT)
Age: 78
End: 2018-09-09

## 2018-09-11 ENCOUNTER — APPOINTMENT (OUTPATIENT)
Dept: CV DIAGNOSITCS | Facility: HOSPITAL | Age: 78
End: 2018-09-11
Payer: MEDICARE

## 2018-09-11 ENCOUNTER — OUTPATIENT (OUTPATIENT)
Dept: OUTPATIENT SERVICES | Facility: HOSPITAL | Age: 78
LOS: 1 days | End: 2018-09-11

## 2018-09-11 DIAGNOSIS — Z98.89 OTHER SPECIFIED POSTPROCEDURAL STATES: Chronic | ICD-10-CM

## 2018-09-11 DIAGNOSIS — I87.2 VENOUS INSUFFICIENCY (CHRONIC) (PERIPHERAL): ICD-10-CM

## 2018-09-11 DIAGNOSIS — R91.8 OTHER NONSPECIFIC ABNORMAL FINDING OF LUNG FIELD: Chronic | ICD-10-CM

## 2018-09-11 PROCEDURE — 93970 EXTREMITY STUDY: CPT | Mod: 26

## 2018-10-15 ENCOUNTER — APPOINTMENT (OUTPATIENT)
Dept: INFECTIOUS DISEASE | Facility: CLINIC | Age: 78
End: 2018-10-15
Payer: MEDICARE

## 2018-10-15 VITALS
TEMPERATURE: 96.8 F | DIASTOLIC BLOOD PRESSURE: 77 MMHG | HEART RATE: 66 BPM | HEIGHT: 74 IN | BODY MASS INDEX: 37.35 KG/M2 | WEIGHT: 291 LBS | SYSTOLIC BLOOD PRESSURE: 138 MMHG | OXYGEN SATURATION: 95 %

## 2018-10-15 PROCEDURE — 99213 OFFICE O/P EST LOW 20 MIN: CPT

## 2018-10-16 LAB
ALBUMIN SERPL ELPH-MCNC: 4.4 G/DL
ALP BLD-CCNC: 58 U/L
ALT SERPL-CCNC: 10 U/L
ANION GAP SERPL CALC-SCNC: 14 MMOL/L
AST SERPL-CCNC: 14 U/L
BASOPHILS # BLD AUTO: 0.02 K/UL
BASOPHILS NFR BLD AUTO: 0.4 %
BILIRUB SERPL-MCNC: 0.8 MG/DL
BUN SERPL-MCNC: 19 MG/DL
CALCIUM SERPL-MCNC: 9.4 MG/DL
CHLORIDE SERPL-SCNC: 102 MMOL/L
CO2 SERPL-SCNC: 23 MMOL/L
CREAT SERPL-MCNC: 1.09 MG/DL
EOSINOPHIL # BLD AUTO: 0.07 K/UL
EOSINOPHIL NFR BLD AUTO: 1.5 %
GLUCOSE SERPL-MCNC: 81 MG/DL
HCT VFR BLD CALC: 48.2 %
HGB BLD-MCNC: 16 G/DL
IMM GRANULOCYTES NFR BLD AUTO: 0.2 %
LYMPHOCYTES # BLD AUTO: 1.38 K/UL
LYMPHOCYTES NFR BLD AUTO: 29.8 %
MAN DIFF?: NORMAL
MCHC RBC-ENTMCNC: 29.1 PG
MCHC RBC-ENTMCNC: 33.2 GM/DL
MCV RBC AUTO: 87.8 FL
MONOCYTES # BLD AUTO: 0.41 K/UL
MONOCYTES NFR BLD AUTO: 8.9 %
NEUTROPHILS # BLD AUTO: 2.74 K/UL
NEUTROPHILS NFR BLD AUTO: 59.2 %
PLATELET # BLD AUTO: 104 K/UL
POTASSIUM SERPL-SCNC: 4.1 MMOL/L
PROT SERPL-MCNC: 7.9 G/DL
RBC # BLD: 5.49 M/UL
RBC # FLD: 14.5 %
SODIUM SERPL-SCNC: 139 MMOL/L
WBC # FLD AUTO: 4.63 K/UL

## 2018-10-17 NOTE — ED PROVIDER NOTE - TEMPLATE, MLM
Interdisciplinary team rounds were held 10/17/2018 with the following team members:Care Management, Nursing, Nurse Practitioner, Nutrition, Palliative Care, Pharmacy, Physical Therapy, Physician, Respiratory Therapy and Clinical Coordinator and the patient. Plan of care discussed. See clinical pathway and/or care plan for interventions and desired outcomes. Neuro

## 2019-01-08 ENCOUNTER — INPATIENT (INPATIENT)
Facility: HOSPITAL | Age: 79
LOS: 3 days | Discharge: HOME | End: 2019-01-12
Attending: INTERNAL MEDICINE | Admitting: INTERNAL MEDICINE
Payer: MEDICARE

## 2019-01-08 VITALS
HEART RATE: 103 BPM | TEMPERATURE: 100 F | SYSTOLIC BLOOD PRESSURE: 133 MMHG | OXYGEN SATURATION: 95 % | DIASTOLIC BLOOD PRESSURE: 63 MMHG | RESPIRATION RATE: 20 BRPM

## 2019-01-08 DIAGNOSIS — Z98.89 OTHER SPECIFIED POSTPROCEDURAL STATES: Chronic | ICD-10-CM

## 2019-01-08 DIAGNOSIS — R91.8 OTHER NONSPECIFIC ABNORMAL FINDING OF LUNG FIELD: Chronic | ICD-10-CM

## 2019-01-08 RX ORDER — ACETAMINOPHEN 500 MG
650 TABLET ORAL ONCE
Qty: 0 | Refills: 0 | Status: COMPLETED | OUTPATIENT
Start: 2019-01-08 | End: 2019-01-08

## 2019-01-08 RX ORDER — PIPERACILLIN AND TAZOBACTAM 4; .5 G/20ML; G/20ML
3.38 INJECTION, POWDER, LYOPHILIZED, FOR SOLUTION INTRAVENOUS ONCE
Qty: 0 | Refills: 0 | Status: COMPLETED | OUTPATIENT
Start: 2019-01-08 | End: 2019-01-08

## 2019-01-08 RX ORDER — SODIUM CHLORIDE 9 MG/ML
1000 INJECTION INTRAMUSCULAR; INTRAVENOUS; SUBCUTANEOUS ONCE
Qty: 0 | Refills: 0 | Status: COMPLETED | OUTPATIENT
Start: 2019-01-08 | End: 2019-01-08

## 2019-01-08 RX ORDER — VANCOMYCIN HCL 1 G
1000 VIAL (EA) INTRAVENOUS ONCE
Qty: 0 | Refills: 0 | Status: COMPLETED | OUTPATIENT
Start: 2019-01-08 | End: 2019-01-09

## 2019-01-08 RX ADMIN — Medication 650 MILLIGRAM(S): at 23:50

## 2019-01-08 RX ADMIN — PIPERACILLIN AND TAZOBACTAM 200 GRAM(S): 4; .5 INJECTION, POWDER, LYOPHILIZED, FOR SOLUTION INTRAVENOUS at 23:50

## 2019-01-08 RX ADMIN — SODIUM CHLORIDE 1000 MILLILITER(S): 9 INJECTION INTRAMUSCULAR; INTRAVENOUS; SUBCUTANEOUS at 23:43

## 2019-01-08 NOTE — ED PROVIDER NOTE - PROGRESS NOTE DETAILS
Klepfish: Elevated lactate, other labs grossly wnl. RVP/UA pending. CXR grossly wnl. US w/ likely unchanged DVT. will admit for further care.

## 2019-01-08 NOTE — ED PROVIDER NOTE - ATTENDING CONTRIBUTION TO CARE
Patient presents with fevers/rigors that started several hours prior to arrival, a/w generized weakness/fatigue. Was at his baseline soh until this evening. No ha, neck pain, cp, sob, cough, vomiting, abd pain, diarrhea, dysuria. Has h/o dvts and le cellulitis, currently b/l le appear at baseline per patient and family.   exam  GEN - NAD;  A+O x3   HEAD - NC/AT   EYES- PERRL, EOMI  ENT: Airway patent, mmm, Oral cavity and pharynx normal. No inflammation, swelling, exudate, or lesions.  NECK: Neck supple, non-tender without lymphadenopathy, no masses.  PULMONARY - CTA b/l, symmetric breath sounds.   CARDIAC -s1s2, RRR, no M,G,R  ABDOMEN - +BS, ND, NT, soft, no guarding, no rebound, no masses   BACK - no CVA tenderness, Normal  spine   EXTREMITIES - FROM, symmetric pulses, capillary refill < 2 seconds, no edema   SKIN - no rash or bruising, +venous stasis skin changes to b/l le, no warmth or erythema, no fluctuance drainage or odor   NEUROLOGIC - alert, speech clear, no focal deficits  PSYCH -nl mood/affect, nl insight.  a/p-patient with fevers/rigors at home with generalized weakness, started several hours prior to ed arrival, nontoxic appearing, will check labs, ua, cxr, empiric abx, fluids, tylenol, rvp, reass.

## 2019-01-08 NOTE — ED ADULT TRIAGE NOTE - CHIEF COMPLAINT QUOTE
Pt arrives via [facility]. as per wife he got flu shot and pneumonia shot today...but this happen before when he had DVT" Around 7pm shaking chill, fever to 102. Tylenol given at 9:30 repeat temp 104....very weak unable to walk usually can walk with cane, can't stand ." hx of DM, AFib,  HTN DTV< lung Ca 2015. hx heena chronic heena lower ext cellulits Pt arrives via [facility]. as per wife he got flu shot and pneumonia shot today...but this happen before when he had DVT" Around 7pm shaking chill, fever to 102. Tylenol given at 9:30 repeat temp 104....very weak unable to walk usually can walk with cane, can't stand ." hx of DM, AFib,  HTN DTV< lung Ca 2015. hx heena chronic heena lower ext cellulits. As per EMT" Axox4 unable to stand on arrival, pox=90% placed on 2lnc pox improved to 95%"

## 2019-01-08 NOTE — ED PROVIDER NOTE - OBJECTIVE STATEMENT
77 y/o male pmh lung CA,  s/p partial lobeectomy, htn, dm, b/l LE dvt on eliquis  brought by family for fever, starting at 7 pm tonight, started at 101, went up to 104 gave tylenol and came to ER, family states that eh appeared very weak, but now appears much better, pt denies any headaches, neck pain, n/v/d, chest pain, sob, abdominal pain, urinary symptoms, numbness/tingling. pt received flue + PNA vaccine today

## 2019-01-09 DIAGNOSIS — I87.8 OTHER SPECIFIED DISORDERS OF VEINS: ICD-10-CM

## 2019-01-09 DIAGNOSIS — D69.6 THROMBOCYTOPENIA, UNSPECIFIED: ICD-10-CM

## 2019-01-09 DIAGNOSIS — G62.9 POLYNEUROPATHY, UNSPECIFIED: ICD-10-CM

## 2019-01-09 DIAGNOSIS — I80.229 PHLEBITIS AND THROMBOPHLEBITIS OF UNSPECIFIED POPLITEAL VEIN: ICD-10-CM

## 2019-01-09 DIAGNOSIS — C34.90 MALIGNANT NEOPLASM OF UNSPECIFIED PART OF UNSPECIFIED BRONCHUS OR LUNG: ICD-10-CM

## 2019-01-09 DIAGNOSIS — A41.9 SEPSIS, UNSPECIFIED ORGANISM: ICD-10-CM

## 2019-01-09 DIAGNOSIS — I48.0 PAROXYSMAL ATRIAL FIBRILLATION: ICD-10-CM

## 2019-01-09 DIAGNOSIS — R65.10 SYSTEMIC INFLAMMATORY RESPONSE SYNDROME (SIRS) OF NON-INFECTIOUS ORIGIN WITHOUT ACUTE ORGAN DYSFUNCTION: ICD-10-CM

## 2019-01-09 DIAGNOSIS — I10 ESSENTIAL (PRIMARY) HYPERTENSION: ICD-10-CM

## 2019-01-09 DIAGNOSIS — E11.40 TYPE 2 DIABETES MELLITUS WITH DIABETIC NEUROPATHY, UNSPECIFIED: ICD-10-CM

## 2019-01-09 DIAGNOSIS — E87.2 ACIDOSIS: ICD-10-CM

## 2019-01-09 DIAGNOSIS — I82.531 CHRONIC EMBOLISM AND THROMBOSIS OF RIGHT POPLITEAL VEIN: ICD-10-CM

## 2019-01-09 LAB
ALBUMIN SERPL ELPH-MCNC: 4.2 G/DL — SIGNIFICANT CHANGE UP (ref 3.3–5)
ALP SERPL-CCNC: 58 U/L — SIGNIFICANT CHANGE UP (ref 40–120)
ALT FLD-CCNC: 12 U/L — SIGNIFICANT CHANGE UP (ref 4–41)
ANION GAP SERPL CALC-SCNC: 15 MEQ/L — HIGH (ref 7–14)
ANISOCYTOSIS BLD QL: SLIGHT — SIGNIFICANT CHANGE UP
APPEARANCE UR: CLEAR — SIGNIFICANT CHANGE UP
APTT BLD: 33.9 SEC — SIGNIFICANT CHANGE UP (ref 27.5–36.3)
AST SERPL-CCNC: 14 U/L — SIGNIFICANT CHANGE UP (ref 4–40)
B PERT DNA SPEC QL NAA+PROBE: NOT DETECTED — SIGNIFICANT CHANGE UP
BACTERIA # UR AUTO: NEGATIVE — SIGNIFICANT CHANGE UP
BASE EXCESS BLDV CALC-SCNC: 2.1 MMOL/L — SIGNIFICANT CHANGE UP
BASE EXCESS BLDV CALC-SCNC: 2.1 MMOL/L — SIGNIFICANT CHANGE UP
BASOPHILS # BLD AUTO: 0.02 K/UL — SIGNIFICANT CHANGE UP (ref 0–0.2)
BASOPHILS NFR BLD AUTO: 0.5 % — SIGNIFICANT CHANGE UP (ref 0–2)
BASOPHILS NFR SPEC: 0 % — SIGNIFICANT CHANGE UP (ref 0–2)
BILIRUB SERPL-MCNC: 0.9 MG/DL — SIGNIFICANT CHANGE UP (ref 0.2–1.2)
BILIRUB UR-MCNC: NEGATIVE — SIGNIFICANT CHANGE UP
BLASTS # FLD: 0 % — SIGNIFICANT CHANGE UP (ref 0–0)
BLOOD GAS VENOUS - CREATININE: 0.98 MG/DL — SIGNIFICANT CHANGE UP (ref 0.5–1.3)
BLOOD GAS VENOUS - CREATININE: 0.99 MG/DL — SIGNIFICANT CHANGE UP (ref 0.5–1.3)
BLOOD UR QL VISUAL: NEGATIVE — SIGNIFICANT CHANGE UP
BUN SERPL-MCNC: 20 MG/DL — SIGNIFICANT CHANGE UP (ref 7–23)
C PNEUM DNA SPEC QL NAA+PROBE: NOT DETECTED — SIGNIFICANT CHANGE UP
CALCIUM SERPL-MCNC: 9.5 MG/DL — SIGNIFICANT CHANGE UP (ref 8.4–10.5)
CHLORIDE BLDV-SCNC: 105 MMOL/L — SIGNIFICANT CHANGE UP (ref 96–108)
CHLORIDE BLDV-SCNC: 105 MMOL/L — SIGNIFICANT CHANGE UP (ref 96–108)
CHLORIDE SERPL-SCNC: 102 MMOL/L — SIGNIFICANT CHANGE UP (ref 98–107)
CO2 SERPL-SCNC: 23 MMOL/L — SIGNIFICANT CHANGE UP (ref 22–31)
COLOR SPEC: SIGNIFICANT CHANGE UP
CREAT SERPL-MCNC: 1.02 MG/DL — SIGNIFICANT CHANGE UP (ref 0.5–1.3)
EOSINOPHIL # BLD AUTO: 0.03 K/UL — SIGNIFICANT CHANGE UP (ref 0–0.5)
EOSINOPHIL NFR BLD AUTO: 0.7 % — SIGNIFICANT CHANGE UP (ref 0–6)
EOSINOPHIL NFR FLD: 0.9 % — SIGNIFICANT CHANGE UP (ref 0–6)
FLUAV H1 2009 PAND RNA SPEC QL NAA+PROBE: NOT DETECTED — SIGNIFICANT CHANGE UP
FLUAV H1 RNA SPEC QL NAA+PROBE: NOT DETECTED — SIGNIFICANT CHANGE UP
FLUAV H3 RNA SPEC QL NAA+PROBE: NOT DETECTED — SIGNIFICANT CHANGE UP
FLUAV SUBTYP SPEC NAA+PROBE: NOT DETECTED — SIGNIFICANT CHANGE UP
FLUBV RNA SPEC QL NAA+PROBE: NOT DETECTED — SIGNIFICANT CHANGE UP
GAS PNL BLDV: 138 MMOL/L — SIGNIFICANT CHANGE UP (ref 136–146)
GAS PNL BLDV: 139 MMOL/L — SIGNIFICANT CHANGE UP (ref 136–146)
GIANT PLATELETS BLD QL SMEAR: PRESENT — SIGNIFICANT CHANGE UP
GLUCOSE BLDC GLUCOMTR-MCNC: 121 MG/DL — HIGH (ref 70–99)
GLUCOSE BLDC GLUCOMTR-MCNC: 127 MG/DL — HIGH (ref 70–99)
GLUCOSE BLDC GLUCOMTR-MCNC: 134 MG/DL — HIGH (ref 70–99)
GLUCOSE BLDV-MCNC: 136 — HIGH (ref 70–99)
GLUCOSE BLDV-MCNC: 152 — HIGH (ref 70–99)
GLUCOSE SERPL-MCNC: 149 MG/DL — HIGH (ref 70–99)
GLUCOSE UR-MCNC: NEGATIVE — SIGNIFICANT CHANGE UP
HADV DNA SPEC QL NAA+PROBE: NOT DETECTED — SIGNIFICANT CHANGE UP
HCO3 BLDV-SCNC: 24 MMOL/L — SIGNIFICANT CHANGE UP (ref 20–27)
HCO3 BLDV-SCNC: 26 MMOL/L — SIGNIFICANT CHANGE UP (ref 20–27)
HCOV PNL SPEC NAA+PROBE: SIGNIFICANT CHANGE UP
HCT VFR BLD CALC: 47.7 % — SIGNIFICANT CHANGE UP (ref 39–50)
HCT VFR BLDV CALC: 46.8 % — SIGNIFICANT CHANGE UP (ref 39–51)
HCT VFR BLDV CALC: 50.1 % — SIGNIFICANT CHANGE UP (ref 39–51)
HGB BLD-MCNC: 16 G/DL — SIGNIFICANT CHANGE UP (ref 13–17)
HGB BLDV-MCNC: 15.3 G/DL — SIGNIFICANT CHANGE UP (ref 13–17)
HGB BLDV-MCNC: 16.4 G/DL — SIGNIFICANT CHANGE UP (ref 13–17)
HMPV RNA SPEC QL NAA+PROBE: NOT DETECTED — SIGNIFICANT CHANGE UP
HPIV1 RNA SPEC QL NAA+PROBE: NOT DETECTED — SIGNIFICANT CHANGE UP
HPIV2 RNA SPEC QL NAA+PROBE: NOT DETECTED — SIGNIFICANT CHANGE UP
HPIV3 RNA SPEC QL NAA+PROBE: NOT DETECTED — SIGNIFICANT CHANGE UP
HPIV4 RNA SPEC QL NAA+PROBE: NOT DETECTED — SIGNIFICANT CHANGE UP
HYALINE CASTS # UR AUTO: NEGATIVE — SIGNIFICANT CHANGE UP
IMM GRANULOCYTES NFR BLD AUTO: 0.5 % — SIGNIFICANT CHANGE UP (ref 0–1.5)
INR BLD: 1.75 — HIGH (ref 0.88–1.17)
KETONES UR-MCNC: NEGATIVE — SIGNIFICANT CHANGE UP
LACTATE BLDV-MCNC: 2.4 MMOL/L — HIGH (ref 0.5–2)
LACTATE BLDV-MCNC: 3.6 MMOL/L — HIGH (ref 0.5–2)
LEUKOCYTE ESTERASE UR-ACNC: NEGATIVE — SIGNIFICANT CHANGE UP
LYMPHOCYTES # BLD AUTO: 0.15 K/UL — LOW (ref 1–3.3)
LYMPHOCYTES # BLD AUTO: 3.4 % — LOW (ref 13–44)
LYMPHOCYTES NFR SPEC AUTO: 1.8 % — LOW (ref 13–44)
MACROCYTES BLD QL: SLIGHT — SIGNIFICANT CHANGE UP
MCHC RBC-ENTMCNC: 28.9 PG — SIGNIFICANT CHANGE UP (ref 27–34)
MCHC RBC-ENTMCNC: 33.5 % — SIGNIFICANT CHANGE UP (ref 32–36)
MCV RBC AUTO: 86.3 FL — SIGNIFICANT CHANGE UP (ref 80–100)
METAMYELOCYTES # FLD: 0 % — SIGNIFICANT CHANGE UP (ref 0–1)
MICROCYTES BLD QL: SLIGHT — SIGNIFICANT CHANGE UP
MONOCYTES # BLD AUTO: 0.36 K/UL — SIGNIFICANT CHANGE UP (ref 0–0.9)
MONOCYTES NFR BLD AUTO: 8.2 % — SIGNIFICANT CHANGE UP (ref 2–14)
MONOCYTES NFR BLD: 8.9 % — SIGNIFICANT CHANGE UP (ref 2–9)
MYELOCYTES NFR BLD: 0 % — SIGNIFICANT CHANGE UP (ref 0–0)
NEUTROPHIL AB SER-ACNC: 83.9 % — HIGH (ref 43–77)
NEUTROPHILS # BLD AUTO: 3.79 K/UL — SIGNIFICANT CHANGE UP (ref 1.8–7.4)
NEUTROPHILS NFR BLD AUTO: 86.7 % — HIGH (ref 43–77)
NEUTS BAND # BLD: 0 % — SIGNIFICANT CHANGE UP (ref 0–6)
NITRITE UR-MCNC: NEGATIVE — SIGNIFICANT CHANGE UP
NRBC # FLD: 0 K/UL — LOW (ref 25–125)
OTHER - HEMATOLOGY %: 0 — SIGNIFICANT CHANGE UP
OVALOCYTES BLD QL SMEAR: SLIGHT — SIGNIFICANT CHANGE UP
PCO2 BLDV: 40 MMHG — LOW (ref 41–51)
PCO2 BLDV: 52 MMHG — HIGH (ref 41–51)
PH BLDV: 7.34 PH — SIGNIFICANT CHANGE UP (ref 7.32–7.43)
PH BLDV: 7.43 PH — SIGNIFICANT CHANGE UP (ref 7.32–7.43)
PH UR: 6 — SIGNIFICANT CHANGE UP (ref 5–8)
PLATELET # BLD AUTO: 76 K/UL — LOW (ref 150–400)
PLATELET COUNT - ESTIMATE: SIGNIFICANT CHANGE UP
PMV BLD: 10.3 FL — SIGNIFICANT CHANGE UP (ref 7–13)
PO2 BLDV: 49 MMHG — HIGH (ref 35–40)
PO2 BLDV: < 24 MMHG — LOW (ref 35–40)
POIKILOCYTOSIS BLD QL AUTO: SLIGHT — SIGNIFICANT CHANGE UP
POTASSIUM BLDV-SCNC: 3.4 MMOL/L — SIGNIFICANT CHANGE UP (ref 3.4–4.5)
POTASSIUM BLDV-SCNC: 3.4 MMOL/L — SIGNIFICANT CHANGE UP (ref 3.4–4.5)
POTASSIUM SERPL-MCNC: 3.3 MMOL/L — LOW (ref 3.5–5.3)
POTASSIUM SERPL-SCNC: 3.3 MMOL/L — LOW (ref 3.5–5.3)
PROMYELOCYTES # FLD: 0 % — SIGNIFICANT CHANGE UP (ref 0–0)
PROT SERPL-MCNC: 7.1 G/DL — SIGNIFICANT CHANGE UP (ref 6–8.3)
PROT UR-MCNC: 20 — SIGNIFICANT CHANGE UP
PROTHROM AB SERPL-ACNC: 19.8 SEC — HIGH (ref 9.8–13.1)
RBC # BLD: 5.53 M/UL — SIGNIFICANT CHANGE UP (ref 4.2–5.8)
RBC # FLD: 14.1 % — SIGNIFICANT CHANGE UP (ref 10.3–14.5)
RBC CASTS # UR COMP ASSIST: SIGNIFICANT CHANGE UP (ref 0–?)
RSV RNA SPEC QL NAA+PROBE: NOT DETECTED — SIGNIFICANT CHANGE UP
RV+EV RNA SPEC QL NAA+PROBE: NOT DETECTED — SIGNIFICANT CHANGE UP
SAO2 % BLDV: 24.7 % — LOW (ref 60–85)
SAO2 % BLDV: 84.5 % — SIGNIFICANT CHANGE UP (ref 60–85)
SODIUM SERPL-SCNC: 140 MMOL/L — SIGNIFICANT CHANGE UP (ref 135–145)
SP GR SPEC: 1.03 — SIGNIFICANT CHANGE UP (ref 1–1.04)
SQUAMOUS # UR AUTO: SIGNIFICANT CHANGE UP
UROBILINOGEN FLD QL: NORMAL — SIGNIFICANT CHANGE UP
VARIANT LYMPHS # BLD: 4.5 % — SIGNIFICANT CHANGE UP
WBC # BLD: 4.37 K/UL — SIGNIFICANT CHANGE UP (ref 3.8–10.5)
WBC # FLD AUTO: 4.37 K/UL — SIGNIFICANT CHANGE UP (ref 3.8–10.5)
WBC UR QL: SIGNIFICANT CHANGE UP (ref 0–?)

## 2019-01-09 PROCEDURE — 93970 EXTREMITY STUDY: CPT | Mod: 26

## 2019-01-09 PROCEDURE — 99223 1ST HOSP IP/OBS HIGH 75: CPT

## 2019-01-09 PROCEDURE — 71045 X-RAY EXAM CHEST 1 VIEW: CPT | Mod: 26

## 2019-01-09 PROCEDURE — 99222 1ST HOSP IP/OBS MODERATE 55: CPT

## 2019-01-09 RX ORDER — ACETAMINOPHEN 500 MG
650 TABLET ORAL EVERY 6 HOURS
Qty: 0 | Refills: 0 | Status: DISCONTINUED | OUTPATIENT
Start: 2019-01-09 | End: 2019-01-11

## 2019-01-09 RX ORDER — DOCUSATE SODIUM 100 MG
1 CAPSULE ORAL
Qty: 0 | Refills: 0 | COMMUNITY

## 2019-01-09 RX ORDER — GABAPENTIN 400 MG/1
800 CAPSULE ORAL THREE TIMES A DAY
Qty: 0 | Refills: 0 | Status: DISCONTINUED | OUTPATIENT
Start: 2019-01-09 | End: 2019-01-12

## 2019-01-09 RX ORDER — POTASSIUM CHLORIDE 20 MEQ
40 PACKET (EA) ORAL ONCE
Qty: 0 | Refills: 0 | Status: COMPLETED | OUTPATIENT
Start: 2019-01-09 | End: 2019-01-09

## 2019-01-09 RX ORDER — CEPHALEXIN 500 MG
250 CAPSULE ORAL EVERY 12 HOURS
Qty: 0 | Refills: 0 | Status: DISCONTINUED | OUTPATIENT
Start: 2019-01-09 | End: 2019-01-12

## 2019-01-09 RX ORDER — TIZANIDINE 4 MG/1
2 TABLET ORAL
Qty: 0 | Refills: 0 | COMMUNITY

## 2019-01-09 RX ORDER — INSULIN LISPRO 100/ML
VIAL (ML) SUBCUTANEOUS AT BEDTIME
Qty: 0 | Refills: 0 | Status: DISCONTINUED | OUTPATIENT
Start: 2019-01-09 | End: 2019-01-12

## 2019-01-09 RX ORDER — METOPROLOL TARTRATE 50 MG
25 TABLET ORAL
Qty: 0 | Refills: 0 | Status: DISCONTINUED | OUTPATIENT
Start: 2019-01-09 | End: 2019-01-12

## 2019-01-09 RX ORDER — APIXABAN 2.5 MG/1
5 TABLET, FILM COATED ORAL EVERY 12 HOURS
Qty: 0 | Refills: 0 | Status: DISCONTINUED | OUTPATIENT
Start: 2019-01-09 | End: 2019-01-12

## 2019-01-09 RX ORDER — FERROUS SULFATE 325(65) MG
1 TABLET ORAL
Qty: 0 | Refills: 0 | COMMUNITY

## 2019-01-09 RX ORDER — AMLODIPINE BESYLATE 2.5 MG/1
5 TABLET ORAL DAILY
Qty: 0 | Refills: 0 | Status: DISCONTINUED | OUTPATIENT
Start: 2019-01-09 | End: 2019-01-12

## 2019-01-09 RX ORDER — FUROSEMIDE 40 MG
40 TABLET ORAL DAILY
Qty: 0 | Refills: 0 | Status: DISCONTINUED | OUTPATIENT
Start: 2019-01-09 | End: 2019-01-09

## 2019-01-09 RX ORDER — INSULIN LISPRO 100/ML
VIAL (ML) SUBCUTANEOUS
Qty: 0 | Refills: 0 | Status: DISCONTINUED | OUTPATIENT
Start: 2019-01-09 | End: 2019-01-12

## 2019-01-09 RX ADMIN — AMLODIPINE BESYLATE 5 MILLIGRAM(S): 2.5 TABLET ORAL at 14:58

## 2019-01-09 RX ADMIN — Medication 1000 MILLIGRAM(S): at 04:22

## 2019-01-09 RX ADMIN — Medication 250 MILLIGRAM(S): at 02:11

## 2019-01-09 RX ADMIN — Medication 25 MILLIGRAM(S): at 18:45

## 2019-01-09 RX ADMIN — Medication 40 MILLIEQUIVALENT(S): at 14:56

## 2019-01-09 RX ADMIN — GABAPENTIN 800 MILLIGRAM(S): 400 CAPSULE ORAL at 22:52

## 2019-01-09 RX ADMIN — PIPERACILLIN AND TAZOBACTAM 3.38 GRAM(S): 4; .5 INJECTION, POWDER, LYOPHILIZED, FOR SOLUTION INTRAVENOUS at 04:20

## 2019-01-09 RX ADMIN — GABAPENTIN 800 MILLIGRAM(S): 400 CAPSULE ORAL at 14:56

## 2019-01-09 RX ADMIN — SODIUM CHLORIDE 1000 MILLILITER(S): 9 INJECTION INTRAMUSCULAR; INTRAVENOUS; SUBCUTANEOUS at 02:11

## 2019-01-09 RX ADMIN — Medication 250 MILLIGRAM(S): at 18:45

## 2019-01-09 RX ADMIN — APIXABAN 5 MILLIGRAM(S): 2.5 TABLET, FILM COATED ORAL at 18:45

## 2019-01-09 RX ADMIN — Medication 650 MILLIGRAM(S): at 04:19

## 2019-01-09 RX ADMIN — Medication 10 MILLIGRAM(S): at 18:45

## 2019-01-09 NOTE — CONSULT NOTE ADULT - SUBJECTIVE AND OBJECTIVE BOX
Cardiology/Vascular Medicine Inpatient Consultation Note      HISTORY OF PRESENT ILLNESS:  Patient is a 77 yo man with lung CA s/p partial lobectomy and last chemotherapy 2015, HTN, DM2, LE DVT on Eliquis, Afib s/p ablation, venous stasis, recurrent LE cellulitis on prophylactic Keflex, presented with 1 day of fevers and chills. Patient states that he was in his usual state of health up until yesterday when he received the flu and PNA vaccines. Patient started having fevers up to 104F and chills later on at home. He was shaking, took Tylenol and was brought to the ER. Patient denies cough, no urinary symptoms, diarrhea, or redness in legs. He states that he has never had a fever after a vaccine, but he has never received the flu and PNA vaccine at the same time. (09 Jan 2019 11:07)    Has had prior admissions for LE cellulitis and ulcerations.   Started on cephalexin for presumed      Allergies  No Known Allergies    MEDICATIONS:  amLODIPine   Tablet 5 milliGRAM(s) Oral daily  apixaban 5 milliGRAM(s) Oral every 12 hours  metoprolol tartrate 25 milliGRAM(s) Oral two times a day  cephalexin 250 milliGRAM(s) Oral every 12 hours  acetaminophen   Tablet .. 650 milliGRAM(s) Oral every 6 hours PRN  acetaminophen   Tablet .. 650 milliGRAM(s) Oral every 6 hours PRN  busPIRone 10 milliGRAM(s) Oral two times a day  gabapentin 800 milliGRAM(s) Oral three times a day  insulin lispro (HumaLOG) corrective regimen sliding scale   SubCutaneous three times a day before meals  insulin lispro (HumaLOG) corrective regimen sliding scale   SubCutaneous at bedtime      PAST MEDICAL & SURGICAL HISTORY:  DVT (deep venous thrombosis): RLE dx 6/28/17  Venous stasis of both lower extremities  Balanitis  Squamous cell lung cancer  Atrial fibrillation: s/p ablation 2006  Cellulitis: Both legs 2/2 chronic venous stasis  Morbid obesity  Anxiety  Neuropathy  Diabetes mellitus  HTN (hypertension)  Lung nodules: Flexible Bronchoscopy, Right VATS, Right Lung Resection - 1/21/15, LLL partial resection 2/2015  H/O prior ablation treatment: cardiac 2006      FAMILY HISTORY:  Family history of liver cancer (Sibling): sister  Family history of diabetes mellitus (Sibling): Brother  Family history of heart failure (Father): father      SOCIAL HISTORY:    As above    REVIEW OF SYSTEMS:  As above    PHYSICAL EXAM:  T(C): 36.8 (01-09-19 @ 13:30), Max: 38.4 (01-09-19 @ 00:01)  HR: 61 (01-09-19 @ 13:30) (61 - 103)  BP: 160/65 (01-09-19 @ 13:30) (113/54 - 160/65)  RR: 19 (01-09-19 @ 13:30) (18 - 20)  SpO2: 99% (01-09-19 @ 13:30) (95% - 100%)    Appearance: NAD, appears somewhat confused  HEENT:   Normal oral mucosa, PERRL, EOMI	  Lymphatic: No lymphadenopathy  Cardiovascular: Normal S1 S2, No JVD, No murmurs, No edema  Respiratory: Lungs clear to auscultation	  Psychiatry: Awake, aware to name, place  Gastrointestinal:  Soft, Non-tender, + BS	  Skin: No rashes, No ecchymoses, No cyanosis	  Neurologic: Non-focal  Extremities: As above, +extensive chronic venous stasis skin changes b/l LEs    LABS:	 	                          16.0   4.37  )-----------( 78       ( 08 Jan 2019 23:42 )             47.7     01-08    140  |  102  |  20  ----------------------------<  149<H>  3.3<L>   |  23  |  1.02    Ca    9.5      08 Jan 2019 23:42    TPro  7.1  /  Alb  4.2  /  TBili  0.9  /  DBili  x   /  AST  14  /  ALT  12  /  AlkPhos  58  01-08      < from: Xray Chest 1 View AP/PA (01.09.19 @ 01:32) >    EXAM:  XR CHEST AP OR PA 1V        PROCEDURE DATE:  Jan 9 2019         INTERPRETATION:  CLINICAL INFORMATION: Fever.    TECHNIQUE: Portable AP radiograph of the chest.    COMPARISON: Chest x-ray 2/10/2018.    FINDINGS:    LUNGS AND PLEURA: The lungs are clear. No pleural effusion or   pneumothorax.    HEART AND MEDIASTINUM: Heart size is within normal limits.    SKELETON: Degenerative changes of the spine.      IMPRESSION:     Clear lungs.      ROGELIO ROJAS M.D., RADIOLOGY RESIDENT  This document has been electronically signed.  REGINA FINCH M.D., ATTENDING RADIOLOGIST  This document has been electronically signed. Jan 9 2019  5:48AM      < from: US Duplex Venous Lower Ext Complete, Bilateral (01.09.19 @ 01:08) >    EXAM:  US DPLX LWR EXT VEINS COMPL BI        PROCEDURE DATE:  Jan 9 2019         INTERPRETATION:  CLINICAL INFORMATION: Lower extremity swelling. History   of right lower extremity DVT.    COMPARISON: Bilateral lower extremity duplex ultrasound of11/6/2017.    TECHNIQUE: Duplex sonography of the BILATERAL LOWER extremities with   color and spectral Doppler, with and without compression.      FINDINGS:    Noncompressible partially occlusive thrombus within the right popliteal   vein. No additional thrombus is seen in the visualized segment of the   right lower extremity veins.    There is normal compressibility of the left common femoral, femoral and   popliteal veins. No left calf vein thrombosis is detected.    IMPRESSION:     Noncompressible thrombus within the right popliteal vein, similar in   location to that of prior DVT demonstrated on 11/6/2017.     No left lower extremity deep venous thrombosis.    The above findings were discussed with Dr. Mitchell by Dr. Rojas on   1/9/2019 1:48 AM, with read-back verification.      ROGELIO ROJAS M.D., RADIOLOGY RESIDENT  This document has been electronically signed.  ANNE-MARIE ZAVALETA M.D., ATTENDING RADIOLOGIST  This document has been electronically signed. Jan 9 2019  2:04AM

## 2019-01-09 NOTE — H&P ADULT - PROBLEM SELECTOR PLAN 2
-s/p IVF in ER, trending down  -repeat lactate in AM -s/p IVF in ER, trending down  -hold Lasix and Metformin   -repeat lactate in AM

## 2019-01-09 NOTE — ED ADULT NURSE NOTE - NSIMPLEMENTINTERV_GEN_ALL_ED
Implemented All Universal Safety Interventions:  [place] to call system. Call bell, personal items and telephone within reach. Instruct patient to call for assistance. Room bathroom lighting operational. Non-slip footwear when patient is off stretcher. Physically safe environment: no spills, clutter or unnecessary equipment. Stretcher in lowest position, wheels locked, appropriate side rails in place.

## 2019-01-09 NOTE — H&P ADULT - PROBLEM SELECTOR PLAN 7
-continue Lasix for LE edema   -h/o recurrent cellulitis, currently no evidence of infection, continue prophylactic Keflex -hold Lasix for now  -h/o recurrent cellulitis, currently no evidence of infection, continue prophylactic Keflex

## 2019-01-09 NOTE — CONSULT NOTE ADULT - ASSESSMENT
78 year old with a history of ling cancer, prior DVT, and le venous stasis with recurrent cellulitis presented with fever and chills.    1) Venous stasis: no evidence of active cellulitis on exam  Continue previously prescribed keflex  Monitor clinically    2) Fever:   DDX included infectious process, adverse reaction to vaccine, thromboembolic    No evidence of acitve bacterial infection at this time    Continue to observe    3)DVT; on eliguis  PE on ddx- but a fever to 104 would be unusual.  COntinue to observe

## 2019-01-09 NOTE — ED ADULT NURSE NOTE - CHIEF COMPLAINT QUOTE
Pt arrives via [facility]. as per wife he got flu shot and pneumonia shot today...but this happen before when he had DVT" Around 7pm shaking chill, fever to 102. Tylenol given at 9:30 repeat temp 104....very weak unable to walk usually can walk with cane, can't stand ." hx of DM, AFib,  HTN DTV< lung Ca 2015. hx heena chronic heena lower ext cellulits. As per EMT" Axox4 unable to stand on arrival, pox=90% placed on 2lnc pox improved to 95%"

## 2019-01-09 NOTE — H&P ADULT - HISTORY OF PRESENT ILLNESS
78M h/o lung CA s/p partial lobectomy and last chemo 2015, HTN, DM2, LE DVT on Eliquis, Afib s/p ablation, venous stasis, recurrent LE cellulitis on prophylactic Keflex, presented with 1 day of fevers and chills. Patient states that he was in his usual state of health up until yesterday when he received the flu and PNA vaccines. Patient started having fevers up to 104 and chills later on at home. He was shaking, took Tylenol and was brought to the ER. Patient denies cough, no urinary symptoms, diarrhea, or redness in legs. He states that he has never had a fever after a vaccine, but he has never received the flu and PNA vaccine at the same time.

## 2019-01-09 NOTE — H&P ADULT - NSHPPHYSICALEXAM_GEN_ALL_CORE
Vital Signs Last 24 Hrs  T(C): 36.2 (09 Jan 2019 05:31), Max: 38.4 (09 Jan 2019 00:01)  T(F): 97.2 (09 Jan 2019 05:31), Max: 101.1 (09 Jan 2019 00:01)  HR: 90 (09 Jan 2019 05:31) (77 - 103)  BP: 133/66 (09 Jan 2019 05:31) (113/54 - 153/70)  BP(mean): --  RR: 18 (09 Jan 2019 05:31) (18 - 20)  SpO2: 95% (09 Jan 2019 05:31) (95% - 100%)

## 2019-01-09 NOTE — H&P ADULT - ASSESSMENT
78M h/o lung CA s/p partial lobectomy and last chemo 2015, HTN, DM2, LE DVT on Eliquis, Afib s/p ablation, venous stasis, recurrent LE cellulitis on prophylactic Keflex, presented with 1 day of fevers and chills after receiving flu and PNA vaccines simultaneously

## 2019-01-09 NOTE — H&P ADULT - PROBLEM SELECTOR PLAN 1
-SIRS (fever, tachycardia) with no source of infection, possibly reaction to Flu and PNA vaccines   -UA negative, CXR clear, RVP negative, no leukocytosis, no localizing symptoms, non-toxic appearing   -s/p empiric IV Vanco/Zosyn in ER, will monitor off antibiotics   -f/up blood cultures -SIRS (fever, tachycardia) with no source of infection, possibly reaction to Flu and PNA vaccines   -UA negative, CXR clear, RVP negative, no leukocytosis, no localizing symptoms, non-toxic appearing   -s/p empiric IV Vanco/Zosyn in ER, will monitor off antibiotics   -start empiric Zosyn if remains febrile or if clinical status changes   -f/up blood cultures

## 2019-01-09 NOTE — CONSULT NOTE ADULT - ASSESSMENT
78M h/o lung CA s/p partial lobectomy and last chemo 2015, HTN, DM2, LE DVT on Eliquis, Afib s/p ablation, venous stasis, recurrent LE cellulitis on prophylactic Keflex, presented with 1 day of fevers and chills after receiving flu and PNA vaccines simultaneously      Problem/Plan - 1:  ·  Problem: SIRS (systemic inflammatory response syndrome).  Plan: -SIRS (fever, tachycardia) with no source of infection, possibly reaction to Flu and PNA vaccines   -UA negative, CXR clear, RVP negative, no leukocytosis, no localizing symptoms, non-toxic appearing   -s/p empiric IV Vanco/Zosyn in ER, now on cephalexin  -f/u blood cultures.         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  Plan: -R popliteal vein DVT was present on previous US --> no changes  -continue Eliquis.        Problem/Plan - 3:  Problem: Essential hypertension. Plan: -continue Norvasc, Metoprolol, Lasix   -monitor BP.     Problem/Plan - 4:  ·  Problem: Venous stasis of both lower extremities.  Plan: -Hold Lasix for now  -h/o recurrent cellulitis, on cephalexin       Problem/Plan - 5:  ·  Problem: Paroxysmal atrial fibrillation.  Plan: -continue Metoprolol   -continue Eliquis.

## 2019-01-09 NOTE — H&P ADULT - NSHPLABSRESULTS_GEN_ALL_CORE
16.0   4.37  )-----------( 78       ( 08 Jan 2019 23:42 )             47.7   01-08    140  |  102  |  20  ----------------------------<  149<H>  3.3<L>   |  23  |  1.02    Ca    9.5      08 Jan 2019 23:42    TPro  7.1  /  Alb  4.2  /  TBili  0.9  /  DBili  x   /  AST  14  /  ALT  12  /  AlkPhos  58  01-08    LE doppler:     Noncompressible thrombus within the right popliteal vein, similar in   location to that of prior DVT demonstrated on 11/6/2017.     No left lower extremity deep venous thrombosis.    CXR: clear lungs     EKG tracing reviewed and interpreted: SR with 1st degree AVB, RBBB, q waves in inferior leads 16.0   4.37  )-----------( 78       ( 08 Jan 2019 23:42 )             47.7   01-08    140  |  102  |  20  ----------------------------<  149<H>  3.3<L>   |  23  |  1.02    Ca    9.5      08 Jan 2019 23:42    TPro  7.1  /  Alb  4.2  /  TBili  0.9  /  DBili  x   /  AST  14  /  ALT  12  /  AlkPhos  58  01-08    LE doppler:     Noncompressible thrombus within the right popliteal vein, similar in   location to that of prior DVT demonstrated on 11/6/2017.     No left lower extremity deep venous thrombosis.    CXR: clear lungs     EKG tracing reviewed and interpreted: SR with 1st degree AVB, RBBB, q waves in inferior leads (unchanged from prior)

## 2019-01-09 NOTE — H&P ADULT - FAMILY HISTORY
Sibling  Still living? No  Family history of diabetes mellitus, Age at diagnosis: Age Unknown  Family history of liver cancer, Age at diagnosis: Age Unknown     Father  Still living? Unknown  Family history of heart failure, Age at diagnosis: Age Unknown

## 2019-01-09 NOTE — CONSULT NOTE ADULT - SUBJECTIVE AND OBJECTIVE BOX
HPI:  78M h/o lung CA s/p partial lobectomy and last chemo , HTN, DM2, LE DVT on Eliquis, Afib s/p ablation, venous stasis, recurrent LE cellulitis on prophylactic Keflex, presented with 1 day of fevers and chills.    The patient was previously treated for lung cancer with surgery and chemotherapy- but denies treatment in the last 4 years.     Patient states that he was in his usual state of health up until 3/8. He felt chills and noted fever up to 104.  He felt associated  fatigue. He felt better after tylenol.    He denies any shortness of breath, cough, or nasal congestion.  He denies abdominal pain or diarhea. He denies dysuria  He states that he feels close to his baseline at this time.          PAST MEDICAL & SURGICAL HISTORY:  DVT (deep venous thrombosis): RLE dx 17  Venous stasis of both lower extremities-  --recurrent cellulitis on chronic suppression with kelfex  Balanitis  Squamous cell lung cancer  Atrial fibrillation: s/p ablation   Cellulitis: Both legs 2/2 chronic venous stasis  Morbid obesity  Anxiety  Neuropathy  Diabetes mellitus  HTN (hypertension)  Lung nodules: Flexible Bronchoscopy, Right VATS, Right Lung Resection - 1/21/15, LLL partial resection 2015  H/O prior ablation treatment: cardiac       Allergies    No Known Allergies    Intolerances        ANTIMICROBIALS:  cephalexin 250 every 12 hours      OTHER MEDS:  acetaminophen   Tablet .. 650 milliGRAM(s) Oral every 6 hours PRN  acetaminophen   Tablet .. 650 milliGRAM(s) Oral every 6 hours PRN  amLODIPine   Tablet 5 milliGRAM(s) Oral daily  apixaban 5 milliGRAM(s) Oral every 12 hours  busPIRone 10 milliGRAM(s) Oral two times a day  gabapentin 800 milliGRAM(s) Oral three times a day  insulin lispro (HumaLOG) corrective regimen sliding scale   SubCutaneous three times a day before meals  insulin lispro (HumaLOG) corrective regimen sliding scale   SubCutaneous at bedtime  metoprolol tartrate 25 milliGRAM(s) Oral two times a day      SOCIAL HISTORY:  retired  former smoker- stopped 14 years ago  no drug use  no recent travel    FAMILY HISTORY:  Family history of liver cancer (Sibling): sister  Family history of diabetes mellitus (Sibling): Brother  Family history of heart failure (Father): father    Father  in 60s of CAD  Mother  in 30s of "jaundice"      REVIEW OF SYSTEMS  [  ] ROS unobtainable because:    [  ] All other systems negative except as noted below:	    Constitutional:  [x ] fever [x ] chills  [ ] weight loss  [x ] weakness  Skin:  [ ] rash [ ] phlebitis	  Eyes: [ ] icterus [ ] pain  [ ] discharge	  ENMT: [ ] sore throat  [ ] thrush [ ] ulcers [ ] exudates  Respiratory: [ ] dyspnea [ ] hemoptysis [ ] cough [ ] sputum	  Cardiovascular:  [ ] chest pain [ ] palpitations [ ] edema	  Gastrointestinal:  [ ] nausea [ ] vomiting [ ] diarrhea [ ] constipation [ ] pain	  Genitourinary:  [ ] dysuria [ ] frequency [ ] hematuria [ ] discharge [ ] flank pain  [ ] incontinence  Musculoskeletal:  [ ] myalgias [ ] arthralgias [ ] arthritis  [ ] back pain  Neurological:  [ ] headache [ ] seizures  [ ] confusion/altered mental status  Psychiatric:  [ ] anxiety [ ] depression	  Hematology/Lymphatics:  [ ] lymphadenopathy  Endocrine:  [ ] adrenal [ ] thyroid  Allergic/Immunologic:	 [ ] transplant [ ] seasonal    PHYSICAL EXAM:  General: x[ ] non-toxic  HEAD/EYES: [ ] PERRL [x ] white sclera [ ] icterus  ENT:  [x ] normal [ ] supple [ ] thrush [ ] pharyngeal exudate  Cardiovascular:   [ ] murmur [x ] normal [ ] PPM/AICD  Respiratory:  [ x] clear to ausculation bilaterally  GI:  [ x] soft, non-tender, normal bowel sounds  :  [ ] kennedy [ x] no CVA tenderness   Musculoskeletal:  [x ] no synovitis  Neurologic:  [x ] non-focal exam   Skin:  [x ] bilateral LE skin changes c.w stasis  legs are not warm to touch  no ulcers    Lymph: [x ] no lymphadenopathy  Psychiatric:  [ x] appropriate affect [ ] alert & oriented  Lines:  [x ] no phlebitis [ ] central line          Drug Dosing Weight  Height (cm): 187.96 (2019 05:31)  Weight (kg): 131.6 (2019 05:31)  BMI (kg/m2): 37.2 (2019 05:31)  BSA (m2): 2.55 (2019 05:31)    Vital Signs Last 24 Hrs  T(F): 98.2 (19 @ 13:30), Max: 101.1 (19 @ 00:01)    Vital Signs Last 24 Hrs  HR: 94 (19 @ 18:44) (61 - 103)  BP: 126/78 (19 @ 18:44) (113/54 - 160/65)  RR: 19 (19 @ 13:30)  SpO2: 99% (19 @ 13:30) (95% - 100%)  Wt(kg): --                          16.0   4.37  )-----------( 78       ( 2019 23:42 )             47.7           140  |  102  |  20  ----------------------------<  149<H>  3.3<L>   |  23  |  1.02    Ca    9.5      2019 23:42    TPro  7.1  /  Alb  4.2  /  TBili  0.9  /  DBili  x   /  AST  14  /  ALT  12  /  AlkPhos  58        Urinalysis Basic - ( 2019 23:42 )    Color: YELLO / Appearance: CLEAR / S.027 / pH: 6.0  Gluc: NEGATIVE / Ketone: NEGATIVE  / Bili: NEGATIVE / Urobili: NORMAL   Blood: NEGATIVE / Protein: 20 / Nitrite: NEGATIVE   Leuk Esterase: NEGATIVE / RBC: 3-5 / WBC 0-2   Sq Epi: OCC / Non Sq Epi: x / Bacteria: NEGATIVE        MICROBIOLOGY:    RADIOLOGY:    < from: Xray Chest 1 View AP/PA (19 @ 01:32) >    EXAM:  XR CHEST AP OR PA 1V        PROCEDURE DATE:  2019         INTERPRETATION:  CLINICAL INFORMATION: Fever.    TECHNIQUE: Portable AP radiograph of the chest.    COMPARISON: Chest x-ray 2/10/2018.    FINDINGS:    LUNGS AND PLEURA: The lungs are clear. No pleural effusion or   pneumothorax.    HEART AND MEDIASTINUM: Heart size is within normal limits.    SKELETON: Degenerative changes of the spine.      IMPRESSION:     Clear lungs.    < end of copied text >  < from: Xray Chest 1 View AP/PA (19 @ 01:32) >    EXAM:  XR CHEST AP OR PA 1V        PROCEDURE DATE:  2019         INTERPRETATION:  CLINICAL INFORMATION: Fever.    TECHNIQUE: Portable AP radiograph of the chest.    COMPARISON: Chest x-ray 2/10/2018.    FINDINGS:    LUNGS AND PLEURA: The lungs are clear. No pleural effusion or   pneumothorax.    HEART AND MEDIASTINUM: Heart size is within normal limits.    SKELETON: Degenerative changes of the spine.      IMPRESSION:     Clear lungs.    < end of copied text >

## 2019-01-09 NOTE — ED ADULT NURSE NOTE - OBJECTIVE STATEMENT
Pt received in 313, aaox3 with c/o fever, generalized malaise, and body aches since this afternoon. pt states th Pt received in 313, aaox3 with c/o fever, generalized malaise, body aches and sob since this afternoon. pt states that he received the flu and pna vaccination earlier today. Pt was found to be hypoxic by EMS and administered o2 via NC @ 2lpm which he is tolerating well and with an improvement to o2 sat. Pt denies cp, nausea, vomiting, dizziness. Pt on CM with continuous pulse oximetry, iv established, labs sent. Sepsis protocol initiated.

## 2019-01-10 LAB
ANION GAP SERPL CALC-SCNC: 12 MEQ/L — SIGNIFICANT CHANGE UP (ref 7–14)
BACTERIA UR CULT: SIGNIFICANT CHANGE UP
BASOPHILS # BLD AUTO: 0.03 K/UL — SIGNIFICANT CHANGE UP (ref 0–0.2)
BASOPHILS NFR BLD AUTO: 0.7 % — SIGNIFICANT CHANGE UP (ref 0–2)
BUN SERPL-MCNC: 15 MG/DL — SIGNIFICANT CHANGE UP (ref 7–23)
CALCIUM SERPL-MCNC: 9 MG/DL — SIGNIFICANT CHANGE UP (ref 8.4–10.5)
CHLORIDE SERPL-SCNC: 102 MMOL/L — SIGNIFICANT CHANGE UP (ref 98–107)
CO2 SERPL-SCNC: 23 MMOL/L — SIGNIFICANT CHANGE UP (ref 22–31)
CREAT SERPL-MCNC: 0.97 MG/DL — SIGNIFICANT CHANGE UP (ref 0.5–1.3)
EOSINOPHIL # BLD AUTO: 0.04 K/UL — SIGNIFICANT CHANGE UP (ref 0–0.5)
EOSINOPHIL NFR BLD AUTO: 0.9 % — SIGNIFICANT CHANGE UP (ref 0–6)
GLUCOSE BLDC GLUCOMTR-MCNC: 120 MG/DL — HIGH (ref 70–99)
GLUCOSE BLDC GLUCOMTR-MCNC: 123 MG/DL — HIGH (ref 70–99)
GLUCOSE BLDC GLUCOMTR-MCNC: 134 MG/DL — HIGH (ref 70–99)
GLUCOSE BLDC GLUCOMTR-MCNC: 138 MG/DL — HIGH (ref 70–99)
GLUCOSE SERPL-MCNC: 119 MG/DL — HIGH (ref 70–99)
HBA1C BLD-MCNC: 5.5 % — SIGNIFICANT CHANGE UP (ref 4–5.6)
HCT VFR BLD CALC: 45 % — SIGNIFICANT CHANGE UP (ref 39–50)
HGB BLD-MCNC: 14.7 G/DL — SIGNIFICANT CHANGE UP (ref 13–17)
IMM GRANULOCYTES NFR BLD AUTO: 0.2 % — SIGNIFICANT CHANGE UP (ref 0–1.5)
LYMPHOCYTES # BLD AUTO: 0.66 K/UL — LOW (ref 1–3.3)
LYMPHOCYTES # BLD AUTO: 14.5 % — SIGNIFICANT CHANGE UP (ref 13–44)
MAGNESIUM SERPL-MCNC: 1.7 MG/DL — SIGNIFICANT CHANGE UP (ref 1.6–2.6)
MCHC RBC-ENTMCNC: 28.4 PG — SIGNIFICANT CHANGE UP (ref 27–34)
MCHC RBC-ENTMCNC: 32.7 % — SIGNIFICANT CHANGE UP (ref 32–36)
MCV RBC AUTO: 87 FL — SIGNIFICANT CHANGE UP (ref 80–100)
MONOCYTES # BLD AUTO: 0.73 K/UL — SIGNIFICANT CHANGE UP (ref 0–0.9)
MONOCYTES NFR BLD AUTO: 16.1 % — HIGH (ref 2–14)
NEUTROPHILS # BLD AUTO: 3.07 K/UL — SIGNIFICANT CHANGE UP (ref 1.8–7.4)
NEUTROPHILS NFR BLD AUTO: 67.6 % — SIGNIFICANT CHANGE UP (ref 43–77)
NRBC # FLD: 0 K/UL — LOW (ref 25–125)
PHOSPHATE SERPL-MCNC: 1.8 MG/DL — LOW (ref 2.5–4.5)
PLATELET # BLD AUTO: 71 K/UL — LOW (ref 150–400)
PMV BLD: 10.6 FL — SIGNIFICANT CHANGE UP (ref 7–13)
POTASSIUM SERPL-MCNC: 3.5 MMOL/L — SIGNIFICANT CHANGE UP (ref 3.5–5.3)
POTASSIUM SERPL-SCNC: 3.5 MMOL/L — SIGNIFICANT CHANGE UP (ref 3.5–5.3)
RBC # BLD: 5.17 M/UL — SIGNIFICANT CHANGE UP (ref 4.2–5.8)
RBC # FLD: 14.5 % — SIGNIFICANT CHANGE UP (ref 10.3–14.5)
SODIUM SERPL-SCNC: 137 MMOL/L — SIGNIFICANT CHANGE UP (ref 135–145)
SPECIMEN SOURCE: SIGNIFICANT CHANGE UP
WBC # BLD: 4.54 K/UL — SIGNIFICANT CHANGE UP (ref 3.8–10.5)
WBC # FLD AUTO: 4.54 K/UL — SIGNIFICANT CHANGE UP (ref 3.8–10.5)

## 2019-01-10 PROCEDURE — 99232 SBSQ HOSP IP/OBS MODERATE 35: CPT

## 2019-01-10 RX ORDER — SODIUM,POTASSIUM PHOSPHATES 278-250MG
1 POWDER IN PACKET (EA) ORAL
Qty: 0 | Refills: 0 | Status: COMPLETED | OUTPATIENT
Start: 2019-01-10 | End: 2019-01-11

## 2019-01-10 RX ADMIN — Medication 1 TABLET(S): at 21:51

## 2019-01-10 RX ADMIN — GABAPENTIN 800 MILLIGRAM(S): 400 CAPSULE ORAL at 05:13

## 2019-01-10 RX ADMIN — GABAPENTIN 800 MILLIGRAM(S): 400 CAPSULE ORAL at 13:07

## 2019-01-10 RX ADMIN — Medication 10 MILLIGRAM(S): at 17:27

## 2019-01-10 RX ADMIN — APIXABAN 5 MILLIGRAM(S): 2.5 TABLET, FILM COATED ORAL at 17:27

## 2019-01-10 RX ADMIN — AMLODIPINE BESYLATE 5 MILLIGRAM(S): 2.5 TABLET ORAL at 05:12

## 2019-01-10 RX ADMIN — Medication 1 TABLET(S): at 17:27

## 2019-01-10 RX ADMIN — Medication 1 TABLET(S): at 13:07

## 2019-01-10 RX ADMIN — Medication 25 MILLIGRAM(S): at 05:13

## 2019-01-10 RX ADMIN — APIXABAN 5 MILLIGRAM(S): 2.5 TABLET, FILM COATED ORAL at 05:13

## 2019-01-10 RX ADMIN — Medication 25 MILLIGRAM(S): at 17:27

## 2019-01-10 RX ADMIN — Medication 250 MILLIGRAM(S): at 05:13

## 2019-01-10 RX ADMIN — Medication 10 MILLIGRAM(S): at 05:13

## 2019-01-10 RX ADMIN — Medication 250 MILLIGRAM(S): at 17:27

## 2019-01-10 RX ADMIN — GABAPENTIN 800 MILLIGRAM(S): 400 CAPSULE ORAL at 21:51

## 2019-01-10 NOTE — PROGRESS NOTE ADULT - PROBLEM SELECTOR PLAN 7
-hold Lasix for now  -h/o recurrent cellulitis, currently no evidence of infection, continue prophylactic Keflex

## 2019-01-10 NOTE — PROGRESS NOTE ADULT - PROBLEM SELECTOR PROBLEM 6
Essential hypertension Composite Graft Text: The defect edges were debeveled with a #15 scalpel blade.  Given the location of the defect, shape of the defect, the proximity to free margins and the fact the defect was full thickness a composite graft was deemed most appropriate.  The defect was outline and then transferred to the donor site.  A full thickness graft was then excised from the donor site. The graft was then placed in the primary defect, oriented appropriately and then sutured into place.  The secondary defect was then repaired using a primary closure.

## 2019-01-11 ENCOUNTER — TRANSCRIPTION ENCOUNTER (OUTPATIENT)
Age: 79
End: 2019-01-11

## 2019-01-11 LAB
ANION GAP SERPL CALC-SCNC: 14 MEQ/L — SIGNIFICANT CHANGE UP (ref 7–14)
BASOPHILS # BLD AUTO: 0.02 K/UL — SIGNIFICANT CHANGE UP (ref 0–0.2)
BASOPHILS NFR BLD AUTO: 0.6 % — SIGNIFICANT CHANGE UP (ref 0–2)
BUN SERPL-MCNC: 14 MG/DL — SIGNIFICANT CHANGE UP (ref 7–23)
CALCIUM SERPL-MCNC: 8.9 MG/DL — SIGNIFICANT CHANGE UP (ref 8.4–10.5)
CHLORIDE SERPL-SCNC: 104 MMOL/L — SIGNIFICANT CHANGE UP (ref 98–107)
CO2 SERPL-SCNC: 23 MMOL/L — SIGNIFICANT CHANGE UP (ref 22–31)
CREAT SERPL-MCNC: 0.9 MG/DL — SIGNIFICANT CHANGE UP (ref 0.5–1.3)
EOSINOPHIL # BLD AUTO: 0.12 K/UL — SIGNIFICANT CHANGE UP (ref 0–0.5)
EOSINOPHIL NFR BLD AUTO: 3.3 % — SIGNIFICANT CHANGE UP (ref 0–6)
GLUCOSE BLDC GLUCOMTR-MCNC: 109 MG/DL — HIGH (ref 70–99)
GLUCOSE BLDC GLUCOMTR-MCNC: 110 MG/DL — HIGH (ref 70–99)
GLUCOSE BLDC GLUCOMTR-MCNC: 111 MG/DL — HIGH (ref 70–99)
GLUCOSE BLDC GLUCOMTR-MCNC: 124 MG/DL — HIGH (ref 70–99)
GLUCOSE SERPL-MCNC: 100 MG/DL — HIGH (ref 70–99)
HCT VFR BLD CALC: 40.9 % — SIGNIFICANT CHANGE UP (ref 39–50)
HGB BLD-MCNC: 13.7 G/DL — SIGNIFICANT CHANGE UP (ref 13–17)
IMM GRANULOCYTES NFR BLD AUTO: 0.3 % — SIGNIFICANT CHANGE UP (ref 0–1.5)
LYMPHOCYTES # BLD AUTO: 0.76 K/UL — LOW (ref 1–3.3)
LYMPHOCYTES # BLD AUTO: 21.1 % — SIGNIFICANT CHANGE UP (ref 13–44)
MAGNESIUM SERPL-MCNC: 1.9 MG/DL — SIGNIFICANT CHANGE UP (ref 1.6–2.6)
MCHC RBC-ENTMCNC: 29 PG — SIGNIFICANT CHANGE UP (ref 27–34)
MCHC RBC-ENTMCNC: 33.5 % — SIGNIFICANT CHANGE UP (ref 32–36)
MCV RBC AUTO: 86.7 FL — SIGNIFICANT CHANGE UP (ref 80–100)
MONOCYTES # BLD AUTO: 0.56 K/UL — SIGNIFICANT CHANGE UP (ref 0–0.9)
MONOCYTES NFR BLD AUTO: 15.6 % — HIGH (ref 2–14)
NEUTROPHILS # BLD AUTO: 2.13 K/UL — SIGNIFICANT CHANGE UP (ref 1.8–7.4)
NEUTROPHILS NFR BLD AUTO: 59.1 % — SIGNIFICANT CHANGE UP (ref 43–77)
NRBC # FLD: 0 K/UL — LOW (ref 25–125)
PHOSPHATE SERPL-MCNC: 2.8 MG/DL — SIGNIFICANT CHANGE UP (ref 2.5–4.5)
PLATELET # BLD AUTO: 63 K/UL — LOW (ref 150–400)
PMV BLD: 10.5 FL — SIGNIFICANT CHANGE UP (ref 7–13)
POTASSIUM SERPL-MCNC: 3.4 MMOL/L — LOW (ref 3.5–5.3)
POTASSIUM SERPL-SCNC: 3.4 MMOL/L — LOW (ref 3.5–5.3)
RBC # BLD: 4.72 M/UL — SIGNIFICANT CHANGE UP (ref 4.2–5.8)
RBC # FLD: 14.5 % — SIGNIFICANT CHANGE UP (ref 10.3–14.5)
SODIUM SERPL-SCNC: 141 MMOL/L — SIGNIFICANT CHANGE UP (ref 135–145)
WBC # BLD: 3.6 K/UL — LOW (ref 3.8–10.5)
WBC # FLD AUTO: 3.6 K/UL — LOW (ref 3.8–10.5)

## 2019-01-11 PROCEDURE — 99232 SBSQ HOSP IP/OBS MODERATE 35: CPT

## 2019-01-11 RX ORDER — POTASSIUM CHLORIDE 20 MEQ
40 PACKET (EA) ORAL ONCE
Qty: 0 | Refills: 0 | Status: COMPLETED | OUTPATIENT
Start: 2019-01-11 | End: 2019-01-11

## 2019-01-11 RX ORDER — METOPROLOL TARTRATE 50 MG
1 TABLET ORAL
Qty: 0 | Refills: 0 | COMMUNITY

## 2019-01-11 RX ORDER — CEPHALEXIN 500 MG
1 CAPSULE ORAL
Qty: 0 | Refills: 0 | COMMUNITY

## 2019-01-11 RX ORDER — APIXABAN 2.5 MG/1
1 TABLET, FILM COATED ORAL
Qty: 0 | Refills: 0 | DISCHARGE
Start: 2019-01-11

## 2019-01-11 RX ORDER — CEPHALEXIN 500 MG
2 CAPSULE ORAL
Qty: 0 | Refills: 0 | DISCHARGE
Start: 2019-01-11

## 2019-01-11 RX ORDER — CEPHALEXIN 500 MG
1 CAPSULE ORAL
Qty: 0 | Refills: 0 | DISCHARGE
Start: 2019-01-11

## 2019-01-11 RX ORDER — METOPROLOL TARTRATE 50 MG
1 TABLET ORAL
Refills: 0 | DISCHARGE
Start: 2019-01-11

## 2019-01-11 RX ORDER — ACETAMINOPHEN 500 MG
2 TABLET ORAL
Qty: 0 | Refills: 0 | DISCHARGE
Start: 2019-01-11

## 2019-01-11 RX ORDER — METOPROLOL TARTRATE 50 MG
1 TABLET ORAL
Qty: 0 | Refills: 0 | COMMUNITY
Start: 2019-01-11

## 2019-01-11 RX ORDER — AMLODIPINE BESYLATE 2.5 MG/1
1 TABLET ORAL
Qty: 0 | Refills: 0 | DISCHARGE
Start: 2019-01-11

## 2019-01-11 RX ORDER — ACETAMINOPHEN 500 MG
650 TABLET ORAL EVERY 6 HOURS
Qty: 0 | Refills: 0 | Status: DISCONTINUED | OUTPATIENT
Start: 2019-01-11 | End: 2019-01-12

## 2019-01-11 RX ORDER — AMLODIPINE BESYLATE 2.5 MG/1
1 TABLET ORAL
Qty: 0 | Refills: 0 | COMMUNITY
Start: 2019-01-11

## 2019-01-11 RX ADMIN — APIXABAN 5 MILLIGRAM(S): 2.5 TABLET, FILM COATED ORAL at 17:32

## 2019-01-11 RX ADMIN — Medication 650 MILLIGRAM(S): at 18:30

## 2019-01-11 RX ADMIN — Medication 10 MILLIGRAM(S): at 17:32

## 2019-01-11 RX ADMIN — GABAPENTIN 800 MILLIGRAM(S): 400 CAPSULE ORAL at 12:14

## 2019-01-11 RX ADMIN — Medication 650 MILLIGRAM(S): at 17:32

## 2019-01-11 RX ADMIN — GABAPENTIN 800 MILLIGRAM(S): 400 CAPSULE ORAL at 05:54

## 2019-01-11 RX ADMIN — Medication 25 MILLIGRAM(S): at 17:32

## 2019-01-11 RX ADMIN — APIXABAN 5 MILLIGRAM(S): 2.5 TABLET, FILM COATED ORAL at 05:53

## 2019-01-11 RX ADMIN — Medication 250 MILLIGRAM(S): at 17:32

## 2019-01-11 RX ADMIN — AMLODIPINE BESYLATE 5 MILLIGRAM(S): 2.5 TABLET ORAL at 05:54

## 2019-01-11 RX ADMIN — Medication 25 MILLIGRAM(S): at 05:54

## 2019-01-11 RX ADMIN — Medication 10 MILLIGRAM(S): at 05:54

## 2019-01-11 RX ADMIN — Medication 250 MILLIGRAM(S): at 05:54

## 2019-01-11 RX ADMIN — Medication 650 MILLIGRAM(S): at 10:00

## 2019-01-11 RX ADMIN — Medication 40 MILLIEQUIVALENT(S): at 08:51

## 2019-01-11 RX ADMIN — Medication 1 TABLET(S): at 08:51

## 2019-01-11 RX ADMIN — Medication 650 MILLIGRAM(S): at 12:14

## 2019-01-11 RX ADMIN — GABAPENTIN 800 MILLIGRAM(S): 400 CAPSULE ORAL at 22:02

## 2019-01-11 NOTE — DISCHARGE NOTE ADULT - ADDITIONAL INSTRUCTIONS
Continue antibiotic therapy as directed.  Monitor for any further signs and symptoms of further infection, including but not limited to, fevers/chills, shortness of breath, increased heart rate, dizziness, or abrupt changes in mental status.

## 2019-01-11 NOTE — DISCHARGE NOTE ADULT - CARE PLAN
Principal Discharge DX:	SIRS (systemic inflammatory response syndrome)  Goal:	Resolved - Patient will remain hemodynamically stable; Patient will remain free from fever.  Assessment and plan of treatment:	You were evaluated by our infectious disease service.  There is no evidence of active infection on exam. Your cultures are NEGATIVE. Source of fever could be due to a reaction to the Flu/Pneumonia vaccine. Continue taking home Keflex as prescribed.  Follow up with your PMD within one week.  Call to schedule follow up appointment.  Secondary Diagnosis:	Chronic deep vein thrombosis (DVT) of popliteal vein of right lower extremity  Goal:	Stable - patient will remain asymptomatic  Assessment and plan of treatment:	Continue Eliquis as prescribed.  Secondary Diagnosis:	Venous stasis of both lower extremities  Goal:	stable  Secondary Diagnosis:	Type 2 diabetes mellitus with diabetic neuropathy, without long-term current use of insulin  Goal:	Hgb A1C good - 5.5  Assessment and plan of treatment:	Medications as prescribed, Consistent Carb diet  Secondary Diagnosis:	Squamous cell carcinoma of lung, unspecified laterality  Goal:	Stable  Assessment and plan of treatment:	Outpatient follow up  Secondary Diagnosis:	Paroxysmal atrial fibrillation  Goal:	Stable - Full Anticoagulation  Secondary Diagnosis:	Essential hypertension  Goal:	stable  Assessment and plan of treatment:	medications as tolerated; Low sodium, low fat diet Principal Discharge DX:	SIRS (systemic inflammatory response syndrome)  Goal:	Resolved - Patient will remain hemodynamically stable; Patient will remain free from fever.  Assessment and plan of treatment:	You were evaluated by our infectious disease service.  There is no evidence of active infection on exam. Your cultures are NEGATIVE. Source of fever could be due to a reaction to the Flu/Pneumonia vaccine. Continue taking home Keflex as prescribed.  Follow up with your PMD within one week.  Call to schedule follow up appointment.  Secondary Diagnosis:	Chronic deep vein thrombosis (DVT) of popliteal vein of right lower extremity  Goal:	Stable - patient will remain asymptomatic  Assessment and plan of treatment:	Continue Eliquis as prescribed.  Secondary Diagnosis:	Venous stasis of both lower extremities  Goal:	stable  Assessment and plan of treatment:	Follow up with your primary care provider within 1 week of discharge from hospital  Secondary Diagnosis:	Type 2 diabetes mellitus with diabetic neuropathy, without long-term current use of insulin  Goal:	Hgb A1C good - 5.5  Assessment and plan of treatment:	Continue consistent carbohydrate diet.  Monitor blood glucose levels throughout the day before meals and at bedtime.  Record blood sugars and bring to outpatient providers appointment in order to be reviewed by your doctor for management modifications.  Be aware of diabetes management symptoms including feeling cool and clammy may be related to low glucose levels.  Feeling hot and dry may indicate high glucose levels.  If  you feel these symptoms, check your blood sugar.  Make regular podiatry appointments in order to have feet checked for wounds and toe nails cut by a doctor to prevent infections.  Secondary Diagnosis:	Squamous cell carcinoma of lung, unspecified laterality  Goal:	Stable  Assessment and plan of treatment:	Outpatient follow up  Secondary Diagnosis:	Paroxysmal atrial fibrillation  Goal:	Stable - Full Anticoagulation  Assessment and plan of treatment:	Continue Eliquis  Secondary Diagnosis:	Essential hypertension  Goal:	stable  Assessment and plan of treatment:	Continue current blood pressure medication regimen as directed. Monitor for any visual changes, headaches or dizziness.  Monitor blood pressure regularly.  Follow up with your PCP for further management for high blood pressure, please call to make appointment within 1 week of discharge Principal Discharge DX:	SIRS (systemic inflammatory response syndrome)  Goal:	Resolved - Patient will remain hemodynamically stable; Patient will remain free from fever.  Assessment and plan of treatment:	You were evaluated by our infectious disease service.  There is no evidence of active infection on exam. Your cultures are NEGATIVE. Source of fever could be due to a reaction to the Flu/Pneumonia vaccine. Continue taking home Keflex as prescribed.  Follow up with your PMD within one week.  Call to schedule follow up appointment. Please follow up with your infectious disease physician Dr. Pike within 1 week of discharge. Please call to make an appointment.  Secondary Diagnosis:	Chronic deep vein thrombosis (DVT) of popliteal vein of right lower extremity  Goal:	Stable - patient will remain asymptomatic  Assessment and plan of treatment:	Continue Eliquis as prescribed.  Secondary Diagnosis:	Venous stasis of both lower extremities  Goal:	stable  Assessment and plan of treatment:	Follow up with your primary care provider within 1 week of discharge from hospital  Secondary Diagnosis:	Type 2 diabetes mellitus with diabetic neuropathy, without long-term current use of insulin  Goal:	Hgb A1C good - 5.5  Assessment and plan of treatment:	Continue consistent carbohydrate diet.  Monitor blood glucose levels throughout the day before meals and at bedtime.  Record blood sugars and bring to outpatient providers appointment in order to be reviewed by your doctor for management modifications.  Be aware of diabetes management symptoms including feeling cool and clammy may be related to low glucose levels.  Feeling hot and dry may indicate high glucose levels.  If  you feel these symptoms, check your blood sugar.  Make regular podiatry appointments in order to have feet checked for wounds and toe nails cut by a doctor to prevent infections.  Secondary Diagnosis:	Squamous cell carcinoma of lung, unspecified laterality  Goal:	Stable  Assessment and plan of treatment:	Outpatient follow up  Secondary Diagnosis:	Paroxysmal atrial fibrillation  Goal:	Stable - Full Anticoagulation  Assessment and plan of treatment:	Continue Eliquis  Secondary Diagnosis:	Essential hypertension  Goal:	stable  Assessment and plan of treatment:	Continue current blood pressure medication regimen as directed. Monitor for any visual changes, headaches or dizziness.  Monitor blood pressure regularly.  Follow up with your PCP for further management for high blood pressure, please call to make appointment within 1 week of discharge Principal Discharge DX:	SIRS (systemic inflammatory response syndrome)  Goal:	Resolved - Patient will remain hemodynamically stable; Patient will remain free from fever.  Assessment and plan of treatment:	You were evaluated by our infectious disease service.  There is no evidence of active infection on exam. Your cultures are NEGATIVE. Source of fever could be due to a reaction to the Flu/Pneumonia vaccine. Continue taking home Keflex as prescribed.  Follow up with your PMD within one week.  Call to schedule follow up appointment. Please follow up with your infectious disease physician Dr. Pike within 1 week of discharge. Please call to make an appointment. Your platelet count and WBC count also noted to be low, but stable- please have repeat lab work within 1 week of discharge from hospital (i.e. CBC- complete blood count) per Dr. Wood.  Secondary Diagnosis:	Chronic deep vein thrombosis (DVT) of popliteal vein of right lower extremity  Goal:	Stable - patient will remain asymptomatic  Assessment and plan of treatment:	Continue Eliquis as prescribed.  Secondary Diagnosis:	Venous stasis of both lower extremities  Goal:	stable  Assessment and plan of treatment:	Follow up with your primary care provider within 1 week of discharge from hospital  Secondary Diagnosis:	Type 2 diabetes mellitus with diabetic neuropathy, without long-term current use of insulin  Goal:	Hgb A1C good - 5.5  Assessment and plan of treatment:	Continue consistent carbohydrate diet.  Monitor blood glucose levels throughout the day before meals and at bedtime.  Record blood sugars and bring to outpatient providers appointment in order to be reviewed by your doctor for management modifications.  Be aware of diabetes management symptoms including feeling cool and clammy may be related to low glucose levels.  Feeling hot and dry may indicate high glucose levels.  If  you feel these symptoms, check your blood sugar.  Make regular podiatry appointments in order to have feet checked for wounds and toe nails cut by a doctor to prevent infections.  Secondary Diagnosis:	Squamous cell carcinoma of lung, unspecified laterality  Goal:	Stable  Assessment and plan of treatment:	Outpatient follow up  Secondary Diagnosis:	Paroxysmal atrial fibrillation  Goal:	Stable - Full Anticoagulation  Assessment and plan of treatment:	Continue Eliquis  Secondary Diagnosis:	Essential hypertension  Goal:	stable  Assessment and plan of treatment:	Continue current blood pressure medication regimen as directed. Monitor for any visual changes, headaches or dizziness.  Monitor blood pressure regularly.  Follow up with your PCP for further management for high blood pressure, please call to make appointment within 1 week of discharge

## 2019-01-11 NOTE — PROGRESS NOTE ADULT - ASSESSMENT
78 year old with a history of ling cancer, prior DVT, and le venous stasis with recurrent cellulitis presented with fever and chills.    1) Venous stasis: no evidence of active cellulitis on exam  Continue previously prescribed keflex  Monitor clinically    2) Fever:   DDX included infectious process, adverse reaction to vaccine, thromboembolic  No evidence of acitve bacterial infection at this time. Cultures are without growth      3)DVT; on eliguis  PE on ddx- but a fever to 104 would be unusual.  COntinue to observe    Observe off abx.  Follow up with pmd  Call ID service with questions
78 year old with a history of ling cancer, prior DVT, and le venous stasis with recurrent cellulitis presented with fever and chills.    1) Venous stasis: no evidence of active cellulitis on exam  Continue previously prescribed keflex  Monitor clinically    2) Fever:   DDX included infectious process, adverse reaction to vaccine, thromboembolic    No evidence of acitve bacterial infection at this time. Cultures are without growth        3)DVT; on eliguis  PE on ddx- but a fever to 104 would be unusual.  COntinue to observe    Observe off abx.  Follow up with pmd  Call ID service with questions
78M h/o lung CA s/p partial lobectomy and last chemo 2015, HTN, DM2, LE DVT on Eliquis, Afib s/p ablation, venous stasis, recurrent LE cellulitis on prophylactic Keflex, presented with 1 day of fevers and chills after receiving flu and PNA vaccines simultaneously
78M h/o lung CA s/p partial lobectomy and last chemo 2015, HTN, DM2, LE DVT on Eliquis, Afib s/p ablation, venous stasis, recurrent LE cellulitis on prophylactic Keflex, presented with 1 day of fevers and chills after receiving flu and PNA vaccines simultaneously
78M h/o lung CA s/p partial lobectomy and last chemo 2015, HTN, DM2, LE DVT on Eliquis, Afib s/p ablation, venous stasis, recurrent LE cellulitis on prophylactic Keflex, presented with 1 day of fevers and chills after receiving flu and PNA vaccines simultaneously      Problem/Plan - 1:  ·  Problem: SIRS (systemic inflammatory response syndrome).  Plan: -SIRS (fever, tachycardia) with no source of infection, possibly reaction to Flu and PNA vaccines   -UA negative, CXR clear, RVP negative, no leukocytosis, no localizing symptoms, non-toxic appearing   -s/p empiric IV Vanco/Zosyn in ER, now on cephalexin  -Blood and urine ctx NGTD      Problem/Plan - 2:  ·  Problem: Chronic deep vein thrombosis (DVT) of popliteal vein of right lower extremity.  Plan: -R popliteal vein DVT was present on previous US --> no changes  -continue Eliquis.        Problem/Plan - 3:  Problem: Essential hypertension. Plan: -continue Norvasc, Metoprolol, Lasix   -monitor BP.     Problem/Plan - 4:  ·  Problem: Venous stasis of both lower extremities.  Plan: -Hold Lasix for now  -h/o recurrent cellulitis, on cephalexin       Problem/Plan - 5:  ·  Problem: Paroxysmal atrial fibrillation.  Plan: -continue Metoprolol   -continue Eliquis.
78M h/o lung CA s/p partial lobectomy and last chemo 2015, HTN, DM2, LE DVT on Eliquis, Afib s/p ablation, venous stasis, recurrent LE cellulitis on prophylactic Keflex, presented with 1 day of fevers and chills after receiving flu and PNA vaccines simultaneously      Problem/Plan - 1:  ·  Problem: SIRS (systemic inflammatory response syndrome).  Plan: -SIRS (fever, tachycardia) with no source of infection, possibly reaction to Flu and PNA vaccines   -UA negative, CXR clear, RVP negative, no leukocytosis, no localizing symptoms, non-toxic appearing   -s/p empiric IV Vanco/Zosyn in ER, now on cephalexin  -f/u blood cultures.         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  Plan: -R popliteal vein DVT was present on previous US --> no changes  -continue Eliquis.        Problem/Plan - 3:  Problem: Essential hypertension. Plan: -continue Norvasc, Metoprolol, Lasix   -monitor BP.     Problem/Plan - 4:  ·  Problem: Venous stasis of both lower extremities.  Plan: -Hold Lasix for now  -h/o recurrent cellulitis, on cephalexin       Problem/Plan - 5:  ·  Problem: Paroxysmal atrial fibrillation.  Plan: -continue Metoprolol   -continue Eliquis.

## 2019-01-11 NOTE — DISCHARGE NOTE ADULT - PLAN OF CARE
Resolved - Patient will remain hemodynamically stable; Patient will remain free from fever. You were evaluated by our infectious disease service.  There is no evidence of active infection on exam. Your cultures are NEGATIVE. Source of fever could be due to a reaction to the Flu/Pneumonia vaccine. Continue taking home Keflex as prescribed.  Follow up with your PMD within one week.  Call to schedule follow up appointment. Stable - patient will remain asymptomatic Continue Eliquis as prescribed. stable Hgb A1C good - 5.5 Medications as prescribed, Consistent Carb diet Stable Outpatient follow up Stable - Full Anticoagulation medications as tolerated; Low sodium, low fat diet Follow up with your primary care provider within 1 week of discharge from hospital Continue consistent carbohydrate diet.  Monitor blood glucose levels throughout the day before meals and at bedtime.  Record blood sugars and bring to outpatient providers appointment in order to be reviewed by your doctor for management modifications.  Be aware of diabetes management symptoms including feeling cool and clammy may be related to low glucose levels.  Feeling hot and dry may indicate high glucose levels.  If  you feel these symptoms, check your blood sugar.  Make regular podiatry appointments in order to have feet checked for wounds and toe nails cut by a doctor to prevent infections. Continue Eliquis Continue current blood pressure medication regimen as directed. Monitor for any visual changes, headaches or dizziness.  Monitor blood pressure regularly.  Follow up with your PCP for further management for high blood pressure, please call to make appointment within 1 week of discharge You were evaluated by our infectious disease service.  There is no evidence of active infection on exam. Your cultures are NEGATIVE. Source of fever could be due to a reaction to the Flu/Pneumonia vaccine. Continue taking home Keflex as prescribed.  Follow up with your PMD within one week.  Call to schedule follow up appointment. Please follow up with your infectious disease physician Dr. Pike within 1 week of discharge. Please call to make an appointment. You were evaluated by our infectious disease service.  There is no evidence of active infection on exam. Your cultures are NEGATIVE. Source of fever could be due to a reaction to the Flu/Pneumonia vaccine. Continue taking home Keflex as prescribed.  Follow up with your PMD within one week.  Call to schedule follow up appointment. Please follow up with your infectious disease physician Dr. Pike within 1 week of discharge. Please call to make an appointment. Your platelet count and WBC count also noted to be low, but stable- please have repeat lab work within 1 week of discharge from hospital (i.e. CBC- complete blood count) per Dr. Wood.

## 2019-01-11 NOTE — DISCHARGE NOTE ADULT - PROVIDER TOKENS
FREE:[LAST:[ronit],FIRST:[andressa],PHONE:[phone]],FAX:[(   )    -]] FREE:[LAST:[ronit],FIRST:[andressa],PHONE:[phone]],FAX:[(   )    -]],TOKEN:'[id number]'

## 2019-01-11 NOTE — DISCHARGE NOTE ADULT - PATIENT PORTAL LINK FT
urine cx= contaminated. pt started on keflex
You can access the [facility] Patient Portal, offered by [facility], by registering with the following website: [email or web address]

## 2019-01-11 NOTE — PROGRESS NOTE ADULT - PROBLEM SELECTOR PLAN 1
no evidence of active cellulitis on exam  Continue previously prescribed keflex  Monitor clinically  PT eval and dc after PT eval

## 2019-01-11 NOTE — DISCHARGE NOTE ADULT - MEDICATION SUMMARY - MEDICATIONS TO TAKE
I will START or STAY ON the medications listed below when I get home from the hospital:    acetaminophen 325 mg oral tablet  -- 2 tab(s) by mouth every 6 hours, As needed, Temp greater or equal to 38C (100.4F), Mild Pain (1 - 3)  -- Indication: For Pain/fever     apixaban 5 mg oral tablet  -- 1 tab(s) by mouth every 12 hours  -- Indication: For DVT/afib     gabapentin 800 mg oral tablet  -- 1 tab(s) by mouth 3 times a day  -- Indication: For Neuropathy    Amaryl 1 mg oral tablet  -- 1 tab(s) by mouth once a day before breakfast  -- Indication: For Type 2 diabetes mellitus with diabetic neuropathy, without long-term current use of insulin    metFORMIN 500 mg oral tablet, extended release  -- 1 tab(s) by mouth once a day  -- Indication: For Type 2 diabetes mellitus with diabetic neuropathy, without long-term current use of insulin    busPIRone 10 mg oral tablet  -- 1 tab(s) by mouth 2 times a day  -- Indication: For anxiety     metoprolol tartrate 25 mg oral tablet  -- 1 tab(s) by mouth 2 times a day  -- Indication: For Essential hypertension    amLODIPine 5 mg oral tablet  -- 1 tab(s) by mouth once a day  -- Indication: For Essential hypertension    cephalexin 250 mg oral capsule  -- 1 cap(s) by mouth every 12 hours  -- Indication: For antibiotic prophylaxis I will START or STAY ON the medications listed below when I get home from the hospital:    acetaminophen 325 mg oral tablet  -- 2 tab(s) by mouth every 6 hours, As needed, Temp greater or equal to 38C (100.4F), Mild Pain (1 - 3)  -- Indication: For Pain/fever     apixaban 5 mg oral tablet  -- 1 tab(s) by mouth every 12 hours  -- Indication: For DVT/afib     gabapentin 800 mg oral tablet  -- 1 tab(s) by mouth 3 times a day, hold for oversedation/lethargy   -- Indication: For Neuropathy    Amaryl 1 mg oral tablet  -- 1 tab(s) by mouth once a day before breakfast  -- Indication: For Type 2 diabetes mellitus with diabetic neuropathy, without long-term current use of insulin    metFORMIN 500 mg oral tablet, extended release  -- 1 tab(s) by mouth once a day  -- Indication: For Type 2 diabetes mellitus with diabetic neuropathy, without long-term current use of insulin    busPIRone 10 mg oral tablet  -- 1 tab(s) by mouth 2 times a day  -- Indication: For anxiety     metoprolol tartrate 25 mg oral tablet  -- 1 tab(s) by mouth 2 times a day, hold for SBP<110 or HR <60  -- Indication: For Essential hypertension    amLODIPine 5 mg oral tablet  -- 1 tab(s) by mouth once a day, hold for SBP <110  -- Indication: For Essential hypertension    cephalexin 250 mg oral capsule  -- 1 cap(s) by mouth every 12 hours  -- Indication: For antibiotic prophylaxis

## 2019-01-11 NOTE — DISCHARGE NOTE ADULT - SECONDARY DIAGNOSIS.
Chronic deep vein thrombosis (DVT) of popliteal vein of right lower extremity Venous stasis of both lower extremities Type 2 diabetes mellitus with diabetic neuropathy, without long-term current use of insulin Squamous cell carcinoma of lung, unspecified laterality Paroxysmal atrial fibrillation Essential hypertension

## 2019-01-11 NOTE — DISCHARGE NOTE ADULT - CARE PROVIDER_API CALL
andressa cottrell  Phone: [phone]  Fax: (   )    - andressa cottrell  Phone: [phone]  Fax: (   )    -    Manda Pike), Infectious Disease; Internal Medicine  [address] [place] [address]  Phone: [phone]  Fax: [phone]

## 2019-01-11 NOTE — PROGRESS NOTE ADULT - SUBJECTIVE AND OBJECTIVE BOX
Follow Up:      Inverval History/ROS:Patient is a 78y old  Male who presents with a chief complaint of fever (11 Jan 2019 12:12)    No fever. No events    Denies cough/ SOB.   No abd pain  No diarrhea.    Allergies    No Known Allergies    Intolerances        ANTIMICROBIALS:  cephalexin 250 every 12 hours      OTHER MEDS:  acetaminophen   Tablet .. 650 milliGRAM(s) Oral every 6 hours PRN  amLODIPine   Tablet 5 milliGRAM(s) Oral daily  apixaban 5 milliGRAM(s) Oral every 12 hours  busPIRone 10 milliGRAM(s) Oral two times a day  gabapentin 800 milliGRAM(s) Oral three times a day  insulin lispro (HumaLOG) corrective regimen sliding scale   SubCutaneous three times a day before meals  insulin lispro (HumaLOG) corrective regimen sliding scale   SubCutaneous at bedtime  metoprolol tartrate 25 milliGRAM(s) Oral two times a day      Vital Signs Last 24 Hrs  T(C): 36.7 (11 Jan 2019 13:03), Max: 36.7 (11 Jan 2019 05:52)  T(F): 98 (11 Jan 2019 13:03), Max: 98.1 (11 Jan 2019 05:52)  HR: 77 (11 Jan 2019 13:03) (64 - 77)  BP: 138/88 (11 Jan 2019 13:03) (131/77 - 146/86)  BP(mean): --  RR: 17 (11 Jan 2019 13:03) (17 - 18)  SpO2: 99% (11 Jan 2019 13:03) (96% - 99%)    PHYSICAL EXAM:  General: [x ] non-toxic  HEAD/EYES: [ ] PERRL [ x] white sclera [ ] icterus  ENT:  [ ] normal [ x] supple [ ] thrush [ ] pharyngeal exudate  Cardiovascular:   [ ] murmur [ x] normal [ ] PPM/AICD  Respiratory:  [ x] clear to ausculation bilaterally  GI:  [x ] soft, non-tender, normal bowel sounds  :  [ ] kennedy [x ] no CVA tenderness   Musculoskeletal:  [x ] no synovitis  Neurologic:  [x ] non-focal exam   Skin:  [x ] bilateral stasis change  Lymph: [ x] no lymphadenopathy  Psychiatric:  [ ] appropriate affect [ x] alert & oriented  Lines:  [ x] no phlebitis [ ] central line                                13.7   3.60  )-----------( 63       ( 11 Jan 2019 06:05 )             40.9       01-11    141  |  104  |  14  ----------------------------<  100<H>  3.4<L>   |  23  |  0.90    Ca    8.9      11 Jan 2019 06:05  Phos  2.8     01-11  Mg     1.9     01-11            MICROBIOLOGY:    RADIOLOGY:
Cardiology/Vascular Medicine Inpatient Progress Note      Vital Signs Last 24 Hrs  T(C): 36.4 (10 Sree 2019 13:43), Max: 36.8 (09 Jan 2019 22:49)  T(F): 97.6 (10 Sree 2019 13:43), Max: 98.2 (09 Jan 2019 22:49)  HR: 71 (10 Sree 2019 17:28) (67 - 94)  BP: 134/70 (10 Sree 2019 17:28) (118/72 - 135/71)  BP(mean): --  RR: 17 (10 Sree 2019 13:43) (17 - 18)  SpO2: 99% (10 Sree 2019 13:43) (95% - 99%)      Appearance: NAD  HEENT:   Normal oral mucosa, PERRL, EOMI	  Lymphatic: No lymphadenopathy  Cardiovascular: Normal S1 S2, No JVD, No murmurs, No edema  Respiratory: Lungs clear to auscultation	  Psychiatry: Awake, aware to name, place  Gastrointestinal:  Soft, Non-tender, + BS	  Skin: No rashes, No ecchymoses, No cyanosis	  Neurologic: Non-focal  Extremities: As above, +extensive chronic venous stasis skin changes b/l LEs    MEDICATIONS  (STANDING):  amLODIPine   Tablet 5 milliGRAM(s) Oral daily  apixaban 5 milliGRAM(s) Oral every 12 hours  busPIRone 10 milliGRAM(s) Oral two times a day  cephalexin 250 milliGRAM(s) Oral every 12 hours  gabapentin 800 milliGRAM(s) Oral three times a day  insulin lispro (HumaLOG) corrective regimen sliding scale   SubCutaneous three times a day before meals  insulin lispro (HumaLOG) corrective regimen sliding scale   SubCutaneous at bedtime  metoprolol tartrate 25 milliGRAM(s) Oral two times a day  potassium acid phosphate/sodium acid phosphate tablet (K-PHOS No. 2) 1 Tablet(s) Oral four times a day with meals    MEDICATIONS  (PRN):  acetaminophen   Tablet .. 650 milliGRAM(s) Oral every 6 hours PRN Temp greater or equal to 38C (100.4F), Mild Pain (1 - 3)  acetaminophen   Tablet .. 650 milliGRAM(s) Oral every 6 hours PRN Temp greater or equal to 38C (100.4F)    LABS:	 	                            14.7   4.54  )-----------( 71       ( 10 Sree 2019 06:50 )             45.0   01-10    137  |  102  |  15  ----------------------------<  119<H>  3.5   |  23  |  0.97    Ca    9.0      10 Sree 2019 06:50  Phos  1.8     01-10  Mg     1.7     01-10    TPro  7.1  /  Alb  4.2  /  TBili  0.9  /  DBili  x   /  AST  14  /  ALT  12  /  AlkPhos  58  01-08    PT/INR - ( 08 Jan 2019 23:42 )   PT: 19.8 SEC;   INR: 1.75     PTT - ( 08 Jan 2019 23:42 )  PTT:33.9 SEC    < from: Xray Chest 1 View AP/PA (01.09.19 @ 01:32) >    EXAM:  XR CHEST AP OR PA 1V        PROCEDURE DATE:  Jan 9 2019         INTERPRETATION:  CLINICAL INFORMATION: Fever.    TECHNIQUE: Portable AP radiograph of the chest.    COMPARISON: Chest x-ray 2/10/2018.    FINDINGS:    LUNGS AND PLEURA: The lungs are clear. No pleural effusion or   pneumothorax.    HEART AND MEDIASTINUM: Heart size is within normal limits.    SKELETON: Degenerative changes of the spine.      IMPRESSION:     Clear lungs.      ROGELIO ROJAS M.D., RADIOLOGY RESIDENT  This document has been electronically signed.  REGINA FINCH M.D., ATTENDING RADIOLOGIST  This document has been electronically signed. Jan 9 2019  5:48AM      < from: US Duplex Venous Lower Ext Complete, Bilateral (01.09.19 @ 01:08) >    EXAM:  US DPLX LWR EXT VEINS COMPL BI        PROCEDURE DATE:  Jan 9 2019         INTERPRETATION:  CLINICAL INFORMATION: Lower extremity swelling. History   of right lower extremity DVT.    COMPARISON: Bilateral lower extremity duplex ultrasound of11/6/2017.    TECHNIQUE: Duplex sonography of the BILATERAL LOWER extremities with   color and spectral Doppler, with and without compression.      FINDINGS:    Noncompressible partially occlusive thrombus within the right popliteal   vein. No additional thrombus is seen in the visualized segment of the   right lower extremity veins.    There is normal compressibility of the left common femoral, femoral and   popliteal veins. No left calf vein thrombosis is detected.    IMPRESSION:     Noncompressible thrombus within the right popliteal vein, similar in   location to that of prior DVT demonstrated on 11/6/2017.     No left lower extremity deep venous thrombosis.    The above findings were discussed with Dr. Mitchell by Dr. Rojas on   1/9/2019 1:48 AM, with read-back verification.      ROGELIO ROJAS M.D., RADIOLOGY RESIDENT  This document has been electronically signed.  ANNE-MARIE ZAVALETA M.D., ATTENDING RADIOLOGIST  This document has been electronically signed. Jan 9 2019  2:04AM
Cardiology/Vascular Medicine Inpatient Progress Note    No interval change in complaints or symptoms    Vital Signs Last 24 Hrs  T(C): 36.7 (11 Jan 2019 05:52), Max: 36.7 (11 Jan 2019 05:52)  T(F): 98.1 (11 Jan 2019 05:52), Max: 98.1 (11 Jan 2019 05:52)  HR: 64 (11 Jan 2019 05:52) (64 - 71)  BP: 146/86 (11 Jan 2019 05:52) (120/57 - 146/86)  BP(mean): --  RR: 18 (11 Jan 2019 05:52) (17 - 18)  SpO2: 96% (11 Jan 2019 05:52) (96% - 99%)    Appearance: NAD  HEENT:   Normal oral mucosa, PERRL, EOMI	  Lymphatic: No lymphadenopathy  Cardiovascular: Normal S1 S2, No JVD, No murmurs, No edema  Respiratory: Lungs clear to auscultation	  Psychiatry: Awake, aware to name, place  Gastrointestinal:  Soft, Non-tender, + BS		  Neurologic: Non-focal  Extremities: +Extensive chronic venous stasis skin changes b/l LEs    MEDICATIONS  (STANDING):  amLODIPine   Tablet 5 milliGRAM(s) Oral daily  apixaban 5 milliGRAM(s) Oral every 12 hours  busPIRone 10 milliGRAM(s) Oral two times a day  cephalexin 250 milliGRAM(s) Oral every 12 hours  gabapentin 800 milliGRAM(s) Oral three times a day  insulin lispro (HumaLOG) corrective regimen sliding scale   SubCutaneous three times a day before meals  insulin lispro (HumaLOG) corrective regimen sliding scale   SubCutaneous at bedtime  metoprolol tartrate 25 milliGRAM(s) Oral two times a day    MEDICATIONS  (PRN):  acetaminophen   Tablet .. 650 milliGRAM(s) Oral every 6 hours PRN Temp greater or equal to 38C (100.4F), Mild Pain (1 - 3), Moderate Pain (4 - 6)    LABS:	 	                                           13.7   3.60  )-----------( 63       ( 11 Jan 2019 06:05 )             40.9   01-11    141  |  104  |  14  ----------------------------<  100<H>  3.4<L>   |  23  |  0.90    Ca    8.9      11 Jan 2019 06:05  Phos  2.8     01-11  Mg     1.9     01-11        < from: Xray Chest 1 View AP/PA (01.09.19 @ 01:32) >    EXAM:  XR CHEST AP OR PA 1V        PROCEDURE DATE:  Jan 9 2019         INTERPRETATION:  CLINICAL INFORMATION: Fever.    TECHNIQUE: Portable AP radiograph of the chest.    COMPARISON: Chest x-ray 2/10/2018.    FINDINGS:    LUNGS AND PLEURA: The lungs are clear. No pleural effusion or   pneumothorax.    HEART AND MEDIASTINUM: Heart size is within normal limits.    SKELETON: Degenerative changes of the spine.      IMPRESSION:     Clear lungs.      ROGELIO ROJAS M.D., RADIOLOGY RESIDENT  This document has been electronically signed.  REGINA FINCH M.D., ATTENDING RADIOLOGIST  This document has been electronically signed. Jan 9 2019  5:48AM      < from: US Duplex Venous Lower Ext Complete, Bilateral (01.09.19 @ 01:08) >    EXAM:  US DPLX LWR EXT VEINS COMPL BI        PROCEDURE DATE:  Jan 9 2019         INTERPRETATION:  CLINICAL INFORMATION: Lower extremity swelling. History   of right lower extremity DVT.    COMPARISON: Bilateral lower extremity duplex ultrasound of11/6/2017.    TECHNIQUE: Duplex sonography of the BILATERAL LOWER extremities with   color and spectral Doppler, with and without compression.      FINDINGS:    Noncompressible partially occlusive thrombus within the right popliteal   vein. No additional thrombus is seen in the visualized segment of the   right lower extremity veins.    There is normal compressibility of the left common femoral, femoral and   popliteal veins. No left calf vein thrombosis is detected.    IMPRESSION:     Noncompressible thrombus within the right popliteal vein, similar in   location to that of prior DVT demonstrated on 11/6/2017.     No left lower extremity deep venous thrombosis.    The above findings were discussed with Dr. Mitchell by Dr. Rojas on   1/9/2019 1:48 AM, with read-back verification.      ROGELIO ROJAS M.D., RADIOLOGY RESIDENT  This document has been electronically signed.  ANNE-MARIE ZAVALETA M.D., ATTENDING RADIOLOGIST  This document has been electronically signed. Jan 9 2019  2:04AM
Follow Up:      Inverval History/ROS:Patient is a 78y old  Male who presents with a chief complaint of fever (2019 21:58)      Allergies    No Known Allergies    Intolerances        ANTIMICROBIALS:  cephalexin 250 every 12 hours      OTHER MEDS:  acetaminophen   Tablet .. 650 milliGRAM(s) Oral every 6 hours PRN  acetaminophen   Tablet .. 650 milliGRAM(s) Oral every 6 hours PRN  amLODIPine   Tablet 5 milliGRAM(s) Oral daily  apixaban 5 milliGRAM(s) Oral every 12 hours  busPIRone 10 milliGRAM(s) Oral two times a day  gabapentin 800 milliGRAM(s) Oral three times a day  insulin lispro (HumaLOG) corrective regimen sliding scale   SubCutaneous three times a day before meals  insulin lispro (HumaLOG) corrective regimen sliding scale   SubCutaneous at bedtime  metoprolol tartrate 25 milliGRAM(s) Oral two times a day  potassium acid phosphate/sodium acid phosphate tablet (K-PHOS No. 2) 1 Tablet(s) Oral four times a day with meals      Vital Signs Last 24 Hrs  T(C): 36.4 (10 Sree 2019 13:43), Max: 36.8 (2019 22:49)  T(F): 97.6 (10 Sree 2019 13:43), Max: 98.2 (2019 22:49)  HR: 67 (10 Sree 2019 13:43) (67 - 94)  BP: 120/57 (10 Sree 2019 13:43) (118/72 - 135/71)  BP(mean): --  RR: 17 (10 Sree 2019 13:43) (17 - 18)  SpO2: 99% (10 Sree 2019 13:43) (95% - 99%)    PHYSICAL EXAM:  General: [x ] non-toxic  HEAD/EYES: [ ] PERRL [x ] white sclera [ ] icterus  ENT:  [ ] normal [x ] supple [ ] thrush [ ] pharyngeal exudate  Cardiovascular:   [ ] murmur [ x] normal [ ] PPM/AICD  Respiratory:  [x ] clear to ausculation bilaterally  GI:  [x ] soft, non-tender, normal bowel sounds  :  [ ] kennedy [x ] no CVA tenderness   Musculoskeletal:  x[ ] no synovitis  Neurologic:  [ ] non-focal exam   Skin:  [x ] Bilateral le skin change cw statiss  not warm- not suggestive of cellultiis  Lymph: [ ] no lymphadenopathy  Psychiatric:  [ ] appropriate affect [x ] alert & oriented  Lines:  [ ] no phlebitis [ ] central line                                14.7   4.54  )-----------( 71       ( 10 Sree 2019 06:50 )             45.0       -10    137  |  102  |  15  ----------------------------<  119<H>  3.5   |  23  |  0.97    Ca    9.0      10 Sree 2019 06:50  Phos  1.8     01-10  Mg     1.7     01-10    TPro  7.1  /  Alb  4.2  /  TBili  0.9  /  DBili  x   /  AST  14  /  ALT  12  /  AlkPhos  58        Urinalysis Basic - ( 2019 23:42 )    Color: YELLO / Appearance: CLEAR / S.027 / pH: 6.0  Gluc: NEGATIVE / Ketone: NEGATIVE  / Bili: NEGATIVE / Urobili: NORMAL   Blood: NEGATIVE / Protein: 20 / Nitrite: NEGATIVE   Leuk Esterase: NEGATIVE / RBC: 3-5 / WBC 0-2   Sq Epi: OCC / Non Sq Epi: x / Bacteria: NEGATIVE        MICROBIOLOGY:  Culture - Urine (19 @ 02:51)    Culture - Urine:   NO GROWTH AT 24 HOURS    Specimen Source: URINE MIDSTREAM    Culture - Blood (19 @ 00:51)    Culture - Blood:   NO ORGANISMS ISOLATED  NO ORGANISMS ISOLATED AT 24 HOURS    Specimen Source: BLOOD VENOUS      RADIOLOGY:
SUBJECTIVE / OVERNIGHT EVENTS: pt denies chest pain, shortness of breath, nausea, vomiting, lower ext pain       MEDICATIONS  (STANDING):  amLODIPine   Tablet 5 milliGRAM(s) Oral daily  apixaban 5 milliGRAM(s) Oral every 12 hours  busPIRone 10 milliGRAM(s) Oral two times a day  cephalexin 250 milliGRAM(s) Oral every 12 hours  gabapentin 800 milliGRAM(s) Oral three times a day  insulin lispro (HumaLOG) corrective regimen sliding scale   SubCutaneous three times a day before meals  insulin lispro (HumaLOG) corrective regimen sliding scale   SubCutaneous at bedtime  metoprolol tartrate 25 milliGRAM(s) Oral two times a day  potassium acid phosphate/sodium acid phosphate tablet (K-PHOS No. 2) 1 Tablet(s) Oral four times a day with meals    MEDICATIONS  (PRN):  acetaminophen   Tablet .. 650 milliGRAM(s) Oral every 6 hours PRN Temp greater or equal to 38C (100.4F), Mild Pain (1 - 3)  acetaminophen   Tablet .. 650 milliGRAM(s) Oral every 6 hours PRN Temp greater or equal to 38C (100.4F)    Vital Signs Last 24 Hrs  T(C): 36.4 (10 Sree 2019 13:43), Max: 36.8 (2019 22:49)  T(F): 97.6 (10 Sree 2019 13:43), Max: 98.2 (2019 22:49)  HR: 71 (10 Sree 2019 17:28) (67 - 72)  BP: 134/70 (10 Sree 2019 17:28) (118/72 - 135/71)  BP(mean): --  RR: 17 (10 Sree 2019 13:43) (17 - 18)  SpO2: 99% (10 Sree 2019 13:43) (95% - 99%)    CAPILLARY BLOOD GLUCOSE      POCT Blood Glucose.: 120 mg/dL (10 Sree 2019 17:23)  POCT Blood Glucose.: 134 mg/dL (10 Sree 2019 12:17)  POCT Blood Glucose.: 138 mg/dL (10 Sree 2019 08:53)  POCT Blood Glucose.: 121 mg/dL (2019 22:35)    I&O's Summary      Constitutional: No fever, fatigue  Skin: No rash.  Eyes: No recent vision problems or eye pain.  ENT: No congestion, ear pain, or sore throat.  Cardiovascular: No chest pain or palpation.  Respiratory: No cough, shortness of breath, congestion, or wheezing.  Gastrointestinal: No abdominal pain, nausea, vomiting, or diarrhea.  Genitourinary: No dysuria.  Musculoskeletal: No joint swelling.  Neurologic: No headache.    PHYSICAL EXAM:  GENERAL: NAD  EYES: EOMI, PERRLA  NECK: Supple, No JVD  CHEST/LUNG: dec breath sounds at bases   HEART:  S1 , S2 +  ABDOMEN: soft, bs+  EXTREMITIES:  edema+ , chronic venous stasis changes +  NEUROLOGY:alert awake       LABS:                        14.7   4.54  )-----------( 71       ( 10 Sree 2019 06:50 )             45.0     -10    137  |  102  |  15  ----------------------------<  119<H>  3.5   |  23  |  0.97    Ca    9.0      10 Sree 2019 06:50  Phos  1.8     01-10  Mg     1.7     01-10    TPro  7.1  /  Alb  4.2  /  TBili  0.9  /  DBili  x   /  AST  14  /  ALT  12  /  AlkPhos  58  -    PT/INR - ( 2019 23:42 )   PT: 19.8 SEC;   INR: 1.75          PTT - ( 2019 23:42 )  PTT:33.9 SEC      Urinalysis Basic - ( 2019 23:42 )    Color: YELLO / Appearance: CLEAR / S.027 / pH: 6.0  Gluc: NEGATIVE / Ketone: NEGATIVE  / Bili: NEGATIVE / Urobili: NORMAL   Blood: NEGATIVE / Protein: 20 / Nitrite: NEGATIVE   Leuk Esterase: NEGATIVE / RBC: 3-5 / WBC 0-2   Sq Epi: OCC / Non Sq Epi: x / Bacteria: NEGATIVE        RADIOLOGY & ADDITIONAL TESTS:    Imaging Personally Reviewed:    Consultant(s) Notes Reviewed:      Care Discussed with Consultants/Other Providers:
SUBJECTIVE / OVERNIGHT EVENTS: pt denies chest pain, shortness of breath, nausea, vomiting, lower ext pain ,l pt says that hsi back hurts sometime sand cant ambulate , denies bladder and bowel incontinence    MEDICATIONS  (STANDING):  amLODIPine   Tablet 5 milliGRAM(s) Oral daily  apixaban 5 milliGRAM(s) Oral every 12 hours  busPIRone 10 milliGRAM(s) Oral two times a day  cephalexin 250 milliGRAM(s) Oral every 12 hours  gabapentin 800 milliGRAM(s) Oral three times a day  insulin lispro (HumaLOG) corrective regimen sliding scale   SubCutaneous three times a day before meals  insulin lispro (HumaLOG) corrective regimen sliding scale   SubCutaneous at bedtime  metoprolol tartrate 25 milliGRAM(s) Oral two times a day    MEDICATIONS  (PRN):  acetaminophen   Tablet .. 650 milliGRAM(s) Oral every 6 hours PRN Temp greater or equal to 38C (100.4F), Mild Pain (1 - 3), Moderate Pain (4 - 6)    Vital Signs Last 24 Hrs  T(C): 36.8 (2019 20:27), Max: 36.8 (2019 20:27)  T(F): 98.3 (2019 20:27), Max: 98.3 (2019 20:27)  HR: 57 (2019 20:27) (57 - 77)  BP: 135/67 (2019 20:27) (131/77 - 146/86)  BP(mean): --  RR: 18 (2019 20:27) (17 - 18)  SpO2: 97% (2019 20:27) (96% - 99%)    Constitutional: No fever, fatigue  Skin: No rash.  Eyes: No recent vision problems or eye pain.  ENT: No congestion, ear pain, or sore throat.  Cardiovascular: No chest pain or palpation.  Respiratory: No cough, shortness of breath, congestion, or wheezing.  Gastrointestinal: No abdominal pain, nausea, vomiting, or diarrhea.  Genitourinary: No dysuria.  Musculoskeletal: No joint swelling.  Neurologic: No headache.    PHYSICAL EXAM:  GENERAL: NAD  EYES: EOMI, PERRLA  NECK: Supple, No JVD  CHEST/LUNG: dec breath sounds at bases   HEART:  S1 , S2 +  ABDOMEN: soft, bs+  EXTREMITIES:  edema+ , chronic venous stasis changes +  NEUROLOGY:alert awake       LABS:      141  |  104  |  14  ----------------------------<  100<H>  3.4<L>   |  23  |  0.90    Ca    8.9      2019 06:05  Phos  2.8     -11  Mg     1.9     -11      Creatinine Trend: 0.90 <--, 0.97 <--, 1.02 <--                        13.7   3.60  )-----------( 63       ( 2019 06:05 )             40.9     Urine Studies:  Urinalysis Basic - ( 2019 23:42 )    Color: YELLO / Appearance: CLEAR / S.027 / pH: 6.0  Gluc: NEGATIVE / Ketone: NEGATIVE  / Bili: NEGATIVE / Urobili: NORMAL   Blood: NEGATIVE / Protein: 20 / Nitrite: NEGATIVE   Leuk Esterase: NEGATIVE / RBC: 3-5 / WBC 0-2   Sq Epi: OCC / Non Sq Epi:  / Bacteria: NEGATIVE                    Consultant(s) Notes Reviewed:      Care Discussed with Consultants/Other Providers:
pt wase valuated by hospitalist earlier during the day   labs, vitals, consults reviewed

## 2019-01-11 NOTE — DISCHARGE NOTE ADULT - HOSPITAL COURSE
78M h/o lung CA s/p partial lobectomy and last chemo 2015, HTN, DM2, LE DVT on Eliquis, Afib s/p ablation, venous stasis, recurrent LE cellulitis on prophylactic Keflex, presented with 1 day of fevers and chills after receiving flu and PNA vaccines simultaneously     SIRS (systemic inflammatory response syndrome).    no evidence of active cellulitis on exam  Continue previously prescribed keflex  Improved clinically.     Lactic acid increased.   stable    Chronic deep vein thrombosis (DVT) of popliteal vein of right lower extremity.  -R popliteal vein DVT was present on previous US  -continue Eliquis.     Type 2 diabetes mellitus with diabetic neuropathy, without long-term current use of insulin.   -ciss.      Neuropathy.    -continue neurontin.     Essential hypertension.   -continue Norvasc, Metoprolol, Lasix   -monitor BP.    Venous stasis of both lower extremities.   -hold Lasix for now  -h/o recurrent cellulitis, currently no evidence of infection, continue prophylactic Keflex.      Thrombocytopenia.   -unclear etiology, ?medication induced   -monitor platelets.     Paroxysmal atrial fibrillation.    -continue Metoprolol   -continue Eliquis.      Squamous cell carcinoma of lung, unspecified laterality.   -s/p partial lobectomy and chemo   -outpatient f/up.      Dispo: home 78M h/o lung CA s/p partial lobectomy and last chemo 2015, HTN, DM2, LE DVT on Eliquis, Afib s/p ablation, venous stasis, recurrent LE cellulitis on prophylactic Keflex, presented with 1 day of fevers and chills after receiving flu and PNA vaccines simultaneously     SIRS (systemic inflammatory response syndrome).    no evidence of active cellulitis on exam  Continue previously prescribed keflex  Improved clinically.   ID consulted, recs appreciated     Lactic acid increased.   stable    Chronic deep vein thrombosis (DVT) of popliteal vein of right lower extremity.  -R popliteal vein DVT was present on previous US  -continue Eliquis.     Type 2 diabetes mellitus with diabetic neuropathy, without long-term current use of insulin.   -corrective sliding scale and finger sticks QID while inpatient  -resume home meds upon d/c  -diabetic diet.       Neuropathy.    -continue Neurontin     Essential hypertension.   -continue Norvasc, Metoprolol as directed with hold parameters. Lasix was discontinued.   -monitored BP.    Venous stasis of both lower extremities.   -lasix held   -h/o recurrent cellulitis, currently no evidence of infection, continue prophylactic Keflex.      Thrombocytopenia.   -unclear etiology, ?medication induced   -Platelets monitored, stable.     Paroxysmal atrial fibrillation.    -continue Metoprolol   -continue Eliquis.      Squamous cell carcinoma of lung, unspecified laterality.   -s/p partial lobectomy and chemo   -outpatient f/up.      Dispo: pt medically stable for discharge per Dr. Wood.

## 2019-01-11 NOTE — DISCHARGE NOTE ADULT - CARE PROVIDERS DIRECT ADDRESSES
,DirectAddress_Unknown ,DirectAddress_Unknown,[email or web address]

## 2019-01-11 NOTE — PROGRESS NOTE ADULT - PROVIDER SPECIALTY LIST ADULT
Infectious Disease
Internal Medicine
Vascular Cardiology
Vascular Cardiology
Infectious Disease

## 2019-01-11 NOTE — PHYSICAL THERAPY INITIAL EVALUATION ADULT - DISCHARGE DISPOSITION, PT EVAL
Home with no skilled PT needs, Pt. presents without gross impairment and independent with functional mobility. Skilled, therapeutic PT intervention not indicated at this time. Pt. will be d/c from PT program; please re-consult if needed.

## 2019-01-11 NOTE — PHYSICAL THERAPY INITIAL EVALUATION ADULT - CRITERIA FOR SKILLED THERAPEUTIC INTERVENTIONS
rehab potential/predicted duration of therapy intervention/anticipated equipment needs at discharge/impairments found/therapy frequency/anticipated discharge recommendation

## 2019-01-12 VITALS
HEART RATE: 65 BPM | DIASTOLIC BLOOD PRESSURE: 76 MMHG | OXYGEN SATURATION: 98 % | SYSTOLIC BLOOD PRESSURE: 159 MMHG | TEMPERATURE: 98 F | RESPIRATION RATE: 18 BRPM

## 2019-01-12 LAB
ANION GAP SERPL CALC-SCNC: 11 MEQ/L — SIGNIFICANT CHANGE UP (ref 7–14)
BASOPHILS # BLD AUTO: 0.02 K/UL — SIGNIFICANT CHANGE UP (ref 0–0.2)
BASOPHILS NFR BLD AUTO: 0.6 % — SIGNIFICANT CHANGE UP (ref 0–2)
BUN SERPL-MCNC: 14 MG/DL — SIGNIFICANT CHANGE UP (ref 7–23)
CALCIUM SERPL-MCNC: 8.8 MG/DL — SIGNIFICANT CHANGE UP (ref 8.4–10.5)
CHLORIDE SERPL-SCNC: 107 MMOL/L — SIGNIFICANT CHANGE UP (ref 98–107)
CO2 SERPL-SCNC: 25 MMOL/L — SIGNIFICANT CHANGE UP (ref 22–31)
CREAT SERPL-MCNC: 0.89 MG/DL — SIGNIFICANT CHANGE UP (ref 0.5–1.3)
EOSINOPHIL # BLD AUTO: 0.09 K/UL — SIGNIFICANT CHANGE UP (ref 0–0.5)
EOSINOPHIL NFR BLD AUTO: 2.9 % — SIGNIFICANT CHANGE UP (ref 0–6)
GLUCOSE BLDC GLUCOMTR-MCNC: 105 MG/DL — HIGH (ref 70–99)
GLUCOSE BLDC GLUCOMTR-MCNC: 126 MG/DL — HIGH (ref 70–99)
GLUCOSE SERPL-MCNC: 104 MG/DL — HIGH (ref 70–99)
HCT VFR BLD CALC: 41.2 % — SIGNIFICANT CHANGE UP (ref 39–50)
HCT VFR BLD CALC: 41.2 % — SIGNIFICANT CHANGE UP (ref 39–50)
HGB BLD-MCNC: 13.7 G/DL — SIGNIFICANT CHANGE UP (ref 13–17)
HGB BLD-MCNC: 13.7 G/DL — SIGNIFICANT CHANGE UP (ref 13–17)
IMM GRANULOCYTES NFR BLD AUTO: 0.3 % — SIGNIFICANT CHANGE UP (ref 0–1.5)
LYMPHOCYTES # BLD AUTO: 0.88 K/UL — LOW (ref 1–3.3)
LYMPHOCYTES # BLD AUTO: 28.2 % — SIGNIFICANT CHANGE UP (ref 13–44)
MAGNESIUM SERPL-MCNC: 2.1 MG/DL — SIGNIFICANT CHANGE UP (ref 1.6–2.6)
MCHC RBC-ENTMCNC: 28.5 PG — SIGNIFICANT CHANGE UP (ref 27–34)
MCHC RBC-ENTMCNC: 28.5 PG — SIGNIFICANT CHANGE UP (ref 27–34)
MCHC RBC-ENTMCNC: 33.3 % — SIGNIFICANT CHANGE UP (ref 32–36)
MCHC RBC-ENTMCNC: 33.3 % — SIGNIFICANT CHANGE UP (ref 32–36)
MCV RBC AUTO: 85.8 FL — SIGNIFICANT CHANGE UP (ref 80–100)
MCV RBC AUTO: 85.8 FL — SIGNIFICANT CHANGE UP (ref 80–100)
MONOCYTES # BLD AUTO: 0.36 K/UL — SIGNIFICANT CHANGE UP (ref 0–0.9)
MONOCYTES NFR BLD AUTO: 11.5 % — SIGNIFICANT CHANGE UP (ref 2–14)
NEUTROPHILS # BLD AUTO: 1.76 K/UL — LOW (ref 1.8–7.4)
NEUTROPHILS NFR BLD AUTO: 56.5 % — SIGNIFICANT CHANGE UP (ref 43–77)
NRBC # FLD: 0 K/UL — LOW (ref 25–125)
NRBC # FLD: 0 K/UL — LOW (ref 25–125)
PHOSPHATE SERPL-MCNC: 2.8 MG/DL — SIGNIFICANT CHANGE UP (ref 2.5–4.5)
PLATELET # BLD AUTO: 77 K/UL — LOW (ref 150–400)
PLATELET # BLD AUTO: 77 K/UL — LOW (ref 150–400)
PMV BLD: 10.3 FL — SIGNIFICANT CHANGE UP (ref 7–13)
PMV BLD: 10.3 FL — SIGNIFICANT CHANGE UP (ref 7–13)
POTASSIUM SERPL-MCNC: 3.7 MMOL/L — SIGNIFICANT CHANGE UP (ref 3.5–5.3)
POTASSIUM SERPL-SCNC: 3.7 MMOL/L — SIGNIFICANT CHANGE UP (ref 3.5–5.3)
RBC # BLD: 4.8 M/UL — SIGNIFICANT CHANGE UP (ref 4.2–5.8)
RBC # BLD: 4.8 M/UL — SIGNIFICANT CHANGE UP (ref 4.2–5.8)
RBC # FLD: 14.5 % — SIGNIFICANT CHANGE UP (ref 10.3–14.5)
RBC # FLD: 14.5 % — SIGNIFICANT CHANGE UP (ref 10.3–14.5)
SODIUM SERPL-SCNC: 143 MMOL/L — SIGNIFICANT CHANGE UP (ref 135–145)
WBC # BLD: 3.13 K/UL — LOW (ref 3.8–10.5)
WBC # BLD: 3.13 K/UL — LOW (ref 3.8–10.5)
WBC # FLD AUTO: 3.13 K/UL — LOW (ref 3.8–10.5)
WBC # FLD AUTO: 3.13 K/UL — LOW (ref 3.8–10.5)

## 2019-01-12 RX ADMIN — Medication 650 MILLIGRAM(S): at 14:18

## 2019-01-12 RX ADMIN — Medication 650 MILLIGRAM(S): at 14:50

## 2019-01-12 RX ADMIN — GABAPENTIN 800 MILLIGRAM(S): 400 CAPSULE ORAL at 14:17

## 2019-01-12 RX ADMIN — Medication 250 MILLIGRAM(S): at 05:23

## 2019-01-12 RX ADMIN — AMLODIPINE BESYLATE 5 MILLIGRAM(S): 2.5 TABLET ORAL at 05:23

## 2019-01-12 RX ADMIN — Medication 25 MILLIGRAM(S): at 05:23

## 2019-01-12 RX ADMIN — GABAPENTIN 800 MILLIGRAM(S): 400 CAPSULE ORAL at 05:23

## 2019-01-12 RX ADMIN — Medication 650 MILLIGRAM(S): at 05:23

## 2019-01-12 RX ADMIN — Medication 650 MILLIGRAM(S): at 05:53

## 2019-01-12 RX ADMIN — Medication 10 MILLIGRAM(S): at 05:23

## 2019-01-12 RX ADMIN — APIXABAN 5 MILLIGRAM(S): 2.5 TABLET, FILM COATED ORAL at 05:23

## 2019-01-14 LAB
BACTERIA BLD CULT: SIGNIFICANT CHANGE UP
BACTERIA BLD CULT: SIGNIFICANT CHANGE UP

## 2019-01-25 ENCOUNTER — OTHER (OUTPATIENT)
Age: 79
End: 2019-01-25

## 2019-02-05 ENCOUNTER — APPOINTMENT (OUTPATIENT)
Dept: INFECTIOUS DISEASE | Facility: CLINIC | Age: 79
End: 2019-02-05

## 2019-02-07 ENCOUNTER — APPOINTMENT (OUTPATIENT)
Dept: CARDIOLOGY | Facility: CLINIC | Age: 79
End: 2019-02-07
Payer: MEDICARE

## 2019-02-07 ENCOUNTER — NON-APPOINTMENT (OUTPATIENT)
Age: 79
End: 2019-02-07

## 2019-02-07 VITALS
WEIGHT: 282 LBS | DIASTOLIC BLOOD PRESSURE: 78 MMHG | BODY MASS INDEX: 36.19 KG/M2 | SYSTOLIC BLOOD PRESSURE: 155 MMHG | OXYGEN SATURATION: 97 % | HEART RATE: 57 BPM | HEIGHT: 74 IN

## 2019-02-07 VITALS — DIASTOLIC BLOOD PRESSURE: 65 MMHG | SYSTOLIC BLOOD PRESSURE: 120 MMHG

## 2019-02-07 DIAGNOSIS — N40.0 BENIGN PROSTATIC HYPERPLASIA WITHOUT LOWER URINARY TRACT SYMPMS: ICD-10-CM

## 2019-02-07 PROCEDURE — 99214 OFFICE O/P EST MOD 30 MIN: CPT

## 2019-02-07 PROCEDURE — 93000 ELECTROCARDIOGRAM COMPLETE: CPT

## 2019-02-07 NOTE — PHYSICAL EXAM
[General Appearance - Well Developed] : well developed [Normal Appearance] : normal appearance [Well Groomed] : well groomed [General Appearance - Well Nourished] : well nourished [No Deformities] : no deformities [General Appearance - In No Acute Distress] : no acute distress [Normal Conjunctiva] : the conjunctiva exhibited no abnormalities [Eyelids - No Xanthelasma] : the eyelids demonstrated no xanthelasmas [Normal Oral Mucosa] : normal oral mucosa [No Oral Pallor] : no oral pallor [No Oral Cyanosis] : no oral cyanosis [Normal Jugular Venous A Waves Present] : normal jugular venous A waves present [Normal Jugular Venous V Waves Present] : normal jugular venous V waves present [No Jugular Venous [place] A Waves] : no jugular venous [place] A waves [Respiration, Rhythm And Depth] : normal respiratory rhythm and effort [Exaggerated Use Of Accessory Muscles For Inspiration] : no accessory muscle use [Auscultation Breath Sounds / Voice Sounds] : lungs were clear to auscultation bilaterally [Heart Rate And Rhythm] : heart rate and rhythm were normal [Heart Sounds] : normal S1 and S2 [Murmurs] : no murmurs present [Abdomen Soft] : soft [Abdomen Tenderness] : non-tender [Abdomen Mass (___ Cm)] : no abdominal mass palpated [Abnormal Walk] : normal gait [Gait - Sufficient For Exercise Testing] : the gait was sufficient for exercise testing [Nail Clubbing] : no clubbing of the fingernails [Cyanosis, Localized] : no localized cyanosis [Petechial Hemorrhages (___cm)] : no petechial hemorrhages [Skin Color & Pigmentation] : normal skin color and pigmentation [] : no rash [No Venous Stasis] : no venous stasis [Skin Lesions] : no skin lesions [No Skin Ulcers] : no skin ulcer [No Xanthoma] : no  xanthoma was observed [Oriented To Time, Place, And Person] : oriented to person, place, and time [Affect] : the affect was normal [Mood] : the mood was normal [No Anxiety] : not feeling anxious

## 2019-02-07 NOTE — REVIEW OF SYSTEMS
[Lower Ext Edema] : lower extremity edema [Change In Color Of Skin] : change in skin color [Negative] : Heme/Lymph [Urinary Frequency] : no change in urinary frequency [Hematuria] : no hematuria [Pain During Urination] : no dysuria [Impotence] : no impotence [Nocturia] : no nocturia [Skin: A Rash] : no rash: [Itching] : no itching [Skin Lesions] : no skin lesions

## 2019-02-07 NOTE — REASON FOR VISIT
[FreeTextEntry1] : Mr Rudy Delgado presents for follow-up evaluation after recent hospitalization for leg edema and cellulitis requiring abx.  His symptoms have stabilized since then.  He was also taken off diuretics and has continued to remain stable since discharge.  \par \par He has a prior history of LE DVT.  Remains on anticoagulation.  Since his last visit with me in Sept 2018 and since discharge, his leg wounds have healed and he no longer has open sores or wounds.  Edema has remained minimal.\par \par Does complain of urinary hesitancy.  Has BPH.  Trial of Flomax worked well 2-3 years ago and he had stopped.  Will arrange for trial again.\par \par PMH: Mr. 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 He also had a right upper lobectomy on 1/21/15 for a T1N0Mx poorly differentiated squamous cell carcinoma. He was treated with chemotherapy. CT scan done 7/19/17 shows stable post operative changes and stable few small solid nodules in both lungs when compared to previous exams. \par \par Mr Delgado has been hospitalized 3 times for various reasons including, worsening leg cellulitis requiring IV abx, DVT, and persistent leg swelling.\par \par

## 2019-02-12 ENCOUNTER — OUTPATIENT (OUTPATIENT)
Dept: OUTPATIENT SERVICES | Facility: HOSPITAL | Age: 79
LOS: 1 days | End: 2019-02-12
Payer: COMMERCIAL

## 2019-02-12 ENCOUNTER — APPOINTMENT (OUTPATIENT)
Dept: CT IMAGING | Facility: IMAGING CENTER | Age: 79
End: 2019-02-12
Payer: MEDICARE

## 2019-02-12 DIAGNOSIS — R91.8 OTHER NONSPECIFIC ABNORMAL FINDING OF LUNG FIELD: Chronic | ICD-10-CM

## 2019-02-12 DIAGNOSIS — Z98.89 OTHER SPECIFIED POSTPROCEDURAL STATES: Chronic | ICD-10-CM

## 2019-02-12 DIAGNOSIS — C34.90 MALIGNANT NEOPLASM OF UNSPECIFIED PART OF UNSPECIFIED BRONCHUS OR LUNG: ICD-10-CM

## 2019-02-12 PROCEDURE — 71250 CT THORAX DX C-: CPT

## 2019-02-12 PROCEDURE — 71250 CT THORAX DX C-: CPT | Mod: 26

## 2019-02-15 NOTE — H&P ADULT - WEIGHT IN LBS
Verbal/written post procedure instructions were given to patient/caregiver./Avoid solid food./Avoid hot food./Instructed patient/caregiver regarding signs and symptoms of infection./Instructed patient/caregiver to follow-up with primary dentist. 332.8

## 2019-02-26 ENCOUNTER — APPOINTMENT (OUTPATIENT)
Dept: THORACIC SURGERY | Facility: CLINIC | Age: 79
End: 2019-02-26
Payer: MEDICARE

## 2019-02-26 VITALS
WEIGHT: 282 LBS | OXYGEN SATURATION: 96 % | SYSTOLIC BLOOD PRESSURE: 131 MMHG | HEART RATE: 63 BPM | DIASTOLIC BLOOD PRESSURE: 72 MMHG | RESPIRATION RATE: 18 BRPM | BODY MASS INDEX: 36.19 KG/M2 | HEIGHT: 74 IN

## 2019-02-26 PROCEDURE — 99213 OFFICE O/P EST LOW 20 MIN: CPT

## 2019-02-26 RX ORDER — TIZANIDINE HYDROCHLORIDE 4 MG/1
4 CAPSULE ORAL
Qty: 30 | Refills: 0 | Status: DISCONTINUED | COMMUNITY
Start: 2017-07-26 | End: 2019-02-26

## 2019-02-26 RX ORDER — NAPROXEN 500 MG/1
500 TABLET ORAL
Qty: 60 | Refills: 0 | Status: DISCONTINUED | COMMUNITY
Start: 2017-07-26 | End: 2019-02-26

## 2019-02-26 NOTE — PHYSICAL THERAPY INITIAL EVALUATION ADULT - TRANSFER SKILLS, REHAB EVAL
needs device/independent/straight cane, rolling walker Complex Repair And Modified Advancement Flap Text: The defect edges were debeveled with a #15 scalpel blade.  The primary defect was closed partially with a complex linear closure.  Given the location of the remaining defect, shape of the defect and the proximity to free margins a modified advancement flap was deemed most appropriate for complete closure of the defect.  Using a sterile surgical marker, an appropriate advancement flap was drawn incorporating the defect and placing the expected incisions within the relaxed skin tension lines where possible.    The area thus outlined was incised deep to adipose tissue with a #15 scalpel blade.  The skin margins were undermined to an appropriate distance in all directions utilizing iris scissors.

## 2019-02-27 NOTE — DISCHARGE NOTE ADULT - CLICK TO LAUNCH ORM
Duration Of Freeze Thaw-Cycle (Seconds): 0 Post-Care Instructions: I reviewed with the patient in detail post-care instructions. Patient is to wear sunprotection, and avoid picking at any of the treated lesions. Pt may apply Vaseline to crusted or scabbing areas. Detail Level: Detailed Render Post-Care Instructions In Note?: no Consent: The patient's consent was obtained including but not limited to risks of crusting, scabbing, blistering, scarring, darker or lighter pigmentary change, recurrence, incomplete removal and infection. .

## 2019-03-02 NOTE — HISTORY OF PRESENT ILLNESS
[FreeTextEntry1] : Mr. Delgado is a 78 year old male with a history of several lung cancers and DVT (On Eliquis). He is s/p uniportal Left VATS, LLL superior segmentectomy on 2/23/15 for a T2N0 small cell carcinoma and RUL on 1/21/15 for a T1N0Mx poorly differentiated squamous cell carcinoma. He was treated with adjuvant chemotherapy. He did not received RT to the chest or brain. \par \par CT Chest on 2/12/19 revealed:\par - No change in the pulmonary nodules since 10/2/2015\par \par Overall, he reports feeling well and continues to note stable shortness of breath with exertion (stairs).  He was recently admitted to the hospital (1/8-1/12) for fever.  Since then, he denies any fever, chills, cough, chest pain, hemoptysis, or recent illness.

## 2019-03-02 NOTE — PHYSICAL EXAM
[Sclera] : the sclera and conjunctiva were normal [PERRL With Normal Accommodation] : pupils were equal in size, round, and reactive to light [Neck Appearance] : the appearance of the neck was normal [] : no respiratory distress [Respiration, Rhythm And Depth] : normal respiratory rhythm and effort [Auscultation Breath Sounds / Voice Sounds] : lungs were clear to auscultation bilaterally [Heart Sounds] : normal S1 and S2 [Murmurs] : no murmurs [Examination Of The Chest] : the chest was normal in appearance [Abdomen Soft] : soft [Abdomen Tenderness] : non-tender [Cervical Lymph Nodes Enlarged Posterior Bilaterally] : posterior cervical [Cervical Lymph Nodes Enlarged Anterior Bilaterally] : anterior cervical [Supraclavicular Lymph Nodes Enlarged Bilaterally] : supraclavicular [No CVA Tenderness] : no ~M costovertebral angle tenderness [Abnormal Walk] : normal gait [Musculoskeletal - Swelling] : no joint swelling seen [No Focal Deficits] : no focal deficits [Oriented To Time, Place, And Person] : oriented to person, place, and time [Affect] : the affect was normal [Mood] : the mood was normal [FreeTextEntry1] : darkened discoloration of bilateral lower extremities up to the knee

## 2019-03-02 NOTE — REVIEW OF SYSTEMS
[SOB on Exertion] : shortness of breath during exertion [Negative] : Heme/Lymph [FreeTextEntry2] : Hospitalization at [facility] 1/8-1/12 (2019)

## 2019-03-02 NOTE — ASSESSMENT
[FreeTextEntry1] : Mr. Delgado is a 78 year old male with a history of several lung cancers and DVT (On Eliquis). He is s/p uniportal Left VATS, LLL superior segmentectomy on 2/23/15 for a T2N0 small cell carcinoma and RUL on 1/21/15 for a T1N0Mx poorly differentiated squamous cell carcinoma. He was treated with adjuvant chemotherapy. He did not received RT to the chest or brain. \par \par CT Chest on 2/12/19 revealed:\par - No change in the pulmonary nodules since 10/2/2015\par \par I have reviewed the patient's medical records and diagnostic images during the time of this office visit, and I have made the following recommendation:\par 1. Return to office for follow up with CT Chest in 6 months\par \par \par Written by Esha Montes NP, acting as a scribe for Thierry Ayers MD.\par \par The documentation recorded by the scribe accurately reflects the service I personally performed and the decisions made by me. THIERRY AYERS MD

## 2019-03-02 NOTE — CONSULT LETTER
[Dear  ___] : Dear  [unfilled], [Courtesy Letter:] : I had the pleasure of seeing your patient, [unfilled], in my office today. [Please see my note below.] : Please see my note below. [Sincerely,] : Sincerely, [FreeTextEntry2] : Juancho Marvin MD (PCP)\par Manda Pike MD (ID)\par Nik Quesada MD (Cardiac)  [FreeTextEntry3] : \par \par \par Thierry Pineda MD, FACS \par Chief, Division of Thoracic Surgery \par Director, Minimally Invasive Thoracic Surgery \par Department of Cardiovascular and Thoracic Surgery \par [facility] \par , Cardiovascular and Thoracic Surgery

## 2019-03-05 NOTE — ED PROVIDER NOTE - NS ED NOTE AC HIGH RISK COUNTRIES
Lab Results   Component Value Date/Time    CHOLSTRLTOT 165 09/25/2018 08:00 AM    LDL 82 09/25/2018 08:00 AM    HDL 72 09/25/2018 08:00 AM    TRIGLYCERIDE 54 09/25/2018 08:00 AM     Controlled on fenofibrate 145 mg daily.    No

## 2019-03-11 ENCOUNTER — APPOINTMENT (OUTPATIENT)
Dept: INFECTIOUS DISEASE | Facility: CLINIC | Age: 79
End: 2019-03-11
Payer: MEDICARE

## 2019-03-11 VITALS
TEMPERATURE: 97 F | SYSTOLIC BLOOD PRESSURE: 144 MMHG | DIASTOLIC BLOOD PRESSURE: 74 MMHG | OXYGEN SATURATION: 93 % | HEART RATE: 60 BPM | BODY MASS INDEX: 35.95 KG/M2 | WEIGHT: 280 LBS

## 2019-03-11 PROCEDURE — 99213 OFFICE O/P EST LOW 20 MIN: CPT

## 2019-03-12 LAB
ALBUMIN SERPL ELPH-MCNC: 4.6 G/DL
ALP BLD-CCNC: 60 U/L
ALT SERPL-CCNC: 12 U/L
ANION GAP SERPL CALC-SCNC: 12 MMOL/L
AST SERPL-CCNC: 17 U/L
BASOPHILS # BLD AUTO: 0.02 K/UL
BASOPHILS NFR BLD AUTO: 0.4 %
BILIRUB SERPL-MCNC: 0.8 MG/DL
BUN SERPL-MCNC: 18 MG/DL
CALCIUM SERPL-MCNC: 9.3 MG/DL
CHLORIDE SERPL-SCNC: 104 MMOL/L
CO2 SERPL-SCNC: 25 MMOL/L
CREAT SERPL-MCNC: 1 MG/DL
CRP SERPL-MCNC: 0.11 MG/DL
EOSINOPHIL # BLD AUTO: 0.07 K/UL
EOSINOPHIL NFR BLD AUTO: 1.5 %
ERYTHROCYTE [SEDIMENTATION RATE] IN BLOOD BY WESTERGREN METHOD: 4 MM/HR
GLUCOSE SERPL-MCNC: 85 MG/DL
HCT VFR BLD CALC: 49.8 %
HGB BLD-MCNC: 15.8 G/DL
IMM GRANULOCYTES NFR BLD AUTO: 0.2 %
LYMPHOCYTES # BLD AUTO: 1.21 K/UL
LYMPHOCYTES NFR BLD AUTO: 26.8 %
MAN DIFF?: NORMAL
MCHC RBC-ENTMCNC: 28.6 PG
MCHC RBC-ENTMCNC: 31.7 GM/DL
MCV RBC AUTO: 90.2 FL
MONOCYTES # BLD AUTO: 0.48 K/UL
MONOCYTES NFR BLD AUTO: 10.6 %
NEUTROPHILS # BLD AUTO: 2.73 K/UL
NEUTROPHILS NFR BLD AUTO: 60.5 %
PLATELET # BLD AUTO: NORMAL K/UL
POTASSIUM SERPL-SCNC: 4.6 MMOL/L
PROT SERPL-MCNC: 7.4 G/DL
RBC # BLD: 5.52 M/UL
RBC # FLD: 14.1 %
SODIUM SERPL-SCNC: 141 MMOL/L
WBC # FLD AUTO: 4.52 K/UL

## 2019-03-21 NOTE — HISTORY OF PRESENT ILLNESS
[FreeTextEntry1] : Hospitalized in Jan 2019 for fever, chills, altered mental status which occurred after receiving flu and pneumococcal vaccines.  Blood cultures were negative and no sign of cellulitis.  He was d/galindo home on keflex ppx.  today feels well.  no longer on diuretic .\par no fever, chills.

## 2019-03-21 NOTE — ASSESSMENT
[FreeTextEntry1] : 76 yo man with h/o lung cancer, chronic DVT  right leg, venous stasis bilateral , recurrent right leg cellulitis , hospitalized x 7 for right leg cellulitis 2017, s/p recent hospitalization 1/2019 for sepsis unclear source. I suspect it was related to break through infection related to recurrent cellulitis.  Continues on chronic suppression with keflex 500 mg po BID.  Edema and dermatitis legs much improved.   Discussed the possibility of stopping antibiotic ppx now that source of infection (legs) so much improved in terms of skin integrity. Will discuss again at next visit.\par \par plan; cbc, cmp\par          CRP, ESR\par          continue keflex 500 mg po q 12 \par          return in 6 months.

## 2019-03-21 NOTE — PHYSICAL EXAM
[General Appearance - In No Acute Distress] : in no acute distress [Sclera] : the sclera and conjunctiva were normal [Oropharynx] : the oropharynx was normal with no thrush [] : the neck was supple [Respiration, Rhythm And Depth] : normal respiratory rhythm and effort [Auscultation Breath Sounds / Voice Sounds] : lungs were clear to auscultation bilaterally [Heart Rate And Rhythm] : heart rate was normal and rhythm regular [Heart Sounds] : normal S1 and S2 [Bowel Sounds] : normal bowel sounds [Abdomen Soft] : soft [Abdomen Tenderness] : non-tender [FreeTextEntry1] : chronic skin changes legs bilat with hyperpigmentation [Oriented To Time, Place, And Person] : oriented to person, place, and time [Affect] : the affect was normal

## 2019-03-21 NOTE — REVIEW OF SYSTEMS
[Fever] : no fever [Chills] : no chills [Body Aches] : no body aches [Feeling Tired] : feeling tired [Lower Ext Edema] : lower extremity edema [SOB on Exertion] : shortness of breath during exertion [Joint Pain] : no joint pain [Limb Pain] : no limb pain [Limb Swelling] : limb swelling [Skin Lesions] : skin lesion [Limb Weakness] : limb weakness [Negative] : Genitourinary [FreeTextEntry9] : leg weakness

## 2019-05-01 ENCOUNTER — RX RENEWAL (OUTPATIENT)
Age: 79
End: 2019-05-01

## 2019-07-01 NOTE — ED ADULT NURSE NOTE - FALLEN IN THE PAST
Patient calling today with new onset bilateral ankle swelling for the past week. She states it started with just the left ankle but now both are swollen, left worse than right. Also of note she recently discontinued warfarin in May and also decreased her Prednisone at the end of May. She states the swelling starts about 2 inches about the ankles and goes down into her feet. She has never had this before and she is concerned because she had a heart attack last year. She states the swelling does get a little better when she elevates her feet. She denies SOB, no trauma or falls, no chest pain, feet are not cool or blue, she is able to walk, no red area or streaks, no fevers, no pain, no weeping of fluid, no thigh or calf pain. Per protocol scheduled patient to see Dr. Jeronimo Montano today. no

## 2019-07-08 ENCOUNTER — APPOINTMENT (OUTPATIENT)
Dept: INFECTIOUS DISEASE | Facility: CLINIC | Age: 79
End: 2019-07-08

## 2019-07-08 VITALS
HEART RATE: 64 BPM | OXYGEN SATURATION: 96 % | TEMPERATURE: 97 F | DIASTOLIC BLOOD PRESSURE: 73 MMHG | WEIGHT: 284 LBS | BODY MASS INDEX: 36.45 KG/M2 | HEIGHT: 74 IN | SYSTOLIC BLOOD PRESSURE: 141 MMHG

## 2019-07-23 ENCOUNTER — APPOINTMENT (OUTPATIENT)
Dept: INFECTIOUS DISEASE | Facility: CLINIC | Age: 79
End: 2019-07-23
Payer: MEDICARE

## 2019-07-23 VITALS
DIASTOLIC BLOOD PRESSURE: 68 MMHG | SYSTOLIC BLOOD PRESSURE: 128 MMHG | BODY MASS INDEX: 36.19 KG/M2 | HEART RATE: 55 BPM | WEIGHT: 282 LBS | HEIGHT: 74 IN | OXYGEN SATURATION: 95 % | TEMPERATURE: 97.4 F

## 2019-07-23 PROCEDURE — 99214 OFFICE O/P EST MOD 30 MIN: CPT

## 2019-07-23 RX ORDER — CHLORHEXIDINE GLUCONATE 4 %
325 (65 FE) LIQUID (ML) TOPICAL
Refills: 0 | Status: COMPLETED | COMMUNITY
End: 2019-07-23

## 2019-07-31 NOTE — ASSESSMENT
[FreeTextEntry1] : 78 yo man with h/o lung cancer, chronic DVT  right leg, venous stasis bilateral , recurrent right leg cellulitis , hospitalized x 7 for right leg cellulitis 2017; hospitalized once in 2018 and  s/p  hospitalization 1/2019 for sepsis unclear source. I suspect it was related to break through infection related to recurrent cellulitis. Has not been hospitalized since but may have had another break trough of infection a few months ago which patient and wife managed by increasing antibiotic dose at home. \par   Continues on chronic suppression with keflex 500 mg po BID. \par   Edema and dermatitis legs much improved.\par   I think beneficial to continue on suppressive keflex at this time.\par   Lab work from primary reviewed- of note platelets low 50K\par   Patient will discuss with primary during appt in Aug\par   Return Nov 2019.  \par   Keflex renewed.\par \par plan; cbc, cmp\par          CRP, ESR\par          continue keflex 500 mg po q 12 \par          return in 6 months.

## 2019-07-31 NOTE — PHYSICAL EXAM
[General Appearance - Well-Appearing] : healthy appearing [Sclera] : the sclera and conjunctiva were normal [Oropharynx] : the oropharynx was normal with no thrush [Auscultation Breath Sounds / Voice Sounds] : lungs were clear to auscultation bilaterally [Respiration, Rhythm And Depth] : normal respiratory rhythm and effort [Heart Rate And Rhythm] : heart rate was normal and rhythm regular [Heart Sounds] : normal S1 and S2 [Bowel Sounds] : normal bowel sounds [Abdomen Soft] : soft [Motor Tone] : muscle strength and tone were normal [Abdomen Tenderness] : non-tender [] : no rash [FreeTextEntry1] : hyperpigmentation lower extr bilaterally , cool to touch, skin intact- no dryness, fissures as before. [Oriented To Time, Place, And Person] : oriented to person, place, and time [Affect] : the affect was normal

## 2019-07-31 NOTE — HISTORY OF PRESENT ILLNESS
[FreeTextEntry1] : Has continued taking suppressive keflex 500 mg po BID.  Has not had any flare ups of cellulitis since Nov 2018.   However notes did have episode of swelling and drainage from right leg several months ago. He doubled keflex dose for a few days and swelling, drainage resolved. \par no longer taking lasix.\par   \par last hospitalization Jan 2019 when developed fever after receiving pneumococcal vaccine.

## 2019-08-08 ENCOUNTER — APPOINTMENT (OUTPATIENT)
Dept: CARDIOLOGY | Facility: CLINIC | Age: 79
End: 2019-08-08
Payer: MEDICARE

## 2019-08-08 VITALS
DIASTOLIC BLOOD PRESSURE: 74 MMHG | BODY MASS INDEX: 36.19 KG/M2 | OXYGEN SATURATION: 95 % | HEART RATE: 61 BPM | WEIGHT: 282 LBS | HEIGHT: 74 IN | SYSTOLIC BLOOD PRESSURE: 122 MMHG

## 2019-08-08 DIAGNOSIS — B36.9 SUPERFICIAL MYCOSIS, UNSPECIFIED: ICD-10-CM

## 2019-08-08 DIAGNOSIS — Z00.00 ENCOUNTER FOR GENERAL ADULT MEDICAL EXAMINATION W/OUT ABNORMAL FINDINGS: ICD-10-CM

## 2019-08-08 PROCEDURE — 99214 OFFICE O/P EST MOD 30 MIN: CPT

## 2019-08-08 PROCEDURE — 93000 ELECTROCARDIOGRAM COMPLETE: CPT

## 2019-09-03 ENCOUNTER — APPOINTMENT (OUTPATIENT)
Dept: THORACIC SURGERY | Facility: CLINIC | Age: 79
End: 2019-09-03

## 2019-09-18 ENCOUNTER — APPOINTMENT (OUTPATIENT)
Dept: CT IMAGING | Facility: IMAGING CENTER | Age: 79
End: 2019-09-18
Payer: MEDICARE

## 2019-09-18 ENCOUNTER — OUTPATIENT (OUTPATIENT)
Dept: OUTPATIENT SERVICES | Facility: HOSPITAL | Age: 79
LOS: 1 days | End: 2019-09-18
Payer: COMMERCIAL

## 2019-09-18 DIAGNOSIS — R91.8 OTHER NONSPECIFIC ABNORMAL FINDING OF LUNG FIELD: Chronic | ICD-10-CM

## 2019-09-18 DIAGNOSIS — Z98.89 OTHER SPECIFIED POSTPROCEDURAL STATES: Chronic | ICD-10-CM

## 2019-09-18 DIAGNOSIS — C34.90 MALIGNANT NEOPLASM OF UNSPECIFIED PART OF UNSPECIFIED BRONCHUS OR LUNG: ICD-10-CM

## 2019-09-18 PROCEDURE — 71250 CT THORAX DX C-: CPT | Mod: 26

## 2019-09-18 PROCEDURE — 71250 CT THORAX DX C-: CPT

## 2019-09-24 ENCOUNTER — APPOINTMENT (OUTPATIENT)
Dept: THORACIC SURGERY | Facility: CLINIC | Age: 79
End: 2019-09-24
Payer: MEDICARE

## 2019-09-24 VITALS
HEIGHT: 74 IN | BODY MASS INDEX: 36.19 KG/M2 | WEIGHT: 282 LBS | OXYGEN SATURATION: 96 % | DIASTOLIC BLOOD PRESSURE: 69 MMHG | SYSTOLIC BLOOD PRESSURE: 154 MMHG | RESPIRATION RATE: 18 BRPM | HEART RATE: 62 BPM | TEMPERATURE: 98 F

## 2019-09-24 PROCEDURE — 99214 OFFICE O/P EST MOD 30 MIN: CPT

## 2019-09-24 NOTE — ED PROVIDER NOTE - LOWER EXTREMITY EXAM, RIGHT
Discharge Summary - Fatou Warner 47 y o  female MRN: [id number]    Unit/Bed#: E5 -01 Encounter: [id number]      Pre-Operative Diagnosis: Pre-Op Diagnosis Codes:     * Morbid obesity ([address] ) [E66 01]    Post-Operative Diagnosis: Post-Op Diagnosis Codes:     * Morbid obesity ([facility] [address] ) [E66 01]    Procedures Performed:  Procedure(s):  LAPAROSCOPIC YUNIOR-EN-Y GASTRIC BYPASS AND INTRAOPERATIVE EGD    Surgeon: Gerhardt Ely, MD    See H & P for full details of admission and Operative Note for full details of operations performed  Patient tolerated surgery well without complications  In the morning postoperative Day 1, the patient had mild nausea and abdominal pain  Tolerated a clear liquid diet without vomiting  Able to ambulate and voiding independently  Patient was deemed ready for discharge home  Patient was seen and examined prior to discharge  Provisions for Follow-Up Care:  See After Visit Summary/Discharge Instructions for information related to follow-up care and home orders  Disposition: Home, in stable condition  Planned Readmission: No    Discharge Medications:  See After Visit Summary/Discharge Instructions for reconciled discharge medications provided to patient and family  Post Operative instructions: Reviewed with patient and/or family      Signature:   Bart France PA-C  Date: 9/24/2019 Time: 11:21 AM REDUCED STRENGTH/SWELLING/TENDERNESS

## 2019-10-02 NOTE — HISTORY OF PRESENT ILLNESS
[FreeTextEntry1] : 79 year old male with a history of several lung cancers and DVT in his Right LE  (on Eliquis). He is s/p uniportal Left VATS, LLL superior segmentectomy on 2/23/15 for a T2N0 small cell carcinoma and RUL on 1/21/15 for a T1N0Mx poorly differentiated squamous cell carcinoma. He was treated with adjuvant chemotherapy. He did not received RT to the chest or brain. \par \par He is doing well. No complaints at this time. He is being treated for cellulitis with Keflex for the past 2 years.\par \par His medical history includes HTN, history of atrial fibrillation, BPH, DVT - Right LE  and recurrent cellulitis of LE. He has chronic LE ulcers in his bilateral LE and bilateral LE edema.\par \par CT chest scan 9/18/19 :\par Post right upper lobectomy and bilateral wedge resections. Associated volume loss and architectural distortion is noted. No consolidations, edema, effusion or pneumothorax. Left lower lobe 5 mm nodules again seen on image 95. Right middle lobe 3 mm nodule on image 79 is stable. No new nodules  identified.\par Right lower lobe pleural-based 5 mm nodule on image 50 is also unchanged.\par \par \par CT Chest on 2/12/19 revealed:\par - No change in the pulmonary nodules since 10/2/2015\par

## 2019-10-02 NOTE — REVIEW OF SYSTEMS
[Difficulty Walking] : difficulty walking [Negative] : Heme/Lymph [Lower Ext Edema] : lower extremity edema [de-identified] : Uses cane to walk due to poor balance.

## 2019-10-02 NOTE — CONSULT LETTER
[Dear  ___] : Dear  [unfilled], [Courtesy Letter:] : I had the pleasure of seeing your patient, [unfilled], in my office today. [Sincerely,] : Sincerely, [Please see my note below.] : Please see my note below. [FreeTextEntry2] : Juancho Marvin MD (PCP)\par Manda Pike MD (ID)\par Nik Quesada MD (Cardiac) \par  [FreeTextEntry3] : \par Thierry Pineda MD, FACS \par Chief, Division of Thoracic Surgery \par Director, Minimally Invasive Thoracic Surgery \par Department of Cardiovascular and Thoracic Surgery \par [facility] \par , Cardiovascular and Thoracic Surgery \par [facility] [employer] [facility] \par

## 2019-10-02 NOTE — PHYSICAL EXAM
[2+] : left 2+ [0] : left 0 [No Abnormalities] : the abdominal aorta was not enlarged and no bruit was heard [Sclera] : the sclera and conjunctiva were normal [Strabismus] : no strabismus was seen [Neck Appearance] : the appearance of the neck was normal [Jugular Venous Distention Increased] : there was no jugular-venous distention [Respiration, Rhythm And Depth] : normal respiratory rhythm and effort [] : no respiratory distress [Auscultation Breath Sounds / Voice Sounds] : lungs were clear to auscultation bilaterally [Heart Rate And Rhythm] : heart rate was normal and rhythm regular [Murmurs] : no murmurs [Heart Sounds] : normal S1 and S2 [Regular] : the rhythm was regular [Examination Of The Chest] : the chest was normal in appearance [1+] : left 1+ [Bowel Sounds] : normal bowel sounds [Abdomen Soft] : soft [Abdomen Tenderness] : non-tender [Abnormal Walk] : normal gait [Abnormal Color] : abnormal color and pigmentation [Brownish Discoloration] : brownish discoloration [No Focal Deficits] : no focal deficits [Oriented To Time, Place, And Person] : oriented to person, place, and time [Impaired Insight] : insight and judgment were intact [Right Carotid Bruit] : no bruit heard over the right carotid [Left Carotid Bruit] : no bruit heard over the left carotid [Right Femoral Bruit] : no bruit heard over the right femoral artery [Left Femoral Bruit] : no bruit heard over the left femoral artery [FreeTextEntry1] : Walks with Cane. [FreeTextEntry2] : Stasis dermatitis in bilateral LE.

## 2019-10-02 NOTE — ASSESSMENT
[FreeTextEntry1] : 79 year old male with a history of several lung cancers and DVT in his Right LE  (on Eliquis). He is s/p uniportal Left VATS, LLL superior segmentectomy on 2/23/15 for a T2N0 small cell carcinoma and RUL on 1/21/15 for a T1N0Mx poorly differentiated squamous cell carcinoma. He was treated with adjuvant chemotherapy. He did not received RT to the chest or brain. \par \par CT scan done on 9/18/2019:\par Post right upper lobectomy and bilateral wedge resections. Associated volume loss and architectural distortion is noted. No consolidations, edema, effusion or pneumothorax. Left lower lobe 5 mm nodules again seen on image 95. Right middle lobe 3 mm nodule on image 79 is stable. No new nodules  identified.\par Right lower lobe pleural-based 5 mm nodule on image 50 is also unchanged.\par \par He returned for a follow up visit. No complaints at this time.\par \par I have reviewed the patient's medical records and diagnostic images during the time of this office visit, and I have made the following recommendation: \par Plan:\par 1. CT scan without contrast in 6 months and return to clinic.\par \par Written by Flower Webb RN   acting as a scribe for  Dr. Thierry Pineda.\par “The documentation recorded by the scribe accurately reflects the service I personally performed and the decisions made by me. By Dr. Thierry Pineda.\par \par

## 2019-10-12 NOTE — ED PROVIDER NOTE - EXTREMITY EXAM
Alert and oriented, no focal deficits, no motor or sensory deficits. (+) edema to b/l LE, 1+ DP, no overlying cellulitis noted

## 2019-12-03 ENCOUNTER — APPOINTMENT (OUTPATIENT)
Dept: INFECTIOUS DISEASE | Facility: CLINIC | Age: 79
End: 2019-12-03
Payer: MEDICARE

## 2019-12-03 VITALS
RESPIRATION RATE: 16 BRPM | HEIGHT: 74 IN | OXYGEN SATURATION: 97 % | SYSTOLIC BLOOD PRESSURE: 167 MMHG | WEIGHT: 282 LBS | TEMPERATURE: 97.3 F | BODY MASS INDEX: 36.19 KG/M2 | DIASTOLIC BLOOD PRESSURE: 79 MMHG | HEART RATE: 66 BPM

## 2019-12-03 PROCEDURE — 99213 OFFICE O/P EST LOW 20 MIN: CPT

## 2019-12-07 NOTE — ASSESSMENT
[FreeTextEntry1] : 78 yo man with h/o lung cancer, chronic DVT  right leg, venous stasis bilateral , recurrent right leg cellulitis , hospitalized x 7 for right leg cellulitis 2017; hospitalized once in 2018 and  s/p  hospitalization 1/2019 for sepsis unclear source. I suspect it was related to break through infection related to recurrent cellulitis. Has not been hospitalized since but may have had another break trough of infection a few months ago which patient and wife managed by increasing antibiotic dose at home. \par   Continues on chronic suppression with keflex 500 mg po BID. \par   Edema and dermatitis legs continue to improve. Edema is gone. Hyperpigmentation only on skin now.\par \par   I think beneficial to continue on suppressive keflex at this time\par   Will renew keflex\par   Will have blood work done at [facility] next week- patient's wife will assure platelet count checked.\par   Return 4-6 months.\par  \par          continue keflex 500 mg po q 12 \par          return in 6 months.

## 2019-12-07 NOTE — HISTORY OF PRESENT ILLNESS
[FreeTextEntry1] : Has not had any flare ups of cellulitis, sepsis since Jan 2019.  \par continues taking keflex ppx daily.\par leg swelling is under control.  no longer taking diuretic.\par drove to [place] without problem.\par lab work to be done by [facility] in next week.\par we discussed low platelets noted on prior tests.

## 2019-12-07 NOTE — PHYSICAL EXAM
[General Appearance - Well-Appearing] : healthy appearing [Oropharynx] : the oropharynx was normal with no thrush [Sclera] : the sclera and conjunctiva were normal [Auscultation Breath Sounds / Voice Sounds] : lungs were clear to auscultation bilaterally [Respiration, Rhythm And Depth] : normal respiratory rhythm and effort [] : the neck was supple [Heart Rate And Rhythm] : heart rate was normal and rhythm regular [Edema] : there was no peripheral edema [Bowel Sounds] : normal bowel sounds [Heart Sounds] : normal S1 and S2 [Abdomen Tenderness] : non-tender [Musculoskeletal - Swelling] : no joint swelling [Abdomen Soft] : soft [FreeTextEntry1] : walks with cane [Affect] : the affect was normal [Oriented To Time, Place, And Person] : oriented to person, place, and time

## 2019-12-07 NOTE — REVIEW OF SYSTEMS
[Fever] : no fever [Chills] : no chills [Feeling Tired] : feeling tired [Body Aches] : body aches [Limb Pain] : limb pain [Joint Stiffness] : joint stiffness [As Noted in HPI] : as noted in HPI [FreeTextEntry6] : continues seeing Dr. Cruz for lung cancer screening [Negative] : Psychiatric

## 2019-12-12 ENCOUNTER — NON-APPOINTMENT (OUTPATIENT)
Age: 79
End: 2019-12-12

## 2019-12-12 ENCOUNTER — APPOINTMENT (OUTPATIENT)
Dept: CARDIOLOGY | Facility: CLINIC | Age: 79
End: 2019-12-12
Payer: MEDICARE

## 2019-12-12 VITALS
HEART RATE: 54 BPM | OXYGEN SATURATION: 97 % | DIASTOLIC BLOOD PRESSURE: 80 MMHG | WEIGHT: 282 LBS | HEIGHT: 74 IN | SYSTOLIC BLOOD PRESSURE: 177 MMHG | BODY MASS INDEX: 36.19 KG/M2

## 2019-12-12 VITALS — DIASTOLIC BLOOD PRESSURE: 70 MMHG | SYSTOLIC BLOOD PRESSURE: 130 MMHG

## 2019-12-12 PROCEDURE — 99214 OFFICE O/P EST MOD 30 MIN: CPT

## 2019-12-12 PROCEDURE — 93000 ELECTROCARDIOGRAM COMPLETE: CPT

## 2019-12-24 NOTE — ADVANCED PRACTICE NURSE CONSULT - RECOMMEDATIONS
Recommend outpatient Podiatry for evaluation of proper footwear and foot care.    Recommend outpatient follow up with vascular MD for continued care of venous/arterial insuffiencey.     Left 4th toe, Right 2nd and 4th toes: Apply betadine daily, allow to dry.    Apply liquid barrier film to B/L heels.    Apply sween 24 moisturizing lotion to B/L LE and UE daily.    Discussed use of cotton t-shirt to wick moisture from pannus at home if noticed increase in moisture.    Please call wound care service line is further assistance is needed ([phone]).
Yes...

## 2019-12-31 NOTE — H&P ADULT - CONSTITUTIONAL
Chief complaint:   Chief Complaint   Patient presents with   • Blood Pressure Check   And has a very strong family history of hypertension and diabetes and she has been so healthy she has not seen anyone of very long time so she is in today to have both of these things checked.  She has never had a Pap smear but is in her menses today so we cannot do that.  This is not a physical exam as we can't do her Pap smear.    Vitals:  Visit Vitals  /68 (BP Location: RUE - Right upper extremity, Patient Position: Sitting, Cuff Size: Regular)   Pulse 74   Ht 5' 8\" (1.727 m)   Wt 59.9 kg   LMP 12/27/2019 (Exact Date)   SpO2 98%   BMI 20.09 kg/m²       HISTORY OF PRESENT ILLNESS     HPI :  Cherie has regular monthly menses without metrorhagia or new/ abnormal vaginal discharge.  Therefore she does not need her pelvic exam today and will put this off until she is not in her menses.  She has also not been sexually active and does not need to discuss birth control methods.    Other significant problems:  Patient Active Problem List    Diagnosis Date Noted   • H/O TB skin testing 11/29/2017     Priority: Low     Positive conversion         PAST MEDICAL, FAMILY AND SOCIAL HISTORY     Medications:  No current outpatient medications on file.     No current facility-administered medications for this visit.        Allergies:  ALLERGIES:  No Known Allergies    Past Medical  History/Surgeries:  No past medical history on file.    No past surgical history on file.    Family History:  Family History   Problem Relation Age of Onset   • Heart disease Mother         needs a pacemaker   • Diabetes Father         type 1   • Diabetes Paternal Grandmother         type 1    • Diabetes Paternal Grandfather         type 1   • Diabetes Paternal Aunt         type 1   (3 aunts)       Social History:  Social History     Tobacco Use   • Smoking status: Never Smoker   • Smokeless tobacco: Never Used   Substance Use Topics   • Alcohol use: Not on file        REVIEW OF SYSTEMS     Review of Systems :  She exercises regularly and has no problem with her breathing or fatigue during exercise.    PHYSICAL EXAM     Physical Exam :  Vital signs as charted.  ENT:  Funduscopic benign.  PERRLA.  Neck without nodes or thyromegaly.  Chest:  Lungs CTA.  Heart rate without murmur.  Breasts and pelvic:  Omitted.  When she is not her menses she will come back for physical exam and will perform these to examinations.  Extremities:  No pedal edema.  PT pulse 2 +bilaterally.    ASSESSMENT/PLAN     1. Strong family history of hypertension and diabetes - we know she does not have hypertension and she will have fasting blood work to rule out the letter.  2. She needs her 1st Pap smear.  Currently is not in need of birth control method.  Will reschedule for her physical exam and her 1st Pap smear.     Seen and the supervision of Dr. Steve Angulo   detailed exam

## 2020-02-28 NOTE — PATIENT PROFILE ADULT. - PT NEEDS ASSIST
NOTIFICATION RETURN TO WORK / SCHOOL 
 
2/28/2020 12:26 PM 
 
Ms. Suzanne Driscoll [address] [facility] [id number] To Whom It May Concern: 
 
Suzanne Driscoll is currently under the care of Nara Motta. She will return to work/school on: 3/4/20 but she may return earlier at her own discretion if she feels better. If there are questions or concerns please have the patient contact our office.  
 
 
 
Sincerely, 
 
 
 
 
Di Escobedo MD 
 
                                
 

yes

## 2020-03-18 ENCOUNTER — RESULT REVIEW (OUTPATIENT)
Age: 80
End: 2020-03-18

## 2020-03-18 ENCOUNTER — OUTPATIENT (OUTPATIENT)
Dept: OUTPATIENT SERVICES | Facility: HOSPITAL | Age: 80
LOS: 1 days | End: 2020-03-18
Payer: COMMERCIAL

## 2020-03-18 ENCOUNTER — APPOINTMENT (OUTPATIENT)
Dept: CT IMAGING | Facility: IMAGING CENTER | Age: 80
End: 2020-03-18
Payer: MEDICARE

## 2020-03-18 DIAGNOSIS — R91.8 OTHER NONSPECIFIC ABNORMAL FINDING OF LUNG FIELD: Chronic | ICD-10-CM

## 2020-03-18 DIAGNOSIS — C34.90 MALIGNANT NEOPLASM OF UNSPECIFIED PART OF UNSPECIFIED BRONCHUS OR LUNG: ICD-10-CM

## 2020-03-18 DIAGNOSIS — Z98.89 OTHER SPECIFIED POSTPROCEDURAL STATES: Chronic | ICD-10-CM

## 2020-03-18 PROCEDURE — 71250 CT THORAX DX C-: CPT | Mod: 26

## 2020-03-18 PROCEDURE — 71250 CT THORAX DX C-: CPT

## 2020-06-09 ENCOUNTER — APPOINTMENT (OUTPATIENT)
Dept: INFECTIOUS DISEASE | Facility: CLINIC | Age: 80
End: 2020-06-09
Payer: MEDICARE

## 2020-06-09 PROCEDURE — 99442: CPT

## 2020-06-09 NOTE — ASSESSMENT
[FreeTextEntry1] : 79 yo man with h/o lung cancer, chronic DVT  right leg, venous stasis bilateral , recurrent right leg cellulitis , hospitalized x 7 for right leg cellulitis 2017; hospitalized once in 2018 and once 1/2019 for sepsis unclear source. I suspect it was related to break through infection related to recurrent cellulitis. Has not been hospitalized since that time for cellulitis but may have had another break through of infection approx a year ago which patient and wife managed by increasing antibiotic dose at home. \par   Continues on chronic suppression with keflex 500 mg po BID. Tolerating well.\par   Seeing podiatrist regularly for foot care. \par   Had blood work with primary 5/27/2020 and reports platelets remain low but stable- 82K\par \par   I think beneficial to continue on suppressive keflex at this time\par   Will renew keflex\par    continue keflex 500 mg po q 12 \par     return in 6 months.

## 2020-06-09 NOTE — REVIEW OF SYSTEMS
[Fever] : no fever [Chills] : no chills [Skin Lesions] : no skin lesions [Skin Wound] : no skin wound [Itching] : no itching [As Noted in HPI] : as noted in HPI [Negative] : Psychiatric

## 2020-06-09 NOTE — HISTORY OF PRESENT ILLNESS
[Home] : at home, [unfilled] , at the time of the visit. [Medical Office: ([facility])___] : at the medical office located in  [Verbal consent obtained from patient] : the patient, [unfilled] [FreeTextEntry1] : Feels well. has not had any flare-ups of cellulitis. notes right leg always more swollen than left and once in a while gets bruises but no pain, increased warmth, weeping.\par No fever, chills. \par Taking keflex ppx without difficulty.\par Saw podiatrist 5/28- took vitals and trimmed nails.\par Applies lotion to feet every other day.\par Saw primary 5/27- blood work done. platelets 82K.\par CT chest 3/2020 stable.\par Seeing Dr Quesada this week.

## 2020-06-09 NOTE — PHYSICAL EXAM
[General Appearance - Alert] : alert [General Appearance - In No Acute Distress] : in no acute distress [] : no respiratory distress [Oriented To Time, Place, And Person] : oriented to person, place, and time [Affect] : the affect was normal

## 2020-06-11 ENCOUNTER — NON-APPOINTMENT (OUTPATIENT)
Age: 80
End: 2020-06-11

## 2020-06-11 ENCOUNTER — APPOINTMENT (OUTPATIENT)
Dept: CARDIOLOGY | Facility: CLINIC | Age: 80
End: 2020-06-11
Payer: MEDICARE

## 2020-06-11 VITALS
BODY MASS INDEX: 33.11 KG/M2 | WEIGHT: 258 LBS | OXYGEN SATURATION: 96 % | HEART RATE: 60 BPM | DIASTOLIC BLOOD PRESSURE: 70 MMHG | TEMPERATURE: 95.7 F | HEIGHT: 74 IN | SYSTOLIC BLOOD PRESSURE: 145 MMHG

## 2020-06-11 PROCEDURE — 93000 ELECTROCARDIOGRAM COMPLETE: CPT

## 2020-06-11 PROCEDURE — 99214 OFFICE O/P EST MOD 30 MIN: CPT

## 2020-09-10 ENCOUNTER — APPOINTMENT (OUTPATIENT)
Dept: INFECTIOUS DISEASE | Facility: CLINIC | Age: 80
End: 2020-09-10
Payer: MEDICARE

## 2020-09-10 VITALS
HEART RATE: 70 BPM | BODY MASS INDEX: 32.08 KG/M2 | HEIGHT: 74 IN | TEMPERATURE: 98.3 F | OXYGEN SATURATION: 95 % | DIASTOLIC BLOOD PRESSURE: 74 MMHG | WEIGHT: 250 LBS | SYSTOLIC BLOOD PRESSURE: 143 MMHG

## 2020-09-10 PROCEDURE — 99214 OFFICE O/P EST MOD 30 MIN: CPT

## 2020-09-10 NOTE — HISTORY OF PRESENT ILLNESS
[FreeTextEntry1] : Developed skin tear on right lower leg 2 weeks ago while in podiatrist's office 8/27.  The leg became a bit more swollen and warm. He increased keflex dosing to 4 x daily (usual suppressive dose 2 x daily) for a few days then lowered dose to 3 x daily for 7 days. Wife is dressing wound at home.  Keeping leg elevated. \par No fever, chills.\par Had blood work done last week. reports platelets stable in 80 range.

## 2020-09-10 NOTE — ASSESSMENT
[FreeTextEntry1] : 79 yo man with h/o lung cancer, chronic DVT right leg, venous stasis bilateral, recurrent right leg cellulitis, hospitalized x 7 for right leg cellulitis in 2017; hospitalized once in 2018 and once 1/2019 now on  chronic suppression with keflex 500 mg po BID who today presents with a skin tear on right leg with slight increased warmth and swelling lower leg. The wound does not look infected but has resolving cellulitis.  The wound occurred on 8/27 and patient had increased keflex dose to 4x daily then 3x daily for 7 days. \par No fever, chills, purulence in wound. \par \par  Will continue suppressive keflex 500 mg po q 12 \par  Advised come to ER if develops any fever, chills\par  Local wound care to wound \par  Return 3- 4 months\par

## 2020-09-10 NOTE — PHYSICAL EXAM
[General Appearance - Alert] : alert [General Appearance - In No Acute Distress] : in no acute distress [General Appearance - Well Nourished] : well nourished [Sclera] : the sclera and conjunctiva were normal [Heart Sounds] : normal S1 and S2 [Abdomen Soft] : soft [] : no rash [Musculoskeletal - Swelling] : no joint swelling [Oriented To Time, Place, And Person] : oriented to person, place, and time [Affect] : the affect was normal [FreeTextEntry1] : right lower leg with oval shaped 6cm x 2 cm area of denuded skin, no purulence or streaking up leg

## 2020-09-16 ENCOUNTER — APPOINTMENT (OUTPATIENT)
Dept: CT IMAGING | Facility: IMAGING CENTER | Age: 80
End: 2020-09-16
Payer: MEDICARE

## 2020-09-16 ENCOUNTER — RESULT REVIEW (OUTPATIENT)
Age: 80
End: 2020-09-16

## 2020-09-16 ENCOUNTER — OUTPATIENT (OUTPATIENT)
Dept: OUTPATIENT SERVICES | Facility: HOSPITAL | Age: 80
LOS: 1 days | End: 2020-09-16
Payer: COMMERCIAL

## 2020-09-16 DIAGNOSIS — C34.90 MALIGNANT NEOPLASM OF UNSPECIFIED PART OF UNSPECIFIED BRONCHUS OR LUNG: ICD-10-CM

## 2020-09-16 DIAGNOSIS — Z98.89 OTHER SPECIFIED POSTPROCEDURAL STATES: Chronic | ICD-10-CM

## 2020-09-16 DIAGNOSIS — R91.8 OTHER NONSPECIFIC ABNORMAL FINDING OF LUNG FIELD: Chronic | ICD-10-CM

## 2020-09-16 PROCEDURE — 71250 CT THORAX DX C-: CPT | Mod: 26

## 2020-09-16 PROCEDURE — 71250 CT THORAX DX C-: CPT

## 2020-09-22 ENCOUNTER — APPOINTMENT (OUTPATIENT)
Dept: THORACIC SURGERY | Facility: CLINIC | Age: 80
End: 2020-09-22
Payer: MEDICARE

## 2020-09-22 PROCEDURE — 99443: CPT

## 2020-09-22 NOTE — HISTORY OF PRESENT ILLNESS
[Home] : at home, [unfilled] , at the time of the visit. [Medical Office: ([facility])___] : at the medical office located in  [Verbal consent obtained from patient] : the patient, [unfilled] [FreeTextEntry1] : 80 year old male with a history of several lung cancers and DVT in his Right LE (on Eliquis). He is s/p uniportal Left VATS, LLL superior segmentectomy on 2/23/15 for a T2aN0 small cell carcinoma and FB, uniportal Right VATS, wedge resection of RUL X 1, RUL anatomic video-assisted thoracic surgery Lobectomy, MLND on 1/21/15 for a [id number] poorly differentiated squamous cell carcinoma. He was treated with adjuvant chemotherapy. He did not received RT to the chest or brain. \par \par His medical history includes HTN, history of atrial fibrillation, BPH, DVT - Right LE and recurrent cellulitis of LE. He has chronic LE ulcers in his bilateral LE and bilateral LE edema.\par \par CT chest scan 9/16/2020: 0.3 cm solid nodule in the right middle lobe as well as a 0.5 cm solid nodule in the left lower lobe are noted. They are unchanged in their size and appearance when compared to previous exam. A 0.6 cm peripherally situated nodule in the right lower lobe is also unchanged when compared to previous exam. No pleural effusions are noted. \par \par CT Chest on 3/18/2020 stable.\par \par CT chest scan 9/18/19: Left lower lobe 5 mm nodules again seen on image 95. Right middle lobe 3 mm nodule on image 79 is stable.  Right lower lobe pleural-based 5 mm nodule on image 50 is also unchanged. No new nodules identified.\par \par CT Chest on 2/12/19 revealed:\par - No change in the pulmonary nodules since 10/2/2015\par \par Patient is followed today via Telephonic visit. Patient denies shortness of breath, cough, chest pain, fever, chills, loss of appetite, weight loss, or hemoptysis.

## 2020-09-22 NOTE — CONSULT LETTER
[Dear  ___] : Dear  [unfilled], [Consult Letter:] : I had the pleasure of evaluating your patient, [unfilled]. [( Thank you for referring [unfilled] for consultation for _____ )] : Thank you for referring [unfilled] for consultation for [unfilled] [Please see my note below.] : Please see my note below. [Consult Closing:] : Thank you very much for allowing me to participate in the care of this patient.  If you have any questions, please do not hesitate to contact me. [Sincerely,] : Sincerely, [FreeTextEntry2] : Juancho Marvin MD (PCP)\par Manda Pike MD (ID)\par Nik Quesada MD (Cardiac)  [FreeTextEntry3] : Thierry Pineda MD, FACS \par Chief, Division of Thoracic Surgery \par Director, Minimally Invasive Thoracic Surgery \par Department of Cardiovascular and Thoracic Surgery \par [facility] \par , Cardiovascular and Thoracic Surgery\par \par

## 2020-09-22 NOTE — DATA REVIEWED
[FreeTextEntry1] : CT chest scan 9/16/2020: 0.3 cm solid nodule in the right middle lobe as well as a 0.5 cm solid nodule in the left lower lobe are noted. They are unchanged in their size and appearance when compared to previous exam. A 0.6 cm peripherally situated nodule in the right lower lobe is also unchanged when compared to previous exam. No pleural effusions are noted.

## 2020-09-22 NOTE — ASSESSMENT
[FreeTextEntry1] : 80 year old male with a history of several lung cancers and DVT in his Right LE (on Eliquis). He is s/p uniportal Left VATS, LLL superior segmentectomy on 2/23/15 for a T2aN0 small cell carcinoma and FB, uniportal Right VATS, wedge resection of RUL X 1, RUL anatomic video-assisted thoracic surgery Lobectomy, MLND on 1/21/15 for a [id number] poorly differentiated squamous cell carcinoma. He was treated with adjuvant chemotherapy. He did not received RT to the chest or brain. \par \par His medical history includes HTN, history of atrial fibrillation, BPH, DVT - Right LE and recurrent cellulitis of LE. He has chronic LE ulcers in his bilateral LE and bilateral LE edema.\par \par CT chest scan 9/16/2020: 0.3 cm solid nodule in the right middle lobe as well as a 0.5 cm solid nodule in the left lower lobe are noted. They are unchanged in their size and appearance when compared to previous exam. A 0.6 cm peripherally situated nodule in the right lower lobe is also unchanged when compared to previous exam. No pleural effusions are noted. \par \par CT Chest on 3/18/2020 stable.\par \par I have reviewed the patient's medical records and diagnostic images at time of this office consultation and have made the following recommendation:\par 1. CT chest reviewed and explained to patient, I recommended patient to return to office in 6 months with CT Chest without contrast.\par \par I have spent 25 minutes on the phone discussing above result and plan of care with patient.\par \par I personally performed the services described in the documentation, reviewed the documentation recorded by the scribe in my presence and it accurately and completely records my words and actions.\par \par I, Martina Le, NP, am scribing for and the presence of JOVAN Foreman, the following sections HISTORY OF PRESENT ILLNESS, PAST MEDICAL/FAMILY/SOCIAL HISTORY; REVIEW OF SYSTEMS; VITAL SIGNS; PHYSICAL EXAM; DISPOSITION.

## 2020-12-23 ENCOUNTER — APPOINTMENT (OUTPATIENT)
Dept: CARDIOLOGY | Facility: CLINIC | Age: 80
End: 2020-12-23
Payer: MEDICARE

## 2020-12-23 VITALS
OXYGEN SATURATION: 97 % | DIASTOLIC BLOOD PRESSURE: 80 MMHG | TEMPERATURE: 98.3 F | HEIGHT: 74 IN | HEART RATE: 65 BPM | BODY MASS INDEX: 32.48 KG/M2 | SYSTOLIC BLOOD PRESSURE: 148 MMHG

## 2020-12-23 VITALS — BODY MASS INDEX: 32.48 KG/M2 | WEIGHT: 253 LBS

## 2020-12-23 PROCEDURE — 99214 OFFICE O/P EST MOD 30 MIN: CPT

## 2020-12-23 PROCEDURE — 93000 ELECTROCARDIOGRAM COMPLETE: CPT

## 2020-12-23 PROCEDURE — 99072 ADDL SUPL MATRL&STAF TM PHE: CPT

## 2020-12-27 ENCOUNTER — NON-APPOINTMENT (OUTPATIENT)
Age: 80
End: 2020-12-27

## 2020-12-28 NOTE — H&P ADULT - PROBLEM/PLAN-4
Problem: Pain:  Goal: Pain level will decrease  Description: Pain level will decrease  12/28/2020 0245 by Pati Pizano RN  Outcome: Met This Shift     Problem: Pain:  Goal: Control of acute pain  Description: Control of acute pain  12/28/2020 0245 by Pati Pizano RN  Outcome: Met This Shift     Problem: Pain:  Goal: Control of chronic pain  Description: Control of chronic pain  12/28/2020 0245 by Pati Pizano RN  Outcome: Met This Shift     Problem: Falls - Risk of:  Goal: Will remain free from falls  Description: Will remain free from falls  12/28/2020 0245 by Pati Pizano RN  Outcome: Met This Shift     Problem: Falls - Risk of:  Goal: Absence of physical injury  Description: Absence of physical injury  12/28/2020 0245 by Pati Pizano RN  Outcome: Met This Shift     Problem: Skin Integrity:  Goal: Will show no infection signs and symptoms  Description: Will show no infection signs and symptoms  12/28/2020 0245 by Pati Pizano RN  Outcome: Met This Shift     Problem: Skin Integrity:  Goal: Absence of new skin breakdown  Description: Absence of new skin breakdown  12/28/2020 0245 by Pati Pizano RN  Outcome: Met This Shift DISPLAY PLAN FREE TEXT

## 2021-01-28 ENCOUNTER — APPOINTMENT (OUTPATIENT)
Dept: INFECTIOUS DISEASE | Facility: CLINIC | Age: 81
End: 2021-01-28
Payer: MEDICARE

## 2021-01-28 VITALS
HEART RATE: 63 BPM | DIASTOLIC BLOOD PRESSURE: 80 MMHG | TEMPERATURE: 97.7 F | OXYGEN SATURATION: 97 % | SYSTOLIC BLOOD PRESSURE: 145 MMHG | BODY MASS INDEX: 32.47 KG/M2 | RESPIRATION RATE: 18 BRPM | WEIGHT: 253 LBS | HEIGHT: 74 IN

## 2021-01-28 PROCEDURE — 99213 OFFICE O/P EST LOW 20 MIN: CPT

## 2021-01-28 PROCEDURE — 99072 ADDL SUPL MATRL&STAF TM PHE: CPT

## 2021-02-05 NOTE — PHYSICAL EXAM
[General Appearance - Alert] : alert [General Appearance - In No Acute Distress] : in no acute distress [General Appearance - Well Nourished] : well nourished [Sclera] : the sclera and conjunctiva were normal [] : the neck was supple [Respiration, Rhythm And Depth] : normal respiratory rhythm and effort [Auscultation Breath Sounds / Voice Sounds] : lungs were clear to auscultation bilaterally [Heart Sounds] : normal S1 and S2 [Bowel Sounds] : normal bowel sounds [Abdomen Soft] : soft [Abdomen Tenderness] : non-tender [FreeTextEntry1] : walks with cane [Oriented To Time, Place, And Person] : oriented to person, place, and time [Affect] : the affect was normal

## 2021-02-05 NOTE — HISTORY OF PRESENT ILLNESS
[FreeTextEntry1] : Feels well.  continues on suppressive keflex for recurrent cellulitis right leg.\par leg swelling has diminished.\par no recent fever, chills. leg swelling.\par trying to get covid vaccine.

## 2021-02-05 NOTE — ASSESSMENT
[FreeTextEntry1] : 79 yo man with h/o lung cancer, chronic DVT right leg, venous stasis bilateral, recurrent right leg cellulitis, hospitalized x 7 for right leg cellulitis in 2017; hospitalized once in 2018 and once 1/2019 now on  chronic suppression with keflex 500 mg po BID.  \par Today legs are less swollen c/w past visits. There are no skin breaks or signs of infection.\par \par  Will continue suppressive keflex 500 mg po q 12 \par  Advised come to ER if develops any fever, chills\par  Local skin care\par  Return 3- 4 months\par

## 2021-02-05 NOTE — REVIEW OF SYSTEMS
[Fever] : no fever [Chills] : no chills [Body Aches] : no body aches [As Noted in HPI] : as noted in HPI [Limb Swelling] : limb swelling [Limb Weakness] : limb weakness [Negative] : Psychiatric

## 2021-03-04 NOTE — PROGRESS NOTE ADULT - PROBLEM/PLAN-3
Last seen by PCP: 1-7-2021  Advised to follow up on: 1 year  Next visit with PCP on: 1-7-2022    Hydrochlorothiazide 25 mg last refilled: 1-7-2021, #90, 3 refills    Medication refill request denied per Dr. Martínez patient has current refills on file at same requesting pharmacy.       DISPLAY PLAN FREE TEXT

## 2021-03-22 ENCOUNTER — OUTPATIENT (OUTPATIENT)
Dept: OUTPATIENT SERVICES | Facility: HOSPITAL | Age: 81
LOS: 1 days | End: 2021-03-22
Payer: COMMERCIAL

## 2021-03-22 ENCOUNTER — APPOINTMENT (OUTPATIENT)
Dept: CT IMAGING | Facility: IMAGING CENTER | Age: 81
End: 2021-03-22
Payer: MEDICARE

## 2021-03-22 DIAGNOSIS — C34.90 MALIGNANT NEOPLASM OF UNSPECIFIED PART OF UNSPECIFIED BRONCHUS OR LUNG: ICD-10-CM

## 2021-03-22 DIAGNOSIS — Z98.89 OTHER SPECIFIED POSTPROCEDURAL STATES: Chronic | ICD-10-CM

## 2021-03-22 DIAGNOSIS — R91.8 OTHER NONSPECIFIC ABNORMAL FINDING OF LUNG FIELD: Chronic | ICD-10-CM

## 2021-03-22 DIAGNOSIS — Z00.8 ENCOUNTER FOR OTHER GENERAL EXAMINATION: ICD-10-CM

## 2021-03-22 PROCEDURE — 71250 CT THORAX DX C-: CPT | Mod: 26

## 2021-03-22 PROCEDURE — 71250 CT THORAX DX C-: CPT

## 2021-03-30 ENCOUNTER — APPOINTMENT (OUTPATIENT)
Dept: THORACIC SURGERY | Facility: CLINIC | Age: 81
End: 2021-03-30
Payer: MEDICARE

## 2021-03-30 VITALS
SYSTOLIC BLOOD PRESSURE: 114 MMHG | DIASTOLIC BLOOD PRESSURE: 66 MMHG | OXYGEN SATURATION: 96 % | TEMPERATURE: 97.9 F | HEART RATE: 63 BPM | HEIGHT: 74 IN | BODY MASS INDEX: 32.85 KG/M2 | WEIGHT: 256 LBS

## 2021-03-30 PROCEDURE — 99214 OFFICE O/P EST MOD 30 MIN: CPT

## 2021-03-30 PROCEDURE — 99072 ADDL SUPL MATRL&STAF TM PHE: CPT

## 2021-03-30 NOTE — CONSULT LETTER
[Dear  ___] : Dear  [unfilled], [Courtesy Letter:] : I had the pleasure of seeing your patient, [unfilled], in my office today. [Please see my note below.] : Please see my note below. [Sincerely,] : Sincerely, [FreeTextEntry2] : Juancho Marvin MD (PCP)\par Manda Pike MD (ID)\par Nik Quesada MD (Cardiac)  [FreeTextEntry3] : Thierry Pineda MD, FACS \par Chief, Division of Thoracic Surgery \par Director, Minimally Invasive Thoracic Surgery \par Department of Cardiovascular and Thoracic Surgery \par [facility] \par , Cardiovascular and Thoracic Surgery\par

## 2021-03-30 NOTE — ASSESSMENT
[FreeTextEntry1] : 80 year old male with a history of several lung cancers and DVT in his Right LE (on Eliquis). He is s/p uniportal Left VATS, LLL superior segmentectomy on 2/23/15 for a T2aN0 small cell carcinoma and FB, uniportal Right VATS, wedge resection of RUL X 1, RUL anatomic video-assisted thoracic surgery Lobectomy, MLND on 1/21/15 for a [id number] poorly differentiated squamous cell carcinoma. He was treated with adjuvant chemotherapy. He did not receive RT to the chest or brain. \par \par His medical history includes HTN, history of atrial fibrillation, BPH, DVT - Right LE and recurrent cellulitis of LE. He has chronic LE ulcers in his bilateral LE and bilateral LE edema.\par \par CT chest scan 9/16/2020: 0.3 cm solid nodule in the right middle lobe as well as a 0.5 cm solid nodule in the left lower lobe are noted. They are unchanged in their size and appearance when compared to previous exam. A 0.6 cm peripherally situated nodule in the right lower lobe is also unchanged when compared to previous exam. No pleural effusions are noted. \par \par CT Chest on 3/22/2021:\par - Stable, no evidence of new or recurrent disease. Compared to CT Chest 9/16/2020. \par \par I have reviewed the patient's medical records and diagnostic images at time of this office consultation and have made the following recommendation:\par 1. CT chest reviewed and explained to patient, patient is doing well. I recommended patient to return to office in 6 months with CT Chest without contrast. \par \par I personally performed the services described in the documentation, reviewed the documentation recorded by the scribe in my presence and it accurately and completely records my words and actions.\par \par I, Rosie Kelley NP, am scribing for and in the presence of JOVAN Foreman, the following sections HISTORY OF PRESENT ILLNESS, PAST MEDICAL/FAMILY/SOCIAL HISTORY; REVIEW OF SYSTEMS; VITAL SIGNS; PHYSICAL EXAM; DISPOSITION.\par  \par

## 2021-03-30 NOTE — DATA REVIEWED
[FreeTextEntry1] : I have individually reviewed CT Chest on 3/22/2021:\par - Stable, no evidence of new or recurrent disease. Compared to CT Chest 9/16/2020.

## 2021-03-30 NOTE — HISTORY OF PRESENT ILLNESS
[FreeTextEntry1] : 80 year old male with a history of several lung cancers and DVT in his Right LE (on Eliquis). He is s/p uniportal Left VATS, LLL superior segmentectomy on 2/23/15 for a T2aN0 small cell carcinoma and FB, uniportal Right VATS, wedge resection of RUL X 1, RUL anatomic video-assisted thoracic surgery Lobectomy, MLND on 1/21/15 for a [id number] poorly differentiated squamous cell carcinoma. He was treated with adjuvant chemotherapy. He did not receive RT to the chest or brain. \par \par His medical history includes HTN, history of atrial fibrillation, BPH, DVT - Right LE and recurrent cellulitis of LE. He has chronic LE ulcers in his bilateral LE and bilateral LE edema.\par \par CT chest scan 9/16/2020: 0.3 cm solid nodule in the right middle lobe as well as a 0.5 cm solid nodule in the left lower lobe are noted. They are unchanged in their size and appearance when compared to previous exam. A 0.6 cm peripherally situated nodule in the right lower lobe is also unchanged when compared to previous exam. No pleural effusions are noted. \par \par CT Chest on 3/22/2021:\par - Stable, no evidence of new or recurrent disease. Compared to CT Chest 9/16/2020. \par \par Patient is here today for a 6 months CT Chest follow up. He endorses chronic SOB with exertion. Patient denies recent hospitalizations, cough, chest pain, fever, chills.

## 2021-03-30 NOTE — PHYSICAL EXAM
[Respiration, Rhythm And Depth] : normal respiratory rhythm and effort [Auscultation Breath Sounds / Voice Sounds] : lungs were clear to auscultation bilaterally [Heart Sounds] : normal S1 and S2 [Examination Of The Chest] : the chest was normal in appearance [2+] : left 2+ [Abdomen Soft] : soft [Abdomen Tenderness] : non-tender [Cervical Lymph Nodes Enlarged Anterior Bilaterally] : anterior cervical [Cervical Lymph Nodes Enlarged Posterior Bilaterally] : posterior cervical [Supraclavicular Lymph Nodes Enlarged Bilaterally] : supraclavicular [Abnormal Walk] : normal gait [Skin Turgor] : normal skin turgor

## 2021-05-11 NOTE — PROGRESS NOTE ADULT - PROBLEM SELECTOR PLAN 5
----- Message from Indira Diaz DO sent at 5/11/2021  2:53 PM CDT -----  INR therapeutic so can continue on same dose of coumadin, but on high side so please recheck in 2 weeks    Indira Diaz DO    
Pt aware of INR results and re-check in 2 weeks.   
On Buspar, TSH,

## 2021-05-28 NOTE — ED PROVIDER NOTE - NEUROLOGICAL, MLM
Low concern for residual bone infection - wait for OR Deep WCx negative 3 days and discharge on PO Augmentin BID 7 days  Moderate concern for viability of free flap
Alert and oriented, no focal deficits, no motor or sensory deficits.

## 2021-06-23 ENCOUNTER — APPOINTMENT (OUTPATIENT)
Dept: CARDIOLOGY | Facility: CLINIC | Age: 81
End: 2021-06-23
Payer: MEDICARE

## 2021-06-23 VITALS
HEART RATE: 61 BPM | DIASTOLIC BLOOD PRESSURE: 77 MMHG | SYSTOLIC BLOOD PRESSURE: 161 MMHG | BODY MASS INDEX: 33.13 KG/M2 | WEIGHT: 258 LBS | OXYGEN SATURATION: 96 % | RESPIRATION RATE: 16 BRPM

## 2021-06-23 PROCEDURE — 93000 ELECTROCARDIOGRAM COMPLETE: CPT

## 2021-06-23 PROCEDURE — 99214 OFFICE O/P EST MOD 30 MIN: CPT

## 2021-06-24 ENCOUNTER — NON-APPOINTMENT (OUTPATIENT)
Age: 81
End: 2021-06-24

## 2021-07-06 ENCOUNTER — RX RENEWAL (OUTPATIENT)
Age: 81
End: 2021-07-06

## 2021-08-02 NOTE — PROGRESS NOTE ADULT - PROBLEM SELECTOR PLAN 10
Nutrition Assessment   Assessment Type: Initial assessment  Reason for Visit: MST  Referral Requested By: Nurse  Chart Medications Lab Results Reviewed:  Yes    Nutritional Risk Factors: Poor appetite since admission    Current Diet Order: Diabetic  Diet Tolerance: tolerating  Food Allergies: no  Priority Points: Status 2    Demographic/Anthropometrics Information  Gender: female   Patient Age: 89 year old  Height:    Ht Readings from Last 1 Encounters:   07/30/21 5' 2\" (1.575 m)      Weight:   Wt Readings from Last 1 Encounters:   08/01/21 82.9 kg      BMI:   BMI Readings from Last 1 Encounters:   08/01/21 33.43 kg/m²     Ideal Body Wt: Ideal body weight: 50.1 kg  Adjusted ideal body weight: 63.2 kg  Usual Body Weight: 77 kg per 5/16/21  % Weight change: no weight loss    Physical Appearance: Overweight  Weight Classification: Grade I obesity (BMI 30-34.9)    Nutrition Diagnosis (PES)  Inadequate oral intake related to Confusion and/or mental status change as evidenced by Documented/reported poor oral intake    Nutrition Plan  Recommended Nutrition Intervention: Coordination of nutrition care by a nutrition professional and nutrition supplemental therapy  Monitor: Biochemical data, medical tests, procedures, Food and beverage intake and Weight    Discharge Needs: Supplements  Care Plan Discussed With: Patient and Other: RN  Goals: Meet >/= 75% of estimated needs  Goal Progress: Initiated  Timeframe to Achieve Goal: By discharge    Dietitian Notes/Impressions/Recommendations:  8/2: Dietitian evaluation d/t documented poor intake, eating 5% of meals. Pt seen in room - confused, reports she is eating a lot of fish. Pt is willing to drink liz Ensure HP. Spoke with RN - pt more confused today than normal; is only eating small bites of her meals. RD will continue to follow per protocol and remain available via consult as needed.  Weight Hx: no wt loss NFPE: No visible losses.     TREATMENT PLAN: Monitoring &  Interventions   1. Diet: Recommend continue consistent carb diet  2. Oral Nutrition Supplement: Recommend chocolate Ensure HP breakfast and dinner.    3. RD monitoring intake trends, weight, labs. Further recommendations based on clinical course.     Malnutrition Status: Patient does not meet criteria for malnutrition at this time.         on Eliquis for DVT

## 2021-12-22 ENCOUNTER — APPOINTMENT (OUTPATIENT)
Dept: CARDIOLOGY | Facility: CLINIC | Age: 81
End: 2021-12-22
Payer: MEDICARE

## 2021-12-22 ENCOUNTER — NON-APPOINTMENT (OUTPATIENT)
Age: 81
End: 2021-12-22

## 2021-12-22 VITALS
HEART RATE: 75 BPM | TEMPERATURE: 98.8 F | DIASTOLIC BLOOD PRESSURE: 77 MMHG | SYSTOLIC BLOOD PRESSURE: 148 MMHG | BODY MASS INDEX: 34.79 KG/M2 | OXYGEN SATURATION: 96 % | HEIGHT: 74 IN

## 2021-12-22 VITALS — WEIGHT: 271 LBS | BODY MASS INDEX: 34.79 KG/M2

## 2021-12-22 PROCEDURE — 99214 OFFICE O/P EST MOD 30 MIN: CPT

## 2021-12-22 PROCEDURE — 93000 ELECTROCARDIOGRAM COMPLETE: CPT

## 2022-01-06 ENCOUNTER — APPOINTMENT (OUTPATIENT)
Dept: INFECTIOUS DISEASE | Facility: CLINIC | Age: 82
End: 2022-01-06
Payer: MEDICARE

## 2022-01-06 VITALS
BODY MASS INDEX: 34.65 KG/M2 | HEIGHT: 74 IN | TEMPERATURE: 98.4 F | DIASTOLIC BLOOD PRESSURE: 81 MMHG | HEART RATE: 69 BPM | WEIGHT: 270 LBS | SYSTOLIC BLOOD PRESSURE: 171 MMHG | OXYGEN SATURATION: 96 %

## 2022-01-06 PROCEDURE — 99213 OFFICE O/P EST LOW 20 MIN: CPT

## 2022-01-06 NOTE — PHYSICAL EXAM
[General Appearance - Alert] : alert [General Appearance - In No Acute Distress] : in no acute distress [General Appearance - Well Nourished] : well nourished [Extraocular Movements] : extraocular movements were intact [Oropharynx] : the oropharynx was normal with no thrush [] : the neck was supple [Respiration, Rhythm And Depth] : normal respiratory rhythm and effort [Auscultation Breath Sounds / Voice Sounds] : lungs were clear to auscultation bilaterally [Heart Rate And Rhythm] : heart rate was normal and rhythm regular [Heart Sounds] : normal S1 and S2 [Bowel Sounds] : normal bowel sounds [Abdomen Soft] : soft [Abdomen Tenderness] : non-tender [FreeTextEntry1] : hyperpigmentation and lthickened skin lower legs bilateral, no increased warmth or drainage [Oriented To Time, Place, And Person] : oriented to person, place, and time [Affect] : the affect was normal

## 2022-01-06 NOTE — ASSESSMENT
[FreeTextEntry1] : 80 yo man with h/o lung cancer, chronic DVT right leg, venous stasis bilateral, recurrent right leg cellulitis, hospitalized x 7 for right leg cellulitis in 2017; hospitalized once in 2018 and once 1/2019 now on  chronic suppression with keflex 500 mg po BID.  Tolerating and compliant.\par Legs continue to show diminished swelling c/w past visits. There are no skin breaks or signs of infection.\par \par  Will continue suppressive keflex 500 mg po q 12 \par  Local skin care has helped a lot.\par Since has been some time now since breakthrough infection might consider stopping keflex suppression in next few months\par Encouraged covid vaccine  \par  Return 3- 4 months\par

## 2022-01-06 NOTE — HISTORY OF PRESENT ILLNESS
[FreeTextEntry1] : Feels well.  Notes decreased swelling in legs.\par Has not had episode of fever, chills, cellulitis in 2 years (1/2019).\par Continues on keflex oral suppression.

## 2022-02-05 NOTE — CONSULT NOTE ADULT - CONSTITUTIONAL
Shift assessment completed, see flow sheet. RASS -3.  - Fentanyl infusing at 250 mcg//h.   - Versed infusing at 2 mg/h. - Propofol infusing at 20 mcg/kg/min. Intubated and sedated on AC # 8 ETT, at 22 LL. 24 / 40 %/ +8. SpO2 90%. Respirations are easy, even, and unlabored. Bilateral lung sounds diminshed. VSS  OG in place at 60, with TF at 50 mL/hr. PICC/PIV, WNL. All lines and monitoring devices in place. Mcguire is patent and secured. Bilateral soft wrist restraints in place for patient safety. Bed in lowest position with wheels locked. detailed exam

## 2022-03-22 ENCOUNTER — OUTPATIENT (OUTPATIENT)
Dept: OUTPATIENT SERVICES | Facility: HOSPITAL | Age: 82
LOS: 1 days | End: 2022-03-22
Payer: COMMERCIAL

## 2022-03-22 ENCOUNTER — APPOINTMENT (OUTPATIENT)
Dept: CT IMAGING | Facility: IMAGING CENTER | Age: 82
End: 2022-03-22
Payer: MEDICARE

## 2022-03-22 DIAGNOSIS — C34.90 MALIGNANT NEOPLASM OF UNSPECIFIED PART OF UNSPECIFIED BRONCHUS OR LUNG: ICD-10-CM

## 2022-03-22 DIAGNOSIS — R91.8 OTHER NONSPECIFIC ABNORMAL FINDING OF LUNG FIELD: Chronic | ICD-10-CM

## 2022-03-22 DIAGNOSIS — Z00.8 ENCOUNTER FOR OTHER GENERAL EXAMINATION: ICD-10-CM

## 2022-03-22 DIAGNOSIS — Z98.89 OTHER SPECIFIED POSTPROCEDURAL STATES: Chronic | ICD-10-CM

## 2022-03-22 PROCEDURE — 71250 CT THORAX DX C-: CPT | Mod: 26

## 2022-03-22 PROCEDURE — 71250 CT THORAX DX C-: CPT

## 2022-03-29 ENCOUNTER — APPOINTMENT (OUTPATIENT)
Dept: THORACIC SURGERY | Facility: CLINIC | Age: 82
End: 2022-03-29
Payer: MEDICARE

## 2022-03-29 PROCEDURE — 99443: CPT

## 2022-03-29 NOTE — ASSESSMENT
[FreeTextEntry1] : 81 year old male with a history of several lung cancers and DVT in his Right LE (on Eliquis). He is s/p FB, uniportal Right VATS, wedge resection of RUL X 1, RUL anatomic video-assisted thoracic surgery Lobectomy, MLND on 1/21/15 for a [id number] poorly differentiated squamous cell carcinoma. Then s/p uniportal Left VATS, LLL superior segmentectomy on 2/23/15 for a T2aN0 small cell carcinoma. He was treated with adjuvant chemotherapy. He did not receive RT to the chest or brain. \par \par His medical history includes HTN, history of atrial fibrillation, BPH, DVT - Right LE and recurrent cellulitis of LE. He has chronic LE ulcers in his bilateral LE and bilateral LE edema.\par \par I have reviewed the patient's medical records and diagnostic images at time of this office consultation and have made the following recommendation:\par 1. CT scan showed no evidence of recurrence, recommended patient to return to office in 1yr w/ CT Chest w/o contrast.\par \par \par I personally performed the services described in the documentation, reviewed the documentation recorded by the scribe in my presence and it accurately and completely records my words and actions.\par \par I, Jackson Caputo NP, am scribing for and the presence of KOREY ForemanIM, the following sections HISTORY OF PRESENT ILLNESS, PAST MEDICAL/FAMILY/SOCIAL HISTORY; REVIEW OF SYSTEMS; VITAL SIGNS; PHYSICAL EXAM; DISPOSITION.\par \par \par \par

## 2022-03-29 NOTE — HISTORY OF PRESENT ILLNESS
[Home] : at home, [unfilled] , at the time of the visit. [Medical Office: ([facility])___] : at the medical office located in  [Verbal consent obtained from patient] : the patient, [unfilled] [FreeTextEntry1] : 81 year old male with a history of several lung cancers and DVT in his Right LE (on Eliquis). He is s/p FB, uniportal Right VATS, wedge resection of RUL X 1, RUL anatomic video-assisted thoracic surgery Lobectomy, MLND on 1/21/15 for a [id number] poorly differentiated squamous cell carcinoma. Then s/p uniportal Left VATS, LLL superior segmentectomy on 2/23/15 for a T2aN0 small cell carcinoma. He was treated with adjuvant chemotherapy. He did not receive RT to the chest or brain. \par \par His medical history includes HTN, history of atrial fibrillation, BPH, DVT - Right LE and recurrent cellulitis of LE. He has chronic LE ulcers in his bilateral LE and bilateral LE edema.\par \par CT chest scan 9/16/2020: 0.3 cm solid nodule in the right middle lobe as well as a 0.5 cm solid nodule in the left lower lobe are noted. They are unchanged in their size and appearance when compared to previous exam. A 0.6 cm peripherally situated nodule in the right lower lobe is also unchanged when compared to previous exam. No pleural effusions are noted. \par \par CT Chest on 3/22/2021:\par - Stable, no evidence of new or recurrent disease. Compared to CT Chest 9/16/2020. \par \par CT Chest on 3/22/22:\par - stable exam; s/p RULobectomy and LLL wedge rxn\par - TWYLA\par \par Patient is followed today via Telephonic visit. Denies SOB, CP, cough.\par

## 2022-03-29 NOTE — CONSULT LETTER
[FreeTextEntry2] : Juancho Marvin MD (PCP)\par Manda Pike MD (ID)\par Nik Quesada MD (Cardiac) \par Tyler Barry (HemeOnc) [FreeTextEntry3] : Thierry Pineda MD, FACS \par Chief, Division of Thoracic Surgery \par Director, Minimally Invasive Thoracic Surgery \par Department of Cardiovascular and Thoracic Surgery \par [facility] \par , Cardiovascular and Thoracic Surgery\par

## 2022-04-04 NOTE — PATIENT PROFILE ADULT - NSPROCHRONICPAINRELIEVE_GEN_A_NUR
Procedure:  CYSTOSCOPY RETROGRADE PYELOGRAM WITH INSERTION STENT URETERAL (Right Ureter)    Relevant Problems   CARDIO   (+) Dyspnea on exertion   (+) Pain in thoracic spine      /RENAL   (+) Hydronephrosis      GYN   (+) Malignant neoplasm of endocervix (HCC)      HEMATOLOGY   (+) Thrombocytopenia (HCC)      MUSCULOSKELETAL   (+) Pain in thoracic spine   (+) Primary generalized (osteo)arthritis      NEURO/PSYCH   (+) Anxiety and depression   (+) Continuous opioid dependence (HCC)   (+) Depression, recurrent (HCC)      PULMONARY   (+) Chronic obstructive pulmonary disease, unspecified COPD type (HCC)   (+) Dyspnea on exertion        Physical Exam    Airway    Mallampati score: I  TM Distance: >3 FB  Neck ROM: full     Dental   No notable dental hx     Cardiovascular      Pulmonary      Other Findings        Anesthesia Plan  ASA Score- 3     Anesthesia Type- general with ASA Monitors  Additional Monitors:   Airway Plan: LMA  Plan Factors-Exercise tolerance (METS): >4 METS  Chart reviewed  Patient summary reviewed  Patient is not a current smoker  Induction- intravenous  Postoperative Plan- Plan for postoperative opioid use  Informed Consent- Anesthetic plan and risks discussed with patient  I personally reviewed this patient with the CRNA  Discussed and agreed on the Anesthesia Plan with the CRNA  Chao Moody medications

## 2022-06-02 NOTE — PATIENT PROFILE ADULT. - NS PRO PT RIGHT SUPPORT PERSON
Abdomen , soft, nontender, nondistended , no guarding or rigidity , no masses palpable , normal bowel sounds , Liver and Spleen,  no hepatosplenomegaly , liver nontender same name as above

## 2022-07-02 ENCOUNTER — RX RENEWAL (OUTPATIENT)
Age: 82
End: 2022-07-02

## 2022-07-07 ENCOUNTER — APPOINTMENT (OUTPATIENT)
Dept: INFECTIOUS DISEASE | Facility: CLINIC | Age: 82
End: 2022-07-07

## 2022-07-07 VITALS
SYSTOLIC BLOOD PRESSURE: 161 MMHG | TEMPERATURE: 97.7 F | DIASTOLIC BLOOD PRESSURE: 77 MMHG | BODY MASS INDEX: 36.19 KG/M2 | WEIGHT: 282 LBS | HEIGHT: 74 IN | HEART RATE: 62 BPM | OXYGEN SATURATION: 97 %

## 2022-07-07 PROCEDURE — 99213 OFFICE O/P EST LOW 20 MIN: CPT

## 2022-07-07 NOTE — REASON FOR VISIT
[Follow-Up: _____] : a [unfilled] follow-up visit [Spouse] : spouse [FreeTextEntry1] : antibiotic suppression for recurrent right leg cellulitis

## 2022-07-07 NOTE — PHYSICAL EXAM
[General Appearance - Alert] : alert [General Appearance - In No Acute Distress] : in no acute distress [General Appearance - Well Nourished] : well nourished [General Appearance - Well-Appearing] : healthy appearing [Sclera] : the sclera and conjunctiva were normal [] : no respiratory distress [Edema] : there was no peripheral edema [Musculoskeletal - Swelling] : no joint swelling [Motor Tone] : muscle strength and tone were normal [FreeTextEntry1] : right leg w/o erythema, swelling [Motor Exam] : the motor exam was normal [Oriented To Time, Place, And Person] : oriented to person, place, and time [Affect] : the affect was normal

## 2022-07-07 NOTE — REVIEW OF SYSTEMS
[Fever] : no fever [Chills] : no chills [Body Aches] : no body aches [Feeling Sick] : not feeling sick [Joint Swelling] : no joint swelling [Limb Pain] : no limb pain [Limb Swelling] : no limb swelling [Skin Lesions] : no skin lesions [Skin Wound] : no skin wound [Negative] : Psychiatric [FreeTextEntry9] : walks with cane

## 2022-07-07 NOTE — ASSESSMENT
[FreeTextEntry1] : 83 yo man with h/o lung cancer, chronic DVT right leg, venous stasis bilateral, recurrent right leg cellulitis, hospitalized x 7 for right leg cellulitis in 2017; hospitalized once in 2018 and once 1/2019 now on  chronic suppression with keflex 500 mg po BID.  Tolerating and compliant.\par Legs continue to show diminished swelling c/w past visits. There are no skin breaks or signs of infection.\par \par  Will continue suppressive keflex 500 mg po q 12 \par  Local skin care has helped a lot.\par Since has been some time now since breakthrough infection might consider stopping keflex suppression in next few months but hesitate to in summer months\par  Lab work reviewed low platelets chronic\par  c/w keflex suppression\par return 4- 6 m\par

## 2022-07-07 NOTE — HISTORY OF PRESENT ILLNESS
[FreeTextEntry1] : Feels well. Continue taking keflex twice daily.\par Has not had any episodes of fever, chills, leg swelling.\par Patient's wife noticed small purple bumps on right leg a few weeks ago but spontaneously resolved.\jose maria Brought lab work from PMD

## 2022-07-20 ENCOUNTER — APPOINTMENT (OUTPATIENT)
Dept: CARDIOLOGY | Facility: CLINIC | Age: 82
End: 2022-07-20

## 2022-07-20 ENCOUNTER — NON-APPOINTMENT (OUTPATIENT)
Age: 82
End: 2022-07-20

## 2022-07-20 VITALS
OXYGEN SATURATION: 96 % | TEMPERATURE: 98.1 F | WEIGHT: 284 LBS | HEART RATE: 55 BPM | HEIGHT: 74 IN | BODY MASS INDEX: 36.45 KG/M2 | DIASTOLIC BLOOD PRESSURE: 75 MMHG | SYSTOLIC BLOOD PRESSURE: 160 MMHG

## 2022-07-20 PROCEDURE — 99214 OFFICE O/P EST MOD 30 MIN: CPT | Mod: 25

## 2022-07-20 PROCEDURE — 93000 ELECTROCARDIOGRAM COMPLETE: CPT

## 2022-07-20 RX ORDER — APIXABAN 5 MG/1
5 TABLET, FILM COATED ORAL
Qty: 90 | Refills: 3 | Status: DISCONTINUED | COMMUNITY
Start: 1900-01-01 | End: 2022-07-20

## 2022-08-18 NOTE — ED PROVIDER NOTE - CROS ED CONS ALL NEG
Discharge Plan[de-identified] Home with Office Follow-up   Telemetry/Cardiac Monitoring Required?: Yes
- - -

## 2022-10-03 NOTE — H&P ADULT - PROBLEM SELECTOR PLAN 9
on Neurontin, pain control, Mirvaso Pregnancy And Lactation Text: This medication has not been assigned a Pregnancy Risk Category. It is unknown if the medication is excreted in breast milk.

## 2022-10-06 NOTE — PHYSICAL THERAPY INITIAL EVALUATION ADULT - ADDITIONAL COMMENTS
Patient left sitting at edge of bed, NAD, call bell in reach, all lines/devices intact, RN aware.
This was a shared visit with the ALYSSIA. I reviewed and verified the documentation and independently performed the documented:

## 2022-11-29 ENCOUNTER — NON-APPOINTMENT (OUTPATIENT)
Age: 82
End: 2022-11-29

## 2022-12-13 ENCOUNTER — INPATIENT (INPATIENT)
Facility: HOSPITAL | Age: 82
LOS: 2 days | Discharge: HOME HEALTH | End: 2022-12-16
Attending: INTERNAL MEDICINE | Admitting: INTERNAL MEDICINE
Payer: MEDICARE

## 2022-12-13 VITALS
SYSTOLIC BLOOD PRESSURE: 176 MMHG | TEMPERATURE: 98 F | RESPIRATION RATE: 18 BRPM | OXYGEN SATURATION: 96 % | HEART RATE: 78 BPM | DIASTOLIC BLOOD PRESSURE: 73 MMHG

## 2022-12-13 DIAGNOSIS — Z98.89 OTHER SPECIFIED POSTPROCEDURAL STATES: Chronic | ICD-10-CM

## 2022-12-13 DIAGNOSIS — L03.90 CELLULITIS, UNSPECIFIED: ICD-10-CM

## 2022-12-13 DIAGNOSIS — F41.9 ANXIETY DISORDER, UNSPECIFIED: ICD-10-CM

## 2022-12-13 DIAGNOSIS — Z29.9 ENCOUNTER FOR PROPHYLACTIC MEASURES, UNSPECIFIED: ICD-10-CM

## 2022-12-13 DIAGNOSIS — D69.6 THROMBOCYTOPENIA, UNSPECIFIED: ICD-10-CM

## 2022-12-13 DIAGNOSIS — L03.115 CELLULITIS OF RIGHT LOWER LIMB: ICD-10-CM

## 2022-12-13 DIAGNOSIS — E11.9 TYPE 2 DIABETES MELLITUS WITHOUT COMPLICATIONS: ICD-10-CM

## 2022-12-13 DIAGNOSIS — Z86.718 PERSONAL HISTORY OF OTHER VENOUS THROMBOSIS AND EMBOLISM: ICD-10-CM

## 2022-12-13 DIAGNOSIS — R91.8 OTHER NONSPECIFIC ABNORMAL FINDING OF LUNG FIELD: Chronic | ICD-10-CM

## 2022-12-13 DIAGNOSIS — D64.9 ANEMIA, UNSPECIFIED: ICD-10-CM

## 2022-12-13 LAB
ALBUMIN SERPL ELPH-MCNC: 3.9 G/DL — SIGNIFICANT CHANGE UP (ref 3.3–5)
ALP SERPL-CCNC: 74 U/L — SIGNIFICANT CHANGE UP (ref 40–120)
ALT FLD-CCNC: 12 U/L — SIGNIFICANT CHANGE UP (ref 4–41)
ANION GAP SERPL CALC-SCNC: 10 MMOL/L — SIGNIFICANT CHANGE UP (ref 7–14)
AST SERPL-CCNC: 16 U/L — SIGNIFICANT CHANGE UP (ref 4–40)
BASOPHILS # BLD AUTO: 0.03 K/UL — SIGNIFICANT CHANGE UP (ref 0–0.2)
BASOPHILS NFR BLD AUTO: 0.6 % — SIGNIFICANT CHANGE UP (ref 0–2)
BILIRUB SERPL-MCNC: 0.4 MG/DL — SIGNIFICANT CHANGE UP (ref 0.2–1.2)
BUN SERPL-MCNC: 19 MG/DL — SIGNIFICANT CHANGE UP (ref 7–23)
CALCIUM SERPL-MCNC: 9.1 MG/DL — SIGNIFICANT CHANGE UP (ref 8.4–10.5)
CHLORIDE SERPL-SCNC: 104 MMOL/L — SIGNIFICANT CHANGE UP (ref 98–107)
CO2 SERPL-SCNC: 25 MMOL/L — SIGNIFICANT CHANGE UP (ref 22–31)
CREAT SERPL-MCNC: 0.82 MG/DL — SIGNIFICANT CHANGE UP (ref 0.5–1.3)
EGFR: 89 ML/MIN/1.73M2 — SIGNIFICANT CHANGE UP
EOSINOPHIL # BLD AUTO: 0.08 K/UL — SIGNIFICANT CHANGE UP (ref 0–0.5)
EOSINOPHIL NFR BLD AUTO: 1.6 % — SIGNIFICANT CHANGE UP (ref 0–6)
GLUCOSE BLDC GLUCOMTR-MCNC: 148 MG/DL — HIGH (ref 70–99)
GLUCOSE BLDC GLUCOMTR-MCNC: 185 MG/DL — HIGH (ref 70–99)
GLUCOSE SERPL-MCNC: 151 MG/DL — HIGH (ref 70–99)
HCT VFR BLD CALC: 39.6 % — SIGNIFICANT CHANGE UP (ref 39–50)
HGB BLD-MCNC: 12.9 G/DL — LOW (ref 13–17)
IANC: 3.43 K/UL — SIGNIFICANT CHANGE UP (ref 1.8–7.4)
IMM GRANULOCYTES NFR BLD AUTO: 0.4 % — SIGNIFICANT CHANGE UP (ref 0–0.9)
LYMPHOCYTES # BLD AUTO: 0.9 K/UL — LOW (ref 1–3.3)
LYMPHOCYTES # BLD AUTO: 17.4 % — SIGNIFICANT CHANGE UP (ref 13–44)
MCHC RBC-ENTMCNC: 28.5 PG — SIGNIFICANT CHANGE UP (ref 27–34)
MCHC RBC-ENTMCNC: 32.6 GM/DL — SIGNIFICANT CHANGE UP (ref 32–36)
MCV RBC AUTO: 87.6 FL — SIGNIFICANT CHANGE UP (ref 80–100)
MONOCYTES # BLD AUTO: 0.7 K/UL — SIGNIFICANT CHANGE UP (ref 0–0.9)
MONOCYTES NFR BLD AUTO: 13.6 % — SIGNIFICANT CHANGE UP (ref 2–14)
NEUTROPHILS # BLD AUTO: 3.43 K/UL — SIGNIFICANT CHANGE UP (ref 1.8–7.4)
NEUTROPHILS NFR BLD AUTO: 66.4 % — SIGNIFICANT CHANGE UP (ref 43–77)
NRBC # BLD: 0 /100 WBCS — SIGNIFICANT CHANGE UP (ref 0–0)
NRBC # FLD: 0 K/UL — SIGNIFICANT CHANGE UP (ref 0–0)
PLATELET # BLD AUTO: 107 K/UL — LOW (ref 150–400)
POTASSIUM SERPL-MCNC: 3.9 MMOL/L — SIGNIFICANT CHANGE UP (ref 3.5–5.3)
POTASSIUM SERPL-SCNC: 3.9 MMOL/L — SIGNIFICANT CHANGE UP (ref 3.5–5.3)
PROT SERPL-MCNC: 6.9 G/DL — SIGNIFICANT CHANGE UP (ref 6–8.3)
RBC # BLD: 4.52 M/UL — SIGNIFICANT CHANGE UP (ref 4.2–5.8)
RBC # FLD: 14.2 % — SIGNIFICANT CHANGE UP (ref 10.3–14.5)
SARS-COV-2 RNA SPEC QL NAA+PROBE: SIGNIFICANT CHANGE UP
SODIUM SERPL-SCNC: 139 MMOL/L — SIGNIFICANT CHANGE UP (ref 135–145)
WBC # BLD: 5.16 K/UL — SIGNIFICANT CHANGE UP (ref 3.8–10.5)
WBC # FLD AUTO: 5.16 K/UL — SIGNIFICANT CHANGE UP (ref 3.8–10.5)

## 2022-12-13 PROCEDURE — 99222 1ST HOSP IP/OBS MODERATE 55: CPT

## 2022-12-13 PROCEDURE — 93971 EXTREMITY STUDY: CPT | Mod: 26,LT

## 2022-12-13 PROCEDURE — 99223 1ST HOSP IP/OBS HIGH 75: CPT

## 2022-12-13 PROCEDURE — 73590 X-RAY EXAM OF LOWER LEG: CPT | Mod: 26,50

## 2022-12-13 PROCEDURE — 99285 EMERGENCY DEPT VISIT HI MDM: CPT

## 2022-12-13 RX ORDER — CEFAZOLIN SODIUM 1 G
1000 VIAL (EA) INJECTION ONCE
Refills: 0 | Status: COMPLETED | OUTPATIENT
Start: 2022-12-13 | End: 2022-12-13

## 2022-12-13 RX ORDER — LANOLIN ALCOHOL/MO/W.PET/CERES
3 CREAM (GRAM) TOPICAL AT BEDTIME
Refills: 0 | Status: DISCONTINUED | OUTPATIENT
Start: 2022-12-13 | End: 2022-12-16

## 2022-12-13 RX ORDER — AMLODIPINE BESYLATE 2.5 MG/1
5 TABLET ORAL DAILY
Refills: 0 | Status: DISCONTINUED | OUTPATIENT
Start: 2022-12-13 | End: 2022-12-15

## 2022-12-13 RX ORDER — SODIUM CHLORIDE 9 MG/ML
1000 INJECTION, SOLUTION INTRAVENOUS
Refills: 0 | Status: DISCONTINUED | OUTPATIENT
Start: 2022-12-13 | End: 2022-12-16

## 2022-12-13 RX ORDER — GLUCAGON INJECTION, SOLUTION 0.5 MG/.1ML
1 INJECTION, SOLUTION SUBCUTANEOUS ONCE
Refills: 0 | Status: DISCONTINUED | OUTPATIENT
Start: 2022-12-13 | End: 2022-12-16

## 2022-12-13 RX ORDER — DEXTROSE 50 % IN WATER 50 %
15 SYRINGE (ML) INTRAVENOUS ONCE
Refills: 0 | Status: DISCONTINUED | OUTPATIENT
Start: 2022-12-13 | End: 2022-12-16

## 2022-12-13 RX ORDER — GABAPENTIN 400 MG/1
800 CAPSULE ORAL THREE TIMES A DAY
Refills: 0 | Status: DISCONTINUED | OUTPATIENT
Start: 2022-12-13 | End: 2022-12-16

## 2022-12-13 RX ORDER — DEXTROSE 50 % IN WATER 50 %
25 SYRINGE (ML) INTRAVENOUS ONCE
Refills: 0 | Status: DISCONTINUED | OUTPATIENT
Start: 2022-12-13 | End: 2022-12-16

## 2022-12-13 RX ORDER — INSULIN LISPRO 100/ML
VIAL (ML) SUBCUTANEOUS AT BEDTIME
Refills: 0 | Status: DISCONTINUED | OUTPATIENT
Start: 2022-12-13 | End: 2022-12-16

## 2022-12-13 RX ORDER — RIVAROXABAN 15 MG-20MG
10 KIT ORAL
Refills: 0 | Status: DISCONTINUED | OUTPATIENT
Start: 2022-12-13 | End: 2022-12-16

## 2022-12-13 RX ORDER — METFORMIN HYDROCHLORIDE 850 MG/1
1 TABLET ORAL
Qty: 0 | Refills: 0 | DISCHARGE

## 2022-12-13 RX ORDER — ACETAMINOPHEN 500 MG
650 TABLET ORAL EVERY 6 HOURS
Refills: 0 | Status: DISCONTINUED | OUTPATIENT
Start: 2022-12-13 | End: 2022-12-16

## 2022-12-13 RX ORDER — CEFAZOLIN SODIUM 1 G
1000 VIAL (EA) INJECTION EVERY 8 HOURS
Refills: 0 | Status: DISCONTINUED | OUTPATIENT
Start: 2022-12-14 | End: 2022-12-16

## 2022-12-13 RX ORDER — DEXTROSE 50 % IN WATER 50 %
12.5 SYRINGE (ML) INTRAVENOUS ONCE
Refills: 0 | Status: DISCONTINUED | OUTPATIENT
Start: 2022-12-13 | End: 2022-12-16

## 2022-12-13 RX ORDER — ONDANSETRON 8 MG/1
4 TABLET, FILM COATED ORAL EVERY 8 HOURS
Refills: 0 | Status: DISCONTINUED | OUTPATIENT
Start: 2022-12-13 | End: 2022-12-16

## 2022-12-13 RX ORDER — INSULIN LISPRO 100/ML
VIAL (ML) SUBCUTANEOUS
Refills: 0 | Status: DISCONTINUED | OUTPATIENT
Start: 2022-12-13 | End: 2022-12-16

## 2022-12-13 RX ORDER — TAMSULOSIN HYDROCHLORIDE 0.4 MG/1
0.4 CAPSULE ORAL AT BEDTIME
Refills: 0 | Status: DISCONTINUED | OUTPATIENT
Start: 2022-12-13 | End: 2022-12-16

## 2022-12-13 RX ORDER — METOPROLOL TARTRATE 50 MG
25 TABLET ORAL
Refills: 0 | Status: DISCONTINUED | OUTPATIENT
Start: 2022-12-13 | End: 2022-12-16

## 2022-12-13 RX ADMIN — Medication 100 MILLIGRAM(S): at 16:27

## 2022-12-13 RX ADMIN — GABAPENTIN 800 MILLIGRAM(S): 400 CAPSULE ORAL at 21:23

## 2022-12-13 RX ADMIN — TAMSULOSIN HYDROCHLORIDE 0.4 MILLIGRAM(S): 0.4 CAPSULE ORAL at 21:23

## 2022-12-13 RX ADMIN — Medication 1: at 17:11

## 2022-12-13 NOTE — ED ADULT NURSE NOTE - NSIMPLEMENTINTERV_GEN_ALL_ED
Implemented All Fall Risk Interventions:  [place] to call system. Call bell, personal items and telephone within reach. Instruct patient to call for assistance. Room bathroom lighting operational. Non-slip footwear when patient is off stretcher. Physically safe environment: no spills, clutter or unnecessary equipment. Stretcher in lowest position, wheels locked, appropriate side rails in place. Provide visual cue, wrist band, yellow gown, etc. Monitor gait and stability. Monitor for mental status changes and reorient to person, place, and time. Review medications for side effects contributing to fall risk. Reinforce activity limits and safety measures with patient and family.

## 2022-12-13 NOTE — H&P ADULT - PROBLEM SELECTOR PLAN 1
Worsening b/l LE cellulitis   -ID recs appreciated.   -C/w cefazolin IV   -Elevate b/l LE   -US LE showed no DVT

## 2022-12-13 NOTE — H&P ADULT - NSHPPHYSICALEXAM_GEN_ALL_CORE
Vital Signs Last 24 Hrs  T(C): 36.7 (13 Dec 2022 09:23), Max: 36.7 (13 Dec 2022 09:23)  T(F): 98 (13 Dec 2022 09:23), Max: 98 (13 Dec 2022 09:23)  HR: 79 (13 Dec 2022 14:16) (78 - 79)  BP: 177/69 (13 Dec 2022 14:16) (176/73 - 177/69)  BP(mean): --  RR: 18 (13 Dec 2022 14:16) (18 - 18)  SpO2: 96% (13 Dec 2022 14:16) (96% - 96%)    Parameters below as of 13 Dec 2022 09:23  Patient On (Oxygen Delivery Method): room air    GENERAL: NAD, well-developed  HEENT:  Atraumatic, Normocephalic, Conjunctiva and sclera clear, oral mucosa moist, clear w/o any exudate   NECK: Supple, No JVD  CHEST/LUNG: Clear to auscultation bilaterally; No wheeze  HEART: Regular rate and rhythm; No murmurs, rubs, or gallops  ABDOMEN: Soft, Nontender, Nondistended; Bowel sounds present  EXTREMITIES:  2+ Peripheral Pulses, 2+ nonpitting edema, superificial breakdown of skin on the medial aspect of LE, TTP, warm to touch.   PSYCH: AAOx3, normal affect  NEUROLOGY: non-focal, moving all extremities  SKIN: No rashes or lesions

## 2022-12-13 NOTE — CONSULT NOTE ADULT - ASSESSMENT
81 yo man with diabetes, h/o lung cancer, h/o DVT, thrombocytopenia, chronic venous insufficiency with h/o hospitalizations for recurrent cellulitis (2017, 2018, 2019 x 8) on chronic suppressive antibiotic with keflex   during last exam in office 7/2022 legs with venous stasis changes but no open wounds  wounds began weeping in late November- increased keflex to 4 x /day for a week  today yellow crusted plaques on left > right lower leg.  afebrile    Recurrent cellulitis   Venous insufficiency   diabetes    Suggest:    Cefazolin 1 gm iv q 8 h  elevate legs  vascular eval

## 2022-12-13 NOTE — H&P ADULT - ASSESSMENT
81 yo man with diabetes, chronic venous insufficiency, h/o lung cancer, h/o DVT, thrombocytopenia, h/o recurrent right leg cellulitis now on suppressive oral antibiotic (keflex 500 mg po BID) presents to the ED with worsening LE swelling and redness. c/w cellulitis.

## 2022-12-13 NOTE — H&P ADULT - NSHPREVIEWOFSYSTEMS_GEN_ALL_CORE
REVIEW OF SYSTEMS:    CONSTITUTIONAL: No weakness, fevers or chills  EYES/ENT: No visual changes;  No vertigo or throat pain   NECK: No pain or stiffness  RESPIRATORY: No cough, wheezing, hemoptysis; No shortness of breath  CARDIOVASCULAR: No chest pain or palpitations  GASTROINTESTINAL: No abdominal or epigastric pain. No nausea, vomiting, or hematemesis; No diarrhea or constipation. No melena or hematochezia.  GENITOURINARY: No dysuria, frequency or hematuria  NEUROLOGICAL: No numbness or weakness  MUSCULOSKELETAL: No joint pain, no muscle ache   SKIN: +redness and weeping in LE   All other review of systems is negative unless indicated above.

## 2022-12-13 NOTE — ED PROVIDER NOTE - NSICDXPASTSURGICALHX_GEN_ALL_CORE_FT
PAST SURGICAL HISTORY:  H/O prior ablation treatment cardiac 2006    Lung nodules Flexible Bronchoscopy, Right VATS, Right Lung Resection - 1/21/15, LLL partial resection 2/2015

## 2022-12-13 NOTE — CONSULT NOTE ADULT - SUBJECTIVE AND OBJECTIVE BOX
HPI:      PAST MEDICAL & SURGICAL HISTORY:  HTN (hypertension)      Diabetes mellitus      Neuropathy      Anxiety      Morbid obesity      Cellulitis  Both legs 2/2 chronic venous stasis      Atrial fibrillation  s/p ablation 2006      Squamous cell lung cancer      Balanitis      Venous stasis of both lower extremities      DVT (deep venous thrombosis)  RLE dx 6/28/17      H/O prior ablation treatment  cardiac 2006      Lung nodules  Flexible Bronchoscopy, Right VATS, Right Lung Resection - 1/21/15, LLL partial resection 2/2015          Allergies    No Known Allergies    Intolerances        ANTIMICROBIALS:      OTHER MEDS:      SOCIAL HISTORY:    FAMILY HISTORY:  Family history of heart failure (Father)  father    Family history of diabetes mellitus (Sibling)  Brother    Family history of liver cancer (Sibling)  sister        REVIEW OF SYSTEMS  [  ] ROS unobtainable because:    [  ] All other systems negative except as noted below:	    Constitutional:  [ ] fever [ ] chills  [ ] weight loss  [ ] weakness  Skin:  [ ] rash [ ] phlebitis	  Eyes: [ ] icterus [ ] pain  [ ] discharge	  ENMT: [ ] sore throat  [ ] thrush [ ] ulcers [ ] exudates  Respiratory: [ ] dyspnea [ ] hemoptysis [ ] cough [ ] sputum	  Cardiovascular:  [ ] chest pain [ ] palpitations [ ] edema	  Gastrointestinal:  [ ] nausea [ ] vomiting [ ] diarrhea [ ] constipation [ ] pain	  Genitourinary:  [ ] dysuria [ ] frequency [ ] hematuria [ ] discharge [ ] flank pain  [ ] incontinence  Musculoskeletal:  [ ] myalgias [ ] arthralgias [ ] arthritis  [ ] back pain  Neurological:  [ ] headache [ ] seizures  [ ] confusion/altered mental status  Psychiatric:  [ ] anxiety [ ] depression	  Hematology/Lymphatics:  [ ] lymphadenopathy  Endocrine:  [ ] adrenal [ ] thyroid  Allergic/Immunologic:	 [ ] transplant [ ] seasonal    PHYSICAL EXAM:  Constitutional: Not in acute distress  Eyes: No icterus.  Oral cavity: Clear, no lesions  Neck: Supple  RS: Chest clear to auscultation bilaterally. No wheeze/rhonchi/crepitations.  CVS: S1, S2 heard. Regular rate and rhythm. No murmurs/rubs/gallops.  Abdomen: Soft. No guarding/rigidity/tenderness.  :   Extremities: Warm. No pedal edema  Skin: No lesions noted  Vascular: No evidence of phlebitis  Neuro: Alert, oriented to time/place/person  Cranial nerves 2-12 grossly normal. No focal abnormalities    Drug Dosing Weight      Vital Signs Last 24 Hrs  T(F): 98 (12-13-22 @ 09:23), Max: 98 (12-13-22 @ 09:23)    Vital Signs Last 24 Hrs  HR: 79 (12-13-22 @ 14:16) (78 - 79)  BP: 177/69 (12-13-22 @ 14:16) (176/73 - 177/69)  RR: 18 (12-13-22 @ 14:16)  SpO2: 96% (12-13-22 @ 14:16) (96% - 96%)  Wt(kg): --                          12.9   5.16  )-----------( 107      ( 13 Dec 2022 10:30 )             39.6       12-13    139  |  104  |  19  ----------------------------<  151<H>  3.9   |  25  |  0.82    Ca    9.1      13 Dec 2022 10:30    TPro  6.9  /  Alb  3.9  /  TBili  0.4  /  DBili  x   /  AST  16  /  ALT  12  /  AlkPhos  74  12-13          MICROBIOLOGY:          v            RADIOLOGY:     HPI: 81 yo man with diabetes, chronic venous insufficiency, h/o lung cancer, h/o DVT, thrombocytopenia, h/o recurrent right leg cellulitis now on suppressive oral antibiotic (keflex 500 mg po BID). who presents with open wounds bilateral lower legs.  Hospitalized x 7 in 2017 for recurrent cellulitis right leg, x 1 in 2018 and 2019.  No fever, chills.  Increased keflex 4 x day for week in late November when legs started oozing.      PAST MEDICAL & SURGICAL HISTORY:  HTN (hypertension)      Diabetes mellitus      Neuropathy      Anxiety      Morbid obesity      Cellulitis  Both legs 2/2 chronic venous stasis      Atrial fibrillation  s/p ablation 2006      Squamous cell lung cancer      Balanitis      Venous stasis of both lower extremities      DVT (deep venous thrombosis)  RLE dx 6/28/17      H/O prior ablation treatment  cardiac 2006      Lung nodules  Flexible Bronchoscopy, Right VATS, Right Lung Resection - 1/21/15, LLL partial resection 2/2015          Allergies    No Known Allergies    Intolerances        ANTIMICROBIALS:  kelfex    OTHER MEDS:      SOCIAL HISTORY: retired    FAMILY HISTORY:  Family history of heart failure (Father)  father    Family history of diabetes mellitus (Sibling)  Brother    Family history of liver cancer (Sibling)  sister        REVIEW OF SYSTEMS  [  ] ROS unobtainable because:    [x  ] All other systems negative except as noted below:	    Constitutional:  [ ] fever [ ] chills  [ ] weight loss  [ ] weakness  Skin:  [ ] rash [ ] phlebitis	  Eyes: [ ] icterus [ ] pain  [ ] discharge	  ENMT: [ ] sore throat  [ ] thrush [ ] ulcers [ ] exudates  Respiratory: [ ] dyspnea [ ] hemoptysis [ ] cough [ ] sputum	  Cardiovascular:  [ ] chest pain [ ] palpitations [ ] edema	  Gastrointestinal:  [ ] nausea [ ] vomiting [ ] diarrhea [ ] constipation [ ] pain	  Genitourinary:  [ ] dysuria [ ] frequency [ ] hematuria [ ] discharge [ ] flank pain  [ ] incontinence  Musculoskeletal:  [ ] myalgias [ ] arthralgias [ ] arthritis  [ ] back pain     oozing wounds lower legs bilat  Neurological:  [ ] headache [ ] seizures  [ ] confusion/altered mental status  Psychiatric:  [ ] anxiety [ ] depression	  Hematology/Lymphatics:  [ ] lymphadenopathy  Endocrine:  [ ] adrenal [ ] thyroid  Allergic/Immunologic:	 [ ] transplant [ ] seasonal    PHYSICAL EXAM:  Constitutional: Not in acute distress  Eyes: No icterus.  Oral cavity: Clear, no lesions  Neck: Supple  RS: Chest clear   CVS: S1, S2   Abdomen: Soft. No guarding/rigidity/tenderness.  Extremities: erythema both lower legs distal to knees, with yellow crusted plaque 6 cm x 4 cm on right lower leg and 3 cm open wound left lower leg   Neuro: Alert, oriented to time/place/person  Cranial nerves 2-12 grossly normal. No focal abnormalities    Drug Dosing Weight      Vital Signs Last 24 Hrs  T(F): 98 (12-13-22 @ 09:23), Max: 98 (12-13-22 @ 09:23)    Vital Signs Last 24 Hrs  HR: 79 (12-13-22 @ 14:16) (78 - 79)  BP: 177/69 (12-13-22 @ 14:16) (176/73 - 177/69)  RR: 18 (12-13-22 @ 14:16)  SpO2: 96% (12-13-22 @ 14:16) (96% - 96%)  Wt(kg): --                          12.9   5.16  )-----------( 107      ( 13 Dec 2022 10:30 )             39.6       12-13    139  |  104  |  19  ----------------------------<  151<H>  3.9   |  25  |  0.82    Ca    9.1      13 Dec 2022 10:30    TPro  6.9  /  Alb  3.9  /  TBili  0.4  /  DBili  x   /  AST  16  /  ALT  12  /  AlkPhos  74  12-13          MICROBIOLOGY:        RADIOLOGY:    rd< from: Xray Tibia + Fibula 2 Views, Bilateral (12.13.22 @ 13:12) >  ACC: [id number] EXAM:  XR TIB FIB AP LAT 2 VIEWS BI                          PROCEDURE DATE:  12/13/2022          INTERPRETATION:  CLINICAL INDICATION: bilateral leg pain    EXAM:  AP lateral views of both legs from 12/13/2022 at 1312. No similar prior   studies available for comparison.    IMPRESSION:  Generalized soft tissue swelling. No tracking soft tissue gas   collections, radiopaque foreign bodies, or gross radiographic evidence   for osteomyelitis.    No fractures, dislocations, or osseous or joint deformities.    Preserved knee, ankle, and visualized midfoot and hindfoot joint spaces   and no joint margin erosions.    Generalized osteopenia otherwise no discrete lytic or blastic lesions.    --- End of Report ---            LACHO CAM MD; Attending Radiologist  This document has been electronically signed. Dec 13 2022  5:37PM    < end of copied text >  < from: US Duplex Venous Lower Ext Ltd, Left (12.13.22 @ 10:58) >    IMPRESSION: Limited exam.  No evidence of left lower extremity deep venous thrombosis involving the   venous segments able to be examined.          --- End of Report ---      < end of copied text >

## 2022-12-13 NOTE — ED PROVIDER NOTE - PHYSICAL EXAMINATION
GENERAL: Awake, alert, NAD  HEENT: NC/AT, moist mucous membranes, PERRL, EOMI  LUNGS: CTAB, no wheezes or crackles   CARDIAC: RRR, no m/r/g  ABDOMEN: Soft, non tender, non distended, no rebound, no guarding  BACK: No midline spinal tenderness, no CVA tenderness    EXT: BL erythema of the lower extremity, warm to touch, BL wounds to medial aspect of the calves. Granulation tissue yellow in appearance with no apparent drainage.  NEURO: A&Ox3. Moving all extremities.  SKIN: Warm and dry. No rash.  PSYCH: Normal affect.

## 2022-12-13 NOTE — ED PROVIDER NOTE - ATTENDING CONTRIBUTION TO CARE
Dr. Salinas:  I have personally performed a face to face bedside history and physical examination of this patient. I have discussed the history, examination, review of systems, assessment and plan of management with the resident. I have reviewed the electronic medical record and amended it to reflect my history, review of systems, physical exam, assessment and plan.    82M h/o htn, hld, dm, lung cancer s/p resection, (follows with Dr. Pike of ID) chronic cellulitis on daily Keflex x 5 years, c/o worsening weeping to his BLE x 2 weeks.  Denies fevers or other constitutional symptoms.      Exam:  - nad  - rrr  - ctab   -abd soft ntnd  - +BLE wounds on medial calves with some granulation tissue, no active purulent drainage    A/P  - possibly worsening cellulitis, r/o osteomyelitis, DVT  - cbc, cmp, esr, crp, US BLE, XR tib/fib Dr. Salinas:  I have personally performed a face to face bedside history and physical examination of this patient. I have discussed the history, examination, review of systems, assessment and plan of management with the resident. I have reviewed the electronic medical record and amended it to reflect my history, review of systems, physical exam, assessment and plan.    82M h/o htn, hld, dm, lung cancer s/p resection, (follows with Dr. Pike of ID) chronic cellulitis on daily Keflex x 5 years, c/o worsening weeping to his BLE x 2 weeks.  Denies fevers or other constitutional symptoms.      Exam:  - nad  - rrr  - ctab   -abd soft ntnd  - +BLE yellowish wounds on medial calves, no active drainage; +chronic venous stasis changes on skin    A/P  - possibly worsening cellulitis, r/o DVT  - cbc, cmp, esr, crp, US BLE, XR tib/fib

## 2022-12-13 NOTE — ED ADULT TRIAGE NOTE - CHIEF COMPLAINT QUOTE
Ambulatory with cane c/o BL lower leg weeping wounds with discoloration x 2 weeks, denies fever, chills.

## 2022-12-13 NOTE — ED PROVIDER NOTE - NSICDXFAMILYHX_GEN_ALL_CORE_FT
FAMILY HISTORY:  Father  Still living? Unknown  Family history of heart failure, Age at diagnosis: Age Unknown    Sibling  Still living? No  Family history of diabetes mellitus, Age at diagnosis: Age Unknown  Family history of liver cancer, Age at diagnosis: Age Unknown

## 2022-12-13 NOTE — ED ADULT NURSE NOTE - OBJECTIVE STATEMENT
Pt walked in c/o wounds to BL ext and weeping - pt presents w/ edema to BL ext no open wounds , wounds appear crusted - in no acute distress pending md daniel green rn

## 2022-12-13 NOTE — ED PROVIDER NOTE - CLINICAL SUMMARY MEDICAL DECISION MAKING FREE TEXT BOX
82-year-old male past medical history of hypertension, diabetes, and DVT presenting for wound check.  States has been weeping for past 2 weeks. Patient denies-fevers, chills, chest pain, shortness of breath, headache, visual [place]. Vitals -HTN. Physical exam -  BL erythema of the lower extremity, warm to touch, BL wounds to medial aspect of the calves. Granulation tissue yellow in appearance with no apparent drainage.      Ddx: not limited to cellulitis vs osteomyelitis vs DVT vs worsening of PVD  Plan to get basic labs, xray of BL LE. and DVT study.   Dispo - pending labs and imaging

## 2022-12-13 NOTE — H&P ADULT - HISTORY OF PRESENT ILLNESS
81 yo man with diabetes, chronic venous insufficiency, h/o lung cancer, h/o DVT, thrombocytopenia, h/o recurrent right leg cellulitis now on suppressive oral antibiotic (keflex 500 mg po BID) presents to the ED with worsening LE swelling and redness. Patient has hx of recurrent cellulitis but for the last 2 weeks his legs are becoming more warm and red. He continued taking keflex but no improvement. He spoke to his ID doctor Dr. Pike and she advised him to come to the ED if it worse. Today he noticed the weeping was increases and looked infected and was told by Dr. Pike if symptoms progressed to come into hospital for further evaluation. Currently, he denies any fever, chills, nausea, vomiting, abdominal pain, and chest pain.   In the ED, his vitals were notable for hypertension. Labs were notable for mild anemia, thrombocytopenia, and hyperglycemia. Xray b/l LE showed generalized soft tissue swelling. US LE showed no DVT.

## 2022-12-13 NOTE — ED PROVIDER NOTE - NSICDXPASTMEDICALHX_GEN_ALL_CORE_FT
PAST MEDICAL HISTORY:  Anxiety     Atrial fibrillation s/p ablation 2006    Balanitis     Cellulitis Both legs 2/2 chronic venous stasis    Diabetes mellitus     DVT (deep venous thrombosis) RLE dx 6/28/17    HTN (hypertension)     Morbid obesity     Neuropathy     Squamous cell lung cancer     Venous stasis of both lower extremities

## 2022-12-13 NOTE — ED PROVIDER NOTE - PROGRESS NOTE DETAILS
Alena - doppler pulses bilat DPs present Kristin Bergeron, PGY-1 DO:  DVT studies negative, pending Xray and ID evaluation.

## 2022-12-13 NOTE — H&P ADULT - PROBLEM SELECTOR PLAN 6
DVT ppx-xarelto 10 mg daily FS are elevated   -Not on oral medication at home as per patient   -C/w ISS and FS for now   -F/u with HgbA1c

## 2022-12-13 NOTE — ED PROVIDER NOTE - OBJECTIVE STATEMENT
82-year-old male past medical history of hypertension, diabetes, and DVT presenting for wound check.  Patient states he sees infectious disease doctor, Dr. Pike. States that he has had chronic cellulitis for the past 5 years and has been managed by her.  Stated that it started weeping 2 weeks ago and has been communication with Dr. Pkie.  Today he noticed the weeping was increases and looked infected and was told by Dr. Pike if symptoms progressed to come into hospital for further evaluation.  States he did not take any other medication outside of his normal meds.  He did put sulfur cream on the wound and it has usually helped the past but not has been helping this time. States that there is only pain described as burning with increased ambulation. States he had Doppler to rule out DVT on October 5.  Denies fevers, chills, chest pain, shortness of breath, headache, visual changes.  ROS positive for bilateral leg.

## 2022-12-13 NOTE — H&P ADULT - NSHPLABSRESULTS_GEN_ALL_CORE
12.9   5.16  )-----------( 107      ( 13 Dec 2022 10:30 )             39.6     Hgb Trend: 12.9<--  12-13    139  |  104  |  19  ----------------------------<  151<H>  3.9   |  25  |  0.82    Ca    9.1      13 Dec 2022 10:30    TPro  6.9  /  Alb  3.9  /  TBili  0.4  /  DBili  x   /  AST  16  /  ALT  12  /  AlkPhos  74  12-13    Creatinine Trend: 0.82<--            EKG is ordered.       US DVT study as reviewed by the radiologist: No evidence of left lower extremity deep venous thrombosis involving the   venous segments able to be examined.    Generalized soft tissue swelling. No tracking soft tissue gas   collections, radiopaque foreign bodies, or gross radiographic evidence   for osteomyelitis.    No fractures, dislocations, or osseous or joint deformities.    Preserved knee, ankle, and visualized midfoot and hindfoot joint spaces   and no joint margin erosions.    Generalized osteopenia otherwise no discrete lytic or blastic lesions.  Xray LE as reviewed by the radiologist:

## 2022-12-14 DIAGNOSIS — R60.0 LOCALIZED EDEMA: ICD-10-CM

## 2022-12-14 LAB
A1C WITH ESTIMATED AVERAGE GLUCOSE RESULT: 6.2 % — HIGH (ref 4–5.6)
ALBUMIN SERPL ELPH-MCNC: 3.8 G/DL — SIGNIFICANT CHANGE UP (ref 3.3–5)
ALP SERPL-CCNC: 65 U/L — SIGNIFICANT CHANGE UP (ref 40–120)
ALT FLD-CCNC: 9 U/L — SIGNIFICANT CHANGE UP (ref 4–41)
ANION GAP SERPL CALC-SCNC: 8 MMOL/L — SIGNIFICANT CHANGE UP (ref 7–14)
AST SERPL-CCNC: 14 U/L — SIGNIFICANT CHANGE UP (ref 4–40)
BASOPHILS # BLD AUTO: 0.02 K/UL — SIGNIFICANT CHANGE UP (ref 0–0.2)
BASOPHILS NFR BLD AUTO: 0.4 % — SIGNIFICANT CHANGE UP (ref 0–2)
BILIRUB SERPL-MCNC: 0.8 MG/DL — SIGNIFICANT CHANGE UP (ref 0.2–1.2)
BUN SERPL-MCNC: 16 MG/DL — SIGNIFICANT CHANGE UP (ref 7–23)
CALCIUM SERPL-MCNC: 8.8 MG/DL — SIGNIFICANT CHANGE UP (ref 8.4–10.5)
CHLORIDE SERPL-SCNC: 105 MMOL/L — SIGNIFICANT CHANGE UP (ref 98–107)
CO2 SERPL-SCNC: 27 MMOL/L — SIGNIFICANT CHANGE UP (ref 22–31)
CREAT SERPL-MCNC: 0.85 MG/DL — SIGNIFICANT CHANGE UP (ref 0.5–1.3)
EGFR: 88 ML/MIN/1.73M2 — SIGNIFICANT CHANGE UP
EOSINOPHIL # BLD AUTO: 0.11 K/UL — SIGNIFICANT CHANGE UP (ref 0–0.5)
EOSINOPHIL NFR BLD AUTO: 2.3 % — SIGNIFICANT CHANGE UP (ref 0–6)
ESTIMATED AVERAGE GLUCOSE: 131 — SIGNIFICANT CHANGE UP
GLUCOSE BLDC GLUCOMTR-MCNC: 130 MG/DL — HIGH (ref 70–99)
GLUCOSE BLDC GLUCOMTR-MCNC: 134 MG/DL — HIGH (ref 70–99)
GLUCOSE BLDC GLUCOMTR-MCNC: 136 MG/DL — HIGH (ref 70–99)
GLUCOSE BLDC GLUCOMTR-MCNC: 150 MG/DL — HIGH (ref 70–99)
GLUCOSE SERPL-MCNC: 140 MG/DL — HIGH (ref 70–99)
HCT VFR BLD CALC: 40 % — SIGNIFICANT CHANGE UP (ref 39–50)
HGB BLD-MCNC: 12.9 G/DL — LOW (ref 13–17)
IANC: 3.18 K/UL — SIGNIFICANT CHANGE UP (ref 1.8–7.4)
IMM GRANULOCYTES NFR BLD AUTO: 0.6 % — SIGNIFICANT CHANGE UP (ref 0–0.9)
LYMPHOCYTES # BLD AUTO: 0.9 K/UL — LOW (ref 1–3.3)
LYMPHOCYTES # BLD AUTO: 18.6 % — SIGNIFICANT CHANGE UP (ref 13–44)
MCHC RBC-ENTMCNC: 28.4 PG — SIGNIFICANT CHANGE UP (ref 27–34)
MCHC RBC-ENTMCNC: 32.3 GM/DL — SIGNIFICANT CHANGE UP (ref 32–36)
MCV RBC AUTO: 87.9 FL — SIGNIFICANT CHANGE UP (ref 80–100)
MONOCYTES # BLD AUTO: 0.6 K/UL — SIGNIFICANT CHANGE UP (ref 0–0.9)
MONOCYTES NFR BLD AUTO: 12.4 % — SIGNIFICANT CHANGE UP (ref 2–14)
NEUTROPHILS # BLD AUTO: 3.18 K/UL — SIGNIFICANT CHANGE UP (ref 1.8–7.4)
NEUTROPHILS NFR BLD AUTO: 65.7 % — SIGNIFICANT CHANGE UP (ref 43–77)
NRBC # BLD: 0 /100 WBCS — SIGNIFICANT CHANGE UP (ref 0–0)
NRBC # FLD: 0 K/UL — SIGNIFICANT CHANGE UP (ref 0–0)
PLATELET # BLD AUTO: 120 K/UL — LOW (ref 150–400)
POTASSIUM SERPL-MCNC: 4.2 MMOL/L — SIGNIFICANT CHANGE UP (ref 3.5–5.3)
POTASSIUM SERPL-SCNC: 4.2 MMOL/L — SIGNIFICANT CHANGE UP (ref 3.5–5.3)
PROT SERPL-MCNC: 6.7 G/DL — SIGNIFICANT CHANGE UP (ref 6–8.3)
RBC # BLD: 4.55 M/UL — SIGNIFICANT CHANGE UP (ref 4.2–5.8)
RBC # FLD: 14.4 % — SIGNIFICANT CHANGE UP (ref 10.3–14.5)
SODIUM SERPL-SCNC: 140 MMOL/L — SIGNIFICANT CHANGE UP (ref 135–145)
WBC # BLD: 4.84 K/UL — SIGNIFICANT CHANGE UP (ref 3.8–10.5)
WBC # FLD AUTO: 4.84 K/UL — SIGNIFICANT CHANGE UP (ref 3.8–10.5)

## 2022-12-14 PROCEDURE — 99223 1ST HOSP IP/OBS HIGH 75: CPT

## 2022-12-14 PROCEDURE — 93923 UPR/LXTR ART STDY 3+ LVLS: CPT | Mod: 26

## 2022-12-14 RX ADMIN — GABAPENTIN 800 MILLIGRAM(S): 400 CAPSULE ORAL at 06:21

## 2022-12-14 RX ADMIN — Medication 10 MILLIGRAM(S): at 06:22

## 2022-12-14 RX ADMIN — AMLODIPINE BESYLATE 5 MILLIGRAM(S): 2.5 TABLET ORAL at 06:21

## 2022-12-14 RX ADMIN — Medication 100 MILLIGRAM(S): at 01:14

## 2022-12-14 RX ADMIN — Medication 10 MILLIGRAM(S): at 17:45

## 2022-12-14 RX ADMIN — Medication 100 MILLIGRAM(S): at 09:55

## 2022-12-14 RX ADMIN — TAMSULOSIN HYDROCHLORIDE 0.4 MILLIGRAM(S): 0.4 CAPSULE ORAL at 21:54

## 2022-12-14 RX ADMIN — GABAPENTIN 800 MILLIGRAM(S): 400 CAPSULE ORAL at 21:54

## 2022-12-14 RX ADMIN — Medication 25 MILLIGRAM(S): at 06:22

## 2022-12-14 RX ADMIN — RIVAROXABAN 10 MILLIGRAM(S): KIT at 17:45

## 2022-12-14 RX ADMIN — Medication 100 MILLIGRAM(S): at 17:00

## 2022-12-14 RX ADMIN — Medication 25 MILLIGRAM(S): at 17:45

## 2022-12-14 RX ADMIN — GABAPENTIN 800 MILLIGRAM(S): 400 CAPSULE ORAL at 17:44

## 2022-12-14 NOTE — CONSULT NOTE ADULT - SUBJECTIVE AND OBJECTIVE BOX
Wound SURGERY CONSULT NOTE    HPI:  83 yo man with diabetes, chronic venous insufficiency, h/o lung cancer, h/o DVT, thrombocytopenia, h/o recurrent right leg cellulitis now on suppressive oral antibiotic (keflex 500 mg po BID) presents to the ED with worsening LE swelling and redness. Patient has hx of recurrent cellulitis but for the last 2 weeks his legs are becoming more warm and red. He continued taking keflex but no improvement. He spoke to his ID doctor Dr. Pike and she advised him to come to the ED if it worse. Today he noticed the weeping was increases and looked infected and was told by Dr. Pike if symptoms progressed to come into hospital for further evaluation. Currently, he denies any fever, chills, nausea, vomiting, abdominal pain, and chest pain.   In the ED, his vitals were notable for hypertension. Labs were notable for mild anemia, thrombocytopenia, and hyperglycemia. Xray b/l LE showed generalized soft tissue swelling. US LE showed no DVT.  (13 Dec 2022 19:47)      Wound consult requested to assist w/ management of B/L wounds and chronic venous stasis changes.     Current Diet: Diet, Regular (12-13-22 @ 16:29)      PAST MEDICAL & SURGICAL HISTORY:  HTN (hypertension)      Diabetes mellitus      Neuropathy      Anxiety      Morbid obesity      Cellulitis  Both legs 2/2 chronic venous stasis      Atrial fibrillation  s/p ablation 2006      Squamous cell lung cancer      Balanitis      Venous stasis of both lower extremities      DVT (deep venous thrombosis)  RLE dx 6/28/17      H/O prior ablation treatment  cardiac 2006      Lung nodules  Flexible Bronchoscopy, Right VATS, Right Lung Resection - 1/21/15, LLL partial resection 2/2015          REVIEW OF SYSTEMS:  General: No complaints. Denies fevers/chills.   Skin: B/L LE swelling, dry skin, weeping ulcers.   MSK: Pain in B/L LEs, worst when his cellulitis was at its peak at admission.   All other systems negative    MEDICATIONS  (STANDING):  amLODIPine   Tablet 5 milliGRAM(s) Oral daily  busPIRone 10 milliGRAM(s) Oral two times a day  ceFAZolin   IVPB 1000 milliGRAM(s) IV Intermittent every 8 hours  dextrose 5%. 1000 milliLiter(s) (100 mL/Hr) IV Continuous <Continuous>  dextrose 5%. 1000 milliLiter(s) (50 mL/Hr) IV Continuous <Continuous>  dextrose 50% Injectable 25 Gram(s) IV Push once  dextrose 50% Injectable 12.5 Gram(s) IV Push once  dextrose 50% Injectable 25 Gram(s) IV Push once  gabapentin 800 milliGRAM(s) Oral three times a day  glucagon  Injectable 1 milliGRAM(s) IntraMuscular once  insulin lispro (ADMELOG) corrective regimen sliding scale   SubCutaneous three times a day before meals  insulin lispro (ADMELOG) corrective regimen sliding scale   SubCutaneous at bedtime  metoprolol tartrate 25 milliGRAM(s) Oral two times a day  rivaroxaban 10 milliGRAM(s) Oral with dinner  tamsulosin 0.4 milliGRAM(s) Oral at bedtime    MEDICATIONS  (PRN):  acetaminophen     Tablet .. 650 milliGRAM(s) Oral every 6 hours PRN Temp greater or equal to 38C (100.4F), Mild Pain (1 - 3)  aluminum hydroxide/magnesium hydroxide/simethicone Suspension 30 milliLiter(s) Oral every 4 hours PRN Dyspepsia  dextrose Oral Gel 15 Gram(s) Oral once PRN Blood Glucose LESS THAN 70 milliGRAM(s)/deciliter  melatonin 3 milliGRAM(s) Oral at bedtime PRN Insomnia  ondansetron Injectable 4 milliGRAM(s) IV Push every 8 hours PRN Nausea and/or Vomiting      Allergies    No Known Allergies    Intolerances        SOCIAL HISTORY: Denies use of tobacco and EtOH    FAMILY HISTORY:  Family history of heart failure (Father)  father    Family history of diabetes mellitus (Sibling)  Brother    Family history of liver cancer (Sibling)  sister        PHYSICAL EXAM:  Vital Signs Last 24 Hrs  T(C): 36.4 (14 Dec 2022 10:04), Max: 36.7 (14 Dec 2022 04:17)  T(F): 97.6 (14 Dec 2022 10:04), Max: 98 (14 Dec 2022 04:17)  HR: 73 (14 Dec 2022 10:04) (73 - 86)  BP: 150/68 (14 Dec 2022 10:04) (150/68 - 177/69)  BP(mean): --  RR: 18 (14 Dec 2022 10:04) (17 - 18)  SpO2: 97% (14 Dec 2022 10:04) (96% - 100%)    Parameters below as of 14 Dec 2022 10:04  Patient On (Oxygen Delivery Method): room air        Constitutional: NAD, overall well-appearing.   (+) low airloss support surface, (-) fluidized postioining devices, (-) complete cair boots     HEENT:  NC/AT,, mucosa moist, throat clear, trachea midline, neck supple    Cardiovascular: RRR   Respiratory: No increased work of breathing, no use of accessory muscles, no stridor    Gastrointestinal soft NT/ND    Neurology: Some diminished light-touch sensation of B/L feet. Otherwise, WNL.     Musculoskeletal: No peripheral muscle-wasting, LE ROM somewhat limited by edema.     Vascular: B/L LEs warm, capillary refill < 2 sec. Multiphasic DP signals present B/L, pedal pulses non-palpable. Hyperpigmentation of B/L gaiter regions, hyperkeratosis present in gaiter regions as well.     Skin: Erythema and increased temperature of B/L gaiter region skin. No crepitus, bullae, induration or fluctuance present. Areas non-tender.   - RLE wounds: Raised ulcerations with densely-adherent crusted exudate vs. eschar.     - Lateral site, 9 x 5 x 0.1 cm    - Medial site, 11 x 11 x 0.1 cm    - Posterior site, 3 x 5 x 0.1  - LLE medial wound: Raised ulceration with densely-adherent crusted exudate vs. eschar    - 6 x 7 x 0.1 cm    LABS/ CULTURES/ RADIOLOGY:                        12.9   4.84  )-----------( 120      ( 14 Dec 2022 07:20 )             40.0       140  |  105  |  16  ----------------------------<  140      [12-14-22 @ 07:20]  4.2   |  27  |  0.85        Ca     8.8     [12-14-22 @ 07:20]    TPro  6.7  /  Alb  3.8  /  TBili  0.8  /  DBili  x   /  AST  14  /  ALT  9   /  AlkPhos  65  [12-14-22 @ 07:20]    < from: US Duplex Venous Lower Ext Ltd, Left (12.13.22 @ 10:58) >  ACC: [id number] EXAM:  US DPLX LWR EXT VEINS LTD LT                          PROCEDURE DATE:  12/13/2022          INTERPRETATION:  CLINICAL INFORMATION: Lower extremity swelling    COMPARISON: Bilateral lower extremity Doppler ultrasound 1/9/2019    TECHNIQUE: Duplex sonography of the LEFT LOWER extremity veins with color   and spectral Doppler, with and without compression.    FINDINGS:    There is normal compressibility of the left common femoral, femoral and   popliteal veins.  Mid and lower femoral venous segments were less well visualized.  The contralateral common femoral vein is patent.  Doppler examination shows normal spontaneous and phasic flow.    Calf veins were not able to be diagnostically visualized.    IMPRESSION: Limited exam.  No evidence of left lower extremity deep venous thrombosis involving the   venous segments able to be examined.    < end of copied text >      < from: Xray Tibia + Fibula 2 Views, Bilateral (12.13.22 @ 13:12) >  ACC: [id number] EXAM:  XR TIB FIB AP LAT 2 VIEWS BI                          PROCEDURE DATE:  12/13/2022          INTERPRETATION:  CLINICAL INDICATION: bilateral leg pain    EXAM:  AP lateral views of both legs from 12/13/2022 at 1312. No similar prior   studies available for comparison.    IMPRESSION:  Generalized soft tissue swelling. No tracking soft tissue gas   collections, radiopaque foreign bodies, or gross radiographic evidence   for osteomyelitis.    No fractures, dislocations, or osseous or joint deformities.    Preserved knee, ankle, and visualized midfoot and hindfoot joint spaces   and no joint margin erosions.    Generalized osteopenia otherwise no discrete lytic or blastic lesions.    --- End of Report ---    < end of copied text >   Wound SURGERY CONSULT NOTE    HPI:  81 yo man with diabetes, chronic venous insufficiency, h/o lung cancer, h/o DVT, thrombocytopenia, h/o recurrent right leg cellulitis now on suppressive oral antibiotic (keflex 500 mg po BID) presents to the ED with worsening LE swelling and redness. Patient has hx of recurrent cellulitis but for the last 2 weeks his legs are becoming more warm and red. He continued taking keflex but no improvement. He spoke to his ID doctor Dr. Pike and she advised him to come to the ED if it worse. Today he noticed the weeping was increases and looked infected and was told by Dr. Pike if symptoms progressed to come into hospital for further evaluation. Currently, he denies any fever, chills, nausea, vomiting, abdominal pain, and chest pain.   In the ED, his vitals were notable for hypertension. Labs were notable for mild anemia, thrombocytopenia, and hyperglycemia. Xray b/l LE showed generalized soft tissue swelling. US LE showed no DVT.  (13 Dec 2022 19:47)      Wound consult requested to assist w/ management of B/L wounds and chronic venous stasis changes.     Current Diet: Diet, Regular (12-13-22 @ 16:29)      PAST MEDICAL & SURGICAL HISTORY:  HTN (hypertension)      Diabetes mellitus      Neuropathy      Anxiety      Morbid obesity      Cellulitis  Both legs 2/2 chronic venous stasis      Atrial fibrillation  s/p ablation 2006      Squamous cell lung cancer      Balanitis      Venous stasis of both lower extremities      DVT (deep venous thrombosis)  RLE dx 6/28/17      H/O prior ablation treatment  cardiac 2006      Lung nodules  Flexible Bronchoscopy, Right VATS, Right Lung Resection - 1/21/15, LLL partial resection 2/2015          REVIEW OF SYSTEMS:  General: No complaints. Denies fevers/chills.   Skin: B/L LE swelling, dry skin, weeping ulcers.   MSK: Pain in B/L LEs, worst when his cellulitis was at its peak at admission.   All other systems negative    MEDICATIONS  (STANDING):  amLODIPine   Tablet 5 milliGRAM(s) Oral daily  busPIRone 10 milliGRAM(s) Oral two times a day  ceFAZolin   IVPB 1000 milliGRAM(s) IV Intermittent every 8 hours  dextrose 5%. 1000 milliLiter(s) (100 mL/Hr) IV Continuous <Continuous>  dextrose 5%. 1000 milliLiter(s) (50 mL/Hr) IV Continuous <Continuous>  dextrose 50% Injectable 25 Gram(s) IV Push once  dextrose 50% Injectable 12.5 Gram(s) IV Push once  dextrose 50% Injectable 25 Gram(s) IV Push once  gabapentin 800 milliGRAM(s) Oral three times a day  glucagon  Injectable 1 milliGRAM(s) IntraMuscular once  insulin lispro (ADMELOG) corrective regimen sliding scale   SubCutaneous three times a day before meals  insulin lispro (ADMELOG) corrective regimen sliding scale   SubCutaneous at bedtime  metoprolol tartrate 25 milliGRAM(s) Oral two times a day  rivaroxaban 10 milliGRAM(s) Oral with dinner  tamsulosin 0.4 milliGRAM(s) Oral at bedtime    MEDICATIONS  (PRN):  acetaminophen     Tablet .. 650 milliGRAM(s) Oral every 6 hours PRN Temp greater or equal to 38C (100.4F), Mild Pain (1 - 3)  aluminum hydroxide/magnesium hydroxide/simethicone Suspension 30 milliLiter(s) Oral every 4 hours PRN Dyspepsia  dextrose Oral Gel 15 Gram(s) Oral once PRN Blood Glucose LESS THAN 70 milliGRAM(s)/deciliter  melatonin 3 milliGRAM(s) Oral at bedtime PRN Insomnia  ondansetron Injectable 4 milliGRAM(s) IV Push every 8 hours PRN Nausea and/or Vomiting      Allergies    No Known Allergies    Intolerances        SOCIAL HISTORY: Denies use of tobacco and EtOH    FAMILY HISTORY:  Family history of heart failure (Father)  father    Family history of diabetes mellitus (Sibling)  Brother    Family history of liver cancer (Sibling)  sister        PHYSICAL EXAM:  Vital Signs Last 24 Hrs  T(C): 36.4 (14 Dec 2022 10:04), Max: 36.7 (14 Dec 2022 04:17)  T(F): 97.6 (14 Dec 2022 10:04), Max: 98 (14 Dec 2022 04:17)  HR: 73 (14 Dec 2022 10:04) (73 - 86)  BP: 150/68 (14 Dec 2022 10:04) (150/68 - 177/69)  BP(mean): --  RR: 18 (14 Dec 2022 10:04) (17 - 18)  SpO2: 97% (14 Dec 2022 10:04) (96% - 100%)    Parameters below as of 14 Dec 2022 10:04  Patient On (Oxygen Delivery Method): room air        Constitutional: NAD, overall well-appearing.   (+) low airloss support surface, (-) fluidized postioining devices, (-) complete cair boots     HEENT:  NC/AT,, mucosa moist, throat clear, trachea midline, neck supple    Cardiovascular: RRR   Respiratory: No increased work of breathing, no use of accessory muscles, no stridor    Gastrointestinal soft NT/ND    Neurology: Some diminished light-touch sensation of B/L feet. Otherwise, WNL.     Musculoskeletal: No peripheral muscle-wasting, LE ROM somewhat limited by edema.     Vascular: B/L LEs warm, capillary refill < 2 sec. Multiphasic DP signals present B/L, pedal pulses non-palpable. Hyperpigmentation of B/L gaiter regions, hyperkeratosis present in gaiter regions as well.     Skin: Erythema and increased temperature of B/L gaiter region skin. No crepitus, bullae, induration or fluctuance present. Areas non-tender.   - RLE wounds: Raised ulcerations with densely-adherent crusted exudate vs. eschar.     - Lateral site, 9 x 5 x 0.1 cm    - Medial site, 11 x 11 x 0.1 cm    - Posterior site, 3 x 5 x 0.1  - LLE medial wound: Raised ulceration with densely-adherent crusted exudate vs. eschar    - 6 x 7 x 0.1 cm  ``  LABS/ CULTURES/ RADIOLOGY:                        12.9   4.84  )-----------( 120      ( 14 Dec 2022 07:20 )             40.0       140  |  105  |  16  ----------------------------<  140      [12-14-22 @ 07:20]  4.2   |  27  |  0.85        Ca     8.8     [12-14-22 @ 07:20]    TPro  6.7  /  Alb  3.8  /  TBili  0.8  /  DBili  x   /  AST  14  /  ALT  9   /  AlkPhos  65  [12-14-22 @ 07:20]    < from: US Duplex Venous Lower Ext Ltd, Left (12.13.22 @ 10:58) >  ACC: [id number] EXAM:  US DPLX LWR EXT VEINS LTD LT                          PROCEDURE DATE:  12/13/2022          INTERPRETATION:  CLINICAL INFORMATION: Lower extremity swelling    COMPARISON: Bilateral lower extremity Doppler ultrasound 1/9/2019    TECHNIQUE: Duplex sonography of the LEFT LOWER extremity veins with color   and spectral Doppler, with and without compression.    FINDINGS:    There is normal compressibility of the left common femoral, femoral and   popliteal veins.  Mid and lower femoral venous segments were less well visualized.  The contralateral common femoral vein is patent.  Doppler examination shows normal spontaneous and phasic flow.    Calf veins were not able to be diagnostically visualized.    IMPRESSION: Limited exam.  No evidence of left lower extremity deep venous thrombosis involving the   venous segments able to be examined.    < end of copied text >      < from: Xray Tibia + Fibula 2 Views, Bilateral (12.13.22 @ 13:12) >  ACC: [id number] EXAM:  XR TIB FIB AP LAT 2 VIEWS BI                          PROCEDURE DATE:  12/13/2022          INTERPRETATION:  CLINICAL INDICATION: bilateral leg pain    EXAM:  AP lateral views of both legs from 12/13/2022 at 1312. No similar prior   studies available for comparison.    IMPRESSION:  Generalized soft tissue swelling. No tracking soft tissue gas   collections, radiopaque foreign bodies, or gross radiographic evidence   for osteomyelitis.    No fractures, dislocations, or osseous or joint deformities.    Preserved knee, ankle, and visualized midfoot and hindfoot joint spaces   and no joint margin erosions.    Generalized osteopenia otherwise no discrete lytic or blastic lesions.    --- End of Report ---    < end of copied text >

## 2022-12-14 NOTE — PATIENT PROFILE ADULT - FALL HARM RISK - HARM RISK INTERVENTIONS

## 2022-12-14 NOTE — CONSULT NOTE ADULT - ASSESSMENT
Patient known to me from the office; last seen 7/20/22    Patient is an 81 yo man with DM2, chronic venous insufficiency, h/o lung cancer, h/o DVT, thrombocytopenia, h/o recurrent right leg cellulitis now on suppressive oral antibiotic (keflex 500 mg po BID) presents to the ED with worsening LE swelling and redness. Patient has hx of recurrent cellulitis but for the last 2 weeks his legs are becoming more erythematous.    On the day of presentation, he noted that his ulcers began to weep.  Concerned that he had an underlying infection, he presented to the ED for further evaluation.    Denies having constitutional symptoms.  In the ED, VS noted elevated BPs, labs notable for mild anemia, thrombocytopenia, and hyperglycemia.   Xray b/l LE showed generalized soft tissue swelling.   LE venous duplex US -- no obvious DVT.     KATHY/PVR study:  Summary/Impressions:  1. Right KATHY is mildly decreased (0.85). Right TBI is  severely decreased (0.22), with a toe pressure of 35 mmHg.   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  2. Left KATHY is normal (1.06). Left TBI is moderately  decreased (0.41), witha toe pressure of 66 mmHg. Findings  suggest the presence of small vessel arterial disease in  the left foot.    Impression:  1. Chronic venous insufficiency with bilateral medial venous ulcers  2. History of prior LE DVT  3. Cellulitis  4. Mild RLE atherosclerotic PAD per KATHY, small vessel arterial disease in both feet.  This is not caused by arterial disease.  5. HTN    Recommendations:  1. Receiving IV cefazolin for presumed underlying cellulitis.  2. Primary concern is venous ulceration.  Only manner by which these will heal is through compression. The patient was not adhering to prior recommendations for graduated compression therapy   until it was too late.  Reinforced need to use compression therapy even now via ace wraps to allow for the healing of venous ulcers.  3. Continue low-dose extended therapy with rivaroxaban for history of VTE.  4. BPs may need better control. Currently on regimen of amlodipine and metoprolol. Can advance dose of amlodipine to 10 mg if SBPs remain > 130 mmhg.

## 2022-12-14 NOTE — CONSULT NOTE ADULT - ASSESSMENT
83 yo man with diabetes, chronic venous insufficiency, h/o lung cancer, h/o DVT, thrombocytopenia, h/o recurrent right leg cellulitis now on suppressive oral antibiotic (keflex 500 mg po BID, per Dr. Pike - ID) - presenting with worsening B/L wounds and cellulitis.     B/L LE wounds  IMPRESSION: Chronic venous stasis ulcerations with crusted exudate vs. eschar. Adherent material is dense and not amenable to mechanical debridement at the bedside. May represent sites of prior blister formation. Surrounding cellulitis, but seems improved since admission (by report). No significant peripheral arterial disease present, no acute or chronic limb-threatening vascular disease.   PLAN: Cleanse B/L LEs with soap/water vs. Hibiclens. Paint ulcer sites with betadine daily. Apply consistent compression to B/L LEs at all times - Kerlix and ACE wrap. Encourage patient to keep legs elevated at all times. Will defer debridement of sites until crusted material can soften.     Abx per Medicine/ ID  Moisturize intact skin w/ SWEEN cream BID  Nutrition Consult for optimization as tolerated in pt w/ Protein Calorie Malnutirion        consider MVI & Vit C to promote wound healing        encourage high quality protein when appropriate  Continue turning and positioning w/ offloading assistive devices as per protocol  Continue w/ Pericare as per protocol  Waffle Cushion to chair when oob to chair  Continue w/ low air loss bed surface     Care as per medicine, will follow w/ you    Upon discharge f/u as outpatient at [facility] [address] [place] [phone]  Seen w/ attng and D/w team    Thank you for this consult  Herb Saucedo MD (Wound Care Fellow)     If after 4PM or before 7:30AM on Mon-Friday or weekend/holiday please contact general surgery for urgent matters.   Team A- [id number]   Team B- [id number]  For non-urgent matters e-mail [email or web address]    I/We spent (50min) minutes face to face with this patient of which more than 50% of the time was spent counseling & coordinating care of this pt 81 yo man with diabetes, chronic venous insufficiency, h/o lung cancer, h/o DVT, thrombocytopenia, h/o recurrent right leg cellulitis now on suppressive oral antibiotic (keflex 500 mg po BID, per Dr. Pike - ID) - presenting with worsening B/L wounds and cellulitis.     B/L LE wounds not cellulitic at this time  no sharp debridement required  IMPRESSION: Chronic venous stasis ulcerations with crusted exudate vs. eschar. Adherent material is dense and not amenable to mechanical debridement at the bedside. May represent sites of prior blister formation. Surrounding cellulitis, but seems improved since admission (by report). No significant peripheral arterial disease present, no acute or chronic limb-threatening vascular disease.     PLAN: Cleanse B/L LEs with soap/water vs. Hibiclens. Paint ulcer sites with betadine daily. Apply consistent compression to B/L LEs at all times - Kerlix and ACE wrap. Encourage patient to keep legs elevated at all times. Will defer debridement of sites until crusted material can soften.     Abx per Medicine/ ID  Moisturize intact skin w/ SWEEN cream BID  Nutrition Consult for optimization as tolerated in pt w/ Protein Calorie Malnutirion        consider MVI & Vit C to promote wound healing        encourage high quality protein when appropriate  Continue turning and positioning w/ offloading assistive devices as per protocol  Continue w/ Pericare as per protocol  Waffle Cushion to chair when oob to chair  Continue w/ low air loss bed surface     Care as per medicine, will follow w/ you    Upon discharge f/u as outpatient at [facility] [address] [phone]  Seen w/ attng and D/w team    Thank you for this consult  Herb Saucedo MD (Wound Care Fellow)     If after 4PM or before 7:30AM on Mon-Friday or weekend/holiday please contact general surgery for urgent matters.   Team A- [id number]   Team B- [id number]  For non-urgent matters e-mail [email or web address]

## 2022-12-14 NOTE — CONSULT NOTE ADULT - SUBJECTIVE AND OBJECTIVE BOX
Cardiology/Vascular Medicine Inpatient Consultation Note      HISTORY OF PRESENT ILLNESS:    Patient known to me from the office; last seen 7/20/22    Patient is an 83 yo man with DM2, chronic venous insufficiency, h/o lung cancer, h/o DVT, thrombocytopenia, h/o recurrent right leg cellulitis now on suppressive oral antibiotic (keflex 500 mg po BID) presents to the ED with worsening LE swelling and redness. Patient has hx of recurrent cellulitis but for the last 2 weeks his legs are becoming more warm and red. He continued taking keflex but no improvement. He spoke to his ID doctor Dr. Pike and she advised him to come to the ED if it worse. Today he noticed the weeping was increases and looked infected and was told by Dr. Pike if symptoms progressed to come into hospital for further evaluation. Currently, he denies any fever, chills, nausea, vomiting, abdominal pain, and chest pain.   In the ED, his vitals were notable for hypertension. Labs were notable for mild anemia, thrombocytopenia, and hyperglycemia. Xray b/l LE showed generalized soft tissue swelling. US LE showed no DVT.  (13 Dec 2022 19:47)      Allergies  No Known Allergies    MEDICATIONS:  amLODIPine   Tablet 5 milliGRAM(s) Oral daily  metoprolol tartrate 25 milliGRAM(s) Oral two times a day  rivaroxaban 10 milliGRAM(s) Oral with dinner  ceFAZolin   IVPB 1000 milliGRAM(s) IV Intermittent every 8 hours  acetaminophen     Tablet .. 650 milliGRAM(s) Oral every 6 hours PRN  busPIRone 10 milliGRAM(s) Oral two times a day  gabapentin 800 milliGRAM(s) Oral three times a day  melatonin 3 milliGRAM(s) Oral at bedtime PRN  ondansetron Injectable 4 milliGRAM(s) IV Push every 8 hours PRN  aluminum hydroxide/magnesium hydroxide/simethicone Suspension 30 milliLiter(s) Oral every 4 hours PRN  dextrose 50% Injectable 25 Gram(s) IV Push once  dextrose 50% Injectable 12.5 Gram(s) IV Push once  dextrose 50% Injectable 25 Gram(s) IV Push once  dextrose Oral Gel 15 Gram(s) Oral once PRN  glucagon  Injectable 1 milliGRAM(s) IntraMuscular once  insulin lispro (ADMELOG) corrective regimen sliding scale   SubCutaneous three times a day before meals  insulin lispro (ADMELOG) corrective regimen sliding scale   SubCutaneous at bedtime  dextrose 5%. 1000 milliLiter(s) IV Continuous <Continuous>  dextrose 5%. 1000 milliLiter(s) IV Continuous <Continuous>  tamsulosin 0.4 milliGRAM(s) Oral at bedtime    PAST MEDICAL & SURGICAL HISTORY:  HTN (hypertension)  Diabetes mellitus  Neuropathy  Anxiety  Morbid obesity  Cellulitis  Both legs 2/2 chronic venous stasis  Atrial fibrillation s/p ablation 2006  Squamous cell lung cancer  Balanitis  Venous stasis of both lower extremities  DVT (deep venous thrombosis) RLE dx 6/28/17  H/O prior ablation treatment cardiac 2006  Lung nodules Flexible Bronchoscopy, Right VATS, Right Lung Resection - 1/21/15, LLL partial resection 2/2015    FAMILY HISTORY:  Family history of heart failure (Father) father  Family history of diabetes mellitus (Sibling) Brother  Family history of liver cancer (Sibling) sister    SOCIAL HISTORY:    As above, as per chart notes    REVIEW OF SYSTEMS:  As above, as per chart notes    PHYSICAL EXAM:  T(C): 36.4 (12-14-22 @ 10:04), Max: 36.7 (12-14-22 @ 04:17)  HR: 73 (12-14-22 @ 10:04) (73 - 86)  BP: 150/68 (12-14-22 @ 10:04) (150/68 - 168/83)  RR: 18 (12-14-22 @ 10:04) (17 - 18)  SpO2: 97% (12-14-22 @ 10:04) (97% - 100%)  Wt(kg): --  I&O's Summary    13 Dec 2022 07:01  -  14 Dec 2022 07:00  --------------------------------------------------------  IN: 240 mL / OUT: 0 mL / NET: 240 mL    Appearance: NAD  HEENT:   Thick neck, unable to determine JVD  Cardiovascular: Normal S1 S2, No JVD, No murmurs, No edema  Respiratory: Decreased breath sounds bilaterally  Psychiatry: Awake, alert  Gastrointestinal:  Soft, Obese, Non-tender, + BS	  Skin:     Neurologic: Non-focal  Extremities:     LABS:	 	    CBC Full  -  ( 14 Dec 2022 07:20 )    WBC Count : 4.84 K/uL  Hemoglobin : 12.9 g/dL  Hematocrit : 40.0 %  Platelet Count - Automated : 120 K/uL  Mean Cell Volume : 87.9 fL  Mean Cell Hemoglobin : 28.4 pg  Mean Cell Hemoglobin Concentration : 32.3 gm/dL  Auto Neutrophil # : 3.18 K/uL  Auto Lymphocyte # : 0.90 K/uL  Auto Monocyte # : 0.60 K/uL  Auto Eosinophil # : 0.11 K/uL  Auto Basophil # : 0.02 K/uL  Auto Neutrophil % : 65.7 %  Auto Lymphocyte % : 18.6 %  Auto Monocyte % : 12.4 %  Auto Eosinophil % : 2.3 %  Auto Basophil % : 0.4 %    12-14    140  |  105  |  16  ----------------------------<  140<H>  4.2   |  27  |  0.85  12-13    139  |  104  |  19  ----------------------------<  151<H>  3.9   |  25  |  0.82    Ca    8.8      14 Dec 2022 07:20  Ca    9.1      13 Dec 2022 10:30    TPro  6.7  /  Alb  3.8  /  TBili  0.8  /  DBili  x   /  AST  14  /  ALT  9   /  AlkPhos  65  12-14  TPro  6.9  /  Alb  3.9  /  TBili  0.4  /  DBili  x   /  AST  16  /  ALT  12  /  AlkPhos  74  12-13      < from: US Duplex Venous Lower Ext Ltd, Left (12.13.22 @ 10:58) >    ACC: [id number] EXAM:  US DPLX LWR EXT VEINS LTD LT                          PROCEDURE DATE:  12/13/2022          INTERPRETATION:  CLINICAL INFORMATION: Lower extremity swelling    COMPARISON: Bilateral lower extremity Doppler ultrasound 1/9/2019    TECHNIQUE: Duplex sonography of the LEFT LOWER extremity veins with color   and spectral Doppler, with and without compression.    FINDINGS:    There is normal compressibility of the left common femoral, femoral and   popliteal veins.  Mid and lower femoral venous segments were less well visualized.  The contralateral common femoral vein is patent.  Doppler examination shows normal spontaneous and phasic flow.    Calf veins were not able to be diagnostically visualized.    IMPRESSION: Limited exam.  No evidence of left lower extremity deep venous thrombosis involving the   venous segments able to be examined.      MIGUEL ÁNGEL PELAEZ MD; Attending Radiologist  This document has been electronically signed. Dec 13 2022 11:08AM    < end of copied text >       Cardiology/Vascular Medicine Inpatient Consultation Note      HISTORY OF PRESENT ILLNESS:    Patient known to me from the office; last seen 7/20/22    Patient is an 83 yo man with DM2, chronic venous insufficiency, h/o lung cancer, h/o DVT, thrombocytopenia, h/o recurrent right leg cellulitis now on suppressive oral antibiotic (keflex 500 mg po BID) presents to the ED with worsening LE swelling and redness. Patient has hx of recurrent cellulitis but for the last 2 weeks his legs are becoming more erythematous.    On the day of presentation, he noted that his ulcers began to weep.  Concerned that he had an underlying infection, he presented to the ED for further evaluation.    Denies having constitutional symptoms.  In the ED, VS noted elevated BPs, labs notable for mild anemia, thrombocytopenia, and hyperglycemia.   Xray b/l LE showed generalized soft tissue swelling.   LE venous duplex US -- no obvious DVT.     KATHY/PVR study:  Summary/Impressions:  1. Right KATHY is mildly decreased (0.85). Right TBI is  severely decreased (0.22), with a toe pressure of 35 mmHg.   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  2. Left KATHY is normal (1.06). Left TBI is moderately  decreased (0.41), witha toe pressure of 66 mmHg. Findings  suggest the presence of small vessel arterial disease in  the left foot.    Impression:  1. Chronic venous insufficiency with bilateral medial venous ulcers  2. History of prior LE DVT  3. Cellulitis  4. Mild RLE atherosclerotic PAD per KATHY, small vessel arterial disease in both feet.  This is not caused by arterial disease.  5. HTN    Recommendations:  1. Receiving IV cefazolin for presumed underlying cellulitis.  2. Primary concern is venous ulceration.  Only manner by which these will heal is through compression. The patient was not adhering to prior recommendations for graduated compression therapy   until it was too late.  Reinforced need to use compression therapy even now via ace wraps to allow for the healing of venous ulcers.  3. Continue low-dose extended therapy with rivaroxaban for history of VTE.  4. BPs may need better control. Currently on regimen of amlodipine and metoprolol. Can advance dose of amlodipine to 10 mg if SBPs remain > 130 mmhg.    Allergies  No Known Allergies    MEDICATIONS:  amLODIPine   Tablet 5 milliGRAM(s) Oral daily  metoprolol tartrate 25 milliGRAM(s) Oral two times a day  rivaroxaban 10 milliGRAM(s) Oral with dinner  ceFAZolin   IVPB 1000 milliGRAM(s) IV Intermittent every 8 hours  acetaminophen     Tablet .. 650 milliGRAM(s) Oral every 6 hours PRN  busPIRone 10 milliGRAM(s) Oral two times a day  gabapentin 800 milliGRAM(s) Oral three times a day  melatonin 3 milliGRAM(s) Oral at bedtime PRN  ondansetron Injectable 4 milliGRAM(s) IV Push every 8 hours PRN  aluminum hydroxide/magnesium hydroxide/simethicone Suspension 30 milliLiter(s) Oral every 4 hours PRN  dextrose 50% Injectable 25 Gram(s) IV Push once  dextrose 50% Injectable 12.5 Gram(s) IV Push once  dextrose 50% Injectable 25 Gram(s) IV Push once  dextrose Oral Gel 15 Gram(s) Oral once PRN  glucagon  Injectable 1 milliGRAM(s) IntraMuscular once  insulin lispro (ADMELOG) corrective regimen sliding scale   SubCutaneous three times a day before meals  insulin lispro (ADMELOG) corrective regimen sliding scale   SubCutaneous at bedtime  dextrose 5%. 1000 milliLiter(s) IV Continuous <Continuous>  dextrose 5%. 1000 milliLiter(s) IV Continuous <Continuous>  tamsulosin 0.4 milliGRAM(s) Oral at bedtime    PAST MEDICAL & SURGICAL HISTORY:  HTN (hypertension)  Diabetes mellitus  Neuropathy  Anxiety  Morbid obesity  Cellulitis  Both legs 2/2 chronic venous stasis  Atrial fibrillation s/p ablation 2006  Squamous cell lung cancer  Balanitis  Venous stasis of both lower extremities  DVT (deep venous thrombosis) RLE dx 6/28/17  H/O prior ablation treatment cardiac 2006  Lung nodules Flexible Bronchoscopy, Right VATS, Right Lung Resection - 1/21/15, LLL partial resection 2/2015    FAMILY HISTORY:  Family history of heart failure (Father) father  Family history of diabetes mellitus (Sibling) Brother  Family history of liver cancer (Sibling) sister    SOCIAL HISTORY:    As above, as per chart notes    REVIEW OF SYSTEMS:  As above, as per chart notes    PHYSICAL EXAM:  T(C): 36.4 (12-14-22 @ 10:04), Max: 36.7 (12-14-22 @ 04:17)  HR: 73 (12-14-22 @ 10:04) (73 - 86)  BP: 150/68 (12-14-22 @ 10:04) (150/68 - 168/83)  RR: 18 (12-14-22 @ 10:04) (17 - 18)  SpO2: 97% (12-14-22 @ 10:04) (97% - 100%)  Wt(kg): --  I&O's Summary    13 Dec 2022 07:01  -  14 Dec 2022 07:00  --------------------------------------------------------  IN: 240 mL / OUT: 0 mL / NET: 240 mL    Appearance: NAD  HEENT:   Thick neck, unable to determine JVD  Cardiovascular: Normal S1 S2, No JVD, No murmurs, No edema  Respiratory: Decreased breath sounds bilaterally  Psychiatry: Awake, alert  Gastrointestinal:  Soft, Obese, Non-tender, + BS	  Skin:     Neurologic: Non-focal  Extremities:     LABS:	 	    CBC Full  -  ( 14 Dec 2022 07:20 )    WBC Count : 4.84 K/uL  Hemoglobin : 12.9 g/dL  Hematocrit : 40.0 %  Platelet Count - Automated : 120 K/uL  Mean Cell Volume : 87.9 fL  Mean Cell Hemoglobin : 28.4 pg  Mean Cell Hemoglobin Concentration : 32.3 gm/dL  Auto Neutrophil # : 3.18 K/uL  Auto Lymphocyte # : 0.90 K/uL  Auto Monocyte # : 0.60 K/uL  Auto Eosinophil # : 0.11 K/uL  Auto Basophil # : 0.02 K/uL  Auto Neutrophil % : 65.7 %  Auto Lymphocyte % : 18.6 %  Auto Monocyte % : 12.4 %  Auto Eosinophil % : 2.3 %  Auto Basophil % : 0.4 %    12-14    140  |  105  |  16  ----------------------------<  140<H>  4.2   |  27  |  0.85  12-13    139  |  104  |  19  ----------------------------<  151<H>  3.9   |  25  |  0.82    Ca    8.8      14 Dec 2022 07:20  Ca    9.1      13 Dec 2022 10:30    TPro  6.7  /  Alb  3.8  /  TBili  0.8  /  DBili  x   /  AST  14  /  ALT  9   /  AlkPhos  65  12-14  TPro  6.9  /  Alb  3.9  /  TBili  0.4  /  DBili  x   /  AST  16  /  ALT  12  /  AlkPhos  74  12-13      < from: US Duplex Venous Lower Ext Ltd, Left (12.13.22 @ 10:58) >    ACC: [id number] EXAM:  US DPLX LWR EXT VEINS LTD LT                          PROCEDURE DATE:  12/13/2022          INTERPRETATION:  CLINICAL INFORMATION: Lower extremity swelling    COMPARISON: Bilateral lower extremity Doppler ultrasound 1/9/2019    TECHNIQUE: Duplex sonography of the LEFT LOWER extremity veins with color   and spectral Doppler, with and without compression.    FINDINGS:    There is normal compressibility of the left common femoral, femoral and   popliteal veins.  Mid and lower femoral venous segments were less well visualized.  The contralateral common femoral vein is patent.  Doppler examination shows normal spontaneous and phasic flow.    Calf veins were not able to be diagnostically visualized.    IMPRESSION: Limited exam.  No evidence of left lower extremity deep venous thrombosis involving the   venous segments able to be examined.      MIGUEL ÁNGEL PELAEZ MD; Attending Radiologist  This document has been electronically signed. Dec 13 2022 11:08AM    < end of copied text >    < from: VA Duplex Physiol Ext Low 3+ Level, BI. (12.14.22 @ 14:29) >    Patient name: CALLIE MACIAS  Date of test: 12/14/2022  MR#: [id number]  [facility] #: [id number]    Location: [facility] Physician(s): , Nik Quesada MD, PhD  Interpreted by: Nik Quesada MD, [facility], [facility], [facility]  Tech: Iván Peña ROBERTO  Type of Test: LE Arterial: KATHY/Segm.  ------------------------------------------------------------------------  Procedure: Segmental Pressure/Waveform - complete  noninvasive physiologic studies of the bilateral lower  extremity arteries.  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)  ------------------------------------------------------------------------  PRESSURE (mm Hg):  ------------------------------------------------------------------------  RIGHT (BP):  Brachial: 158  High Thigh:  Low Thigh: 234  Calf: 181  Ankle-PT: 136  Ankle-DP: 135  Greate Toe: 35  KATHY: 0.85  ------------------------------------------------------------------------  LEFT (BP):  Brachial: 160  High Thigh:  Low Thigh: 236  Calf: 226  Ankle-PT: 169  Ankle-DP: 147  Greate Toe: 66  KATHY: 1.06  ------------------------------------------------------------------------  WAVEFORM:  ------------------------------------------------------------------------  RIGHT:  CFA:  Popliteal:  Ankle-PT:  Ankle-DP:  ------------------------------------------------------------------------  LEFT:  CFA:  Popliteal:  Ankle-PT:  Ankle-DP:  ------------------------------------------------------------------------  PSA/AVF:  ------------------------------------------------------------------------  RIGHT:  Pseudoaneurysm:  A-V fistula:  TBI: 0.22  ------------------------------------------------------------------------  LEFT:  Pseudoaneurysm:  A-V fistula:  TBI: 0.41  ------------------------------------------------------------------------  Right Findings: The ankle-brachial index (KATHY) on the  right is mildly decreased (0.85).  The toe-brachial index (TBI) on the right is severely  decreased (0.22).  The right toe pressure is 35 mmHg.  Pulse volume recording (PVR) waveforms appear normal at  the right thigh, calf, and ankle segments. Waveforms are  moderately diminished in amplitude at the metatarsal and  digit segments.    Left Findings: The ankle-brachial index (KATHY) on the left  is normal (1.06).  The toe-brachial index (TBI) on the left is moderately  decreased (0.41).  The left toe pressure is 66 mmHg.  Pulse volume recording (PVR) waveforms appear normal at  the thigh, calf, and ankle segments. Waveforms are  moderately diminished in amplitude at the metarsal and and  digit segments.   There is no significant decrease in segmental pressures  between arterial segments.  ------------------------------------------------------------------------  Summary/Impressions:  1. Right KATHY is mildly decreased (0.85). Right TBI is  severely decreased (0.22), with a toe pressure of 35 mmHg.   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  2. Left KATHY is normal (1.06). Left TBI is moderately  decreased (0.41), witha toe pressure of 66 mmHg. Findings  suggest the presence of small vessel arterial disease in  the left foot.  ------------------------------------------------------------------------  Confirmed on  12/14/2022 - 4:14 PM by Nik Quesada MD,  [facility], [facility], [facility]  By signing this report, the attending physician certifies  that he or she has personally supervised and interpreted  the vascular study and has reviewed and or edited and  agrees with the written comments contained within the  report.    < end of copied text >

## 2022-12-14 NOTE — CONSULT NOTE ADULT - ATTENDING COMMENTS
82 yr DM h/o dvt with venous stasis, no cellultis at this time no limb ischemia, consider pvr/shadia to confirm  betadine, 2 layer wrap, followup outpatient for compression stockings  70 min agree with above

## 2022-12-14 NOTE — PATIENT PROFILE ADULT - NSPRESCRALCFREQ_GEN_A_NUR
· Refill requested by: Patient  · Last office visit for controlled substance: 5/4/22  · Next office visit: 11/8/2022  · Medication(s) Requested: Alprazolam  · Dosage: 0.25mg  · Sig:  Take 1 to 2 tablets by mouth at night for sleep   · Date medication contract signed: 5/4/22  · Date of last urine drug screen: not on file    · Last 3 PDMP refills: 3/21/22, 12/13/21, 5/24/21  · PDMP review: Criteria met. Forwarded to Physician/GERDA for signature.  Can not refill without MD authorization         Never

## 2022-12-15 ENCOUNTER — TRANSCRIPTION ENCOUNTER (OUTPATIENT)
Age: 82
End: 2022-12-15

## 2022-12-15 LAB
ANION GAP SERPL CALC-SCNC: 11 MMOL/L — SIGNIFICANT CHANGE UP (ref 7–14)
BUN SERPL-MCNC: 17 MG/DL — SIGNIFICANT CHANGE UP (ref 7–23)
CALCIUM SERPL-MCNC: 9.1 MG/DL — SIGNIFICANT CHANGE UP (ref 8.4–10.5)
CHLORIDE SERPL-SCNC: 102 MMOL/L — SIGNIFICANT CHANGE UP (ref 98–107)
CO2 SERPL-SCNC: 27 MMOL/L — SIGNIFICANT CHANGE UP (ref 22–31)
CREAT SERPL-MCNC: 0.91 MG/DL — SIGNIFICANT CHANGE UP (ref 0.5–1.3)
EGFR: 85 ML/MIN/1.73M2 — SIGNIFICANT CHANGE UP
GLUCOSE BLDC GLUCOMTR-MCNC: 122 MG/DL — HIGH (ref 70–99)
GLUCOSE BLDC GLUCOMTR-MCNC: 134 MG/DL — HIGH (ref 70–99)
GLUCOSE BLDC GLUCOMTR-MCNC: 139 MG/DL — HIGH (ref 70–99)
GLUCOSE BLDC GLUCOMTR-MCNC: 158 MG/DL — HIGH (ref 70–99)
GLUCOSE SERPL-MCNC: 137 MG/DL — HIGH (ref 70–99)
HCT VFR BLD CALC: 43.5 % — SIGNIFICANT CHANGE UP (ref 39–50)
HGB BLD-MCNC: 14.1 G/DL — SIGNIFICANT CHANGE UP (ref 13–17)
MAGNESIUM SERPL-MCNC: 2.1 MG/DL — SIGNIFICANT CHANGE UP (ref 1.6–2.6)
MCHC RBC-ENTMCNC: 28.4 PG — SIGNIFICANT CHANGE UP (ref 27–34)
MCHC RBC-ENTMCNC: 32.4 GM/DL — SIGNIFICANT CHANGE UP (ref 32–36)
MCV RBC AUTO: 87.7 FL — SIGNIFICANT CHANGE UP (ref 80–100)
NRBC # BLD: 0 /100 WBCS — SIGNIFICANT CHANGE UP (ref 0–0)
NRBC # FLD: 0 K/UL — SIGNIFICANT CHANGE UP (ref 0–0)
PHOSPHATE SERPL-MCNC: 2.6 MG/DL — SIGNIFICANT CHANGE UP (ref 2.5–4.5)
PLATELET # BLD AUTO: 122 K/UL — LOW (ref 150–400)
POTASSIUM SERPL-MCNC: 3.9 MMOL/L — SIGNIFICANT CHANGE UP (ref 3.5–5.3)
POTASSIUM SERPL-SCNC: 3.9 MMOL/L — SIGNIFICANT CHANGE UP (ref 3.5–5.3)
RBC # BLD: 4.96 M/UL — SIGNIFICANT CHANGE UP (ref 4.2–5.8)
RBC # FLD: 14.4 % — SIGNIFICANT CHANGE UP (ref 10.3–14.5)
SODIUM SERPL-SCNC: 140 MMOL/L — SIGNIFICANT CHANGE UP (ref 135–145)
WBC # BLD: 5.05 K/UL — SIGNIFICANT CHANGE UP (ref 3.8–10.5)
WBC # FLD AUTO: 5.05 K/UL — SIGNIFICANT CHANGE UP (ref 3.8–10.5)

## 2022-12-15 PROCEDURE — 99232 SBSQ HOSP IP/OBS MODERATE 35: CPT

## 2022-12-15 RX ORDER — AMLODIPINE BESYLATE 2.5 MG/1
10 TABLET ORAL DAILY
Refills: 0 | Status: DISCONTINUED | OUTPATIENT
Start: 2022-12-15 | End: 2022-12-16

## 2022-12-15 RX ADMIN — Medication 10 MILLIGRAM(S): at 18:09

## 2022-12-15 RX ADMIN — Medication 10 MILLIGRAM(S): at 05:17

## 2022-12-15 RX ADMIN — Medication 100 MILLIGRAM(S): at 18:32

## 2022-12-15 RX ADMIN — TAMSULOSIN HYDROCHLORIDE 0.4 MILLIGRAM(S): 0.4 CAPSULE ORAL at 22:36

## 2022-12-15 RX ADMIN — RIVAROXABAN 10 MILLIGRAM(S): KIT at 18:09

## 2022-12-15 RX ADMIN — GABAPENTIN 800 MILLIGRAM(S): 400 CAPSULE ORAL at 13:34

## 2022-12-15 RX ADMIN — AMLODIPINE BESYLATE 5 MILLIGRAM(S): 2.5 TABLET ORAL at 05:18

## 2022-12-15 RX ADMIN — Medication 100 MILLIGRAM(S): at 10:10

## 2022-12-15 RX ADMIN — AMLODIPINE BESYLATE 10 MILLIGRAM(S): 2.5 TABLET ORAL at 22:36

## 2022-12-15 RX ADMIN — Medication 25 MILLIGRAM(S): at 18:09

## 2022-12-15 RX ADMIN — Medication 1: at 17:50

## 2022-12-15 RX ADMIN — Medication 100 MILLIGRAM(S): at 00:33

## 2022-12-15 RX ADMIN — GABAPENTIN 800 MILLIGRAM(S): 400 CAPSULE ORAL at 22:36

## 2022-12-15 RX ADMIN — GABAPENTIN 800 MILLIGRAM(S): 400 CAPSULE ORAL at 05:18

## 2022-12-15 RX ADMIN — Medication 25 MILLIGRAM(S): at 05:18

## 2022-12-15 NOTE — PROGRESS NOTE ADULT - PROBLEM SELECTOR PLAN 1
will obtain echo   trial of diuretics  cards- Dr. Quesada to eval pt
will obtain echo   trial of diuretics  cards f/u

## 2022-12-15 NOTE — DISCHARGE NOTE PROVIDER - CARE PROVIDERS DIRECT ADDRESSES
[email or web address]

## 2022-12-15 NOTE — DISCHARGE NOTE PROVIDER - HOSPITAL COURSE
83 yo man with diabetes, chronic venous insufficiency, h/o lung cancer, h/o DVT, thrombocytopenia, h/o recurrent right leg cellulitis now on suppressive oral antibiotic (keflex 500 mg po BID) presents to the ED with worsening LE swelling and redness. c/w cellulitis.     Pedal edema.   - will obtain echo   - trial of diuretics  - cards f/u    Bilateral lower leg cellulitis.   - Worsening b/l LE cellulitis   - abx as per ID   - f/u cx.    History of DVT of lower extremity.   - C/w xarelto 10 mg daily.    Anxiety.   - C/w buspirone 10 mg BID.    Anemia.   - Slightly decreased   -No signs of active bleeding   -Trend CBC daily.    Thrombocytopenia   - Slightly decreased from normal range   - heme eval.        On 12/16/22, case was discussed with , patient is medically cleared and optimized for discharge today. All medications were reviewed with attending, and sent to mutually agreed upon pharmacy.    83 yo man with diabetes, chronic venous insufficiency, h/o lung cancer, h/o DVT, thrombocytopenia, h/o recurrent right leg cellulitis now on suppressive oral antibiotic (keflex 500 mg po BID) presents to the ED with worsening LE swelling and redness. c/w cellulitis.     Bilateral lower leg cellulitis.   - Worsening b/l LE cellulitis   - abx as per ID consultation by Manda Cosme  - patient received Cefazolin 1 gm iv q 8 h while in hospital  - switched to keflex 500 mg po q 6 h on d/c thru 12/21 then suppression again with keflex 500 mg po q 12 h x 28 days    Pedal edema  - Patient to follow up cardiology in the out patient setting for an echocardiogram     History of DVT of lower extremity  - C/w xarelto 10 mg daily    Anxiety  - C/w buspirone 10 mg BID.    Anemia.   - Slightly decreased   - No signs of active bleeding   - Trended CBC daily.    Thrombocytopenia   - Slightly decreased from normal range     On 12/16/22, case was discussed with , patient is medically cleared and optimized for discharge today. All medications were reviewed with attending, and sent to mutually agreed upon pharmacy.

## 2022-12-15 NOTE — DISCHARGE NOTE PROVIDER - CARE PROVIDER_API CALL
Manda Pike (MD)  Infectious Disease; Internal Medicine  [address] [place] [address]  Phone: [phone]  Fax: [phone]  Follow Up Time:     Nik Quesada; PhD)  Cardiology; Internal Medicine; Vascular Medicine  [address] [place] [address], [address]  [place] [address]  Phone: [phone]  Fax: [phone]  Follow Up Time:     BIN FAN  Internal Medicine  [address] [place] [address]  Phone: [phone]  Fax: [phone]  Follow Up Time:

## 2022-12-15 NOTE — DISCHARGE NOTE PROVIDER - PROVIDER TOKENS
PROVIDER:[TOKEN:[[id number]]],PROVIDER:[TOKEN:[[id number]]],PROVIDER:[TOKEN:[[id number]]]

## 2022-12-15 NOTE — DISCHARGE NOTE PROVIDER - NSDCFUSCHEDAPPT_GEN_ALL_CORE_FT
Manda Pike  [facility]  INFDISEASE [address]  Scheduled Appointment: 01/12/2023    Nik Quesada  [facility]  CARDIOLOGY [id number]  Scheduled Appointment: 01/18/2023

## 2022-12-15 NOTE — DISCHARGE NOTE PROVIDER - NSDCMRMEDTOKEN_GEN_ALL_CORE_FT
amLODIPine 5 mg oral tablet: 1 tab(s) orally once a day, hold for SBP &lt;110  busPIRone 10 mg oral tablet: 1 tab(s) orally 2 times a day  cephalexin 250 mg oral capsule: 2 cap(s) orally every 12 hours  gabapentin 800 mg oral tablet: 1 tab(s) orally 3 times a day, hold for oversedation/lethargy   metoprolol tartrate 25 mg oral tablet: 1 tab(s) orally 2 times a day, hold for SBP&lt;110 or HR &lt;60  tamsulosin 0.4 mg oral capsule: 1 cap(s) orally once a day  Xarelto 10 mg oral tablet: 1 tab(s) orally once a day   amLODIPine 5 mg oral tablet: 1 tab(s) orally once a day, hold for SBP &lt;110  busPIRone 10 mg oral tablet: 1 tab(s) orally 2 times a day  cephalexin 250 mg oral capsule: 2 cap(s) orally every 12 hours  cephalexin 500 mg oral capsule: 1 cap(s) orally every 6 hours. Last dose to be taken on 12/21/22.   cephalexin 500 mg oral capsule: 1 cap(s) orally every 12 hours. Start to take this medication every 12 hours on 12/22/22.  gabapentin 800 mg oral tablet: 1 tab(s) orally 3 times a day, hold for oversedation/lethargy   metoprolol tartrate 25 mg oral tablet: 1 tab(s) orally 2 times a day, hold for SBP&lt;110 or HR &lt;60  tamsulosin 0.4 mg oral capsule: 1 cap(s) orally once a day  Xarelto 10 mg oral tablet: 1 tab(s) orally once a day

## 2022-12-15 NOTE — DISCHARGE NOTE PROVIDER - NSDCFUADDAPPT_GEN_ALL_CORE_FT
Follow up with your primary care doctor, cardiologist, and infectious disease doctor for further evaluation and management. Please call to make an appointment within 1-2 weeks of discharge.

## 2022-12-15 NOTE — DISCHARGE NOTE PROVIDER - NSDCCPCAREPLAN_GEN_ALL_CORE_FT
PRINCIPAL DISCHARGE DIAGNOSIS  Diagnosis: Cellulitis  Assessment and Plan of Treatment: You came to the hospital for worsening leg swelling and redness. You were seen by the infectious disease team for cellulitis (tissue infection). You were given the antibiotic Cefazolin. presents to the ED with worsening LE swelling and redness. c/w cellulitis.   Bilateral lower leg cellulitis.   - Worsening b/l LE cellulitis   - abx as per ID consultation by Manda Cosme  - patient received Cefazolin 1 gm iv q 8 h while in hospital  - switched to keflex 500 mg po q 6 h on d/c thru 12/21 then suppression again with keflex 500 mg po q 12 h x 28 days        SECONDARY DISCHARGE DIAGNOSES  Diagnosis: Pedal edema  Assessment and Plan of Treatment: Pedal edema  - Patient to follow up cardiology in the out patient setting for an echocardiogram     PRINCIPAL DISCHARGE DIAGNOSIS  Diagnosis: Cellulitis  Assessment and Plan of Treatment: You came to the hospital for worsening leg swelling and redness. You were seen by the infectious disease team for cellulitis (tissue infection). You were given the antibiotic Cefazolin. You are to continue taking the medication Keflex 500mg orally every 6 hours through 12/21/22 and then take Keflex 500mg orall every 12 hours on 12/22/22 for 28 days. Continue medications as prescribed. Follow up with your primary care doctor and infectious disease for further evaluation and management. Please call to make an appointment within 1-2 weeks of discharge.      SECONDARY DISCHARGE DIAGNOSES  Diagnosis: Pedal edema  Assessment and Plan of Treatment: You were found to have swelling in your feet. Continue medications as prescribed. Follow up with your primary care doctor and cardiologist for an out patient echocardiogram (ultrasound of the heart) for further evaluation and management. Please call to make an appointment within 1-2 weeks of discharge.

## 2022-12-15 NOTE — PROGRESS NOTE ADULT - PROBLEM SELECTOR PLAN 5
Slightly decreased   -No signs of active bleeding   -Trend CBC daily
Slightly decreased   -No signs of active bleeding   -Trend CBC daily

## 2022-12-15 NOTE — PROGRESS NOTE ADULT - PROBLEM SELECTOR PLAN 2
Worsening b/l LE cellulitis   abx as per ID   f/u cx
Worsening b/l LE cellulitis   abx as per ID   f/u cx

## 2022-12-16 ENCOUNTER — TRANSCRIPTION ENCOUNTER (OUTPATIENT)
Age: 82
End: 2022-12-16

## 2022-12-16 VITALS
SYSTOLIC BLOOD PRESSURE: 141 MMHG | DIASTOLIC BLOOD PRESSURE: 95 MMHG | RESPIRATION RATE: 18 BRPM | HEART RATE: 88 BPM | TEMPERATURE: 98 F | OXYGEN SATURATION: 98 %

## 2022-12-16 LAB
ANION GAP SERPL CALC-SCNC: 9 MMOL/L — SIGNIFICANT CHANGE UP (ref 7–14)
BUN SERPL-MCNC: 21 MG/DL — SIGNIFICANT CHANGE UP (ref 7–23)
CALCIUM SERPL-MCNC: 8.7 MG/DL — SIGNIFICANT CHANGE UP (ref 8.4–10.5)
CHLORIDE SERPL-SCNC: 102 MMOL/L — SIGNIFICANT CHANGE UP (ref 98–107)
CO2 SERPL-SCNC: 27 MMOL/L — SIGNIFICANT CHANGE UP (ref 22–31)
CREAT SERPL-MCNC: 0.85 MG/DL — SIGNIFICANT CHANGE UP (ref 0.5–1.3)
EGFR: 88 ML/MIN/1.73M2 — SIGNIFICANT CHANGE UP
GLUCOSE BLDC GLUCOMTR-MCNC: 138 MG/DL — HIGH (ref 70–99)
GLUCOSE BLDC GLUCOMTR-MCNC: 141 MG/DL — HIGH (ref 70–99)
GLUCOSE BLDC GLUCOMTR-MCNC: 148 MG/DL — HIGH (ref 70–99)
GLUCOSE SERPL-MCNC: 137 MG/DL — HIGH (ref 70–99)
HCT VFR BLD CALC: 40.6 % — SIGNIFICANT CHANGE UP (ref 39–50)
HGB BLD-MCNC: 13.1 G/DL — SIGNIFICANT CHANGE UP (ref 13–17)
MAGNESIUM SERPL-MCNC: 2 MG/DL — SIGNIFICANT CHANGE UP (ref 1.6–2.6)
MCHC RBC-ENTMCNC: 28.4 PG — SIGNIFICANT CHANGE UP (ref 27–34)
MCHC RBC-ENTMCNC: 32.3 GM/DL — SIGNIFICANT CHANGE UP (ref 32–36)
MCV RBC AUTO: 88.1 FL — SIGNIFICANT CHANGE UP (ref 80–100)
NRBC # BLD: 0 /100 WBCS — SIGNIFICANT CHANGE UP (ref 0–0)
NRBC # FLD: 0 K/UL — SIGNIFICANT CHANGE UP (ref 0–0)
PHOSPHATE SERPL-MCNC: 2.7 MG/DL — SIGNIFICANT CHANGE UP (ref 2.5–4.5)
PLATELET # BLD AUTO: 116 K/UL — LOW (ref 150–400)
POTASSIUM SERPL-MCNC: 3.6 MMOL/L — SIGNIFICANT CHANGE UP (ref 3.5–5.3)
POTASSIUM SERPL-SCNC: 3.6 MMOL/L — SIGNIFICANT CHANGE UP (ref 3.5–5.3)
RBC # BLD: 4.61 M/UL — SIGNIFICANT CHANGE UP (ref 4.2–5.8)
RBC # FLD: 14.6 % — HIGH (ref 10.3–14.5)
SODIUM SERPL-SCNC: 138 MMOL/L — SIGNIFICANT CHANGE UP (ref 135–145)
WBC # BLD: 4.86 K/UL — SIGNIFICANT CHANGE UP (ref 3.8–10.5)
WBC # FLD AUTO: 4.86 K/UL — SIGNIFICANT CHANGE UP (ref 3.8–10.5)

## 2022-12-16 PROCEDURE — 99232 SBSQ HOSP IP/OBS MODERATE 35: CPT

## 2022-12-16 RX ORDER — CEPHALEXIN 500 MG
1 CAPSULE ORAL
Qty: 56 | Refills: 0
Start: 2022-12-16 | End: 2023-01-12

## 2022-12-16 RX ORDER — CEPHALEXIN 500 MG
1 CAPSULE ORAL
Qty: 20 | Refills: 0
Start: 2022-12-16 | End: 2022-12-20

## 2022-12-16 RX ADMIN — RIVAROXABAN 10 MILLIGRAM(S): KIT at 18:10

## 2022-12-16 RX ADMIN — AMLODIPINE BESYLATE 10 MILLIGRAM(S): 2.5 TABLET ORAL at 06:07

## 2022-12-16 RX ADMIN — Medication 25 MILLIGRAM(S): at 18:10

## 2022-12-16 RX ADMIN — Medication 100 MILLIGRAM(S): at 18:12

## 2022-12-16 RX ADMIN — GABAPENTIN 800 MILLIGRAM(S): 400 CAPSULE ORAL at 06:08

## 2022-12-16 RX ADMIN — Medication 10 MILLIGRAM(S): at 06:06

## 2022-12-16 RX ADMIN — Medication 10 MILLIGRAM(S): at 18:10

## 2022-12-16 RX ADMIN — Medication 100 MILLIGRAM(S): at 01:16

## 2022-12-16 RX ADMIN — GABAPENTIN 800 MILLIGRAM(S): 400 CAPSULE ORAL at 13:40

## 2022-12-16 RX ADMIN — Medication 25 MILLIGRAM(S): at 06:08

## 2022-12-16 RX ADMIN — Medication 100 MILLIGRAM(S): at 08:12

## 2022-12-16 NOTE — CHART NOTE - NSCHARTNOTEFT_GEN_A_CORE
Patient on Wednesday 12/14 during wound care rounds. Patient assessed with bilateral leg dry crust not easily removed upon cleansing. No drainage. No odor. ID infectious disease following for r/o cellulitis of legs. As per team, patient is pending discharge to home, team inquiring about dressing changes every other day at home instead of daily if feasible.   Current wound care recommendations: Cleanse B/L LEs with soap/water vs. Hibiclens. Paint ulcer sites with betadine daily. Apply consistent compression to B/L LEs at all times - Kerlix and ACE wrap. Encourage patient to keep legs elevated at all times.   At home may change dressing/betadine application  every other day.   *Recommend follow up care at [facility]: [phone]. Address: [address] [place] [address].
Spoke with attending . Patient is cleared for discharge.
please page me when dressings on legs are changed today
numerical 0-10

## 2022-12-16 NOTE — PROGRESS NOTE ADULT - SUBJECTIVE AND OBJECTIVE BOX
Cardiology/Vascular Medicine Inpatient Progress Note    States Wound Care team treated patient last night.  Ace wraps noted on LEs -- not applying enough compression as they need to be applied tighter to allow for better wound healing.    Vital Signs Last 24 Hrs  T(C): 36.4 (15 Dec 2022 05:13), Max: 37 (14 Dec 2022 21:34)  T(F): 97.5 (15 Dec 2022 05:13), Max: 98.6 (14 Dec 2022 21:34)  HR: 72 (15 Dec 2022 05:13) (72 - 89)  BP: 150/78 (15 Dec 2022 05:13) (150/68 - 163/74)  BP(mean): --  RR: 18 (15 Dec 2022 05:13) (18 - 18)  SpO2: 97% (15 Dec 2022 05:13) (97% - 99%)    Parameters below as of 15 Dec 2022 05:13  Patient On (Oxygen Delivery Method): room air      Appearance: NAD  HEENT:   Thick neck, unable to determine JVD  Cardiovascular: Normal S1 S2, No JVD, No murmurs, No edema  Respiratory: Decreased breath sounds bilaterally  Psychiatry: Awake, alert  Gastrointestinal:  Soft, Obese, Non-tender, + BS	  Skin: Ace wraps c/d/i over both ankles    MEDICATIONS  (STANDING):  amLODIPine   Tablet 5 milliGRAM(s) Oral daily  busPIRone 10 milliGRAM(s) Oral two times a day  ceFAZolin   IVPB 1000 milliGRAM(s) IV Intermittent every 8 hours  dextrose 5%. 1000 milliLiter(s) (100 mL/Hr) IV Continuous <Continuous>  dextrose 5%. 1000 milliLiter(s) (50 mL/Hr) IV Continuous <Continuous>  dextrose 50% Injectable 25 Gram(s) IV Push once  dextrose 50% Injectable 12.5 Gram(s) IV Push once  dextrose 50% Injectable 25 Gram(s) IV Push once  gabapentin 800 milliGRAM(s) Oral three times a day  glucagon  Injectable 1 milliGRAM(s) IntraMuscular once  insulin lispro (ADMELOG) corrective regimen sliding scale   SubCutaneous three times a day before meals  insulin lispro (ADMELOG) corrective regimen sliding scale   SubCutaneous at bedtime  metoprolol tartrate 25 milliGRAM(s) Oral two times a day  rivaroxaban 10 milliGRAM(s) Oral with dinner  tamsulosin 0.4 milliGRAM(s) Oral at bedtime    MEDICATIONS  (PRN):  acetaminophen     Tablet .. 650 milliGRAM(s) Oral every 6 hours PRN Temp greater or equal to 38C (100.4F), Mild Pain (1 - 3)  aluminum hydroxide/magnesium hydroxide/simethicone Suspension 30 milliLiter(s) Oral every 4 hours PRN Dyspepsia  dextrose Oral Gel 15 Gram(s) Oral once PRN Blood Glucose LESS THAN 70 milliGRAM(s)/deciliter  melatonin 3 milliGRAM(s) Oral at bedtime PRN Insomnia  ondansetron Injectable 4 milliGRAM(s) IV Push every 8 hours PRN Nausea and/or Vomiting      LABS:	 	                          14.1   5.05  )-----------( 122      ( 15 Dec 2022 06:35 )             43.5   12-15    140  |  102  |  17  ----------------------------<  137<H>  3.9   |  27  |  0.91    Ca    9.1      15 Dec 2022 06:35  Phos  2.6     12-15  Mg     2.10     12-15    TPro  6.7  /  Alb  3.8  /  TBili  0.8  /  DBili  x   /  AST  14  /  ALT  9   /  AlkPhos  65  12-14      < from: US Duplex Venous Lower Ext Ltd, Left (12.13.22 @ 10:58) >    ACC: [id number] EXAM:  US DPLX LWR EXT VEINS LTD LT                          PROCEDURE DATE:  12/13/2022          INTERPRETATION:  CLINICAL INFORMATION: Lower extremity swelling    COMPARISON: Bilateral lower extremity Doppler ultrasound 1/9/2019    TECHNIQUE: Duplex sonography of the LEFT LOWER extremity veins with color   and spectral Doppler, with and without compression.    FINDINGS:    There is normal compressibility of the left common femoral, femoral and   popliteal veins.  Mid and lower femoral venous segments were less well visualized.  The contralateral common femoral vein is patent.  Doppler examination shows normal spontaneous and phasic flow.    Calf veins were not able to be diagnostically visualized.    IMPRESSION: Limited exam.  No evidence of left lower extremity deep venous thrombosis involving the   venous segments able to be examined.      MIGUEL ÁNGEL PELAEZ MD; Attending Radiologist  This document has been electronically signed. Dec 13 2022 11:08AM    < end of copied text >    < from: [facility] Duplex Physiol Ext Low 3+ Level, BI. (12.14.22 @ 14:29) >    Patient name: CALLIE MACIAS  Date of test: 12/14/2022  MR#: [id number]  [facility] #: [id number]    Location: [facility] Physician(s): , Nik Quesada MD, PhD  Interpreted by: Nik Quesada MD, [facility], [facility]  Tech: Iván Peña, [facility], [place]  Type of Test: LE Arterial: KATHY/Segm.  ------------------------------------------------------------------------  Procedure: Segmental Pressure/Waveform - complete  noninvasive physiologic studies of the bilateral lower  extremity arteries.  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)  ------------------------------------------------------------------------  PRESSURE (mm Hg):  ------------------------------------------------------------------------  RIGHT (BP):  Brachial: 158  High Thigh:  Low Thigh: 234  Calf: 181  Ankle-PT: 136  Ankle-DP: 135  Greate Toe: 35  KATHY: 0.85  ------------------------------------------------------------------------  LEFT (BP):  Brachial: 160  High Thigh:  Low Thigh: 236  Calf: 226  Ankle-PT: 169  Ankle-DP: 147  Greate Toe: 66  KATHY: 1.06  ------------------------------------------------------------------------  WAVEFORM:  ------------------------------------------------------------------------  RIGHT:  CFA:  Popliteal:  Ankle-PT:  Ankle-DP:  ------------------------------------------------------------------------  LEFT:  CFA:  Popliteal:  Ankle-PT:  Ankle-DP:  ------------------------------------------------------------------------  PSA/AVF:  ------------------------------------------------------------------------  RIGHT:  Pseudoaneurysm:  A-V fistula:  TBI: 0.22  ------------------------------------------------------------------------  LEFT:  Pseudoaneurysm:  A-V fistula:  TBI: 0.41  ------------------------------------------------------------------------  Right Findings: The ankle-brachial index (KATHY) on the  right is mildly decreased (0.85).  The toe-brachial index (TBI) on the right is severely  decreased (0.22).  The right toe pressure is 35 mmHg.  Pulse volume recording (PVR) waveforms appear normal at  the right thigh, calf, and ankle segments. Waveforms are  moderately diminished in amplitude at the metatarsal and  digit segments.    Left Findings: The ankle-brachial index (KATHY) on the left  is normal (1.06).  The toe-brachial index (TBI) on the left is moderately  decreased (0.41).  The left toe pressure is 66 mmHg.  Pulse volume recording (PVR) waveforms appear normal at  the thigh, calf, and ankle segments. Waveforms are  moderately diminished in amplitude at the metarsal and and  digit segments.   There is no significant decrease in segmental pressures  between arterial segments.  ------------------------------------------------------------------------  Summary/Impressions:  1. Right KATHY is mildly decreased (0.85). Right TBI is  severely decreased (0.22), with a toe pressure of 35 mmHg.   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  2. Left KATHY is normal (1.06). Left TBI is moderately  decreased (0.41), witha toe pressure of 66 mmHg. Findings  suggest the presence of small vessel arterial disease in  the left foot.  ------------------------------------------------------------------------  Confirmed on  12/14/2022 - 4:14 PM by Nik Quesada MD,  [facility], [facility]  By signing this report, the attending physician certifies  that he or she has personally supervised and interpreted  the vascular study and has reviewed and or edited and  agrees with the written comments contained within the  report.    < end of copied text >      
  Follow Up:  venous stasis ulcers    Interval History/ROS:  feels ok  no fever, chills.      appreciate eval Dr. Quesada and Dr Ventura      Allergies  No Known Allergies        ANTIMICROBIALS:  ceFAZolin   IVPB 1000 every 8 hours      OTHER MEDS:  acetaminophen     Tablet .. 650 milliGRAM(s) Oral every 6 hours PRN  aluminum hydroxide/magnesium hydroxide/simethicone Suspension 30 milliLiter(s) Oral every 4 hours PRN  amLODIPine   Tablet 10 milliGRAM(s) Oral daily  busPIRone 10 milliGRAM(s) Oral two times a day  dextrose 5%. 1000 milliLiter(s) IV Continuous <Continuous>  dextrose 5%. 1000 milliLiter(s) IV Continuous <Continuous>  dextrose 50% Injectable 25 Gram(s) IV Push once  dextrose 50% Injectable 12.5 Gram(s) IV Push once  dextrose 50% Injectable 25 Gram(s) IV Push once  dextrose Oral Gel 15 Gram(s) Oral once PRN  gabapentin 800 milliGRAM(s) Oral three times a day  glucagon  Injectable 1 milliGRAM(s) IntraMuscular once  insulin lispro (ADMELOG) corrective regimen sliding scale   SubCutaneous three times a day before meals  insulin lispro (ADMELOG) corrective regimen sliding scale   SubCutaneous at bedtime  melatonin 3 milliGRAM(s) Oral at bedtime PRN  metoprolol tartrate 25 milliGRAM(s) Oral two times a day  ondansetron Injectable 4 milliGRAM(s) IV Push every 8 hours PRN  rivaroxaban 10 milliGRAM(s) Oral with dinner  tamsulosin 0.4 milliGRAM(s) Oral at bedtime      Vital Signs Last 24 Hrs  T(C): 36.3 (15 Dec 2022 16:21), Max: 37 (14 Dec 2022 21:34)  T(F): 97.3 (15 Dec 2022 16:21), Max: 98.6 (14 Dec 2022 21:34)  HR: 75 (15 Dec 2022 16:21) (72 - 89)  BP: 175/73 (15 Dec 2022 16:21) (150/78 - 175/73)  BP(mean): --  RR: 18 (15 Dec 2022 16:21) (17 - 18)  SpO2: 100% (15 Dec 2022 16:21) (97% - 100%)    Parameters below as of 15 Dec 2022 16:21  Patient On (Oxygen Delivery Method): room air        PHYSICAL EXAM:  Constitutional: Not in acute distress  Eyes: No icterus.  Oral cavity: Clear, no lesions  Neck: Supple  RS: Chest clear to auscultation bilaterally. No wheeze/rhonchi/crepitations.  CVS: S1, S2 heard. Regular rate and rhythm. No murmurs/rubs/gallops.  Abdomen: Soft. No guarding/rigidity/tenderness.  Extremities ace dressing both legs  Neuro: Alert, oriented to time/place/person  Cranial nerves 2-12 grossly normal. No focal abnormalities                          14.1   5.05  )-----------( 122      ( 15 Dec 2022 06:35 )             43.5       12-15    140  |  102  |  17  ----------------------------<  137<H>  3.9   |  27  |  0.91    Ca    9.1      15 Dec 2022 06:35  Phos  2.6     12-15  Mg     2.10     12-15    TPro  6.7  /  Alb  3.8  /  TBili  0.8  /  DBili  x   /  AST  14  /  ALT  9   /  AlkPhos  65  12-14          MICROBIOLOGY:        RADIOLOGY:    ra< from: Xray Tibia + Fibula 2 Views, Bilateral (12.13.22 @ 13:12) >  IMPRESSION:  Generalized soft tissue swelling. No tracking soft tissue gas   collections, radiopaque foreign bodies, or gross radiographic evidence   for osteomyelitis.    No fractures, dislocations, or osseous or joint deformities.    Preserved knee, ankle, and visualized midfoot and hindfoot joint spaces   and no joint margin erosions.    Generalized osteopenia otherwise no discrete lytic or blastic lesions.    --- End of Report ---            LACHO CAM MD; Attending Radiologist  This document has been electronically signed. Dec 13 2022  5:37PM    < end of copied text >    
  Follow Up:  venous stasis ulcers    Interval History/ROS:  feels ok  no pain in legs now   no fever, chills   dressings removed      Allergies  No Known Allergies        ANTIMICROBIALS: ceFAZolin   IVPB 1000 every 8 hours        OTHER MEDS:  acetaminophen     Tablet .. 650 milliGRAM(s) Oral every 6 hours PRN  aluminum hydroxide/magnesium hydroxide/simethicone Suspension 30 milliLiter(s) Oral every 4 hours PRN  amLODIPine   Tablet 10 milliGRAM(s) Oral daily  busPIRone 10 milliGRAM(s) Oral two times a day  dextrose 5%. 1000 milliLiter(s) IV Continuous <Continuous>  dextrose 5%. 1000 milliLiter(s) IV Continuous <Continuous>  dextrose 50% Injectable 25 Gram(s) IV Push once  dextrose 50% Injectable 12.5 Gram(s) IV Push once  dextrose 50% Injectable 25 Gram(s) IV Push once  dextrose Oral Gel 15 Gram(s) Oral once PRN  gabapentin 800 milliGRAM(s) Oral three times a day  glucagon  Injectable 1 milliGRAM(s) IntraMuscular once  insulin lispro (ADMELOG) corrective regimen sliding scale   SubCutaneous three times a day before meals  insulin lispro (ADMELOG) corrective regimen sliding scale   SubCutaneous at bedtime  melatonin 3 milliGRAM(s) Oral at bedtime PRN  metoprolol tartrate 25 milliGRAM(s) Oral two times a day  ondansetron Injectable 4 milliGRAM(s) IV Push every 8 hours PRN  rivaroxaban 10 milliGRAM(s) Oral with dinner  tamsulosin 0.4 milliGRAM(s) Oral at bedtime    Vital Signs Last 24 Hrs  T(F): 97.3 (12-16-22 @ 10:07), Max: 97.9 (12-15-22 @ 21:59)  HR: 71 (12-16-22 @ 10:07)  BP: 148/81 (12-16-22 @ 10:07)  RR: 19 (12-16-22 @ 10:07)  SpO2: 100% (12-16-22 @ 10:07) (100% - 100%)        PHYSICAL EXAM:  Constitutional: Not in acute distress  Eyes: No icterus.  Oral cavity: Clear, no lesions  Neck: Supple  RS: Chest clear to auscultation bilaterally. No wheeze/rhonchi/crepitations.  CVS: S1, S2 heard. Regular rate and rhythm. No murmurs/rubs/gallops.  Abdomen: Soft. No guarding/rigidity/tenderness.  Extremities decreased swelling both lower legs  decrease in area of yellow crusted plaque right medial and left medial legs  no increased warmth.  Neuro: Alert, oriented to time/place/person  Cranial nerves 2-12 grossly normal. No focal abnormalities                          13.1   4.86  )-----------( 116      ( 16 Dec 2022 05:40 )             40.6 12-16    138  |  102  |  21  ----------------------------<  137  3.6   |  27  |  0.85  Ca    8.7      16 Dec 2022 05:40Phos  2.7     12-16Mg     2.00     12-16            MICROBIOLOGY:        RADIOLOGY:    ra< from: Xray Tibia + Fibula 2 Views, Bilateral (12.13.22 @ 13:12) >  IMPRESSION:  Generalized soft tissue swelling. No tracking soft tissue gas   collections, radiopaque foreign bodies, or gross radiographic evidence   for osteomyelitis.    No fractures, dislocations, or osseous or joint deformities.    Preserved knee, ankle, and visualized midfoot and hindfoot joint spaces   and no joint margin erosions.    Generalized osteopenia otherwise no discrete lytic or blastic lesions.    --- End of Report ---            LACOH CAM MD; Attending Radiologist  This document has been electronically signed. Dec 13 2022  5:37PM    < end of copied text >    
    SUBJECTIVE / OVERNIGHT EVENTS:pt seen and examined  12-15-22     MEDICATIONS  (STANDING):  amLODIPine   Tablet 10 milliGRAM(s) Oral daily  busPIRone 10 milliGRAM(s) Oral two times a day  ceFAZolin   IVPB 1000 milliGRAM(s) IV Intermittent every 8 hours  dextrose 5%. 1000 milliLiter(s) (100 mL/Hr) IV Continuous <Continuous>  dextrose 5%. 1000 milliLiter(s) (50 mL/Hr) IV Continuous <Continuous>  dextrose 50% Injectable 25 Gram(s) IV Push once  dextrose 50% Injectable 12.5 Gram(s) IV Push once  dextrose 50% Injectable 25 Gram(s) IV Push once  gabapentin 800 milliGRAM(s) Oral three times a day  glucagon  Injectable 1 milliGRAM(s) IntraMuscular once  insulin lispro (ADMELOG) corrective regimen sliding scale   SubCutaneous three times a day before meals  insulin lispro (ADMELOG) corrective regimen sliding scale   SubCutaneous at bedtime  metoprolol tartrate 25 milliGRAM(s) Oral two times a day  rivaroxaban 10 milliGRAM(s) Oral with dinner  tamsulosin 0.4 milliGRAM(s) Oral at bedtime    MEDICATIONS  (PRN):  acetaminophen     Tablet .. 650 milliGRAM(s) Oral every 6 hours PRN Temp greater or equal to 38C (100.4F), Mild Pain (1 - 3)  aluminum hydroxide/magnesium hydroxide/simethicone Suspension 30 milliLiter(s) Oral every 4 hours PRN Dyspepsia  dextrose Oral Gel 15 Gram(s) Oral once PRN Blood Glucose LESS THAN 70 milliGRAM(s)/deciliter  melatonin 3 milliGRAM(s) Oral at bedtime PRN Insomnia  ondansetron Injectable 4 milliGRAM(s) IV Push every 8 hours PRN Nausea and/or Vomiting    Vital Signs Last 24 Hrs  T(C): 36.3 (12-15-22 @ 16:21), Max: 37 (12-14-22 @ 21:34)  T(F): 97.3 (12-15-22 @ 16:21), Max: 98.6 (12-14-22 @ 21:34)  HR: 75 (12-15-22 @ 16:21) (72 - 89)  BP: 175/73 (12-15-22 @ 16:21) (150/78 - 175/73)  BP(mean): --  RR: 18 (12-15-22 @ 16:21) (17 - 18)  SpO2: 100% (12-15-22 @ 16:21) (97% - 100%)            Constitutional: No fever, fatigue  Skin: No rash.  Eyes: No recent vision problems or eye pain.  ENT: No congestion, ear pain, or sore throat.  Cardiovascular: No chest pain or palpation.  Respiratory: No cough, shortness of breath, congestion, or wheezing.  Gastrointestinal: No abdominal pain, nausea, vomiting, or diarrhea.  Genitourinary: No dysuria.  Musculoskeletal: No joint swelling.  Neurologic: No headache.    PHYSICAL EXAM:  GENERAL: NAD  EYES: EOMI, PERRLA  NECK: Supple, No JVD  CHEST/LUNG: crackles at bases   HEART:  S1 , S2 +  ABDOMEN: soft , bs+  EXTREMITIES:  edema+  NEUROLOGY:alert awake       LABS:  12-15    140  |  102  |  17  ----------------------------<  137<H>  3.9   |  27  |  0.91    Ca    9.1      15 Dec 2022 06:35  Phos  2.6     12-15  Mg     2.10     12-15    TPro  6.7  /  Alb  3.8  /  TBili  0.8  /  DBili      /  AST  14  /  ALT  9   /  AlkPhos  65  12-14    Creatinine Trend: 0.91 <--, 0.85 <--, 0.82 <--                        14.1   5.05  )-----------( 122      ( 15 Dec 2022 06:35 )             43.5     Urine Studies:            LIVER FUNCTIONS - ( 14 Dec 2022 07:20 )  Alb: 3.8 g/dL / Pro: 6.7 g/dL / ALK PHOS: 65 U/L / ALT: 9 U/L / AST: 14 U/L / GGT: x                   RADIOLOGY & ADDITIONAL TESTS:    Imaging Personally Reviewed:yes    Consultant(s) Notes Reviewed:  yes    Care Discussed with Consultants/Other Providers:yes  
    SUBJECTIVE / OVERNIGHT EVENTS:pt seen and examined  12-14-22     MEDICATIONS  (STANDING):  amLODIPine   Tablet 5 milliGRAM(s) Oral daily  busPIRone 10 milliGRAM(s) Oral two times a day  ceFAZolin   IVPB 1000 milliGRAM(s) IV Intermittent every 8 hours  dextrose 5%. 1000 milliLiter(s) (100 mL/Hr) IV Continuous <Continuous>  dextrose 5%. 1000 milliLiter(s) (50 mL/Hr) IV Continuous <Continuous>  dextrose 50% Injectable 25 Gram(s) IV Push once  dextrose 50% Injectable 12.5 Gram(s) IV Push once  dextrose 50% Injectable 25 Gram(s) IV Push once  gabapentin 800 milliGRAM(s) Oral three times a day  glucagon  Injectable 1 milliGRAM(s) IntraMuscular once  insulin lispro (ADMELOG) corrective regimen sliding scale   SubCutaneous three times a day before meals  insulin lispro (ADMELOG) corrective regimen sliding scale   SubCutaneous at bedtime  metoprolol tartrate 25 milliGRAM(s) Oral two times a day  rivaroxaban 10 milliGRAM(s) Oral with dinner  tamsulosin 0.4 milliGRAM(s) Oral at bedtime    MEDICATIONS  (PRN):  acetaminophen     Tablet .. 650 milliGRAM(s) Oral every 6 hours PRN Temp greater or equal to 38C (100.4F), Mild Pain (1 - 3)  aluminum hydroxide/magnesium hydroxide/simethicone Suspension 30 milliLiter(s) Oral every 4 hours PRN Dyspepsia  dextrose Oral Gel 15 Gram(s) Oral once PRN Blood Glucose LESS THAN 70 milliGRAM(s)/deciliter  melatonin 3 milliGRAM(s) Oral at bedtime PRN Insomnia  ondansetron Injectable 4 milliGRAM(s) IV Push every 8 hours PRN Nausea and/or Vomiting    T(C): 36.7 (12-14-22 @ 17:29), Max: 36.7 (12-14-22 @ 04:17)  HR: 89 (12-14-22 @ 17:29) (73 - 89)  BP: 163/74 (12-14-22 @ 17:29) (150/68 - 168/83)  RR: 18 (12-14-22 @ 17:29) (17 - 18)  SpO2: 97% (12-14-22 @ 17:29) (97% - 100%)    CAPILLARY BLOOD GLUCOSE      POCT Blood Glucose.: 130 mg/dL (14 Dec 2022 16:44)  POCT Blood Glucose.: 134 mg/dL (14 Dec 2022 11:48)  POCT Blood Glucose.: 136 mg/dL (14 Dec 2022 07:19)  POCT Blood Glucose.: 148 mg/dL (13 Dec 2022 21:26)    I&O's Summary    13 Dec 2022 07:01  -  14 Dec 2022 07:00  --------------------------------------------------------  IN: 240 mL / OUT: 0 mL / NET: 240 mL        Constitutional: No fever, fatigue  Skin: No rash.  Eyes: No recent vision problems or eye pain.  ENT: No congestion, ear pain, or sore throat.  Cardiovascular: No chest pain or palpation.  Respiratory: No cough, shortness of breath, congestion, or wheezing.  Gastrointestinal: No abdominal pain, nausea, vomiting, or diarrhea.  Genitourinary: No dysuria.  Musculoskeletal: No joint swelling.  Neurologic: No headache.    PHYSICAL EXAM:  GENERAL: NAD  EYES: EOMI, PERRLA  NECK: Supple, No JVD  CHEST/LUNG: crackles at bases   HEART:  S1 , S2 +  ABDOMEN: soft , bs+  EXTREMITIES:  edema+  NEUROLOGY:alert awake       LABS:                        12.9   4.84  )-----------( 120      ( 14 Dec 2022 07:20 )             40.0     12-14    140  |  105  |  16  ----------------------------<  140<H>  4.2   |  27  |  0.85    Ca    8.8      14 Dec 2022 07:20    TPro  6.7  /  Alb  3.8  /  TBili  0.8  /  DBili  x   /  AST  14  /  ALT  9   /  AlkPhos  65  12-14              RADIOLOGY & ADDITIONAL TESTS:    Imaging Personally Reviewed:    Consultant(s) Notes Reviewed:      Care Discussed with Consultants/Other Providers:

## 2022-12-16 NOTE — PHYSICAL THERAPY INITIAL EVALUATION ADULT - GENERAL OBSERVATIONS, REHAB EVAL
Chart Reviewed - Vaccines Updated - [facility] - CHART SCANNED 2/21/20  Fit Kit Updated     Pt received in bed all lines intact NAD all precautions observed RN made aware, call bell left within reach, PT will continue to follow.

## 2022-12-16 NOTE — PHYSICAL THERAPY INITIAL EVALUATION ADULT - PERTINENT HX OF CURRENT PROBLEM, REHAB EVAL
83 yo man with diabetes, chronic venous insufficiency, h/o lung cancer, h/o DVT, thrombocytopenia, h/o recurrent right leg cellulitis now on suppressive oral antibiotic (keflex 500 mg po BID) presents to the ED with worsening LE swelling and redness. Patient has hx of recurrent cellulitis but for the last 2 weeks his legs are becoming more warm and red. He continued taking keflex but no improvement. He spoke to his ID doctor Dr. Pike and she advised him to come to the ED if it worse.

## 2022-12-16 NOTE — DISCHARGE NOTE NURSING/CASE MANAGEMENT/SOCIAL WORK - NSDCPEFALRISK_GEN_ALL_CORE
For information on Fall & Injury Prevention, visit: [email or web address] OR  [email or web address] OR  https://www.cdc.gov/steadi/patient.html

## 2022-12-16 NOTE — PHYSICAL THERAPY INITIAL EVALUATION ADULT - ADDITIONAL COMMENTS
Pt uses a cane at baseline lives with wife in a home 4 steps to negotiate and staircase indoors.  Independent with ADLS.

## 2022-12-16 NOTE — PROGRESS NOTE ADULT - ASSESSMENT
81 yo man with diabetes, chronic venous insufficiency, h/o lung cancer, h/o DVT, thrombocytopenia, h/o recurrent right leg cellulitis now on suppressive oral antibiotic (keflex 500 mg po BID) presents to the ED with worsening LE swelling and redness. c/w cellulitis. 
83 yo man with diabetes, h/o lung cancer, h/o DVT, thrombocytopenia, chronic venous insufficiency with h/o hospitalizations for recurrent cellulitis (2017, 2018, 2019 x 8) on chronic suppressive antibiotic with keflex   during last exam in office 7/2022 legs with venous stasis changes but no open wounds  wounds began weeping in late November- increased keflex to 4 x /day for a week  on admission yellow crusted plaques on left > right lower leg.  afebrile and normal WBC    Appreciated evaluations Dr. Quesada and Dr. Ventura      Venous stasis ulcers favored over cellulitis (bilateral, afebrile, normal WBC)- improved on exam today  decrease in swelling and yellow crust    planning wound care at home     diabetes    Suggest:    c/w Cefazolin 1 gm iv q 8 h while in hospital  can switch to keflex 500 mg po q 6 h on d/c --> 12/21 then suppression again with keflex 500 mg po q 12 h x 28 days          
83 yo man with diabetes, h/o lung cancer, h/o DVT, thrombocytopenia, chronic venous insufficiency with h/o hospitalizations for recurrent cellulitis (2017, 2018, 2019 x 8) on chronic suppressive antibiotic with keflex   during last exam in office 7/2022 legs with venous stasis changes but no open wounds  wounds began weeping in late November- increased keflex to 4 x /day for a week  on admission yellow crusted plaques on left > right lower leg.  afebrile and normal WBC    Appreciated evaluations Dr. Quesada and Dr. Ventura      Venous stasis ulcers favored over cellulitis (bilateral, afebrile, normal WBC)  diabetes    Suggest:    c/w Cefazolin 1 gm iv q 8 h  anticipate short duration then switch back to keflex po.        
Patient known to me from the office; last seen 7/20/22    Patient is an 81 yo man with DM2, chronic venous insufficiency, h/o lung cancer, h/o DVT, thrombocytopenia, h/o recurrent right leg cellulitis now on suppressive oral antibiotic (keflex 500 mg po BID) presents to the ED with worsening LE swelling and redness. Patient has hx of recurrent cellulitis but for the last 2 weeks his legs are becoming more erythematous.    On the day of presentation, he noted that his ulcers began to weep.  Concerned that he had an underlying infection, he presented to the ED for further evaluation.    Denies having constitutional symptoms.  In the ED, VS noted elevated BPs, labs notable for mild anemia, thrombocytopenia, and hyperglycemia.   Xray b/l LE showed generalized soft tissue swelling.   LE venous duplex US -- no obvious DVT.     KATHY/PVR study:  Summary/Impressions:  1. Right KATHY is mildly decreased (0.85). Right TBI is  severely decreased (0.22), with a toe pressure of 35 mmHg.   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  2. Left KATHY is normal (1.06). Left TBI is moderately  decreased (0.41), witha toe pressure of 66 mmHg. Findings  suggest the presence of small vessel arterial disease in  the left foot.    Impression:  1. Chronic venous insufficiency with bilateral medial venous ulcers  2. History of prior LE DVT  3. Cellulitis  4. Mild RLE atherosclerotic PAD per KATHY, small vessel arterial disease in both feet.  This is not caused by arterial disease.  5. HTN    Recommendations:  1. Receiving IV cefazolin for presumed underlying cellulitis.  2. Primary concern is venous ulceration.  Only manner by which these will heal is through compression. The patient was not adhering to prior recommendations for graduated compression therapy   until it was too late.  Reinforced need to use compression therapy even now via ace wraps to allow for the healing of venous ulcers.  3. Continue low-dose extended therapy with rivaroxaban for history of VTE.  4. BPs may need better control. Currently on regimen of amlodipine and metoprolol. Can advance dose of amlodipine to 10 mg if SBPs remain > 130 mmhg.  
81 yo man with diabetes, chronic venous insufficiency, h/o lung cancer, h/o DVT, thrombocytopenia, h/o recurrent right leg cellulitis now on suppressive oral antibiotic (keflex 500 mg po BID) presents to the ED with worsening LE swelling and redness. c/w cellulitis.

## 2022-12-16 NOTE — DISCHARGE NOTE NURSING/CASE MANAGEMENT/SOCIAL WORK - PATIENT PORTAL LINK FT
You can access the FollowMyHealth Patient Portal offered by [facility] by registering at the following website: [email or web address]. By joining [employer]’s FollowMyHealth portal, you will also be able to view your health information using other applications (apps) compatible with our system.

## 2023-01-04 ENCOUNTER — APPOINTMENT (OUTPATIENT)
Dept: WOUND CARE | Facility: CLINIC | Age: 83
End: 2023-01-04
Payer: MEDICARE

## 2023-01-04 VITALS
DIASTOLIC BLOOD PRESSURE: 76 MMHG | HEIGHT: 74 IN | TEMPERATURE: 98 F | WEIGHT: 282 LBS | BODY MASS INDEX: 36.19 KG/M2 | SYSTOLIC BLOOD PRESSURE: 160 MMHG | HEART RATE: 65 BPM

## 2023-01-04 DIAGNOSIS — Z85.118 PERSONAL HISTORY OF OTHER MALIGNANT NEOPLASM OF BRONCHUS AND LUNG: ICD-10-CM

## 2023-01-04 DIAGNOSIS — Z87.891 PERSONAL HISTORY OF NICOTINE DEPENDENCE: ICD-10-CM

## 2023-01-04 DIAGNOSIS — Z86.39 PERSONAL HISTORY OF OTHER ENDOCRINE, NUTRITIONAL AND METABOLIC DISEASE: ICD-10-CM

## 2023-01-04 DIAGNOSIS — Z86.79 PERSONAL HISTORY OF OTHER DISEASES OF THE CIRCULATORY SYSTEM: ICD-10-CM

## 2023-01-04 PROCEDURE — 99215 OFFICE O/P EST HI 40 MIN: CPT

## 2023-01-04 NOTE — REASON FOR VISIT
[Consultation] : a consultation visit [Spouse] : spouse [FreeTextEntry1] : B/L LE venous stasis dermatitis with blistering/ulcerations

## 2023-01-04 NOTE — REVIEW OF SYSTEMS
[Lower Ext Edema] : lower extremity edema [Skin Lesions] : skin lesion [Negative] : Heme/Lymph [As Noted in HPI] : as noted in HPI

## 2023-01-04 NOTE — ASSESSMENT
[FreeTextEntry1] : 1/4/23\par Patient presenting for initial evaluation and treatment for B/L LE venous stasis disease and ulceration. H/O of DM, lung CA (s/p resection), DVT (R popliteal), venous insufficiency, and multiple hospitalizations for LE edema and cellulitis. He sees ID outpatient (Dr. Pike) and is on maintenance Keflex. He was recently hospitalized in December 2022 with B/L LE ulcerations and cellulitis. \par B/L LE VENOUS STASIS DERMATITIS WITH BLISTERING/ULCERATION\par - There is peripheral hyperpigmentation, hyperkeratotic changes and hyperemia to suggest underlying venous stasis disease of the B/L LEs. Prominent venous varicosities on ankles and feet B/L. \par - Scattered blistering of L medial and R lateral malleolar regions, as well as lateral R knee. \par - No local or systemic signs/symptoms of infection. No purulent discharge, no blanching erythema, no malodor, no crepitus or bullae formation. \par -s/p mechanical debridement\par - There are no elements of the history or findings on exam to suggest any limb threatening arterial insufficiency, either acute or chronic.  \par - On chronic PO abx (Keflex) per ID (Dr. Pike)

## 2023-01-04 NOTE — DATA REVIEWED
[FreeTextEntry1] : Patient name: CALLIE MACIAS\[facility] Date of test: 12/14/2022\par MR#: [id number] #: [id number]    Location: [facility] Ref Physician(s): , Nik Quesada MD, PhD\[facility] Interpreted by: Nik Quesada MD, [facility], [facility], VI\[facility] Tech: Iván Peña, T\par Type of Test: LE Arterial: KATHY/Segm.\par ------------------------------------------------------------------------\par Procedure: Segmental Pressure/Waveform - complete\par noninvasive physiologic studies of the bilateral lower\par extremity arteries.\par Indications: Atheroembolism of bilateral lower extremities\par (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

## 2023-01-04 NOTE — PLAN
[FreeTextEntry1] : 1/4/23\par - Moisturizer applied. Xtrasorb applied to blistered areas. Nora and ACE wraps B/L. \par - KATHY/PVRs reviewed, no critical limb ischemia to preclude gentle LE compression\par - Venous reflux studies ordered\par - The patient was counseled to wear LE compression at all times while standing or walking, and to remove this compression while legs were elevated (as during sleep overnight). The patient was also counseled to elevated the LEs above the level of the heart when the compression is removed.\par - Nursing orders written\par - B/L LEs measured, Circaid compression garment prescription written\par - Follow up in the Wound Care clinic in 3 weeks.

## 2023-01-04 NOTE — HISTORY OF PRESENT ILLNESS
[FreeTextEntry1] : Patient presenting for initial evaluation and treatment for B/L LE venous stasis disease and ulceration. H/O of DM, lung CA (s/p resection), DVT (R popliteal), venous insufficiency, and multiple hospitalizations for LE edema and cellulitis. He sees ID outpatient (Dr. Pike) and is on maintenance Keflex. He was recently hospitalized in December 2022 with B/L LE ulcerations and cellulitis. \par He sees a vascular cardiologist (Dr. Quesada). He was discharged with VNS, coming 3x per week, dressing his LEs. He was referred to the Wound Care Clinic for further care. He denies any LE soreness with ambulation or while at rest with legs elevated. \par \par

## 2023-01-04 NOTE — PHYSICAL EXAM
[2+] : left 2+ [Ankle Swelling (On Exam)] : present [Ankle Swelling Bilaterally] : bilaterally  [Varicose Veins Of Lower Extremities] : bilaterally [] : bilaterally [Ankle Swelling On The Left] : moderate [Alert] : alert [Oriented to Person] : oriented to person [Oriented to Place] : oriented to place [Oriented to Time] : oriented to time [Calm] : calm [Please See PDF for Tissue Analytics] : Please See PDF for [employer]. [de-identified] : NAD, well-appearing [de-identified] : WNL [de-identified] : Supple [de-identified] : No increased work of breathing or use of accessory muscles. No wheezing or stridor.  [FreeTextEntry1] : Extremities are warm, capillary refill delayed on R pedal digits (~3 secs) [de-identified] : Soft, non-distended. Obese.  [de-identified] : Limited ROM, otherwise WNL [de-identified] : No gross deficits, intact B/L

## 2023-01-12 ENCOUNTER — APPOINTMENT (OUTPATIENT)
Dept: INFECTIOUS DISEASE | Facility: CLINIC | Age: 83
End: 2023-01-12
Payer: MEDICARE

## 2023-01-12 VITALS
HEART RATE: 67 BPM | HEIGHT: 74 IN | WEIGHT: 282 LBS | OXYGEN SATURATION: 96 % | TEMPERATURE: 97.8 F | SYSTOLIC BLOOD PRESSURE: 165 MMHG | BODY MASS INDEX: 36.19 KG/M2 | DIASTOLIC BLOOD PRESSURE: 92 MMHG

## 2023-01-12 PROCEDURE — 99213 OFFICE O/P EST LOW 20 MIN: CPT

## 2023-01-18 ENCOUNTER — APPOINTMENT (OUTPATIENT)
Dept: CARDIOLOGY | Facility: CLINIC | Age: 83
End: 2023-01-18
Payer: MEDICARE

## 2023-01-18 ENCOUNTER — NON-APPOINTMENT (OUTPATIENT)
Age: 83
End: 2023-01-18

## 2023-01-18 VITALS
OXYGEN SATURATION: 94 % | WEIGHT: 282 LBS | SYSTOLIC BLOOD PRESSURE: 152 MMHG | BODY MASS INDEX: 36.19 KG/M2 | DIASTOLIC BLOOD PRESSURE: 76 MMHG | HEART RATE: 59 BPM | HEIGHT: 74 IN

## 2023-01-18 DIAGNOSIS — L97.311 VARICOSE VEINS OF RIGHT LOWER EXTREMITY WITH ULCER OF ANKLE: ICD-10-CM

## 2023-01-18 DIAGNOSIS — L03.119 CELLULITIS OF UNSPECIFIED PART OF LIMB: ICD-10-CM

## 2023-01-18 DIAGNOSIS — I83.013 VARICOSE VEINS OF RIGHT LOWER EXTREMITY WITH ULCER OF ANKLE: ICD-10-CM

## 2023-01-18 PROCEDURE — 93000 ELECTROCARDIOGRAM COMPLETE: CPT

## 2023-01-18 PROCEDURE — 99214 OFFICE O/P EST MOD 30 MIN: CPT | Mod: 25

## 2023-01-19 NOTE — REASON FOR VISIT
[Post Hospitalization] : a post hospitalization visit [Spouse] : spouse [FreeTextEntry1] : venous ulcers legs, chronic antibiotic suppression

## 2023-01-19 NOTE — PHYSICAL EXAM
[General Appearance - Alert] : alert [General Appearance - In No Acute Distress] : in no acute distress [General Appearance - Well Nourished] : well nourished [Sclera] : the sclera and conjunctiva were normal [] : the neck was supple [Auscultation Breath Sounds / Voice Sounds] : lungs were clear to auscultation bilaterally [Heart Sounds] : normal S1 and S2 [Abdomen Soft] : soft [FreeTextEntry1] : hyperpigmentation legs bilat [Motor Exam] : the motor exam was normal [Oriented To Time, Place, And Person] : oriented to person, place, and time [Affect] : the affect was normal

## 2023-01-19 NOTE — ASSESSMENT
[FreeTextEntry1] : 81 yo man with h/o lung cancer, chronic DVT right leg, venous stasis bilateral, recurrent right leg cellulitis, hospitalized x 7 for right leg cellulitis in 2017; hospitalized once in 2018 and once 1/2019 followed by  chronic suppression with keflex 500 mg po BID.  Followed several times per year for monitoring antibiotic - legs were w/o swelling and or skin ulcers w/o signs of infection. In late Nov 2022 legs became swollen and ulcers developed.\par no fever, chills.  He was admitted to [facility] mid December with leg swelling, weeping areas of ulceration both LE- did not have fever or elevated WBC.  Suspect venous ulcers with possible component of infection. \par Today legs much improved- wound care appreciated.\par \par \par  Will continue suppressive keflex 500 mg po q 12 \par   Lab work from hospital reviewed low platelets chronic\par   Return 4- 6 m- would consider stopping keflex soon \par

## 2023-01-19 NOTE — HISTORY OF PRESENT ILLNESS
[FreeTextEntry1] : seen in [facility] mid December for open ulcers both lower leg \par he was treated for cellulitis with cefazolin and transitioned to keflex when discharge\par wound care nurse visiting at home 3x/ week.\par has since followed with wound care; ulcers have closed.\par planning compression socks\par back on suppressive dose of keflex\par no fever, chills\par feels well 
show

## 2023-01-23 ENCOUNTER — APPOINTMENT (OUTPATIENT)
Dept: CV DIAGNOSITCS | Facility: HOSPITAL | Age: 83
End: 2023-01-23

## 2023-01-23 ENCOUNTER — OUTPATIENT (OUTPATIENT)
Dept: OUTPATIENT SERVICES | Facility: HOSPITAL | Age: 83
LOS: 1 days | End: 2023-01-23

## 2023-01-23 DIAGNOSIS — I83.023 VARICOSE VEINS OF LEFT LOWER EXTREMITY WITH ULCER OF ANKLE: ICD-10-CM

## 2023-01-23 DIAGNOSIS — I87.2 VENOUS INSUFFICIENCY (CHRONIC) (PERIPHERAL): ICD-10-CM

## 2023-01-23 DIAGNOSIS — Z98.89 OTHER SPECIFIED POSTPROCEDURAL STATES: Chronic | ICD-10-CM

## 2023-01-23 DIAGNOSIS — R91.8 OTHER NONSPECIFIC ABNORMAL FINDING OF LUNG FIELD: Chronic | ICD-10-CM

## 2023-02-01 ENCOUNTER — APPOINTMENT (OUTPATIENT)
Dept: VASCULAR SURGERY | Facility: CLINIC | Age: 83
End: 2023-02-01

## 2023-03-21 ENCOUNTER — OUTPATIENT (OUTPATIENT)
Dept: OUTPATIENT SERVICES | Facility: HOSPITAL | Age: 83
LOS: 1 days | End: 2023-03-21
Payer: COMMERCIAL

## 2023-03-21 DIAGNOSIS — Z98.89 OTHER SPECIFIED POSTPROCEDURAL STATES: Chronic | ICD-10-CM

## 2023-03-21 DIAGNOSIS — C80.1 MALIGNANT (PRIMARY) NEOPLASM, UNSPECIFIED: ICD-10-CM

## 2023-03-21 DIAGNOSIS — C34.90 MALIGNANT NEOPLASM OF UNSPECIFIED PART OF UNSPECIFIED BRONCHUS OR LUNG: ICD-10-CM

## 2023-03-21 DIAGNOSIS — R91.8 OTHER NONSPECIFIC ABNORMAL FINDING OF LUNG FIELD: Chronic | ICD-10-CM

## 2023-03-21 DIAGNOSIS — Z00.8 ENCOUNTER FOR OTHER GENERAL EXAMINATION: ICD-10-CM

## 2023-03-21 PROCEDURE — 71250 CT THORAX DX C-: CPT

## 2023-03-28 ENCOUNTER — APPOINTMENT (OUTPATIENT)
Dept: THORACIC SURGERY | Facility: CLINIC | Age: 83
End: 2023-03-28
Payer: MEDICARE

## 2023-04-04 ENCOUNTER — APPOINTMENT (OUTPATIENT)
Dept: THORACIC SURGERY | Facility: CLINIC | Age: 83
End: 2023-04-04
Payer: MEDICARE

## 2023-04-04 VITALS
WEIGHT: 280 LBS | SYSTOLIC BLOOD PRESSURE: 157 MMHG | BODY MASS INDEX: 35.94 KG/M2 | HEART RATE: 61 BPM | HEIGHT: 74 IN | DIASTOLIC BLOOD PRESSURE: 81 MMHG | RESPIRATION RATE: 16 BRPM | OXYGEN SATURATION: 93 %

## 2023-04-04 PROCEDURE — 99214 OFFICE O/P EST MOD 30 MIN: CPT

## 2023-04-04 NOTE — PHYSICAL EXAM
[] : no respiratory distress [Respiration, Rhythm And Depth] : normal respiratory rhythm and effort [Exaggerated Use Of Accessory Muscles For Inspiration] : no accessory muscle use [Auscultation Breath Sounds / Voice Sounds] : lungs were clear to auscultation bilaterally [Heart Rate And Rhythm] : heart rate was normal and rhythm regular [Examination Of The Chest] : the chest was normal in appearance [Chest Visual Inspection Thoracic Asymmetry] : no chest asymmetry [Diminished Respiratory Excursion] : normal chest expansion [2+] : left 2+ [Cervical Lymph Nodes Enlarged Posterior Bilaterally] : posterior cervical [Cervical Lymph Nodes Enlarged Anterior Bilaterally] : anterior cervical [Supraclavicular Lymph Nodes Enlarged Bilaterally] : supraclavicular [Skin Color & Pigmentation] : normal skin color and pigmentation [Involuntary Movements] : no involuntary movements were seen [FreeTextEntry1] : Ambulates with cane

## 2023-04-04 NOTE — HISTORY OF PRESENT ILLNESS
[FreeTextEntry1] : 82 year old male with a history of several lung cancers and DVT in his Right LE (on Eliquis). He is s/p FB, uniportal Right VATS, wedge resection of RUL X 1, RUL anatomic video-assisted thoracic surgery Lobectomy, MLND on 1/21/15 for a [id number] poorly differentiated squamous cell carcinoma. Then s/p uniportal Left VATS, LLL superior segmentectomy on 2/23/15 for a T2aN0 small cell carcinoma. He was treated with adjuvant chemotherapy. He did not receive RT to the chest or brain. \par \par His medical history includes HTN, history of atrial fibrillation, BPH, DVT - Right LE and recurrent cellulitis of LE. He has chronic LE ulcers in his bilateral LE and bilateral LE edema.\par \par CT chest scan 9/16/2020: 0.3 cm solid nodule in the right middle lobe as well as a 0.5 cm solid nodule in the left lower lobe are noted. They are unchanged in their size and appearance when compared to previous exam. A 0.6 cm peripherally situated nodule in the right lower lobe is also unchanged when compared to previous exam. No pleural effusions are noted. \par \par CT Chest on 3/22/2021:\par - Stable, no evidence of new or recurrent disease. Compared to CT Chest 9/16/2020. \par \par CT Chest on 3/22/22:\par - stable exam; s/p RULobectomy and LLL wedge rxn\par - TWYLA\par \par CT Chest on 3/22/23: \par - s/p RULobectomy and LLL wedge resection\par - Stable trace left posterior pleural effusion/thickening\par - Stable mild basilar reticulation\par - Stable pulmonary nodules including 6 mm LLL (series 2 image 90) \par - RML nodules 2-3 mm; (images 30, 38 and 61) since at least 2014\par - Calcified granulomas are present\par - Cholelithiasis.\par \par Patient is followed today for 1 year follow up. Overall, feeling well. Reports a hospitalization for bilateral lower extremity cellulitis. Feeling better now; Follows with Vascular. Today, patient denies worsening SOB, chest pain, cough, hemoptysis, fever, chills, night sweats, lightheadedness or dizziness.\par \par \par

## 2023-04-04 NOTE — ASSESSMENT
[FreeTextEntry1] : 82 year old male with a history of several lung cancers and DVT in his Right LE (on Eliquis). He is s/p FB, uniportal Right VATS, wedge resection of RUL X 1, RUL anatomic video-assisted thoracic surgery Lobectomy, MLND on 1/21/15 for a [id number] poorly differentiated squamous cell carcinoma. Then s/p uniportal Left VATS, LLL superior segmentectomy on 2/23/15 for a T2aN0 small cell carcinoma. He was treated with adjuvant chemotherapy. He did not receive RT to the chest or brain. \par \par His medical history includes HTN, history of atrial fibrillation, BPH, DVT - Right LE and recurrent cellulitis of LE. He has chronic LE ulcers in his bilateral LE and bilateral LE edema.\par \par I have reviewed the patient's medical records and diagnostic images at time of this office consultation and have made the following recommendation:\par 1. Patient overall feeling well. Discussed returning to clinic in 12 months with repeat CT Chest, no contrast, to re-evaluate stability. \par 2. Patient hypertensive in office. Follow up with PCP or Card for further management.\par \par Recommendations reviewed with patient during this office visit, and all questions answered; Patient instructed on the importance of follow up and verbalizes understanding.\par \par I, JOVAN Foreman, personally performed the evaluation and management (E/M) services for this established patient. That E/M includes conducting the examination, assessing all new/exacerbated conditions, and establishing a new plan of care. Today, My ACP, Scarlet Salmeron, was here to observe my evaluation and management services for this patient to be followed going forward.\par \par \par \par \par \par \par \par

## 2023-04-04 NOTE — CONSULT LETTER
[FreeTextEntry2] : Juancho Marvin MD (PCP)\par Manda Pike MD (ID)\par Nik Quesada MD (Cardiac) \par Tyler Barry (HemeOnc) [FreeTextEntry3] : Thierry Pineda MD, FACS \par Chief, Division of Thoracic Surgery \par Director, Minimally Invasive Thoracic Surgery \par Department of Cardiovascular and Thoracic Surgery \par [facility] \par , Cardiovascular and Thoracic Surgery\par

## 2023-04-06 ENCOUNTER — APPOINTMENT (OUTPATIENT)
Dept: INFECTIOUS DISEASE | Facility: CLINIC | Age: 83
End: 2023-04-06
Payer: MEDICARE

## 2023-04-06 VITALS
HEIGHT: 74 IN | WEIGHT: 280 LBS | HEART RATE: 67 BPM | DIASTOLIC BLOOD PRESSURE: 90 MMHG | SYSTOLIC BLOOD PRESSURE: 178 MMHG | BODY MASS INDEX: 35.94 KG/M2 | OXYGEN SATURATION: 96 % | TEMPERATURE: 97.6 F

## 2023-04-06 PROCEDURE — 99213 OFFICE O/P EST LOW 20 MIN: CPT

## 2023-04-13 NOTE — ED ADULT TRIAGE NOTE - HEART RATE (BEATS/MIN)
28 yo female with no sig PMH presents with 5 days of abd pain, RUQ, recently seen and treated for UTI returns with pain, fever. NO chest pain, SOB.        85

## 2023-06-29 ENCOUNTER — APPOINTMENT (OUTPATIENT)
Dept: INFECTIOUS DISEASE | Facility: CLINIC | Age: 83
End: 2023-06-29
Payer: MEDICARE

## 2023-06-29 VITALS
WEIGHT: 299 LBS | TEMPERATURE: 97.8 F | HEIGHT: 74 IN | BODY MASS INDEX: 38.37 KG/M2 | OXYGEN SATURATION: 94 % | SYSTOLIC BLOOD PRESSURE: 163 MMHG | HEART RATE: 68 BPM | DIASTOLIC BLOOD PRESSURE: 74 MMHG

## 2023-06-29 PROCEDURE — 99213 OFFICE O/P EST LOW 20 MIN: CPT

## 2023-06-29 NOTE — HISTORY OF PRESENT ILLNESS
[FreeTextEntry1] : Feels well.\par continues on keflex suppression  \par wounds on legs have closed.\par wife changes ace. \par jammed left big toe.\par brought lab work from primary [facility] 2/15.\par

## 2023-06-29 NOTE — PHYSICAL EXAM
[General Appearance - Alert] : alert [General Appearance - In No Acute Distress] : in no acute distress [General Appearance - Well Nourished] : well nourished [General Appearance - Well-Appearing] : healthy appearing [Sclera] : the sclera and conjunctiva were normal [Oropharynx] : the oropharynx was normal with no thrush [Respiration, Rhythm And Depth] : normal respiratory rhythm and effort [Auscultation Breath Sounds / Voice Sounds] : lungs were clear to auscultation bilaterally [Heart Sounds] : normal S1 and S2 [Abdomen Soft] : soft [Oriented To Time, Place, And Person] : oriented to person, place, and time [Affect] : the affect was normal [FreeTextEntry1] : neuropathy feet bilat

## 2023-06-29 NOTE — REVIEW OF SYSTEMS
[Fever] : no fever [Chills] : no chills [Body Aches] : no body aches [Feeling Sick] : not feeling sick [Joint Pain] : no joint pain [As Noted in HPI] : as noted in HPI [Limb Weakness] : limb weakness [Negative] : Gastrointestinal

## 2023-06-29 NOTE — REASON FOR VISIT
[Follow-Up: _____] : a [unfilled] follow-up visit [Spouse] : spouse [FreeTextEntry1] : recurrent cellulitis legs, suppressive antibiotics

## 2023-06-29 NOTE — ASSESSMENT
[FreeTextEntry1] : 84 yo man with h/o lung cancer, chronic DVT right leg, venous stasis bilateral, recurrent right leg cellulitis, hospitalized x 7 for right leg cellulitis in 2017; hospitalized once in 2018 and once 1/2019 followed by  chronic suppression with keflex 500 mg po BID.  Followed several times per year for monitoring antibiotic - legs were w/o swelling and or skin ulcers w/o signs of infection until late Nov 2022 when legs became swollen and ulcers developed.   He was admitted to [facility] mid December 2022 with leg swelling, weeping areas of ulceration both LE- did not have fever or elevated WBC.  Suspected venous stasis ulcers with possible component of infection. \par He completed course of cefazolin IV 12/13- 12/16 then switched to keflex q 6 h and now back on q 12 h suppressive dose. Legs are much improved.  No open lesion. No signs of infection. \par Chronic thrombocytopenia - lab work from primary May 2023  plts clumped.  WBC normal, creat normal .\par \par  Will continue suppressive keflex 500 mg po q 12 \par   Return 4- 6 m- \par

## 2023-06-29 NOTE — ASSESSMENT
[FreeTextEntry1] : 81 yo man with h/o lung cancer, chronic DVT right leg, venous stasis bilateral, recurrent right leg cellulitis, hospitalized x 7 for right leg cellulitis in 2017; hospitalized once in 2018 and once 1/2019 followed by  chronic suppression with keflex 500 mg po BID.  Followed several times per year for monitoring antibiotic - legs were w/o swelling and or skin ulcers w/o signs of infection until late Nov 2022 when legs became swollen and ulcers developed.   He was admitted to [facility] mid December 2022 with leg swelling, weeping areas of ulceration both LE- did not have fever or elevated WBC.  Suspected venous stasis ulcers with possible component of infection. \par He completed course of cefazolin IV 12/13- 12/16 then switched to keflex q 6 h and now back on q 12 h suppressive dose. Legs are much improved.  No open lesion. No signs of infection. \par Chronic thrombocytopenia - lab work from primary 2/15/22 plts 83K\par \par  Will continue suppressive keflex 500 mg po q 12 \par   Return 4- 6 m- \par

## 2023-06-29 NOTE — REASON FOR VISIT
[Follow-Up: _____] : a [unfilled] follow-up visit [Spouse] : spouse [FreeTextEntry1] : recurrent leg cellulitis, suppressive antibiotics

## 2023-06-29 NOTE — PHYSICAL EXAM
[General Appearance - Alert] : alert [General Appearance - In No Acute Distress] : in no acute distress [General Appearance - Well Nourished] : well nourished [General Appearance - Well-Appearing] : healthy appearing [Sclera] : the sclera and conjunctiva were normal [Respiration, Rhythm And Depth] : normal respiratory rhythm and effort [Oropharynx] : the oropharynx was normal with no thrush [Auscultation Breath Sounds / Voice Sounds] : lungs were clear to auscultation bilaterally [Heart Rate And Rhythm] : heart rate was normal and rhythm regular [Heart Sounds] : normal S1 and S2 [Edema] : there was no peripheral edema [Abdomen Soft] : soft [FreeTextEntry1] : neuropathy  [Oriented To Time, Place, And Person] : oriented to person, place, and time [Affect] : the affect was normal

## 2023-06-29 NOTE — REVIEW OF SYSTEMS
[Limb Swelling] : limb swelling [As Noted in HPI] : as noted in HPI [Skin Lesions] : skin lesion [Negative] : Psychiatric [Fever] : no fever [Chills] : no chills [Body Aches] : no body aches

## 2023-07-19 ENCOUNTER — NON-APPOINTMENT (OUTPATIENT)
Age: 83
End: 2023-07-19

## 2023-07-19 ENCOUNTER — APPOINTMENT (OUTPATIENT)
Dept: CARDIOLOGY | Facility: CLINIC | Age: 83
End: 2023-07-19
Payer: MEDICARE

## 2023-07-19 VITALS — HEART RATE: 65 BPM | DIASTOLIC BLOOD PRESSURE: 73 MMHG | SYSTOLIC BLOOD PRESSURE: 147 MMHG | OXYGEN SATURATION: 95 %

## 2023-07-19 VITALS — HEIGHT: 74 IN | BODY MASS INDEX: 38.89 KG/M2 | WEIGHT: 303 LBS

## 2023-07-19 PROCEDURE — 93000 ELECTROCARDIOGRAM COMPLETE: CPT

## 2023-07-19 PROCEDURE — 99214 OFFICE O/P EST MOD 30 MIN: CPT | Mod: 25

## 2023-08-22 NOTE — H&P ADULT - NSHPSOCIALHISTORY_GEN_ALL_CORE
ELECTROPHYSIOLOGY PROGRESS NOTE      CHIEF COMPLAINT  Permanent atrial fibrillation  Referring Physician: Leticia Deshpande MD    HISTORY OF PRESENT ILLNESS  Mrs. Thomason is a pleasant 84 year old female with hypertension who knows she was told she had atrial fibrillation while living in the [facility] in the late 90s. She never saw a cardiologist and then moved to the [place] in 2007. She required urgent appendectomy then and was also told by her surgeon that she had atrial fibrillation. Again, she did not see a cardiologist. She has complained of dyspnea on exertion for \"decades\" (no shortness of breath at rest or orthopnea) but thinks it may have acutely worsened several years ago. She underwent stress testing in 2018 at [facility] (records not yet available) and was told she had chronotropic incompetence and would benefit from a pacemaker. She reported that she was ready for this but then it was cancelled by her physician for safety concerns at the last moment. She established care with Dr. Betancourt in August 2022 and thereafter she had some testing and empiric treatments none of which answered why she was dyspneic nor did it improve. Echocardiography showed severe left atrial dilation and despite amiodarone loading, two attempts at cardioversion failed in a matter of seconds.  She was referred to me in February 2023 for evaluation and management of longstanding persistent, if not permanent, atrial fibrillation. Holter monitoring was ordered and confirmed 100% atrial fibrillation burden and rates were generally well controlled (ie. not slow) and so a pacemaker was not advised. She returns now and reports feeling the same. She states every activity makes her short of breath but then her children interject and say when she goes shopping \"she has no issues even if she is doing that for three hours.\"    MEDICATIONS  Current Outpatient Medications   Medication Sig Dispense Refill   • traMADol (ULTRAM)  50 MG tablet Take 1 tablet by mouth 2 times daily as needed for Pain. 60 tablet 1   • hydrALAZINE (APRESOLINE) 10 MG tablet Take 1 tablet by mouth in the morning and 1 tablet in the evening. 60 tablet 6   • spironolactone (ALDACTONE) 25 MG tablet Take 1 tablet by mouth daily. 30 tablet 6   • Calcium Carb-Cholecalciferol (Calcium 600+D) 600-10 MG-MCG Tab      • magnesium oxide (MAG-OX) 400 MG tablet Take 400 mg by mouth daily.     • Ascorbic Acid (vitamin C) 1000 MG tablet Take 1,000 mg by mouth every evening.     • coenzyme Q10 100 MG capsule Take 100 mg by mouth daily.     • hydroCHLOROthiazide (HYDRODIURIL) 12.5 MG tablet Take 12.5 mg by mouth every morning.     • cholecalciferol (VITAMIN D) 25 mcg (1,000 units) tablet Take by mouth daily.     • losartan (COZAAR) 100 MG tablet Take 100 mg by mouth every morning.     • rivaroxaban (XARELTO) 20 MG Tab Take 20 mg by mouth every evening.     • allopurinol (ZYLOPRIM) 100 MG tablet Take 100 mg by mouth every morning.       No current facility-administered medications for this visit.     The patient's current medications were reviewed.    ALLERGIES  ALLERGIES:  No Known Allergies     HISTORIES  Permanent atrial fibrillation s/p attempted DCCV 12/22 (amiodarone)  Hypertension  Endometrial cancer s/p SOL    Past Surgical History:   Procedure Laterality Date   • Appendectomy  2007   • Breast surgery      for hematoma   • Hysterectomy  2014   • Tubal ligation         FAMILY HISTORY  Family History   Problem Relation Age of Onset   • Diabetes Mother    • Diabetes Father      No premature CAD in either her parents and/or siblings    SOCIAL HISTORY  Social History     Tobacco Use   • Smoking status: Never   • Smokeless tobacco: Never   Vaping Use   • Vaping Use: never used   Substance Use Topics   • Alcohol use: Not Currently   • Drug use: Never        REVIEW OF SYSTEMS    Constitutional: Negative for fatigue.   HENT: Negative for congestion and ear discharge.    Eyes: Negative  for discharge.   Respiratory: +dyspnea on exertion  Gastrointestinal: Negative for abdominal distention, nausea or vomiting.   Endocrine: Negative for polyphagia.   Genitourinary: Negative for hematuria.   Musculoskeletal: +\"I always have cramps\"  Skin: Negative for color change.   Allergic/Immunologic: Negative for environmental allergies.   Neurological: Negative for seizures.   Hematological: Does not bruise/bleed easily.   Psychiatric/Behavioral: Negative for behavioral problems.     PHYSICAL EXAM  Visit Vitals  /68 (BP Location: RUE - Right upper extremity)   Pulse 79   Ht 5' 2\" (1.575 m)   Wt 68 kg (149 lb 14.6 oz)   BMI 27.42 kg/m²     Constitutional: Appears well-developed and well-nourished.   Head: Normocephalic.   Mouth/Throat: Mucous membranes are normal.   Eyes: Conjunctivae are normal. Pupils are equal, round, and reactive to light.   Neck: Normal range of motion. Neck supple. No JVD present. Carotid bruit is not present.   Cardiovascular: irregularly irregular nl S1S2, no m/r/g, no LE edema, no JVD  Pulmonary/Chest: Effort normal and breath sounds normal. No respiratory distress. No wheezes, rhonchi, rales, no tenderness.   Abdominal: Soft. Normal appearance and bowel sounds are normal. There is no tenderness.   Musculoskeletal: Normal range of motion. No joint swelling   Neurological: Grossly normal. Alert and oriented to person, place, and time.   Skin: Skin is warm, dry and intact. No rash noted. No erythema.   Psychiatric: Normal mood and affect. Behavior is normal.     CARDIAC TESTING/IMAGING  I have reviewed the pertinent imaging study reports. These are the pertinent findings:  ECG performed and personally reviewed today - 2/7/23: atrial fibrillation with slow ventricular response; 1/20/23: atrial fibrillation with slow ventricular response  TTE - 9/16/22: normal left ventricular size and function with an estimated ejection fraction of 55-60%. Severe, biatrial enlargement  Stress Test -  9/16/22: pharmacological nuclear stress test performed during which time the EF was reported to be 90%. No ischemia on myocardial perfusion imaging suspected  Holter - 2/21 - 2/28/23: AF burden is 100% with heart rates ranging from 36 to 98 beats per minute (average 60). PVCs occurred very rarely (<1%). Distrubution of heart rates show relatively normal heart rates during the daytime with the low heart rates noted during sleeping hours.      ASSESSMENT/PLAN  Permanent atrial fibrillation (CMD)  Mrs. Thomason has permanent atrial fibrillation. Records from 2018 showed atrial fibrillation and her history before and since then suggest no sinus activity. Her rates were relatively normal (average 60) during 7 day holter monitoring. I cannot see how a pacemaker would improve symptoms of dyspnea. She will remain on rivaroxaban and return to see me as needed.    Dyspnea on exertion  I do not sense that her symptoms are due to anything else cardiac. She has PFTs scheduled for next week.    Primary hypertension  Blood pressure is well controlled, no changes necessary.       There are no discontinued medications.    Plan of care discussed with the patient. All questions were answered. Patient verbalized understanding and agrees with the plan. A letter has been sent to the referring physician.      Lionel Estevez MD  Cardiac Electrophysiology  Director of Electrophysiology [facility]  [facility]                 Marital Status:     Occupation:  Retired     Tobacco Use: d/c smoking 13 years ago; used to smoke 2pks/day x 45 years    ETOH Use: neg    Flu Vaccine:     10/2016                             Pneumonia Vaccine:  2 years ago

## 2023-09-25 ENCOUNTER — NON-APPOINTMENT (OUTPATIENT)
Age: 83
End: 2023-09-25

## 2023-09-25 NOTE — PROGRESS NOTE ADULT - PROBLEM SELECTOR PLAN 10
Problem:  Fall Injury Risk  Goal: Absence of Fall, Infant Drop and Related Injury  Outcome: Ongoing, Progressing     Problem: Infection  Goal: Absence of Infection Signs and Symptoms  Outcome: Ongoing, Progressing     Problem: Adult Inpatient Plan of Care  Goal: Plan of Care Review  Outcome: Ongoing, Progressing  Goal: Patient-Specific Goal (Individualized)  Outcome: Ongoing, Progressing  Goal: Absence of Hospital-Acquired Illness or Injury  Outcome: Ongoing, Progressing  Goal: Optimal Comfort and Wellbeing  Outcome: Ongoing, Progressing  Goal: Readiness for Transition of Care  Outcome: Ongoing, Progressing     Problem: Adjustment to Role Transition (Postpartum Vaginal Delivery)  Goal: Successful Maternal Role Transition  Outcome: Ongoing, Progressing     Problem: Bleeding (Postpartum Vaginal Delivery)  Goal: Hemostasis  Outcome: Ongoing, Progressing     Problem: Infection (Postpartum Vaginal Delivery)  Goal: Absence of Infection Signs/Symptoms  Outcome: Ongoing, Progressing     Problem: Pain (Postpartum Vaginal Delivery)  Goal: Acceptable Pain Control  Outcome: Ongoing, Progressing     Problem: Urinary Retention (Postpartum Vaginal Delivery)  Goal: Effective Urinary Elimination  Outcome: Ongoing, Progressing      Therapeutically AC with Eliquis  Medium ISS for hyperglycemia correction  DASH Diet

## 2023-09-29 NOTE — PROGRESS NOTE ADULT - PROBLEM/PLAN-6
HR=55 bpm, RXXM=912/68 mmhg, SpO2=99.0 %, Resp=16 B/min, EtCO2=35 mmHg, Apnea=3 Seconds, Pain=0, Sahu=3 DISPLAY PLAN FREE TEXT

## 2023-10-17 NOTE — ED PROVIDER NOTE - PLAN OF CARE
YOU WILL BE FEELING SORE FOR THE NEXT 3-4 DAYS, PLEASE TAKE PERCOCET AS NEEDED FOR THE PAIN EVERY 6-8HRS - BE CAREFUL IT WILL MAKE YOU DROWSY.  SEE YOUR DOCTOR WITHIN 48HRS. RETURN TO ER FOR WORSENING PAIN, SEVERE HEADACHE, DIZZINESS, DIFFICULTY WALKING, PERSISTENT VOMITING. Spironolactone Pregnancy And Lactation Text: This medication can cause feminization of the male fetus and should be avoided during pregnancy. The active metabolite is also found in breast milk.

## 2023-10-24 ENCOUNTER — APPOINTMENT (OUTPATIENT)
Dept: INFECTIOUS DISEASE | Facility: CLINIC | Age: 83
End: 2023-10-24

## 2023-11-21 ENCOUNTER — APPOINTMENT (OUTPATIENT)
Dept: INFECTIOUS DISEASE | Facility: CLINIC | Age: 83
End: 2023-11-21
Payer: MEDICARE

## 2023-11-21 VITALS
HEIGHT: 74 IN | SYSTOLIC BLOOD PRESSURE: 160 MMHG | TEMPERATURE: 97.8 F | DIASTOLIC BLOOD PRESSURE: 75 MMHG | HEART RATE: 71 BPM | OXYGEN SATURATION: 95 %

## 2023-11-21 PROCEDURE — 99213 OFFICE O/P EST LOW 20 MIN: CPT

## 2024-01-24 ENCOUNTER — NON-APPOINTMENT (OUTPATIENT)
Age: 84
End: 2024-01-24

## 2024-01-24 ENCOUNTER — APPOINTMENT (OUTPATIENT)
Dept: CARDIOLOGY | Facility: CLINIC | Age: 84
End: 2024-01-24
Payer: MEDICARE

## 2024-01-24 VITALS
WEIGHT: 306 LBS | DIASTOLIC BLOOD PRESSURE: 70 MMHG | HEART RATE: 65 BPM | RESPIRATION RATE: 16 BRPM | SYSTOLIC BLOOD PRESSURE: 170 MMHG | TEMPERATURE: 97.6 F | HEIGHT: 74 IN | BODY MASS INDEX: 39.27 KG/M2 | OXYGEN SATURATION: 96 %

## 2024-01-24 DIAGNOSIS — L97.321 VARICOSE VEINS OF LEFT LOWER EXTREMITY WITH ULCER OF ANKLE: ICD-10-CM

## 2024-01-24 DIAGNOSIS — I83.023 VARICOSE VEINS OF LEFT LOWER EXTREMITY WITH ULCER OF ANKLE: ICD-10-CM

## 2024-01-24 DIAGNOSIS — R60.0 LOCALIZED EDEMA: ICD-10-CM

## 2024-01-24 DIAGNOSIS — I82.409 ACUTE EMBOLISM AND THROMBOSIS OF UNSPECIFIED DEEP VEINS OF UNSPECIFIED LOWER EXTREMITY: ICD-10-CM

## 2024-01-24 PROCEDURE — 99214 OFFICE O/P EST MOD 30 MIN: CPT | Mod: 25

## 2024-01-24 PROCEDURE — 93000 ELECTROCARDIOGRAM COMPLETE: CPT

## 2024-01-24 RX ORDER — RIVAROXABAN 10 MG/1
10 TABLET, FILM COATED ORAL
Qty: 90 | Refills: 3 | Status: ACTIVE | COMMUNITY
Start: 2021-12-22 | End: 1900-01-01

## 2024-03-26 ENCOUNTER — APPOINTMENT (OUTPATIENT)
Dept: CT IMAGING | Facility: IMAGING CENTER | Age: 84
End: 2024-03-26
Payer: MEDICARE

## 2024-03-26 ENCOUNTER — OUTPATIENT (OUTPATIENT)
Dept: OUTPATIENT SERVICES | Facility: HOSPITAL | Age: 84
LOS: 1 days | End: 2024-03-26
Payer: COMMERCIAL

## 2024-03-26 DIAGNOSIS — Z98.89 OTHER SPECIFIED POSTPROCEDURAL STATES: Chronic | ICD-10-CM

## 2024-03-26 DIAGNOSIS — Z00.8 ENCOUNTER FOR OTHER GENERAL EXAMINATION: ICD-10-CM

## 2024-03-26 DIAGNOSIS — R91.8 OTHER NONSPECIFIC ABNORMAL FINDING OF LUNG FIELD: Chronic | ICD-10-CM

## 2024-03-26 PROCEDURE — 71250 CT THORAX DX C-: CPT | Mod: 26

## 2024-03-26 PROCEDURE — 71250 CT THORAX DX C-: CPT

## 2024-03-29 PROBLEM — C80.1 SMALL CELL CARCINOMA: Status: ACTIVE | Noted: 2022-03-29

## 2024-04-02 ENCOUNTER — APPOINTMENT (OUTPATIENT)
Dept: THORACIC SURGERY | Facility: CLINIC | Age: 84
End: 2024-04-02
Payer: MEDICARE

## 2024-04-02 VITALS
SYSTOLIC BLOOD PRESSURE: 159 MMHG | HEIGHT: 74 IN | RESPIRATION RATE: 16 BRPM | OXYGEN SATURATION: 95 % | WEIGHT: 298 LBS | BODY MASS INDEX: 38.24 KG/M2 | DIASTOLIC BLOOD PRESSURE: 62 MMHG | HEART RATE: 68 BPM

## 2024-04-02 DIAGNOSIS — C34.90 MALIGNANT NEOPLASM OF UNSPECIFIED PART OF UNSPECIFIED BRONCHUS OR LUNG: ICD-10-CM

## 2024-04-02 DIAGNOSIS — C80.1 MALIGNANT (PRIMARY) NEOPLASM, UNSPECIFIED: ICD-10-CM

## 2024-04-02 PROCEDURE — 99213 OFFICE O/P EST LOW 20 MIN: CPT

## 2024-04-02 RX ORDER — AMMONIUM LACTATE 12 %
12 CREAM (GRAM) TOPICAL
Refills: 0 | Status: ACTIVE | COMMUNITY

## 2024-04-02 NOTE — HISTORY OF PRESENT ILLNESS
[FreeTextEntry1] : 83 year old male with a history of several lung cancers and DVT in his Right LE (on Eliquis). He is s/p FB, uniportal Right VATS, wedge resection of RUL X 1, RUL anatomic video-assisted thoracic surgery Lobectomy, MLND on 1/21/15 for a [id number] poorly differentiated squamous cell carcinoma. Then s/p uniportal Left VATS, LLL superior segmentectomy on 2/23/15 for a T2aN0 small cell carcinoma. He was treated with adjuvant chemotherapy. He did not receive RT to the chest or brain.   His medical history includes HTN, history of atrial fibrillation, BPH, DVT - Right LE and recurrent cellulitis of LE. He has chronic LE ulcers in his bilateral LE and bilateral LE edema.  CT chest scan 9/16/2020: 0.3 cm solid nodule in the right middle lobe as well as a 0.5 cm solid nodule in the left lower lobe are noted. They are unchanged in their size and appearance when compared to previous exam. A 0.6 cm peripherally situated nodule in the right lower lobe is also unchanged when compared to previous exam. No pleural effusions are noted.   CT Chest on 3/22/2021: - Stable, no evidence of new or recurrent disease. Compared to CT Chest 9/16/2020.   CT Chest on 3/22/22: - stable exam; s/p RULobectomy and LLL wedge rxn - TWYLA  CT Chest on 3/22/23:  - s/p RULobectomy and LLL wedge resection - Stable trace left posterior pleural effusion/thickening - Stable mild basilar reticulation - Stable pulmonary nodules including 6 mm LLL (series 2 image 90)  - RML nodules 2-3 mm; (images 30, 38 and 61) since at least 2014 - Calcified granulomas are present - Cholelithiasis.  CT Chest on 3/26/24:  - Aorta is normal in course and caliber, with scattered calcified atheromas. There are atherosclerotic calcification of coronary vessels and aortic valve annulus.  - Post op changes and suture material in right mediastinum related to right upper lobectomy. - s/p LLL wedge resection.  - Redemonstration of subpleural reticulation and fibrotic changes in bilateral lungs, unchanged  - Stable 9 mm solid nodule in LLL (series 3, image 373.) - Few stable sub-5 mm nodules in RML, unchanged. - Multilevel degenerative changes of thoracic spine , with disk space narrowing and osteophytosis. There is rectus abdominis diastasis with herniation of mesenteric fat and portion of loops of bowel.  Patient is followed today for 1 year follow up. Overall, feeling well. Today, patient denies worsening SOB, chest pain, cough, hemoptysis, fever, chills, night sweats, lightheadedness or dizziness.

## 2024-04-02 NOTE — CONSULT LETTER
[Dear  ___] : Dear  [unfilled], [Please see my note below.] : Please see my note below. [Courtesy Letter:] : I had the pleasure of seeing your patient, [unfilled], in my office today. [Sincerely,] : Sincerely, [FreeTextEntry2] : Juancho Marvin MD (PCP)\par  Manda Pike MD (ID)\par  Nik Quesada MD (Cardiac) \par  Tyler Barry (HemeOnc) [FreeTextEntry3] : Thierry Pineda MD, FACS \par  Chief, Division of Thoracic Surgery \par  Director, Minimally Invasive Thoracic Surgery \par  Department of Cardiovascular and Thoracic Surgery \par  [facility] \par  , Cardiovascular and Thoracic Surgery\par

## 2024-04-02 NOTE — PHYSICAL EXAM
[] : no respiratory distress [Respiration, Rhythm And Depth] : normal respiratory rhythm and effort [Exaggerated Use Of Accessory Muscles For Inspiration] : no accessory muscle use [Auscultation Breath Sounds / Voice Sounds] : lungs were clear to auscultation bilaterally [Heart Rate And Rhythm] : heart rate was normal and rhythm regular [Examination Of The Chest] : the chest was normal in appearance [Chest Visual Inspection Thoracic Asymmetry] : no chest asymmetry [Diminished Respiratory Excursion] : normal chest expansion [Breast Palpation Mass] : no palpable masses [Breast Appearance] : normal in appearance [Cervical Lymph Nodes Enlarged Posterior Bilaterally] : posterior cervical [Cervical Lymph Nodes Enlarged Anterior Bilaterally] : anterior cervical [Supraclavicular Lymph Nodes Enlarged Bilaterally] : supraclavicular

## 2024-04-02 NOTE — ASSESSMENT
[FreeTextEntry1] : 83 year old male with a history of several lung cancers and DVT in his Right LE (on Eliquis). He is s/p FB, uniportal Right VATS, wedge resection of RUL X 1, RUL anatomic video-assisted thoracic surgery Lobectomy, MLND on 1/21/15 for a [id number] poorly differentiated squamous cell carcinoma. Then s/p uniportal Left VATS, LLL superior segmentectomy on 2/23/15 for a T2aN0 small cell carcinoma. He was treated with adjuvant chemotherapy. He did not receive RT to the chest or brain.   His medical history includes HTN, history of atrial fibrillation, BPH, DVT - Right LE and recurrent cellulitis of LE. He has chronic LE ulcers in his bilateral LE and bilateral LE edema.  I have reviewed the patient's medical records and diagnostic images at time of this office consultation and have made the following recommendation: - CT Chest reviewed; Stable lung nodules. Discussed returning to clinic in 12 months with repeat non contrast CT Chest to re-evaluate stability.   Recommendations reviewed with patient during this office visit, and all questions answered; Patient instructed on the importance of follow up and verbalizes understanding.  I, KOREY ForemanIM, personally performed the evaluation and management (E/M) services for this established patient. That E/M includes conducting the examination, assessing all new/exacerbated conditions, and establishing a new plan of care. Today, My ACP, Scarlet Salmeron, was here to observe my evaluation and management services for this patient to be followed going forward.

## 2024-04-08 ENCOUNTER — NON-APPOINTMENT (OUTPATIENT)
Age: 84
End: 2024-04-08

## 2024-04-09 ENCOUNTER — APPOINTMENT (OUTPATIENT)
Dept: INFECTIOUS DISEASE | Facility: CLINIC | Age: 84
End: 2024-04-09
Payer: MEDICARE

## 2024-04-09 VITALS
OXYGEN SATURATION: 94 % | SYSTOLIC BLOOD PRESSURE: 154 MMHG | BODY MASS INDEX: 38.5 KG/M2 | HEART RATE: 70 BPM | TEMPERATURE: 97.7 F | WEIGHT: 300 LBS | DIASTOLIC BLOOD PRESSURE: 86 MMHG | HEIGHT: 74 IN

## 2024-04-09 PROCEDURE — 99213 OFFICE O/P EST LOW 20 MIN: CPT

## 2024-04-09 RX ORDER — CEPHALEXIN 500 MG/1
500 CAPSULE ORAL
Qty: 180 | Refills: 1 | Status: ACTIVE | COMMUNITY
Start: 2017-10-03 | End: 1900-01-01

## 2024-04-18 NOTE — PROGRESS NOTE ADULT - PROBLEM SELECTOR PROBLEM 9
PATIENT WAS SEEN MON 4/15 FOR A SORE THROAT AND COUGH.  WAS STARTED ON ZPAK, BUT IS NOT GETTING ANY BETTER - GETTING WORSE.    ADVIL/TYLENOL DOESN'T HELP WITH HER ACHES.  \"I CANNOT REMEMBER A TIME WHERE I'VE BEEN THIS SICK\".    WONDERING IF THERE IS ANYTHING JACKELIN CAN SUGGEST/CALL IN.    [place], [place]   Diabetic neuropathy

## 2024-05-26 ENCOUNTER — RX RENEWAL (OUTPATIENT)
Age: 84
End: 2024-05-26

## 2024-05-26 RX ORDER — TAMSULOSIN HYDROCHLORIDE 0.4 MG/1
0.4 CAPSULE ORAL
Qty: 90 | Refills: 3 | Status: ACTIVE | COMMUNITY
Start: 2019-02-07 | End: 1900-01-01

## 2024-06-19 ENCOUNTER — APPOINTMENT (OUTPATIENT)
Dept: WOUND CARE | Facility: HOSPITAL | Age: 84
End: 2024-06-19
Payer: MEDICARE

## 2024-06-19 ENCOUNTER — OUTPATIENT (OUTPATIENT)
Dept: OUTPATIENT SERVICES | Facility: HOSPITAL | Age: 84
LOS: 1 days | End: 2024-06-19
Payer: COMMERCIAL

## 2024-06-19 VITALS
WEIGHT: 300 LBS | BODY MASS INDEX: 38.5 KG/M2 | TEMPERATURE: 97.7 F | HEART RATE: 76 BPM | HEIGHT: 74 IN | SYSTOLIC BLOOD PRESSURE: 164 MMHG | OXYGEN SATURATION: 93 % | DIASTOLIC BLOOD PRESSURE: 78 MMHG | RESPIRATION RATE: 16 BRPM

## 2024-06-19 DIAGNOSIS — Z98.89 OTHER SPECIFIED POSTPROCEDURAL STATES: Chronic | ICD-10-CM

## 2024-06-19 DIAGNOSIS — R91.8 OTHER NONSPECIFIC ABNORMAL FINDING OF LUNG FIELD: Chronic | ICD-10-CM

## 2024-06-19 PROCEDURE — 11042 DBRDMT SUBQ TIS 1ST 20SQCM/<: CPT

## 2024-06-19 NOTE — REVIEW OF SYSTEMS
[As Noted in HPI] : as noted in HPI [Lower Ext Edema] : lower extremity edema [Skin Lesions] : skin lesion [Negative] : Psychiatric

## 2024-06-19 NOTE — PLAN
[FreeTextEntry1] : 1/4/23 - Moisturizer applied. Xtrasorb applied to blistered areas. Nora and ACE wraps B/L.  - KATHY/PVRs reviewed, no critical limb ischemia to preclude gentle LE compression - Venous reflux studies ordered - The patient was counseled to wear LE compression at all times while standing or walking, and to remove this compression while legs were elevated (as during sleep overnight). The patient was also counseled to elevated the LEs above the level of the heart when the compression is removed. - Nursing orders written - B/L LEs measured, Circaid compression garment prescription written - Follow up in the Wound Care clinic in 3 weeks.   6-19-24: Plan: RLE wound: wash with soap and water adptic/xtrasorb/nora/ace applied today.  Pt to use circaid at home. Circaid off at night Will order vascular testing (KATHY/PVR, VLE, IVC scan)  supplies ordered  Moisturize intact skin. Do not put between toes.  The patient was counseled as to the signs and symptoms of infection, and instructed in the event they suspect new or worsening infection or if the patient is significantly ill (fever, altered mental status, etc.), to present to the nearest ED.  f/u 1 week

## 2024-06-19 NOTE — HISTORY OF PRESENT ILLNESS
[FreeTextEntry1] : Patient presenting for initial evaluation and treatment for B/L LE venous stasis disease and ulceration. H/O of DM, lung CA (s/p resection), DVT (R popliteal), venous insufficiency, and multiple hospitalizations for LE edema and cellulitis. He sees ID outpatient (Dr. Pike) and is on maintenance Keflex. He was recently hospitalized in December 2022 with B/L LE ulcerations and cellulitis.  He sees a vascular cardiologist (Dr. Quesada). He was discharged with VNS, coming 3x per week, dressing his LEs. He was referred to the Wound Care Clinic for further care. He denies any LE soreness with ambulation or while at rest with legs elevated.   6-19-24:  Mr. CALLIE MACIAS   presents to the office with a wound for 1.5 weeks duration.  The wound is located on  the RLE.  The patient has complaints of non healing and drainage   The patient has been dressing the wound with silvadene and DPD  The patient denies fevers or chills.  The patient has localized pain to the wound upon dressing changes.  The patient has no other complaints or associated symptoms.  HbA1c is 7.?.  Has circaids.  Wears them daily on BLE prior to the new wound Has been on daily Kelfex as per ID for at least 3 years uses cerave

## 2024-06-19 NOTE — ASSESSMENT
[FreeTextEntry1] : 1/4/23 Patient presenting for initial evaluation and treatment for B/L LE venous stasis disease and ulceration. H/O of DM, lung CA (s/p resection), DVT (R popliteal), venous insufficiency, and multiple hospitalizations for LE edema and cellulitis. He sees ID outpatient (Dr. Pike) and is on maintenance Keflex. He was recently hospitalized in December 2022 with B/L LE ulcerations and cellulitis.  B/L LE VENOUS STASIS DERMATITIS WITH BLISTERING/ULCERATION - There is peripheral hyperpigmentation, hyperkeratotic changes and hyperemia to suggest underlying venous stasis disease of the B/L LEs. Prominent venous varicosities on ankles and feet B/L.  - Scattered blistering of L medial and R lateral malleolar regions, as well as lateral R knee.  - No local or systemic signs/symptoms of infection. No purulent discharge, no blanching erythema, no malodor, no crepitus or bullae formation.  -s/p mechanical debridement - There are no elements of the history or findings on exam to suggest any limb threatening arterial insufficiency, either acute or chronic.   - On chronic PO abx (Keflex) per ID (Dr. Pike)  6-19-24: Pt here for f/u Accompanied by wife New RLE wound (started as a blister On exam: RLE wound: slough and dry skin.  s/p mechanical debridement of deflated bister. s/p excisional debridement of wound. No s/s of infection.  LLE:  small blood blister s/p mechanical debridement No s/s of infection. dry flaking skin

## 2024-06-19 NOTE — PHYSICAL EXAM
[Ankle Swelling (On Exam)] : present [Ankle Swelling Bilaterally] : bilaterally  [Varicose Veins Of Lower Extremities] : bilaterally [] : bilaterally [Ankle Swelling On The Left] : moderate [Alert] : alert [Oriented to Person] : oriented to person [Oriented to Place] : oriented to place [Oriented to Time] : oriented to time [Calm] : calm [Please See PDF for Tissue Analytics] : Please See PDF for Tissue Analytics. [0] : left 0 [de-identified] : NAD, well-appearing [de-identified] : Supple [de-identified] : WNL [de-identified] : No increased work of breathing or use of accessory muscles. No wheezing or stridor.  [FreeTextEntry1] : Extremities are warm,  [de-identified] : Soft, non-distended. Obese.  [de-identified] : Limited ROM, otherwise WNL [de-identified] : No gross deficits, intact B/L

## 2024-06-19 NOTE — DATA REVIEWED
[FreeTextEntry1] : Patient name: CALLIE MACIAS\[facility]  Date of test: 12/14/2022\par  MR#: [id number]  Hospital #: [id number]    Location: [facility]  Ref Physician(s): , Nik Quesada MD, PhD\[facility]  Interpreted by: Nik Quesada MD, 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

## 2024-06-28 ENCOUNTER — OUTPATIENT (OUTPATIENT)
Dept: OUTPATIENT SERVICES | Facility: HOSPITAL | Age: 84
LOS: 1 days | End: 2024-06-28
Payer: COMMERCIAL

## 2024-06-28 ENCOUNTER — APPOINTMENT (OUTPATIENT)
Dept: WOUND CARE | Facility: HOSPITAL | Age: 84
End: 2024-06-28
Payer: MEDICARE

## 2024-06-28 ENCOUNTER — INPATIENT (INPATIENT)
Facility: HOSPITAL | Age: 84
LOS: 4 days | Discharge: HOME HEALTH | End: 2024-07-03
Attending: INTERNAL MEDICINE | Admitting: INTERNAL MEDICINE
Payer: MEDICARE

## 2024-06-28 VITALS
RESPIRATION RATE: 16 BRPM | WEIGHT: 294.98 LBS | OXYGEN SATURATION: 94 % | HEIGHT: 74 IN | HEART RATE: 64 BPM | DIASTOLIC BLOOD PRESSURE: 76 MMHG | SYSTOLIC BLOOD PRESSURE: 144 MMHG

## 2024-06-28 VITALS
HEART RATE: 74 BPM | RESPIRATION RATE: 18 BRPM | DIASTOLIC BLOOD PRESSURE: 66 MMHG | BODY MASS INDEX: 37.86 KG/M2 | OXYGEN SATURATION: 93 % | WEIGHT: 295 LBS | TEMPERATURE: 97.9 F | SYSTOLIC BLOOD PRESSURE: 162 MMHG | HEIGHT: 74 IN

## 2024-06-28 DIAGNOSIS — I87.2 VENOUS INSUFFICIENCY (CHRONIC) (PERIPHERAL): ICD-10-CM

## 2024-06-28 DIAGNOSIS — S81.802A UNSPECIFIED OPEN WOUND, LEFT LOWER LEG, INITIAL ENCOUNTER: ICD-10-CM

## 2024-06-28 DIAGNOSIS — Z98.89 OTHER SPECIFIED POSTPROCEDURAL STATES: Chronic | ICD-10-CM

## 2024-06-28 DIAGNOSIS — S81.801A UNSPECIFIED OPEN WOUND, RIGHT LOWER LEG, INITIAL ENCOUNTER: ICD-10-CM

## 2024-06-28 DIAGNOSIS — L03.116 CELLULITIS OF LEFT LOWER LIMB: ICD-10-CM

## 2024-06-28 DIAGNOSIS — L03.90 CELLULITIS, UNSPECIFIED: ICD-10-CM

## 2024-06-28 DIAGNOSIS — R91.8 OTHER NONSPECIFIC ABNORMAL FINDING OF LUNG FIELD: Chronic | ICD-10-CM

## 2024-06-28 LAB
ALBUMIN SERPL ELPH-MCNC: 4.2 G/DL — SIGNIFICANT CHANGE UP (ref 3.3–5)
ALP SERPL-CCNC: 58 U/L — SIGNIFICANT CHANGE UP (ref 40–120)
ALT FLD-CCNC: 11 U/L — SIGNIFICANT CHANGE UP (ref 4–41)
ANION GAP SERPL CALC-SCNC: 12 MMOL/L — SIGNIFICANT CHANGE UP (ref 7–14)
AST SERPL-CCNC: 12 U/L — SIGNIFICANT CHANGE UP (ref 4–40)
BASOPHILS # BLD AUTO: 0.02 K/UL — SIGNIFICANT CHANGE UP (ref 0–0.2)
BASOPHILS NFR BLD AUTO: 0.4 % — SIGNIFICANT CHANGE UP (ref 0–2)
BILIRUB SERPL-MCNC: 0.5 MG/DL — SIGNIFICANT CHANGE UP (ref 0.2–1.2)
BUN SERPL-MCNC: 29 MG/DL — HIGH (ref 7–23)
CALCIUM SERPL-MCNC: 9.1 MG/DL — SIGNIFICANT CHANGE UP (ref 8.4–10.5)
CHLORIDE SERPL-SCNC: 106 MMOL/L — SIGNIFICANT CHANGE UP (ref 98–107)
CO2 SERPL-SCNC: 26 MMOL/L — SIGNIFICANT CHANGE UP (ref 22–31)
CREAT SERPL-MCNC: 0.97 MG/DL — SIGNIFICANT CHANGE UP (ref 0.5–1.3)
CRP SERPL-MCNC: 8.1 MG/L — HIGH
EGFR: 78 ML/MIN/1.73M2 — SIGNIFICANT CHANGE UP
EOSINOPHIL # BLD AUTO: 0.08 K/UL — SIGNIFICANT CHANGE UP (ref 0–0.5)
EOSINOPHIL NFR BLD AUTO: 1.6 % — SIGNIFICANT CHANGE UP (ref 0–6)
ERYTHROCYTE [SEDIMENTATION RATE] IN BLOOD: 24 MM/HR — HIGH (ref 1–15)
GLUCOSE BLDC GLUCOMTR-MCNC: 230 MG/DL — HIGH (ref 70–99)
GLUCOSE SERPL-MCNC: 142 MG/DL — HIGH (ref 70–99)
HCT VFR BLD CALC: 41.3 % — SIGNIFICANT CHANGE UP (ref 39–50)
HGB BLD-MCNC: 13.7 G/DL — SIGNIFICANT CHANGE UP (ref 13–17)
IANC: 3 K/UL — SIGNIFICANT CHANGE UP (ref 1.8–7.4)
IMM GRANULOCYTES NFR BLD AUTO: 0.6 % — SIGNIFICANT CHANGE UP (ref 0–0.9)
LYMPHOCYTES # BLD AUTO: 1.25 K/UL — SIGNIFICANT CHANGE UP (ref 1–3.3)
LYMPHOCYTES # BLD AUTO: 25.7 % — SIGNIFICANT CHANGE UP (ref 13–44)
MCHC RBC-ENTMCNC: 29.9 PG — SIGNIFICANT CHANGE UP (ref 27–34)
MCHC RBC-ENTMCNC: 33.2 GM/DL — SIGNIFICANT CHANGE UP (ref 32–36)
MCV RBC AUTO: 90.2 FL — SIGNIFICANT CHANGE UP (ref 80–100)
MONOCYTES # BLD AUTO: 0.48 K/UL — SIGNIFICANT CHANGE UP (ref 0–0.9)
MONOCYTES NFR BLD AUTO: 9.9 % — SIGNIFICANT CHANGE UP (ref 2–14)
NEUTROPHILS # BLD AUTO: 3 K/UL — SIGNIFICANT CHANGE UP (ref 1.8–7.4)
NEUTROPHILS NFR BLD AUTO: 61.8 % — SIGNIFICANT CHANGE UP (ref 43–77)
NRBC # BLD: 0 /100 WBCS — SIGNIFICANT CHANGE UP (ref 0–0)
NRBC # FLD: 0 K/UL — SIGNIFICANT CHANGE UP (ref 0–0)
PLATELET # BLD AUTO: 103 K/UL — LOW (ref 150–400)
POTASSIUM SERPL-MCNC: 4.8 MMOL/L — SIGNIFICANT CHANGE UP (ref 3.5–5.3)
POTASSIUM SERPL-SCNC: 4.8 MMOL/L — SIGNIFICANT CHANGE UP (ref 3.5–5.3)
PROT SERPL-MCNC: 6.9 G/DL — SIGNIFICANT CHANGE UP (ref 6–8.3)
RBC # BLD: 4.58 M/UL — SIGNIFICANT CHANGE UP (ref 4.2–5.8)
RBC # FLD: 14.2 % — SIGNIFICANT CHANGE UP (ref 10.3–14.5)
SODIUM SERPL-SCNC: 144 MMOL/L — SIGNIFICANT CHANGE UP (ref 135–145)
WBC # BLD: 4.86 K/UL — SIGNIFICANT CHANGE UP (ref 3.8–10.5)
WBC # FLD AUTO: 4.86 K/UL — SIGNIFICANT CHANGE UP (ref 3.8–10.5)

## 2024-06-28 PROCEDURE — 99223 1ST HOSP IP/OBS HIGH 75: CPT

## 2024-06-28 PROCEDURE — 11042 DBRDMT SUBQ TIS 1ST 20SQCM/<: CPT

## 2024-06-28 PROCEDURE — G0463: CPT

## 2024-06-28 PROCEDURE — 93922 UPR/L XTREMITY ART 2 LEVELS: CPT | Mod: 26

## 2024-06-28 PROCEDURE — 99285 EMERGENCY DEPT VISIT HI MDM: CPT

## 2024-06-28 PROCEDURE — 99213 OFFICE O/P EST LOW 20 MIN: CPT | Mod: 25

## 2024-06-28 PROCEDURE — 93970 EXTREMITY STUDY: CPT | Mod: 26

## 2024-06-28 RX ORDER — AMPICILLIN AND SULBACTAM 2; 1 G/20ML; G/20ML
3 INJECTION, POWDER, FOR SOLUTION INTRAMUSCULAR; INTRAVENOUS EVERY 6 HOURS
Refills: 0 | Status: DISCONTINUED | OUTPATIENT
Start: 2024-06-28 | End: 2024-07-03

## 2024-06-28 RX ADMIN — AMPICILLIN AND SULBACTAM 200 GRAM(S): 2; 1 INJECTION, POWDER, FOR SOLUTION INTRAMUSCULAR; INTRAVENOUS at 21:42

## 2024-06-29 DIAGNOSIS — Z86.718 PERSONAL HISTORY OF OTHER VENOUS THROMBOSIS AND EMBOLISM: ICD-10-CM

## 2024-06-29 DIAGNOSIS — L03.115 CELLULITIS OF RIGHT LOWER LIMB: ICD-10-CM

## 2024-06-29 DIAGNOSIS — D69.6 THROMBOCYTOPENIA, UNSPECIFIED: ICD-10-CM

## 2024-06-29 DIAGNOSIS — E11.9 TYPE 2 DIABETES MELLITUS WITHOUT COMPLICATIONS: ICD-10-CM

## 2024-06-29 DIAGNOSIS — R06.2 WHEEZING: ICD-10-CM

## 2024-06-29 DIAGNOSIS — G57.93 UNSPECIFIED MONONEUROPATHY OF BILATERAL LOWER LIMBS: ICD-10-CM

## 2024-06-29 DIAGNOSIS — I48.91 UNSPECIFIED ATRIAL FIBRILLATION: ICD-10-CM

## 2024-06-29 DIAGNOSIS — Z79.899 OTHER LONG TERM (CURRENT) DRUG THERAPY: ICD-10-CM

## 2024-06-29 DIAGNOSIS — I73.9 PERIPHERAL VASCULAR DISEASE, UNSPECIFIED: ICD-10-CM

## 2024-06-29 DIAGNOSIS — Z29.9 ENCOUNTER FOR PROPHYLACTIC MEASURES, UNSPECIFIED: ICD-10-CM

## 2024-06-29 LAB
ALBUMIN SERPL ELPH-MCNC: 4.2 G/DL — SIGNIFICANT CHANGE UP (ref 3.3–5)
ALP SERPL-CCNC: 54 U/L — SIGNIFICANT CHANGE UP (ref 40–120)
ALT FLD-CCNC: 13 U/L — SIGNIFICANT CHANGE UP (ref 4–41)
ANION GAP SERPL CALC-SCNC: 16 MMOL/L — HIGH (ref 7–14)
AST SERPL-CCNC: 16 U/L — SIGNIFICANT CHANGE UP (ref 4–40)
BASOPHILS # BLD AUTO: 0.02 K/UL — SIGNIFICANT CHANGE UP (ref 0–0.2)
BASOPHILS NFR BLD AUTO: 0.4 % — SIGNIFICANT CHANGE UP (ref 0–2)
BILIRUB SERPL-MCNC: 0.7 MG/DL — SIGNIFICANT CHANGE UP (ref 0.2–1.2)
BUN SERPL-MCNC: 21 MG/DL — SIGNIFICANT CHANGE UP (ref 7–23)
CALCIUM SERPL-MCNC: 9.2 MG/DL — SIGNIFICANT CHANGE UP (ref 8.4–10.5)
CHLORIDE SERPL-SCNC: 105 MMOL/L — SIGNIFICANT CHANGE UP (ref 98–107)
CO2 SERPL-SCNC: 20 MMOL/L — LOW (ref 22–31)
CREAT SERPL-MCNC: 0.82 MG/DL — SIGNIFICANT CHANGE UP (ref 0.5–1.3)
EGFR: 88 ML/MIN/1.73M2 — SIGNIFICANT CHANGE UP
EOSINOPHIL # BLD AUTO: 0.07 K/UL — SIGNIFICANT CHANGE UP (ref 0–0.5)
EOSINOPHIL NFR BLD AUTO: 1.4 % — SIGNIFICANT CHANGE UP (ref 0–6)
GLUCOSE BLDC GLUCOMTR-MCNC: 148 MG/DL — HIGH (ref 70–99)
GLUCOSE BLDC GLUCOMTR-MCNC: 165 MG/DL — HIGH (ref 70–99)
GLUCOSE BLDC GLUCOMTR-MCNC: 167 MG/DL — HIGH (ref 70–99)
GLUCOSE BLDC GLUCOMTR-MCNC: 230 MG/DL — HIGH (ref 70–99)
GLUCOSE SERPL-MCNC: 166 MG/DL — HIGH (ref 70–99)
HCT VFR BLD CALC: 42.9 % — SIGNIFICANT CHANGE UP (ref 39–50)
HGB BLD-MCNC: 14.2 G/DL — SIGNIFICANT CHANGE UP (ref 13–17)
IANC: 3.49 K/UL — SIGNIFICANT CHANGE UP (ref 1.8–7.4)
IMM GRANULOCYTES NFR BLD AUTO: 0.4 % — SIGNIFICANT CHANGE UP (ref 0–0.9)
LYMPHOCYTES # BLD AUTO: 0.95 K/UL — LOW (ref 1–3.3)
LYMPHOCYTES # BLD AUTO: 18.7 % — SIGNIFICANT CHANGE UP (ref 13–44)
MAGNESIUM SERPL-MCNC: 1.9 MG/DL — SIGNIFICANT CHANGE UP (ref 1.6–2.6)
MCHC RBC-ENTMCNC: 29.4 PG — SIGNIFICANT CHANGE UP (ref 27–34)
MCHC RBC-ENTMCNC: 33.1 GM/DL — SIGNIFICANT CHANGE UP (ref 32–36)
MCV RBC AUTO: 88.8 FL — SIGNIFICANT CHANGE UP (ref 80–100)
MONOCYTES # BLD AUTO: 0.54 K/UL — SIGNIFICANT CHANGE UP (ref 0–0.9)
MONOCYTES NFR BLD AUTO: 10.6 % — SIGNIFICANT CHANGE UP (ref 2–14)
NEUTROPHILS # BLD AUTO: 3.49 K/UL — SIGNIFICANT CHANGE UP (ref 1.8–7.4)
NEUTROPHILS NFR BLD AUTO: 68.5 % — SIGNIFICANT CHANGE UP (ref 43–77)
NRBC # BLD: 0 /100 WBCS — SIGNIFICANT CHANGE UP (ref 0–0)
NRBC # FLD: 0 K/UL — SIGNIFICANT CHANGE UP (ref 0–0)
PHOSPHATE SERPL-MCNC: 2.1 MG/DL — LOW (ref 2.5–4.5)
PLATELET # BLD AUTO: 106 K/UL — LOW (ref 150–400)
POTASSIUM SERPL-MCNC: 4.3 MMOL/L — SIGNIFICANT CHANGE UP (ref 3.5–5.3)
POTASSIUM SERPL-SCNC: 4.3 MMOL/L — SIGNIFICANT CHANGE UP (ref 3.5–5.3)
PROT SERPL-MCNC: 7.3 G/DL — SIGNIFICANT CHANGE UP (ref 6–8.3)
RBC # BLD: 4.83 M/UL — SIGNIFICANT CHANGE UP (ref 4.2–5.8)
RBC # FLD: 13.9 % — SIGNIFICANT CHANGE UP (ref 10.3–14.5)
SODIUM SERPL-SCNC: 141 MMOL/L — SIGNIFICANT CHANGE UP (ref 135–145)
WBC # BLD: 5.09 K/UL — SIGNIFICANT CHANGE UP (ref 3.8–10.5)
WBC # FLD AUTO: 5.09 K/UL — SIGNIFICANT CHANGE UP (ref 3.8–10.5)

## 2024-06-29 RX ORDER — DEXTROSE MONOHYDRATE AND SODIUM CHLORIDE 5; .3 G/100ML; G/100ML
1000 INJECTION, SOLUTION INTRAVENOUS
Refills: 0 | Status: DISCONTINUED | OUTPATIENT
Start: 2024-06-29 | End: 2024-07-03

## 2024-06-29 RX ORDER — ACETAMINOPHEN 325 MG
650 TABLET ORAL EVERY 6 HOURS
Refills: 0 | Status: DISCONTINUED | OUTPATIENT
Start: 2024-06-29 | End: 2024-07-03

## 2024-06-29 RX ORDER — RIVAROXABAN 15 MG-20MG
1 KIT ORAL
Refills: 0 | DISCHARGE

## 2024-06-29 RX ORDER — FENOFIBRATE 130 MG/1
1 CAPSULE ORAL
Refills: 0 | DISCHARGE

## 2024-06-29 RX ORDER — FUROSEMIDE 10 MG/ML
20 INJECTION, SOLUTION INTRAMUSCULAR; INTRAVENOUS DAILY
Refills: 0 | Status: DISCONTINUED | OUTPATIENT
Start: 2024-06-29 | End: 2024-07-03

## 2024-06-29 RX ORDER — INSULIN LISPRO 100 [IU]/ML
INJECTION, SOLUTION SUBCUTANEOUS
Refills: 0 | Status: DISCONTINUED | OUTPATIENT
Start: 2024-06-29 | End: 2024-07-03

## 2024-06-29 RX ORDER — FENOFIBRATE 130 MG/1
48 CAPSULE ORAL DAILY
Refills: 0 | Status: DISCONTINUED | OUTPATIENT
Start: 2024-06-29 | End: 2024-07-03

## 2024-06-29 RX ORDER — RIVAROXABAN 10 MG/1
10 TABLET, FILM COATED ORAL
Refills: 0 | Status: DISCONTINUED | OUTPATIENT
Start: 2024-06-29 | End: 2024-06-30

## 2024-06-29 RX ORDER — INSULIN LISPRO 100 [IU]/ML
INJECTION, SOLUTION SUBCUTANEOUS AT BEDTIME
Refills: 0 | Status: DISCONTINUED | OUTPATIENT
Start: 2024-06-29 | End: 2024-07-03

## 2024-06-29 RX ORDER — METFORMIN HYDROCHLORIDE 850 MG/1
1 TABLET, FILM COATED ORAL
Refills: 0 | DISCHARGE

## 2024-06-29 RX ORDER — TAMSULOSIN HYDROCHLORIDE 0.4 MG/1
1 CAPSULE ORAL
Qty: 0 | Refills: 0 | DISCHARGE

## 2024-06-29 RX ORDER — TAMSULOSIN HYDROCHLORIDE 0.4 MG/1
0.4 CAPSULE ORAL AT BEDTIME
Refills: 0 | Status: DISCONTINUED | OUTPATIENT
Start: 2024-06-29 | End: 2024-07-03

## 2024-06-29 RX ORDER — ONDANSETRON HYDROCHLORIDE 2 MG/ML
4 INJECTION INTRAMUSCULAR; INTRAVENOUS EVERY 8 HOURS
Refills: 0 | Status: DISCONTINUED | OUTPATIENT
Start: 2024-06-29 | End: 2024-07-03

## 2024-06-29 RX ORDER — DEXTROSE 30 % IN WATER 30 %
15 VIAL (ML) INTRAVENOUS ONCE
Refills: 0 | Status: DISCONTINUED | OUTPATIENT
Start: 2024-06-29 | End: 2024-07-03

## 2024-06-29 RX ORDER — CYANOCOBALAMIN (VITAMIN B-12) 1000 MCG
1000 TABLET, EXTENDED RELEASE ORAL
Refills: 0 | DISCHARGE

## 2024-06-29 RX ORDER — BUSPIRONE HYDROCHLORIDE 10 MG/1
10 TABLET ORAL
Refills: 0 | Status: DISCONTINUED | OUTPATIENT
Start: 2024-06-29 | End: 2024-07-03

## 2024-06-29 RX ORDER — MAGNESIUM, ALUMINUM HYDROXIDE 400-400
30 TABLET,CHEWABLE ORAL EVERY 4 HOURS
Refills: 0 | Status: DISCONTINUED | OUTPATIENT
Start: 2024-06-29 | End: 2024-07-03

## 2024-06-29 RX ORDER — DEXTROSE MONOHYDRATE 100 MG/ML
125 INJECTION, SOLUTION INTRAVENOUS ONCE
Refills: 0 | Status: DISCONTINUED | OUTPATIENT
Start: 2024-06-29 | End: 2024-07-03

## 2024-06-29 RX ORDER — DEXTROSE 30 % IN WATER 30 %
25 VIAL (ML) INTRAVENOUS ONCE
Refills: 0 | Status: DISCONTINUED | OUTPATIENT
Start: 2024-06-29 | End: 2024-07-03

## 2024-06-29 RX ORDER — BUSPIRONE HYDROCHLORIDE 10 MG/1
1 TABLET ORAL
Refills: 0 | DISCHARGE

## 2024-06-29 RX ORDER — METOPROLOL TARTRATE 50 MG
25 TABLET ORAL
Refills: 0 | Status: DISCONTINUED | OUTPATIENT
Start: 2024-06-29 | End: 2024-07-03

## 2024-06-29 RX ORDER — GABAPENTIN
800 POWDER (GRAM) MISCELLANEOUS THREE TIMES A DAY
Refills: 0 | Status: DISCONTINUED | OUTPATIENT
Start: 2024-06-29 | End: 2024-07-03

## 2024-06-29 RX ORDER — LOSARTAN POTASSIUM 100 MG/1
50 TABLET, FILM COATED ORAL DAILY
Refills: 0 | Status: DISCONTINUED | OUTPATIENT
Start: 2024-06-29 | End: 2024-07-03

## 2024-06-29 RX ORDER — GLUCAGON HYDROCHLORIDE 1 MG/ML
1 INJECTION, POWDER, FOR SOLUTION INTRAMUSCULAR; INTRAVENOUS; SUBCUTANEOUS ONCE
Refills: 0 | Status: DISCONTINUED | OUTPATIENT
Start: 2024-06-29 | End: 2024-07-03

## 2024-06-29 RX ORDER — FUROSEMIDE 10 MG/ML
1 INJECTION, SOLUTION INTRAMUSCULAR; INTRAVENOUS
Refills: 0 | DISCHARGE

## 2024-06-29 RX ORDER — DEXTROSE 30 % IN WATER 30 %
12.5 VIAL (ML) INTRAVENOUS ONCE
Refills: 0 | Status: DISCONTINUED | OUTPATIENT
Start: 2024-06-29 | End: 2024-07-03

## 2024-06-29 RX ORDER — GABAPENTIN
1 POWDER (GRAM) MISCELLANEOUS
Refills: 0 | DISCHARGE

## 2024-06-29 RX ADMIN — AMPICILLIN AND SULBACTAM 200 GRAM(S): 2; 1 INJECTION, POWDER, FOR SOLUTION INTRAMUSCULAR; INTRAVENOUS at 03:21

## 2024-06-29 RX ADMIN — RIVAROXABAN 10 MILLIGRAM(S): 10 TABLET, FILM COATED ORAL at 18:57

## 2024-06-29 RX ADMIN — AMPICILLIN AND SULBACTAM 200 GRAM(S): 2; 1 INJECTION, POWDER, FOR SOLUTION INTRAMUSCULAR; INTRAVENOUS at 15:10

## 2024-06-29 RX ADMIN — AMPICILLIN AND SULBACTAM 200 GRAM(S): 2; 1 INJECTION, POWDER, FOR SOLUTION INTRAMUSCULAR; INTRAVENOUS at 08:47

## 2024-06-29 RX ADMIN — AMPICILLIN AND SULBACTAM 200 GRAM(S): 2; 1 INJECTION, POWDER, FOR SOLUTION INTRAMUSCULAR; INTRAVENOUS at 21:23

## 2024-06-29 RX ADMIN — Medication 800 MILLIGRAM(S): at 15:10

## 2024-06-29 RX ADMIN — Medication 25 MILLIGRAM(S): at 18:58

## 2024-06-29 RX ADMIN — TAMSULOSIN HYDROCHLORIDE 0.4 MILLIGRAM(S): 0.4 CAPSULE ORAL at 21:22

## 2024-06-29 RX ADMIN — INSULIN LISPRO 1: 100 INJECTION, SOLUTION SUBCUTANEOUS at 17:55

## 2024-06-29 RX ADMIN — INSULIN LISPRO 2: 100 INJECTION, SOLUTION SUBCUTANEOUS at 12:22

## 2024-06-29 RX ADMIN — INSULIN LISPRO 1: 100 INJECTION, SOLUTION SUBCUTANEOUS at 08:41

## 2024-06-29 RX ADMIN — FENOFIBRATE 48 MILLIGRAM(S): 130 CAPSULE ORAL at 17:56

## 2024-06-29 RX ADMIN — Medication 800 MILLIGRAM(S): at 21:23

## 2024-06-29 RX ADMIN — BUSPIRONE HYDROCHLORIDE 10 MILLIGRAM(S): 10 TABLET ORAL at 18:57

## 2024-06-30 LAB
A1C WITH ESTIMATED AVERAGE GLUCOSE RESULT: 7.2 % — HIGH (ref 4–5.6)
ALBUMIN SERPL ELPH-MCNC: 4 G/DL — SIGNIFICANT CHANGE UP (ref 3.3–5)
ALP SERPL-CCNC: 51 U/L — SIGNIFICANT CHANGE UP (ref 40–120)
ALT FLD-CCNC: 11 U/L — SIGNIFICANT CHANGE UP (ref 4–41)
ANION GAP SERPL CALC-SCNC: 13 MMOL/L — SIGNIFICANT CHANGE UP (ref 7–14)
APTT BLD: 111.8 SEC — HIGH (ref 24.5–35.6)
APTT BLD: 34.1 SEC — SIGNIFICANT CHANGE UP (ref 24.5–35.6)
AST SERPL-CCNC: 17 U/L — SIGNIFICANT CHANGE UP (ref 4–40)
BASOPHILS # BLD AUTO: 0.02 K/UL — SIGNIFICANT CHANGE UP (ref 0–0.2)
BASOPHILS NFR BLD AUTO: 0.5 % — SIGNIFICANT CHANGE UP (ref 0–2)
BILIRUB SERPL-MCNC: 0.7 MG/DL — SIGNIFICANT CHANGE UP (ref 0.2–1.2)
BUN SERPL-MCNC: 18 MG/DL — SIGNIFICANT CHANGE UP (ref 7–23)
CALCIUM SERPL-MCNC: 8.8 MG/DL — SIGNIFICANT CHANGE UP (ref 8.4–10.5)
CHLORIDE SERPL-SCNC: 103 MMOL/L — SIGNIFICANT CHANGE UP (ref 98–107)
CO2 SERPL-SCNC: 23 MMOL/L — SIGNIFICANT CHANGE UP (ref 22–31)
CREAT SERPL-MCNC: 0.86 MG/DL — SIGNIFICANT CHANGE UP (ref 0.5–1.3)
EGFR: 86 ML/MIN/1.73M2 — SIGNIFICANT CHANGE UP
EOSINOPHIL # BLD AUTO: 0.09 K/UL — SIGNIFICANT CHANGE UP (ref 0–0.5)
EOSINOPHIL NFR BLD AUTO: 2.2 % — SIGNIFICANT CHANGE UP (ref 0–6)
ESTIMATED AVERAGE GLUCOSE: 160 — SIGNIFICANT CHANGE UP
GLUCOSE BLDC GLUCOMTR-MCNC: 150 MG/DL — HIGH (ref 70–99)
GLUCOSE BLDC GLUCOMTR-MCNC: 157 MG/DL — HIGH (ref 70–99)
GLUCOSE BLDC GLUCOMTR-MCNC: 198 MG/DL — HIGH (ref 70–99)
GLUCOSE BLDC GLUCOMTR-MCNC: 241 MG/DL — HIGH (ref 70–99)
GLUCOSE SERPL-MCNC: 165 MG/DL — HIGH (ref 70–99)
HCT VFR BLD CALC: 40.9 % — SIGNIFICANT CHANGE UP (ref 39–50)
HCT VFR BLD CALC: 41.2 % — SIGNIFICANT CHANGE UP (ref 39–50)
HGB BLD-MCNC: 13.6 G/DL — SIGNIFICANT CHANGE UP (ref 13–17)
HGB BLD-MCNC: 14.1 G/DL — SIGNIFICANT CHANGE UP (ref 13–17)
IANC: 2.43 K/UL — SIGNIFICANT CHANGE UP (ref 1.8–7.4)
IMM GRANULOCYTES NFR BLD AUTO: 0.5 % — SIGNIFICANT CHANGE UP (ref 0–0.9)
LYMPHOCYTES # BLD AUTO: 0.97 K/UL — LOW (ref 1–3.3)
LYMPHOCYTES # BLD AUTO: 24.1 % — SIGNIFICANT CHANGE UP (ref 13–44)
MAGNESIUM SERPL-MCNC: 2.1 MG/DL — SIGNIFICANT CHANGE UP (ref 1.6–2.6)
MCHC RBC-ENTMCNC: 29.7 PG — SIGNIFICANT CHANGE UP (ref 27–34)
MCHC RBC-ENTMCNC: 30.1 PG — SIGNIFICANT CHANGE UP (ref 27–34)
MCHC RBC-ENTMCNC: 33.3 GM/DL — SIGNIFICANT CHANGE UP (ref 32–36)
MCHC RBC-ENTMCNC: 34.2 GM/DL — SIGNIFICANT CHANGE UP (ref 32–36)
MCV RBC AUTO: 87.8 FL — SIGNIFICANT CHANGE UP (ref 80–100)
MCV RBC AUTO: 89.3 FL — SIGNIFICANT CHANGE UP (ref 80–100)
MONOCYTES # BLD AUTO: 0.49 K/UL — SIGNIFICANT CHANGE UP (ref 0–0.9)
MONOCYTES NFR BLD AUTO: 12.2 % — SIGNIFICANT CHANGE UP (ref 2–14)
NEUTROPHILS # BLD AUTO: 2.43 K/UL — SIGNIFICANT CHANGE UP (ref 1.8–7.4)
NEUTROPHILS NFR BLD AUTO: 60.5 % — SIGNIFICANT CHANGE UP (ref 43–77)
NRBC # BLD: 0 /100 WBCS — SIGNIFICANT CHANGE UP (ref 0–0)
NRBC # BLD: 0 /100 WBCS — SIGNIFICANT CHANGE UP (ref 0–0)
NRBC # FLD: 0 K/UL — SIGNIFICANT CHANGE UP (ref 0–0)
NRBC # FLD: 0 K/UL — SIGNIFICANT CHANGE UP (ref 0–0)
PHOSPHATE SERPL-MCNC: 2 MG/DL — LOW (ref 2.5–4.5)
PLATELET # BLD AUTO: 103 K/UL — LOW (ref 150–400)
PLATELET # BLD AUTO: 91 K/UL — LOW (ref 150–400)
POTASSIUM SERPL-MCNC: 3.9 MMOL/L — SIGNIFICANT CHANGE UP (ref 3.5–5.3)
POTASSIUM SERPL-SCNC: 3.9 MMOL/L — SIGNIFICANT CHANGE UP (ref 3.5–5.3)
PROT SERPL-MCNC: 6.8 G/DL — SIGNIFICANT CHANGE UP (ref 6–8.3)
RBC # BLD: 4.58 M/UL — SIGNIFICANT CHANGE UP (ref 4.2–5.8)
RBC # BLD: 4.69 M/UL — SIGNIFICANT CHANGE UP (ref 4.2–5.8)
RBC # FLD: 13.9 % — SIGNIFICANT CHANGE UP (ref 10.3–14.5)
RBC # FLD: 14.1 % — SIGNIFICANT CHANGE UP (ref 10.3–14.5)
SODIUM SERPL-SCNC: 139 MMOL/L — SIGNIFICANT CHANGE UP (ref 135–145)
WBC # BLD: 4.02 K/UL — SIGNIFICANT CHANGE UP (ref 3.8–10.5)
WBC # BLD: 5.13 K/UL — SIGNIFICANT CHANGE UP (ref 3.8–10.5)
WBC # FLD AUTO: 4.02 K/UL — SIGNIFICANT CHANGE UP (ref 3.8–10.5)
WBC # FLD AUTO: 5.13 K/UL — SIGNIFICANT CHANGE UP (ref 3.8–10.5)

## 2024-06-30 PROCEDURE — 99232 SBSQ HOSP IP/OBS MODERATE 35: CPT

## 2024-06-30 PROCEDURE — 99232 SBSQ HOSP IP/OBS MODERATE 35: CPT | Mod: GC

## 2024-06-30 RX ORDER — SOD PHOS DI, MONO/K PHOS MONO 250 MG
1 TABLET ORAL ONCE
Refills: 0 | Status: COMPLETED | OUTPATIENT
Start: 2024-06-30 | End: 2024-06-30

## 2024-06-30 RX ORDER — HEPARIN SODIUM 50 [USP'U]/ML
INJECTION, SOLUTION INTRAVENOUS
Qty: 25000 | Refills: 0 | Status: DISCONTINUED | OUTPATIENT
Start: 2024-06-30 | End: 2024-07-02

## 2024-06-30 RX ADMIN — Medication 25 MILLIGRAM(S): at 05:09

## 2024-06-30 RX ADMIN — BUSPIRONE HYDROCHLORIDE 10 MILLIGRAM(S): 10 TABLET ORAL at 18:22

## 2024-06-30 RX ADMIN — AMPICILLIN AND SULBACTAM 200 GRAM(S): 2; 1 INJECTION, POWDER, FOR SOLUTION INTRAMUSCULAR; INTRAVENOUS at 02:39

## 2024-06-30 RX ADMIN — AMPICILLIN AND SULBACTAM 200 GRAM(S): 2; 1 INJECTION, POWDER, FOR SOLUTION INTRAMUSCULAR; INTRAVENOUS at 18:21

## 2024-06-30 RX ADMIN — Medication 800 MILLIGRAM(S): at 21:59

## 2024-06-30 RX ADMIN — AMPICILLIN AND SULBACTAM 200 GRAM(S): 2; 1 INJECTION, POWDER, FOR SOLUTION INTRAMUSCULAR; INTRAVENOUS at 23:57

## 2024-06-30 RX ADMIN — HEPARIN SODIUM 2100 UNIT(S)/HR: 50 INJECTION, SOLUTION INTRAVENOUS at 23:20

## 2024-06-30 RX ADMIN — HEPARIN SODIUM 2400 UNIT(S)/HR: 50 INJECTION, SOLUTION INTRAVENOUS at 19:08

## 2024-06-30 RX ADMIN — BUSPIRONE HYDROCHLORIDE 10 MILLIGRAM(S): 10 TABLET ORAL at 05:09

## 2024-06-30 RX ADMIN — Medication 800 MILLIGRAM(S): at 14:16

## 2024-06-30 RX ADMIN — LOSARTAN POTASSIUM 50 MILLIGRAM(S): 100 TABLET, FILM COATED ORAL at 05:09

## 2024-06-30 RX ADMIN — Medication 25 MILLIGRAM(S): at 18:22

## 2024-06-30 RX ADMIN — FENOFIBRATE 48 MILLIGRAM(S): 130 CAPSULE ORAL at 11:35

## 2024-06-30 RX ADMIN — HEPARIN SODIUM 2400 UNIT(S)/HR: 50 INJECTION, SOLUTION INTRAVENOUS at 14:09

## 2024-06-30 RX ADMIN — Medication 1 PACKET(S): at 11:35

## 2024-06-30 RX ADMIN — INSULIN LISPRO 2: 100 INJECTION, SOLUTION SUBCUTANEOUS at 12:38

## 2024-06-30 RX ADMIN — AMPICILLIN AND SULBACTAM 200 GRAM(S): 2; 1 INJECTION, POWDER, FOR SOLUTION INTRAMUSCULAR; INTRAVENOUS at 11:34

## 2024-06-30 RX ADMIN — TAMSULOSIN HYDROCHLORIDE 0.4 MILLIGRAM(S): 0.4 CAPSULE ORAL at 21:59

## 2024-06-30 RX ADMIN — INSULIN LISPRO 1: 100 INJECTION, SOLUTION SUBCUTANEOUS at 09:01

## 2024-06-30 RX ADMIN — FUROSEMIDE 20 MILLIGRAM(S): 10 INJECTION, SOLUTION INTRAMUSCULAR; INTRAVENOUS at 05:09

## 2024-06-30 RX ADMIN — Medication 800 MILLIGRAM(S): at 05:08

## 2024-06-30 RX ADMIN — INSULIN LISPRO 1: 100 INJECTION, SOLUTION SUBCUTANEOUS at 18:10

## 2024-07-01 ENCOUNTER — TRANSCRIPTION ENCOUNTER (OUTPATIENT)
Age: 84
End: 2024-07-01

## 2024-07-01 LAB
ALBUMIN SERPL ELPH-MCNC: 3.9 G/DL — SIGNIFICANT CHANGE UP (ref 3.3–5)
ALP SERPL-CCNC: 48 U/L — SIGNIFICANT CHANGE UP (ref 40–120)
ALT FLD-CCNC: 12 U/L — SIGNIFICANT CHANGE UP (ref 4–41)
ANION GAP SERPL CALC-SCNC: 12 MMOL/L — SIGNIFICANT CHANGE UP (ref 7–14)
APTT BLD: 118.7 SEC — HIGH (ref 24.5–35.6)
APTT BLD: 97.2 SEC — HIGH (ref 24.5–35.6)
APTT BLD: 99.5 SEC — HIGH (ref 24.5–35.6)
AST SERPL-CCNC: 15 U/L — SIGNIFICANT CHANGE UP (ref 4–40)
BASOPHILS # BLD AUTO: 0.02 K/UL — SIGNIFICANT CHANGE UP (ref 0–0.2)
BASOPHILS NFR BLD AUTO: 0.4 % — SIGNIFICANT CHANGE UP (ref 0–2)
BILIRUB SERPL-MCNC: 0.5 MG/DL — SIGNIFICANT CHANGE UP (ref 0.2–1.2)
BUN SERPL-MCNC: 20 MG/DL — SIGNIFICANT CHANGE UP (ref 7–23)
CALCIUM SERPL-MCNC: 8.6 MG/DL — SIGNIFICANT CHANGE UP (ref 8.4–10.5)
CHLORIDE SERPL-SCNC: 102 MMOL/L — SIGNIFICANT CHANGE UP (ref 98–107)
CO2 SERPL-SCNC: 25 MMOL/L — SIGNIFICANT CHANGE UP (ref 22–31)
CREAT SERPL-MCNC: 0.89 MG/DL — SIGNIFICANT CHANGE UP (ref 0.5–1.3)
EGFR: 86 ML/MIN/1.73M2 — SIGNIFICANT CHANGE UP
EOSINOPHIL # BLD AUTO: 0.1 K/UL — SIGNIFICANT CHANGE UP (ref 0–0.5)
EOSINOPHIL NFR BLD AUTO: 2.2 % — SIGNIFICANT CHANGE UP (ref 0–6)
GLUCOSE BLDC GLUCOMTR-MCNC: 144 MG/DL — HIGH (ref 70–99)
GLUCOSE BLDC GLUCOMTR-MCNC: 153 MG/DL — HIGH (ref 70–99)
GLUCOSE BLDC GLUCOMTR-MCNC: 179 MG/DL — HIGH (ref 70–99)
GLUCOSE BLDC GLUCOMTR-MCNC: 246 MG/DL — HIGH (ref 70–99)
GLUCOSE SERPL-MCNC: 152 MG/DL — HIGH (ref 70–99)
HCT VFR BLD CALC: 39.5 % — SIGNIFICANT CHANGE UP (ref 39–50)
HGB BLD-MCNC: 13.2 G/DL — SIGNIFICANT CHANGE UP (ref 13–17)
IANC: 2.45 K/UL — SIGNIFICANT CHANGE UP (ref 1.8–7.4)
IMM GRANULOCYTES NFR BLD AUTO: 0.7 % — SIGNIFICANT CHANGE UP (ref 0–0.9)
LYMPHOCYTES # BLD AUTO: 1.38 K/UL — SIGNIFICANT CHANGE UP (ref 1–3.3)
LYMPHOCYTES # BLD AUTO: 30.3 % — SIGNIFICANT CHANGE UP (ref 13–44)
MAGNESIUM SERPL-MCNC: 2 MG/DL — SIGNIFICANT CHANGE UP (ref 1.6–2.6)
MCHC RBC-ENTMCNC: 29.4 PG — SIGNIFICANT CHANGE UP (ref 27–34)
MCHC RBC-ENTMCNC: 33.4 GM/DL — SIGNIFICANT CHANGE UP (ref 32–36)
MCV RBC AUTO: 88 FL — SIGNIFICANT CHANGE UP (ref 80–100)
MONOCYTES # BLD AUTO: 0.58 K/UL — SIGNIFICANT CHANGE UP (ref 0–0.9)
MONOCYTES NFR BLD AUTO: 12.7 % — SIGNIFICANT CHANGE UP (ref 2–14)
NEUTROPHILS # BLD AUTO: 2.45 K/UL — SIGNIFICANT CHANGE UP (ref 1.8–7.4)
NEUTROPHILS NFR BLD AUTO: 53.7 % — SIGNIFICANT CHANGE UP (ref 43–77)
NRBC # BLD: 0 /100 WBCS — SIGNIFICANT CHANGE UP (ref 0–0)
NRBC # FLD: 0 K/UL — SIGNIFICANT CHANGE UP (ref 0–0)
PHOSPHATE SERPL-MCNC: 2.6 MG/DL — SIGNIFICANT CHANGE UP (ref 2.5–4.5)
PLATELET # BLD AUTO: 97 K/UL — LOW (ref 150–400)
POTASSIUM SERPL-MCNC: 3.7 MMOL/L — SIGNIFICANT CHANGE UP (ref 3.5–5.3)
POTASSIUM SERPL-SCNC: 3.7 MMOL/L — SIGNIFICANT CHANGE UP (ref 3.5–5.3)
PROT SERPL-MCNC: 6.4 G/DL — SIGNIFICANT CHANGE UP (ref 6–8.3)
RBC # BLD: 4.49 M/UL — SIGNIFICANT CHANGE UP (ref 4.2–5.8)
RBC # FLD: 14.1 % — SIGNIFICANT CHANGE UP (ref 10.3–14.5)
SODIUM SERPL-SCNC: 139 MMOL/L — SIGNIFICANT CHANGE UP (ref 135–145)
WBC # BLD: 4.56 K/UL — SIGNIFICANT CHANGE UP (ref 3.8–10.5)
WBC # FLD AUTO: 4.56 K/UL — SIGNIFICANT CHANGE UP (ref 3.8–10.5)

## 2024-07-01 PROCEDURE — 99222 1ST HOSP IP/OBS MODERATE 55: CPT

## 2024-07-01 PROCEDURE — 99232 SBSQ HOSP IP/OBS MODERATE 35: CPT | Mod: GC

## 2024-07-01 PROCEDURE — 99232 SBSQ HOSP IP/OBS MODERATE 35: CPT

## 2024-07-01 RX ORDER — DEXTROSE MONOHYDRATE AND SODIUM CHLORIDE 5; .3 G/100ML; G/100ML
1000 INJECTION, SOLUTION INTRAVENOUS
Refills: 0 | Status: DISCONTINUED | OUTPATIENT
Start: 2024-07-01 | End: 2024-07-02

## 2024-07-01 RX ADMIN — TAMSULOSIN HYDROCHLORIDE 0.4 MILLIGRAM(S): 0.4 CAPSULE ORAL at 21:09

## 2024-07-01 RX ADMIN — LOSARTAN POTASSIUM 50 MILLIGRAM(S): 100 TABLET, FILM COATED ORAL at 05:29

## 2024-07-01 RX ADMIN — HEPARIN SODIUM UNIT(S)/HR: 50 INJECTION, SOLUTION INTRAVENOUS at 19:15

## 2024-07-01 RX ADMIN — Medication 800 MILLIGRAM(S): at 05:33

## 2024-07-01 RX ADMIN — INSULIN LISPRO 1: 100 INJECTION, SOLUTION SUBCUTANEOUS at 12:28

## 2024-07-01 RX ADMIN — AMPICILLIN AND SULBACTAM 200 GRAM(S): 2; 1 INJECTION, POWDER, FOR SOLUTION INTRAMUSCULAR; INTRAVENOUS at 05:29

## 2024-07-01 RX ADMIN — HEPARIN SODIUM 2100 UNIT(S)/HR: 50 INJECTION, SOLUTION INTRAVENOUS at 01:39

## 2024-07-01 RX ADMIN — HEPARIN SODIUM UNIT(S)/HR: 50 INJECTION, SOLUTION INTRAVENOUS at 19:30

## 2024-07-01 RX ADMIN — HEPARIN SODIUM 1800 UNIT(S)/HR: 50 INJECTION, SOLUTION INTRAVENOUS at 06:25

## 2024-07-01 RX ADMIN — FENOFIBRATE 48 MILLIGRAM(S): 130 CAPSULE ORAL at 11:37

## 2024-07-01 RX ADMIN — AMPICILLIN AND SULBACTAM 200 GRAM(S): 2; 1 INJECTION, POWDER, FOR SOLUTION INTRAMUSCULAR; INTRAVENOUS at 11:35

## 2024-07-01 RX ADMIN — Medication 800 MILLIGRAM(S): at 11:37

## 2024-07-01 RX ADMIN — AMPICILLIN AND SULBACTAM 200 GRAM(S): 2; 1 INJECTION, POWDER, FOR SOLUTION INTRAMUSCULAR; INTRAVENOUS at 23:47

## 2024-07-01 RX ADMIN — Medication 800 MILLIGRAM(S): at 21:09

## 2024-07-01 RX ADMIN — AMPICILLIN AND SULBACTAM 200 GRAM(S): 2; 1 INJECTION, POWDER, FOR SOLUTION INTRAMUSCULAR; INTRAVENOUS at 17:13

## 2024-07-01 RX ADMIN — Medication 25 MILLIGRAM(S): at 17:13

## 2024-07-01 RX ADMIN — HEPARIN SODIUM UNIT(S)/HR: 50 INJECTION, SOLUTION INTRAVENOUS at 17:18

## 2024-07-01 RX ADMIN — BUSPIRONE HYDROCHLORIDE 10 MILLIGRAM(S): 10 TABLET ORAL at 05:28

## 2024-07-01 RX ADMIN — HEPARIN SODIUM UNIT(S)/HR: 50 INJECTION, SOLUTION INTRAVENOUS at 13:00

## 2024-07-01 RX ADMIN — BUSPIRONE HYDROCHLORIDE 10 MILLIGRAM(S): 10 TABLET ORAL at 17:13

## 2024-07-01 RX ADMIN — Medication 25 MILLIGRAM(S): at 05:28

## 2024-07-01 RX ADMIN — FUROSEMIDE 20 MILLIGRAM(S): 10 INJECTION, SOLUTION INTRAMUSCULAR; INTRAVENOUS at 05:28

## 2024-07-01 RX ADMIN — INSULIN LISPRO 1: 100 INJECTION, SOLUTION SUBCUTANEOUS at 08:29

## 2024-07-01 RX ADMIN — HEPARIN SODIUM 1800 UNIT(S)/HR: 50 INJECTION, SOLUTION INTRAVENOUS at 07:29

## 2024-07-02 ENCOUNTER — APPOINTMENT (OUTPATIENT)
Dept: VASCULAR SURGERY | Facility: HOSPITAL | Age: 84
End: 2024-07-02

## 2024-07-02 LAB
ADD ON TEST-SPECIMEN IN LAB: SIGNIFICANT CHANGE UP
ANION GAP SERPL CALC-SCNC: 16 MMOL/L — HIGH (ref 7–14)
APTT BLD: 90.5 SEC — HIGH (ref 24.5–35.6)
APTT BLD: 91.3 SEC — HIGH (ref 24.5–35.6)
BLD GP AB SCN SERPL QL: NEGATIVE — SIGNIFICANT CHANGE UP
BUN SERPL-MCNC: 21 MG/DL — SIGNIFICANT CHANGE UP (ref 7–23)
CALCIUM SERPL-MCNC: 8.9 MG/DL — SIGNIFICANT CHANGE UP (ref 8.4–10.5)
CHLORIDE SERPL-SCNC: 104 MMOL/L — SIGNIFICANT CHANGE UP (ref 98–107)
CO2 SERPL-SCNC: 22 MMOL/L — SIGNIFICANT CHANGE UP (ref 22–31)
CREAT SERPL-MCNC: 0.88 MG/DL — SIGNIFICANT CHANGE UP (ref 0.5–1.3)
EGFR: 86 ML/MIN/1.73M2 — SIGNIFICANT CHANGE UP
GLUCOSE BLDC GLUCOMTR-MCNC: 131 MG/DL — HIGH (ref 70–99)
GLUCOSE BLDC GLUCOMTR-MCNC: 139 MG/DL — HIGH (ref 70–99)
GLUCOSE BLDC GLUCOMTR-MCNC: 160 MG/DL — HIGH (ref 70–99)
GLUCOSE BLDC GLUCOMTR-MCNC: 169 MG/DL — HIGH (ref 70–99)
GLUCOSE BLDC GLUCOMTR-MCNC: 197 MG/DL — HIGH (ref 70–99)
GLUCOSE SERPL-MCNC: 160 MG/DL — HIGH (ref 70–99)
HCT VFR BLD CALC: 38.9 % — LOW (ref 39–50)
HGB BLD-MCNC: 13.3 G/DL — SIGNIFICANT CHANGE UP (ref 13–17)
INR BLD: 1.07 RATIO — SIGNIFICANT CHANGE UP (ref 0.85–1.18)
MAGNESIUM SERPL-MCNC: 2 MG/DL — SIGNIFICANT CHANGE UP (ref 1.6–2.6)
MCHC RBC-ENTMCNC: 30.2 PG — SIGNIFICANT CHANGE UP (ref 27–34)
MCHC RBC-ENTMCNC: 34.2 GM/DL — SIGNIFICANT CHANGE UP (ref 32–36)
MCV RBC AUTO: 88.2 FL — SIGNIFICANT CHANGE UP (ref 80–100)
NRBC # BLD: 0 /100 WBCS — SIGNIFICANT CHANGE UP (ref 0–0)
NRBC # FLD: 0 K/UL — SIGNIFICANT CHANGE UP (ref 0–0)
PHOSPHATE SERPL-MCNC: 2.9 MG/DL — SIGNIFICANT CHANGE UP (ref 2.5–4.5)
PLATELET # BLD AUTO: 108 K/UL — LOW (ref 150–400)
POTASSIUM SERPL-MCNC: 4 MMOL/L — SIGNIFICANT CHANGE UP (ref 3.5–5.3)
POTASSIUM SERPL-SCNC: 4 MMOL/L — SIGNIFICANT CHANGE UP (ref 3.5–5.3)
PROTHROM AB SERPL-ACNC: 12 SEC — SIGNIFICANT CHANGE UP (ref 9.5–13)
RBC # BLD: 4.41 M/UL — SIGNIFICANT CHANGE UP (ref 4.2–5.8)
RBC # FLD: 13.7 % — SIGNIFICANT CHANGE UP (ref 10.3–14.5)
RH IG SCN BLD-IMP: POSITIVE — SIGNIFICANT CHANGE UP
RH IG SCN BLD-IMP: POSITIVE — SIGNIFICANT CHANGE UP
SODIUM SERPL-SCNC: 142 MMOL/L — SIGNIFICANT CHANGE UP (ref 135–145)
WBC # BLD: 3.88 K/UL — SIGNIFICANT CHANGE UP (ref 3.8–10.5)
WBC # FLD AUTO: 3.88 K/UL — SIGNIFICANT CHANGE UP (ref 3.8–10.5)

## 2024-07-02 PROCEDURE — 99232 SBSQ HOSP IP/OBS MODERATE 35: CPT | Mod: GC,25

## 2024-07-02 PROCEDURE — 37224: CPT | Mod: GC,RT

## 2024-07-02 PROCEDURE — 36246 INS CATH ABD/L-EXT ART 2ND: CPT | Mod: GC,59,RT

## 2024-07-02 PROCEDURE — 75625 CONTRAST EXAM ABDOMINL AORTA: CPT | Mod: 26,GC

## 2024-07-02 PROCEDURE — 75710 ARTERY X-RAYS ARM/LEG: CPT | Mod: 26,59

## 2024-07-02 PROCEDURE — 76937 US GUIDE VASCULAR ACCESS: CPT | Mod: 26,GC

## 2024-07-02 PROCEDURE — 37228: CPT | Mod: GC,RT

## 2024-07-02 DEVICE — CATH QUICK CROSS .014X135CM: Type: IMPLANTABLE DEVICE | Site: RIGHT | Status: FUNCTIONAL

## 2024-07-02 DEVICE — IMPLANTABLE DEVICE: Type: IMPLANTABLE DEVICE | Site: RIGHT | Status: FUNCTIONAL

## 2024-07-02 DEVICE — GUIDEWIRE ADVANTAGE .014INX300CM: Type: IMPLANTABLE DEVICE | Site: RIGHT | Status: FUNCTIONAL

## 2024-07-02 DEVICE — CATH OMNI FLSH 0.035IN 5FRX65: Type: IMPLANTABLE DEVICE | Site: RIGHT | Status: FUNCTIONAL

## 2024-07-02 DEVICE — SHEATH INTRODUCER TERUMO PINNACLE PERIPHERAL 5FR X 10CM X 0.035" MINI WIRE: Type: IMPLANTABLE DEVICE | Site: RIGHT | Status: FUNCTIONAL

## 2024-07-02 DEVICE — DEVICE CLOSURE 5F MYNX GRIP MUST ORDER MIN OF 10 EA: Type: IMPLANTABLE DEVICE | Site: RIGHT | Status: FUNCTIONAL

## 2024-07-02 DEVICE — GUIDEWIRE AMPLATZ SUPER-STIFF STRAIGHT .035" X 180CM: Type: IMPLANTABLE DEVICE | Site: RIGHT | Status: FUNCTIONAL

## 2024-07-02 DEVICE — GWIRE VASC ENTRY MINISTICK MAX 4FRX10CM: Type: IMPLANTABLE DEVICE | Site: RIGHT | Status: FUNCTIONAL

## 2024-07-02 DEVICE — GWIRE ANGL .035X180 STD: Type: IMPLANTABLE DEVICE | Site: RIGHT | Status: FUNCTIONAL

## 2024-07-02 DEVICE — CATH QUICK CROSS .035X135CM: Type: IMPLANTABLE DEVICE | Site: RIGHT | Status: FUNCTIONAL

## 2024-07-02 DEVICE — CATH ANGIO GLIDECATH ANGLE TAPER 5FR X 65CM: Type: IMPLANTABLE DEVICE | Site: RIGHT | Status: FUNCTIONAL

## 2024-07-02 DEVICE — KIT STIFFEN MICRO INTRODUCER: Type: IMPLANTABLE DEVICE | Site: RIGHT | Status: FUNCTIONAL

## 2024-07-02 DEVICE — SHEATH GUIDING STR 5FRX55CM: Type: IMPLANTABLE DEVICE | Site: RIGHT | Status: FUNCTIONAL

## 2024-07-02 RX ORDER — ONDANSETRON HYDROCHLORIDE 2 MG/ML
4 INJECTION INTRAMUSCULAR; INTRAVENOUS ONCE
Refills: 0 | Status: DISCONTINUED | OUTPATIENT
Start: 2024-07-02 | End: 2024-07-02

## 2024-07-02 RX ORDER — CLOPIDOGREL BISULFATE 75 MG/1
300 TABLET, FILM COATED ORAL ONCE
Refills: 0 | Status: COMPLETED | OUTPATIENT
Start: 2024-07-02 | End: 2024-07-02

## 2024-07-02 RX ORDER — CLOPIDOGREL BISULFATE 75 MG/1
75 TABLET, FILM COATED ORAL DAILY
Refills: 0 | Status: DISCONTINUED | OUTPATIENT
Start: 2024-07-03 | End: 2024-07-03

## 2024-07-02 RX ORDER — HYDROMORPHONE HCL 0.2 MG/ML
0.5 INJECTION, SOLUTION INTRAVENOUS
Refills: 0 | Status: DISCONTINUED | OUTPATIENT
Start: 2024-07-02 | End: 2024-07-02

## 2024-07-02 RX ORDER — FENTANYL CITRATE 50 UG/ML
50 INJECTION, SOLUTION INTRAMUSCULAR; INTRAVENOUS
Refills: 0 | Status: DISCONTINUED | OUTPATIENT
Start: 2024-07-02 | End: 2024-07-02

## 2024-07-02 RX ORDER — DIAZEPAM 10 MG/1
5 TABLET ORAL ONCE
Refills: 0 | Status: DISCONTINUED | OUTPATIENT
Start: 2024-07-02 | End: 2024-07-03

## 2024-07-02 RX ORDER — OXYCODONE HYDROCHLORIDE 100 MG/5ML
5 SOLUTION ORAL ONCE
Refills: 0 | Status: DISCONTINUED | OUTPATIENT
Start: 2024-07-02 | End: 2024-07-02

## 2024-07-02 RX ADMIN — AMPICILLIN AND SULBACTAM 200 GRAM(S): 2; 1 INJECTION, POWDER, FOR SOLUTION INTRAMUSCULAR; INTRAVENOUS at 10:08

## 2024-07-02 RX ADMIN — Medication 25 MILLIGRAM(S): at 18:07

## 2024-07-02 RX ADMIN — HEPARIN SODIUM UNIT(S)/HR: 50 INJECTION, SOLUTION INTRAVENOUS at 01:25

## 2024-07-02 RX ADMIN — FUROSEMIDE 20 MILLIGRAM(S): 10 INJECTION, SOLUTION INTRAMUSCULAR; INTRAVENOUS at 06:15

## 2024-07-02 RX ADMIN — AMPICILLIN AND SULBACTAM 200 GRAM(S): 2; 1 INJECTION, POWDER, FOR SOLUTION INTRAMUSCULAR; INTRAVENOUS at 06:19

## 2024-07-02 RX ADMIN — INSULIN LISPRO 1: 100 INJECTION, SOLUTION SUBCUTANEOUS at 06:25

## 2024-07-02 RX ADMIN — HYDROMORPHONE HCL 0.5 MILLIGRAM(S): 0.2 INJECTION, SOLUTION INTRAVENOUS at 16:30

## 2024-07-02 RX ADMIN — TAMSULOSIN HYDROCHLORIDE 0.4 MILLIGRAM(S): 0.4 CAPSULE ORAL at 22:40

## 2024-07-02 RX ADMIN — Medication 800 MILLIGRAM(S): at 06:15

## 2024-07-02 RX ADMIN — LOSARTAN POTASSIUM 50 MILLIGRAM(S): 100 TABLET, FILM COATED ORAL at 06:24

## 2024-07-02 RX ADMIN — BUSPIRONE HYDROCHLORIDE 10 MILLIGRAM(S): 10 TABLET ORAL at 06:15

## 2024-07-02 RX ADMIN — CLOPIDOGREL BISULFATE 300 MILLIGRAM(S): 75 TABLET, FILM COATED ORAL at 18:26

## 2024-07-02 RX ADMIN — HEPARIN SODIUM UNIT(S)/HR: 50 INJECTION, SOLUTION INTRAVENOUS at 07:19

## 2024-07-02 RX ADMIN — AMPICILLIN AND SULBACTAM 200 GRAM(S): 2; 1 INJECTION, POWDER, FOR SOLUTION INTRAMUSCULAR; INTRAVENOUS at 18:26

## 2024-07-02 RX ADMIN — DEXTROSE MONOHYDRATE AND SODIUM CHLORIDE 75 MILLILITER(S): 5; .3 INJECTION, SOLUTION INTRAVENOUS at 07:04

## 2024-07-02 RX ADMIN — FENOFIBRATE 48 MILLIGRAM(S): 130 CAPSULE ORAL at 10:08

## 2024-07-02 RX ADMIN — HYDROMORPHONE HCL 0.5 MILLIGRAM(S): 0.2 INJECTION, SOLUTION INTRAVENOUS at 16:02

## 2024-07-02 RX ADMIN — DEXTROSE MONOHYDRATE AND SODIUM CHLORIDE 75 MILLILITER(S): 5; .3 INJECTION, SOLUTION INTRAVENOUS at 10:09

## 2024-07-02 RX ADMIN — BUSPIRONE HYDROCHLORIDE 10 MILLIGRAM(S): 10 TABLET ORAL at 18:26

## 2024-07-02 RX ADMIN — HEPARIN SODIUM UNIT(S)/HR: 50 INJECTION, SOLUTION INTRAVENOUS at 06:17

## 2024-07-02 RX ADMIN — Medication 25 MILLIGRAM(S): at 06:16

## 2024-07-02 RX ADMIN — Medication 800 MILLIGRAM(S): at 22:40

## 2024-07-03 ENCOUNTER — TRANSCRIPTION ENCOUNTER (OUTPATIENT)
Age: 84
End: 2024-07-03

## 2024-07-03 VITALS
OXYGEN SATURATION: 95 % | HEART RATE: 89 BPM | SYSTOLIC BLOOD PRESSURE: 120 MMHG | TEMPERATURE: 97 F | DIASTOLIC BLOOD PRESSURE: 78 MMHG | RESPIRATION RATE: 18 BRPM

## 2024-07-03 LAB
APTT BLD: 29.7 SEC — SIGNIFICANT CHANGE UP (ref 24.5–35.6)
GLUCOSE BLDC GLUCOMTR-MCNC: 144 MG/DL — HIGH (ref 70–99)
GLUCOSE BLDC GLUCOMTR-MCNC: 150 MG/DL — HIGH (ref 70–99)
HCT VFR BLD CALC: 38.4 % — LOW (ref 39–50)
HGB BLD-MCNC: 12.9 G/DL — LOW (ref 13–17)
MCHC RBC-ENTMCNC: 30.1 PG — SIGNIFICANT CHANGE UP (ref 27–34)
MCHC RBC-ENTMCNC: 33.6 GM/DL — SIGNIFICANT CHANGE UP (ref 32–36)
MCV RBC AUTO: 89.5 FL — SIGNIFICANT CHANGE UP (ref 80–100)
NRBC # BLD: 0 /100 WBCS — SIGNIFICANT CHANGE UP (ref 0–0)
NRBC # FLD: 0 K/UL — SIGNIFICANT CHANGE UP (ref 0–0)
PLATELET # BLD AUTO: 92 K/UL — LOW (ref 150–400)
RBC # BLD: 4.29 M/UL — SIGNIFICANT CHANGE UP (ref 4.2–5.8)
RBC # FLD: 14 % — SIGNIFICANT CHANGE UP (ref 10.3–14.5)
WBC # BLD: 4.66 K/UL — SIGNIFICANT CHANGE UP (ref 3.8–10.5)
WBC # FLD AUTO: 4.66 K/UL — SIGNIFICANT CHANGE UP (ref 3.8–10.5)

## 2024-07-03 RX ORDER — AMOXICILLIN/POTASSIUM CLAV 250-125 MG
1 TABLET ORAL
Qty: 4 | Refills: 0
Start: 2024-07-03 | End: 2024-07-04

## 2024-07-03 RX ORDER — RIVAROXABAN 10 MG/1
10 TABLET, FILM COATED ORAL
Refills: 0 | Status: DISCONTINUED | OUTPATIENT
Start: 2024-07-03 | End: 2024-07-03

## 2024-07-03 RX ORDER — AMMONIUM LACTATE 12 %
1 LOTION (GRAM) TOPICAL
Refills: 0 | DISCHARGE

## 2024-07-03 RX ORDER — CLOPIDOGREL BISULFATE 75 MG/1
1 TABLET, FILM COATED ORAL
Qty: 30 | Refills: 0
Start: 2024-07-03 | End: 2024-08-01

## 2024-07-03 RX ADMIN — FUROSEMIDE 20 MILLIGRAM(S): 10 INJECTION, SOLUTION INTRAMUSCULAR; INTRAVENOUS at 05:22

## 2024-07-03 RX ADMIN — CLOPIDOGREL BISULFATE 75 MILLIGRAM(S): 75 TABLET, FILM COATED ORAL at 12:54

## 2024-07-03 RX ADMIN — LOSARTAN POTASSIUM 50 MILLIGRAM(S): 100 TABLET, FILM COATED ORAL at 05:22

## 2024-07-03 RX ADMIN — AMPICILLIN AND SULBACTAM 200 GRAM(S): 2; 1 INJECTION, POWDER, FOR SOLUTION INTRAMUSCULAR; INTRAVENOUS at 12:53

## 2024-07-03 RX ADMIN — AMPICILLIN AND SULBACTAM 200 GRAM(S): 2; 1 INJECTION, POWDER, FOR SOLUTION INTRAMUSCULAR; INTRAVENOUS at 00:52

## 2024-07-03 RX ADMIN — AMPICILLIN AND SULBACTAM 200 GRAM(S): 2; 1 INJECTION, POWDER, FOR SOLUTION INTRAMUSCULAR; INTRAVENOUS at 05:25

## 2024-07-03 RX ADMIN — BUSPIRONE HYDROCHLORIDE 10 MILLIGRAM(S): 10 TABLET ORAL at 05:22

## 2024-07-03 RX ADMIN — Medication 25 MILLIGRAM(S): at 05:22

## 2024-07-03 RX ADMIN — FENOFIBRATE 48 MILLIGRAM(S): 130 CAPSULE ORAL at 12:54

## 2024-07-03 RX ADMIN — Medication 800 MILLIGRAM(S): at 05:22

## 2024-07-04 LAB
CULTURE RESULTS: SIGNIFICANT CHANGE UP
CULTURE RESULTS: SIGNIFICANT CHANGE UP
SPECIMEN SOURCE: SIGNIFICANT CHANGE UP
SPECIMEN SOURCE: SIGNIFICANT CHANGE UP

## 2024-07-17 ENCOUNTER — APPOINTMENT (OUTPATIENT)
Dept: WOUND CARE | Facility: CLINIC | Age: 84
End: 2024-07-17
Payer: MEDICARE

## 2024-07-17 ENCOUNTER — OUTPATIENT (OUTPATIENT)
Dept: OUTPATIENT SERVICES | Facility: HOSPITAL | Age: 84
LOS: 1 days | End: 2024-07-17
Payer: COMMERCIAL

## 2024-07-17 VITALS — HEART RATE: 74 BPM | SYSTOLIC BLOOD PRESSURE: 149 MMHG | DIASTOLIC BLOOD PRESSURE: 77 MMHG | TEMPERATURE: 98.4 F

## 2024-07-17 DIAGNOSIS — L92.9 GRANULOMATOUS DISORDER OF THE SKIN AND SUBCUTANEOUS TISSUE, UNSPECIFIED: ICD-10-CM

## 2024-07-17 DIAGNOSIS — Z87.828 PERSONAL HISTORY OF OTHER (HEALED) PHYSICAL INJURY AND TRAUMA: ICD-10-CM

## 2024-07-17 DIAGNOSIS — S81.802A UNSPECIFIED OPEN WOUND, LEFT LOWER LEG, INITIAL ENCOUNTER: ICD-10-CM

## 2024-07-17 DIAGNOSIS — S81.801A UNSPECIFIED OPEN WOUND, RIGHT LOWER LEG, INITIAL ENCOUNTER: ICD-10-CM

## 2024-07-17 DIAGNOSIS — R91.8 OTHER NONSPECIFIC ABNORMAL FINDING OF LUNG FIELD: Chronic | ICD-10-CM

## 2024-07-17 DIAGNOSIS — Z98.89 OTHER SPECIFIED POSTPROCEDURAL STATES: Chronic | ICD-10-CM

## 2024-07-17 PROBLEM — I73.9 PVD (PERIPHERAL VASCULAR DISEASE): Status: ACTIVE | Noted: 2024-07-17

## 2024-07-17 PROCEDURE — 17250 CHEM CAUT OF GRANLTJ TISSUE: CPT
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
## 2024-07-18 DIAGNOSIS — S81.801A UNSPECIFIED OPEN WOUND, RIGHT LOWER LEG, INITIAL ENCOUNTER: ICD-10-CM

## 2024-07-18 DIAGNOSIS — S81.802A UNSPECIFIED OPEN WOUND, LEFT LOWER LEG, INITIAL ENCOUNTER: ICD-10-CM
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
## 2024-07-18 DIAGNOSIS — I87.2 VENOUS INSUFFICIENCY (CHRONIC) (PERIPHERAL): ICD-10-CM

## 2024-07-23 ENCOUNTER — APPOINTMENT (OUTPATIENT)
Dept: VASCULAR SURGERY | Facility: CLINIC | Age: 84
End: 2024-07-23

## 2024-07-24 ENCOUNTER — NON-APPOINTMENT (OUTPATIENT)
Age: 84
End: 2024-07-24

## 2024-07-24 ENCOUNTER — APPOINTMENT (OUTPATIENT)
Dept: CARDIOLOGY | Facility: CLINIC | Age: 84
End: 2024-07-24
Payer: MEDICARE

## 2024-07-24 VITALS
OXYGEN SATURATION: 93 % | HEART RATE: 73 BPM | WEIGHT: 296 LBS | SYSTOLIC BLOOD PRESSURE: 143 MMHG | BODY MASS INDEX: 38 KG/M2 | DIASTOLIC BLOOD PRESSURE: 80 MMHG

## 2024-07-24 DIAGNOSIS — I83.023 VARICOSE VEINS OF LEFT LOWER EXTREMITY WITH ULCER OF ANKLE: ICD-10-CM

## 2024-07-24 DIAGNOSIS — L03.119 CELLULITIS OF UNSPECIFIED PART OF LIMB: ICD-10-CM

## 2024-07-24 DIAGNOSIS — I10 ESSENTIAL (PRIMARY) HYPERTENSION: ICD-10-CM

## 2024-07-24 DIAGNOSIS — I82.409 ACUTE EMBOLISM AND THROMBOSIS OF UNSPECIFIED DEEP VEINS OF UNSPECIFIED LOWER EXTREMITY: ICD-10-CM

## 2024-07-24 DIAGNOSIS — L97.321 VARICOSE VEINS OF LEFT LOWER EXTREMITY WITH ULCER OF ANKLE: ICD-10-CM

## 2024-07-24 PROCEDURE — 93000 ELECTROCARDIOGRAM COMPLETE: CPT

## 2024-07-24 PROCEDURE — 99214 OFFICE O/P EST MOD 30 MIN: CPT

## 2024-07-24 PROCEDURE — G2211 COMPLEX E/M VISIT ADD ON: CPT

## 2024-07-24 RX ORDER — AMLODIPINE BESYLATE 5 MG/1
5 TABLET ORAL DAILY
Qty: 90 | Refills: 3 | Status: ACTIVE | COMMUNITY
Start: 2024-07-24

## 2024-07-24 RX ORDER — IRBESARTAN 75 MG/1
75 TABLET ORAL DAILY
Qty: 90 | Refills: 0 | Status: ACTIVE | COMMUNITY
Start: 2024-07-24

## 2024-07-29 DIAGNOSIS — S81.801A UNSPECIFIED OPEN WOUND, RIGHT LOWER LEG, INITIAL ENCOUNTER: ICD-10-CM

## 2024-07-29 DIAGNOSIS — S81.802A UNSPECIFIED OPEN WOUND, LEFT LOWER LEG, INITIAL ENCOUNTER: ICD-10-CM

## 2024-07-29 DIAGNOSIS — I87.2 VENOUS INSUFFICIENCY (CHRONIC) (PERIPHERAL): ICD-10-CM
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
## 2024-07-29 DIAGNOSIS — L03.90 CELLULITIS, UNSPECIFIED: ICD-10-CM

## 2024-07-30 ENCOUNTER — APPOINTMENT (OUTPATIENT)
Dept: VASCULAR SURGERY | Facility: CLINIC | Age: 84
End: 2024-07-30
Payer: MEDICARE

## 2024-07-30 VITALS
BODY MASS INDEX: 38.24 KG/M2 | DIASTOLIC BLOOD PRESSURE: 78 MMHG | HEART RATE: 54 BPM | TEMPERATURE: 97.9 F | WEIGHT: 298 LBS | HEIGHT: 74 IN | SYSTOLIC BLOOD PRESSURE: 134 MMHG

## 2024-07-30 DIAGNOSIS — S81.801A UNSPECIFIED OPEN WOUND, RIGHT LOWER LEG, INITIAL ENCOUNTER: ICD-10-CM

## 2024-07-30 DIAGNOSIS — I73.9 PERIPHERAL VASCULAR DISEASE, UNSPECIFIED: ICD-10-CM

## 2024-07-30 PROCEDURE — 99214 OFFICE O/P EST MOD 30 MIN: CPT

## 2024-07-31 DIAGNOSIS — Z87.828 PERSONAL HISTORY OF OTHER (HEALED) PHYSICAL INJURY AND TRAUMA: ICD-10-CM

## 2024-07-31 DIAGNOSIS — I73.9 PERIPHERAL VASCULAR DISEASE, UNSPECIFIED: ICD-10-CM

## 2024-07-31 DIAGNOSIS — S81.802A UNSPECIFIED OPEN WOUND, LEFT LOWER LEG, INITIAL ENCOUNTER: ICD-10-CM

## 2024-07-31 DIAGNOSIS — S81.801A UNSPECIFIED OPEN WOUND, RIGHT LOWER LEG, INITIAL ENCOUNTER: ICD-10-CM

## 2024-07-31 DIAGNOSIS — L92.9 GRANULOMATOUS DISORDER OF THE SKIN AND SUBCUTANEOUS TISSUE, UNSPECIFIED: ICD-10-CM

## 2024-08-02 NOTE — PHYSICAL EXAM
[Normal Rate and Rhythm] : normal rate and rhythm [1+] : right 1+ [2+] : left 2+ [0] : left 0 [Ankle Swelling (On Exam)] : present [Ankle Swelling Bilaterally] : bilaterally  [Ankle Swelling On The Left] : moderate [Varicose Veins Of Lower Extremities] : not present [] : not present [Abdomen Tenderness] : ~T ~M No abdominal tenderness [Skin Ulcer] : ulcer [Alert] : alert [Oriented to Person] : oriented to person [Oriented to Place] : oriented to place [Oriented to Time] : oriented to time [Calm] : calm [de-identified] : NAD [de-identified] : supple, no masses [de-identified] : unlabored breathing [de-identified] : FROM of all 4 extremities

## 2024-08-02 NOTE — HISTORY OF PRESENT ILLNESS
[FreeTextEntry1] : 84-year-old male with past medical history of hypertension, BPH, chronic venous insufficiency, history of DVT, bilateral lower extremity leg wounds and history of peripheral arterial disease recently hospitalized and underwent right lower extremity angiogram with angioplasty of the right popliteal, TP trunk and peroneal arteries on July 2, 2024.  Patient doing well and states wounds are healing.  Left leg wounds have healed.  Denies claudication or rest pain.  Denies fever, chills.  He is followed in the wound care center.  Otherwise denies chest pain, shortness of breath, MANUEL.  Left groin access site has healed uneventfully.

## 2024-08-02 NOTE — ASSESSMENT
[FreeTextEntry1] : 84-year-old male with past medical history of hypertension, BPH, chronic venous insufficiency, history of DVT, bilateral lower extremity leg wounds and history of peripheral arterial disease recently hospitalized and underwent right lower extremity angiogram with angioplasty of the right popliteal, TP trunk and peroneal arteries on July 2, 2024.   Plan Patient doing well and wounds are healing He will return in 3 months for noninvasive studies of the bilateral lower extremities and right lower extremity arterial duplex to assess intervention patency Continue current medications Continue local wound care [Arterial/Venous Disease] : arterial/venous disease [Medication Management] : medication management [Ulcer Care] : ulcer care

## 2024-08-06 ENCOUNTER — APPOINTMENT (OUTPATIENT)
Dept: INFECTIOUS DISEASE | Facility: CLINIC | Age: 84
End: 2024-08-06

## 2024-08-06 PROCEDURE — 99213 OFFICE O/P EST LOW 20 MIN: CPT

## 2024-08-14 ENCOUNTER — APPOINTMENT (OUTPATIENT)
Dept: WOUND CARE | Facility: HOSPITAL | Age: 84
End: 2024-08-14
Payer: MEDICARE

## 2024-08-14 ENCOUNTER — OUTPATIENT (OUTPATIENT)
Dept: OUTPATIENT SERVICES | Facility: HOSPITAL | Age: 84
LOS: 1 days | End: 2024-08-14
Payer: COMMERCIAL

## 2024-08-14 VITALS — TEMPERATURE: 98.2 F | BODY MASS INDEX: 38.24 KG/M2 | HEIGHT: 74 IN | WEIGHT: 298 LBS

## 2024-08-14 DIAGNOSIS — I73.9 PERIPHERAL VASCULAR DISEASE, UNSPECIFIED: ICD-10-CM

## 2024-08-14 DIAGNOSIS — Z87.828 PERSONAL HISTORY OF OTHER (HEALED) PHYSICAL INJURY AND TRAUMA: ICD-10-CM

## 2024-08-14 DIAGNOSIS — S81.802A UNSPECIFIED OPEN WOUND, LEFT LOWER LEG, INITIAL ENCOUNTER: ICD-10-CM

## 2024-08-14 DIAGNOSIS — S81.801A UNSPECIFIED OPEN WOUND, RIGHT LOWER LEG, INITIAL ENCOUNTER: ICD-10-CM

## 2024-08-14 DIAGNOSIS — R91.8 OTHER NONSPECIFIC ABNORMAL FINDING OF LUNG FIELD: Chronic | ICD-10-CM

## 2024-08-14 DIAGNOSIS — Z98.89 OTHER SPECIFIED POSTPROCEDURAL STATES: Chronic | ICD-10-CM

## 2024-08-14 PROCEDURE — 93922 UPR/L XTREMITY ART 2 LEVELS: CPT | Mod: 26

## 2024-08-14 PROCEDURE — 99213 OFFICE O/P EST LOW 20 MIN: CPT

## 2024-08-14 PROCEDURE — G0463: CPT

## 2024-08-14 RX ORDER — METFORMIN HYDROCHLORIDE 625 MG/1
TABLET ORAL
Refills: 0 | Status: ACTIVE | COMMUNITY

## 2024-08-14 NOTE — REASON FOR VISIT
[Follow-Up: _____] : a [unfilled] follow-up visit [Spouse] : spouse [FreeTextEntry1] : B/L LE venous stasis dermatitis with blistering/ulcerations

## 2024-08-14 NOTE — PLAN
[FreeTextEntry1] : 1/4/23 - Moisturizer applied. Xtrasorb applied to blistered areas. Nora and ACE wraps B/L.  - KATHY/PVRs reviewed, no critical limb ischemia to preclude gentle LE compression - Venous reflux studies ordered - The patient was counseled to wear LE compression at all times while standing or walking, and to remove this compression while legs were elevated (as during sleep overnight). The patient was also counseled to elevated the LEs above the level of the heart when the compression is removed. - Nursing orders written - B/L LEs measured, Circaid compression garment prescription written - Follow up in the Wound Care clinic in 3 weeks.   6-19-24: Plan: RLE wound: wash with soap and water adptic/xtrasorb/nora/ace applied today.  Pt to use circaid at home. Circaid off at night Will order vascular testing (KATHY/PVR, VLE, IVC scan)  supplies ordered  Moisturize intact skin. Do not put between toes.  The patient was counseled as to the signs and symptoms of infection, and instructed in the event they suspect new or worsening infection or if the patient is significantly ill (fever, altered mental status, etc.), to present to the nearest ED.  f/u 1 week   6/28/24 Plan: Today, LLE wrapped with Adaptic, ABD, Nora, ACE compression RLE wrapped with adaptic, ABD, Kerlix Dx cellulitis Patient and wife counseled on concern for cellulitis of the RLE due to erythema, swelling, and warmth. Recommend he present to the ER for evaluation today. Patient and wife are in agreement and will present to [facility] after this appointment. Vitals were stable. Rx with instructions provided to give to ER.  copied to chart Dr. Nair will contact regarding results of vascular study Pt to follow up after hospitalization  7-17-24: Plan: will reorder VLE and IVC LLE HEALED.  cont circiad.  off at night RLE:  xtarsorb/nora/circaids. off at night f/u with vascular  Nursing orders given f/u 3-4 weeks   8-14-24 Plan: BLE wound HEALED Moisturize intact skin. Do not put between toes.  cont circiads.  Off at night leg elevation f/u vascular f/u wound center in 6 months

## 2024-08-14 NOTE — ASSESSMENT
[FreeTextEntry1] : 1/4/23 Patient presenting for initial evaluation and treatment for B/L LE venous stasis disease and ulceration. H/O of DM, lung CA (s/p resection), DVT (R popliteal), venous insufficiency, and multiple hospitalizations for LE edema and cellulitis. He sees ID outpatient (Dr. Pike) and is on maintenance Keflex. He was recently hospitalized in December 2022 with B/L LE ulcerations and cellulitis.  B/L LE VENOUS STASIS DERMATITIS WITH BLISTERING/ULCERATION - There is peripheral hyperpigmentation, hyperkeratotic changes and hyperemia to suggest underlying venous stasis disease of the B/L LEs. Prominent venous varicosities on ankles and feet B/L.  - Scattered blistering of L medial and R lateral malleolar regions, as well as lateral R knee.  - No local or systemic signs/symptoms of infection. No purulent discharge, no blanching erythema, no malodor, no crepitus or bullae formation.  -s/p mechanical debridement - There are no elements of the history or findings on exam to suggest any limb threatening arterial insufficiency, either acute or chronic.   - On chronic PO abx (Keflex) per ID (Dr. Pike)  6-19-24: Pt here for f/u Accompanied by wife New RLE wound (started as a blister On exam: RLE wound: slough and dry skin.  s/p mechanical debridement of deflated bister. s/p excisional debridement of wound. No s/s of infection.  LLE:  small blood blister s/p mechanical debridement No s/s of infection. dry flaking skin  6/28/24 Pt here for f/u of BLE wounds. Accompanied by wife. Had vascular testing prior to this appointment. No fevers or chills On exam: LLE wound is shallow with granulation tissue. Mechanical debridement performed. No evidence of infection RLE is edematous, erythematous and warm.- concern for cellulitis Right anterior wound is broad and shallow with red granulation tissue..  s/p mechanical debridement  Right lateral LE wound with crusted yellow slough. Excisional debridement performed.  7-17-24: Pt here for f/u Accompanied by wife No new complaints D/galindo from [facility] on 7-3-24 S/p Rt angioplasty has nurse 3x/week wearing BLE circaids Hasn't followed up with Vascular yet On exam: LLE wound:  scab s/p mechanical debridement revealing HEALED wound.  No s/s of infection.  RLE::  wound smaller, hypergran tissue s/p mechanical debridement s/p silver nitrate to hypergran. No s/s of infection.   8-14-24: Pt here for f/u No new complaints wearing BLE circaids saw vascular for f/u No longer has nurse On exam: LLE wound healed. No s/s of infection.  RLE wound:  dry scab. s/p mechanical debridement revealing healed wound

## 2024-08-14 NOTE — REVIEW OF SYSTEMS
[Lower Ext Edema] : lower extremity edema [Limb Swelling] : limb swelling [Skin Lesions] : skin lesion [Negative] : Psychiatric

## 2024-08-14 NOTE — PHYSICAL EXAM
[Ankle Swelling (On Exam)] : present [Ankle Swelling Bilaterally] : severe [Varicose Veins Of Lower Extremities] : bilaterally [] : bilaterally [Ankle Swelling On The Left] : moderate [Skin Ulcer] : ulcer [Skin Induration] : induration [Alert] : alert [Oriented to Person] : oriented to person [Oriented to Place] : oriented to place [Oriented to Time] : oriented to time [Calm] : calm [Please See PDF for Tissue Analytics] : Please See PDF for Tissue Analytics. [1+] : left 1+ [de-identified] : NAD, well-appearing [de-identified] : WNL [de-identified] : Supple [de-identified] : No increased work of breathing or use of accessory muscles. No wheezing or stridor.  [FreeTextEntry1] : Extremities are warm,  [de-identified] : Limited ROM, otherwise WNL

## 2024-09-18 DIAGNOSIS — I73.9 PERIPHERAL VASCULAR DISEASE, UNSPECIFIED: ICD-10-CM

## 2024-09-30 NOTE — ED ADULT NURSE REASSESSMENT NOTE - NS ED NURSE REASSESS COMMENT FT1
Pt in no acute distress denies any discomfort at this time - pending disposition norma means
oriented to person, place, time and situation

## 2024-10-29 ENCOUNTER — APPOINTMENT (OUTPATIENT)
Dept: VASCULAR SURGERY | Facility: CLINIC | Age: 84
End: 2024-10-29

## 2024-11-05 ENCOUNTER — APPOINTMENT (OUTPATIENT)
Dept: INFECTIOUS DISEASE | Facility: CLINIC | Age: 84
End: 2024-11-05

## 2025-01-22 ENCOUNTER — NON-APPOINTMENT (OUTPATIENT)
Age: 85
End: 2025-01-22

## 2025-01-22 ENCOUNTER — APPOINTMENT (OUTPATIENT)
Dept: CARDIOLOGY | Facility: CLINIC | Age: 85
End: 2025-01-22
Payer: MEDICARE

## 2025-01-22 VITALS
HEIGHT: 74 IN | HEART RATE: 87 BPM | BODY MASS INDEX: 39.01 KG/M2 | TEMPERATURE: 98 F | OXYGEN SATURATION: 93 % | WEIGHT: 304 LBS | DIASTOLIC BLOOD PRESSURE: 75 MMHG | SYSTOLIC BLOOD PRESSURE: 172 MMHG

## 2025-01-22 DIAGNOSIS — I10 ESSENTIAL (PRIMARY) HYPERTENSION: ICD-10-CM

## 2025-01-22 DIAGNOSIS — I73.9 PERIPHERAL VASCULAR DISEASE, UNSPECIFIED: ICD-10-CM

## 2025-01-22 DIAGNOSIS — I87.2 VENOUS INSUFFICIENCY (CHRONIC) (PERIPHERAL): ICD-10-CM

## 2025-01-22 DIAGNOSIS — I82.409 ACUTE EMBOLISM AND THROMBOSIS OF UNSPECIFIED DEEP VEINS OF UNSPECIFIED LOWER EXTREMITY: ICD-10-CM

## 2025-01-22 PROCEDURE — G2211 COMPLEX E/M VISIT ADD ON: CPT

## 2025-01-22 PROCEDURE — 99214 OFFICE O/P EST MOD 30 MIN: CPT

## 2025-01-22 PROCEDURE — 93000 ELECTROCARDIOGRAM COMPLETE: CPT

## 2025-02-25 NOTE — PATIENT PROFILE ADULT. - ALCOHOL USE HISTORY SINGLE SELECT
Number Of Freeze-Thaw Cycles: 2 freeze-thaw cycles Duration Of Freeze Thaw-Cycle (Seconds): 10 Show Spray Paint Technique Variable?: Yes Render In Bullet Format When Appropriate: No Medical Necessity Information: It is in your best interest to select a reason for this procedure from the list below. All of these items fulfill various CMS LCD requirements except the new and changing color options. Detail Level: Zone Post-Care Instructions: I reviewed with the patient in detail post-care instructions. Patient is to wear sunprotection, and avoid picking at any of the treated lesions. Pt may apply Vaseline to crusted or scabbing areas. Total Number Of Lesions Treated: 3 Medical Necessity Clause: This procedure was medically necessary because the lesions that were treated were: Consent: The patient's consent was obtained including but not limited to risks of crusting, scabbing, blistering, scarring, darker or lighter pigmentary change, recurrence, incomplete removal and infection. never Spray Paint Text: The liquid nitrogen was applied to the skin utilizing a spray paint frosting technique.

## 2025-03-25 ENCOUNTER — APPOINTMENT (OUTPATIENT)
Dept: CT IMAGING | Facility: IMAGING CENTER | Age: 85
End: 2025-03-25

## 2025-04-01 ENCOUNTER — NON-APPOINTMENT (OUTPATIENT)
Age: 85
End: 2025-04-01

## 2025-04-01 ENCOUNTER — APPOINTMENT (OUTPATIENT)
Dept: CT IMAGING | Facility: IMAGING CENTER | Age: 85
End: 2025-04-01
Payer: MEDICARE

## 2025-04-01 ENCOUNTER — OUTPATIENT (OUTPATIENT)
Dept: OUTPATIENT SERVICES | Facility: HOSPITAL | Age: 85
LOS: 1 days | End: 2025-04-01
Payer: COMMERCIAL

## 2025-04-01 DIAGNOSIS — C34.90 MALIGNANT NEOPLASM OF UNSPECIFIED PART OF UNSPECIFIED BRONCHUS OR LUNG: ICD-10-CM

## 2025-04-01 DIAGNOSIS — R91.8 OTHER NONSPECIFIC ABNORMAL FINDING OF LUNG FIELD: Chronic | ICD-10-CM

## 2025-04-01 DIAGNOSIS — Z00.8 ENCOUNTER FOR OTHER GENERAL EXAMINATION: ICD-10-CM

## 2025-04-01 DIAGNOSIS — Z98.89 OTHER SPECIFIED POSTPROCEDURAL STATES: Chronic | ICD-10-CM

## 2025-04-01 PROCEDURE — 71250 CT THORAX DX C-: CPT | Mod: 26

## 2025-04-01 PROCEDURE — 71250 CT THORAX DX C-: CPT

## 2025-04-08 ENCOUNTER — APPOINTMENT (OUTPATIENT)
Dept: THORACIC SURGERY | Facility: CLINIC | Age: 85
End: 2025-04-08
Payer: MEDICARE

## 2025-04-15 ENCOUNTER — APPOINTMENT (OUTPATIENT)
Dept: THORACIC SURGERY | Facility: CLINIC | Age: 85
End: 2025-04-15
Payer: MEDICARE

## 2025-04-15 DIAGNOSIS — C34.90 MALIGNANT NEOPLASM OF UNSPECIFIED PART OF UNSPECIFIED BRONCHUS OR LUNG: ICD-10-CM

## 2025-04-15 DIAGNOSIS — R91.8 OTHER NONSPECIFIC ABNORMAL FINDING OF LUNG FIELD: ICD-10-CM

## 2025-04-15 DIAGNOSIS — C80.1 MALIGNANT (PRIMARY) NEOPLASM, UNSPECIFIED: ICD-10-CM

## 2025-04-15 PROCEDURE — 99213 OFFICE O/P EST LOW 20 MIN: CPT | Mod: 93

## 2025-05-01 ENCOUNTER — APPOINTMENT (OUTPATIENT)
Dept: WOUND CARE | Facility: HOSPITAL | Age: 85
End: 2025-05-01
Payer: MEDICARE

## 2025-05-01 VITALS
RESPIRATION RATE: 18 BRPM | OXYGEN SATURATION: 93 % | HEART RATE: 91 BPM | TEMPERATURE: 97.8 F | SYSTOLIC BLOOD PRESSURE: 172 MMHG | DIASTOLIC BLOOD PRESSURE: 80 MMHG

## 2025-05-01 DIAGNOSIS — S51.809A UNSPECIFIED OPEN WOUND OF UNSPECIFIED FOREARM, INITIAL ENCOUNTER: ICD-10-CM

## 2025-05-01 DIAGNOSIS — S41.102A UNSPECIFIED OPEN WOUND OF LEFT UPPER ARM, INITIAL ENCOUNTER: ICD-10-CM

## 2025-05-01 DIAGNOSIS — S81.801A UNSPECIFIED OPEN WOUND, RIGHT LOWER LEG, INITIAL ENCOUNTER: ICD-10-CM

## 2025-05-01 DIAGNOSIS — S51.002A UNSPECIFIED OPEN WOUND OF LEFT ELBOW, INITIAL ENCOUNTER: ICD-10-CM

## 2025-05-01 DIAGNOSIS — S61.402A UNSPECIFIED OPEN WOUND OF LEFT HAND, INITIAL ENCOUNTER: ICD-10-CM

## 2025-05-01 PROCEDURE — 11042 DBRDMT SUBQ TIS 1ST 20SQCM/<: CPT

## 2025-05-22 ENCOUNTER — APPOINTMENT (OUTPATIENT)
Dept: WOUND CARE | Facility: HOSPITAL | Age: 85
End: 2025-05-22
Payer: MEDICARE

## 2025-05-22 VITALS
OXYGEN SATURATION: 94 % | RESPIRATION RATE: 18 BRPM | WEIGHT: 304 LBS | SYSTOLIC BLOOD PRESSURE: 153 MMHG | HEIGHT: 74 IN | HEART RATE: 67 BPM | BODY MASS INDEX: 39.01 KG/M2 | TEMPERATURE: 97.9 F | DIASTOLIC BLOOD PRESSURE: 75 MMHG

## 2025-05-22 DIAGNOSIS — I87.2 VENOUS INSUFFICIENCY (CHRONIC) (PERIPHERAL): ICD-10-CM

## 2025-05-22 DIAGNOSIS — S51.002D: ICD-10-CM

## 2025-05-22 DIAGNOSIS — R60.0 LOCALIZED EDEMA: ICD-10-CM

## 2025-05-22 DIAGNOSIS — S81.801A UNSPECIFIED OPEN WOUND, RIGHT LOWER LEG, INITIAL ENCOUNTER: ICD-10-CM

## 2025-05-22 PROCEDURE — 99214 OFFICE O/P EST MOD 30 MIN: CPT

## 2025-07-23 ENCOUNTER — NON-APPOINTMENT (OUTPATIENT)
Age: 85
End: 2025-07-23

## 2025-07-23 ENCOUNTER — APPOINTMENT (OUTPATIENT)
Dept: CARDIOLOGY | Facility: CLINIC | Age: 85
End: 2025-07-23
Payer: MEDICARE

## 2025-07-23 VITALS
HEART RATE: 62 BPM | DIASTOLIC BLOOD PRESSURE: 81 MMHG | OXYGEN SATURATION: 92 % | HEIGHT: 74 IN | SYSTOLIC BLOOD PRESSURE: 166 MMHG | TEMPERATURE: 97.7 F | RESPIRATION RATE: 16 BRPM

## 2025-07-23 DIAGNOSIS — I87.2 VENOUS INSUFFICIENCY (CHRONIC) (PERIPHERAL): ICD-10-CM

## 2025-07-23 DIAGNOSIS — I10 ESSENTIAL (PRIMARY) HYPERTENSION: ICD-10-CM

## 2025-07-23 DIAGNOSIS — I73.9 PERIPHERAL VASCULAR DISEASE, UNSPECIFIED: ICD-10-CM

## 2025-07-23 DIAGNOSIS — I82.409 ACUTE EMBOLISM AND THROMBOSIS OF UNSPECIFIED DEEP VEINS OF UNSPECIFIED LOWER EXTREMITY: ICD-10-CM

## 2025-07-23 PROCEDURE — 99214 OFFICE O/P EST MOD 30 MIN: CPT

## 2025-07-23 PROCEDURE — 93000 ELECTROCARDIOGRAM COMPLETE: CPT

## 2025-07-23 PROCEDURE — G2211 COMPLEX E/M VISIT ADD ON: CPT

## 2025-07-29 ENCOUNTER — RX RENEWAL (OUTPATIENT)
Age: 85
End: 2025-07-29

## (undated) DEVICE — MARKING PEN W RULER

## (undated) DEVICE — WARMING BLANKET UPPER ADULT

## (undated) DEVICE — DRAPE FEMORAL ANGIOGRAPHY W TROUGH

## (undated) DEVICE — DRSG BENZOIN 0.6CC

## (undated) DEVICE — DRSG KERLIX ROLL 4.5"

## (undated) DEVICE — Device

## (undated) DEVICE — SOL IRR BAG NS 0.9% 1000ML

## (undated) DEVICE — INFLATOR ENCORE 26

## (undated) DEVICE — DRSG CURITY GAUZE SPONGE 4 X 4" 12-PLY

## (undated) DEVICE — GOWN XL

## (undated) DEVICE — SPONGE X-RAY 4X8"

## (undated) DEVICE — DRAPE COVER SNAP 36X30"

## (undated) DEVICE — DRAPE TOWEL BLUE 17" X 24"

## (undated) DEVICE — VENODYNE/SCD SLEEVE CALF MEDIUM

## (undated) DEVICE — SYR LUER LOK 20CC

## (undated) DEVICE — DRSG TEGADERM 4X4.75"

## (undated) DEVICE — SYR LUER LOK 10CC

## (undated) DEVICE — BLADE SURGICAL #11 CARBON

## (undated) DEVICE — DRAPE 3/4 SHEET 52X76"

## (undated) DEVICE — DRAPE LARGE SHEET 72X85"

## (undated) DEVICE — COVER PROBE W/GEL 18X120CM STRL 50/BX

## (undated) DEVICE — POSITIONER STRAP ARMBOARD VELCRO TS-30